# Patient Record
Sex: FEMALE | Race: BLACK OR AFRICAN AMERICAN | Employment: UNEMPLOYED | URBAN - METROPOLITAN AREA
[De-identification: names, ages, dates, MRNs, and addresses within clinical notes are randomized per-mention and may not be internally consistent; named-entity substitution may affect disease eponyms.]

---

## 2017-02-13 ENCOUNTER — HOSPITAL ENCOUNTER (EMERGENCY)
Age: 52
Discharge: HOME | End: 2017-02-13
Attending: EMERGENCY MEDICINE

## 2017-02-13 ENCOUNTER — APPOINTMENT (OUTPATIENT)
Dept: GENERAL RADIOLOGY | Age: 52
End: 2017-02-13

## 2017-02-13 VITALS
WEIGHT: 233 LBS | DIASTOLIC BLOOD PRESSURE: 97 MMHG | HEIGHT: 67 IN | SYSTOLIC BLOOD PRESSURE: 138 MMHG | HEART RATE: 79 BPM | BODY MASS INDEX: 36.57 KG/M2 | RESPIRATION RATE: 16 BRPM | TEMPERATURE: 98.5 F | OXYGEN SATURATION: 99 %

## 2017-02-13 DIAGNOSIS — S92.501A FRACTURE OF FOURTH TOE, RIGHT, CLOSED, INITIAL ENCOUNTER: Primary | ICD-10-CM

## 2017-02-13 PROCEDURE — 73630 X-RAY EXAM OF FOOT: CPT

## 2017-02-13 PROCEDURE — 6370000000 HC RX 637 (ALT 250 FOR IP): Performed by: PHYSICIAN ASSISTANT

## 2017-02-13 PROCEDURE — 73630 X-RAY EXAM OF FOOT: CPT | Performed by: RADIOLOGY

## 2017-02-13 PROCEDURE — 99283 EMERGENCY DEPT VISIT LOW MDM: CPT

## 2017-02-13 RX ORDER — IBUPROFEN 600 MG/1
600 TABLET ORAL ONCE
Status: COMPLETED | OUTPATIENT
Start: 2017-02-13 | End: 2017-02-13

## 2017-02-13 RX ORDER — IBUPROFEN 600 MG/1
600 TABLET ORAL EVERY 6 HOURS PRN
Qty: 30 TABLET | Refills: 0 | Status: SHIPPED | OUTPATIENT
Start: 2017-02-13 | End: 2017-11-01 | Stop reason: ALTCHOICE

## 2017-02-13 RX ADMIN — IBUPROFEN 600 MG: 600 TABLET, FILM COATED ORAL at 15:03

## 2017-02-13 ASSESSMENT — PAIN SCALES - GENERAL
PAINLEVEL_OUTOF10: 8
PAINLEVEL_OUTOF10: 8

## 2017-02-13 ASSESSMENT — ENCOUNTER SYMPTOMS
COUGH: 0
WHEEZING: 0
COLOR CHANGE: 0

## 2017-03-14 ENCOUNTER — HOSPITAL ENCOUNTER (EMERGENCY)
Age: 52
Discharge: HOME | End: 2017-03-14
Attending: EMERGENCY MEDICINE

## 2017-03-14 ENCOUNTER — APPOINTMENT (OUTPATIENT)
Dept: GENERAL RADIOLOGY | Age: 52
End: 2017-03-14

## 2017-03-14 VITALS
RESPIRATION RATE: 15 BRPM | OXYGEN SATURATION: 98 % | TEMPERATURE: 98.2 F | DIASTOLIC BLOOD PRESSURE: 82 MMHG | WEIGHT: 228 LBS | SYSTOLIC BLOOD PRESSURE: 158 MMHG | BODY MASS INDEX: 35.79 KG/M2 | HEART RATE: 86 BPM | HEIGHT: 67 IN

## 2017-03-14 DIAGNOSIS — M79.671 RIGHT FOOT PAIN: Primary | ICD-10-CM

## 2017-03-14 PROCEDURE — 99283 EMERGENCY DEPT VISIT LOW MDM: CPT

## 2017-03-14 PROCEDURE — 73630 X-RAY EXAM OF FOOT: CPT

## 2017-03-14 ASSESSMENT — ENCOUNTER SYMPTOMS
BACK PAIN: 0
COUGH: 0
WHEEZING: 0

## 2017-03-14 ASSESSMENT — PAIN DESCRIPTION - LOCATION: LOCATION: FOOT

## 2017-03-14 ASSESSMENT — PAIN DESCRIPTION - PAIN TYPE: TYPE: ACUTE PAIN

## 2017-03-14 ASSESSMENT — PAIN SCALES - GENERAL: PAINLEVEL_OUTOF10: 8

## 2017-03-22 ENCOUNTER — OFFICE VISIT (OUTPATIENT)
Dept: ORTHOPEDIC SURGERY | Age: 52
End: 2017-03-22

## 2017-03-22 DIAGNOSIS — S92.514A CLOSED NONDISPLACED FRACTURE OF PROXIMAL PHALANX OF LESSER TOE OF RIGHT FOOT, INITIAL ENCOUNTER: Primary | ICD-10-CM

## 2017-03-22 PROCEDURE — 99203 OFFICE O/P NEW LOW 30 MIN: CPT | Performed by: STUDENT IN AN ORGANIZED HEALTH CARE EDUCATION/TRAINING PROGRAM

## 2017-04-19 ENCOUNTER — OFFICE VISIT (OUTPATIENT)
Dept: ORTHOPEDIC SURGERY | Age: 52
End: 2017-04-19

## 2017-04-19 VITALS — BODY MASS INDEX: 35.64 KG/M2 | HEIGHT: 67 IN | WEIGHT: 227.07 LBS

## 2017-04-19 DIAGNOSIS — M77.41 METATARSALGIA OF RIGHT FOOT: ICD-10-CM

## 2017-04-19 DIAGNOSIS — S92.514A CLOSED NONDISPLACED FRACTURE OF PROXIMAL PHALANX OF LESSER TOE OF RIGHT FOOT, INITIAL ENCOUNTER: Primary | ICD-10-CM

## 2017-04-19 PROCEDURE — 99213 OFFICE O/P EST LOW 20 MIN: CPT | Performed by: STUDENT IN AN ORGANIZED HEALTH CARE EDUCATION/TRAINING PROGRAM

## 2017-04-19 RX ORDER — TRAMADOL HYDROCHLORIDE 50 MG/1
50 TABLET ORAL EVERY 8 HOURS PRN
COMMUNITY
End: 2017-11-01

## 2017-05-03 ENCOUNTER — HOSPITAL ENCOUNTER (EMERGENCY)
Age: 52
Discharge: HOME | End: 2017-05-03
Attending: EMERGENCY MEDICINE

## 2017-05-03 ENCOUNTER — APPOINTMENT (OUTPATIENT)
Dept: GENERAL RADIOLOGY | Age: 52
End: 2017-05-03

## 2017-05-03 ENCOUNTER — APPOINTMENT (OUTPATIENT)
Dept: CT IMAGING | Age: 52
End: 2017-05-03

## 2017-05-03 VITALS
HEIGHT: 67 IN | DIASTOLIC BLOOD PRESSURE: 85 MMHG | WEIGHT: 230 LBS | SYSTOLIC BLOOD PRESSURE: 158 MMHG | BODY MASS INDEX: 36.1 KG/M2 | HEART RATE: 82 BPM | OXYGEN SATURATION: 99 % | TEMPERATURE: 98.3 F | RESPIRATION RATE: 21 BRPM

## 2017-05-03 DIAGNOSIS — K80.20 CALCULUS OF GALLBLADDER WITHOUT CHOLECYSTITIS WITHOUT OBSTRUCTION: Primary | ICD-10-CM

## 2017-05-03 DIAGNOSIS — R10.13 ABDOMINAL PAIN, EPIGASTRIC: ICD-10-CM

## 2017-05-03 LAB
-: ABNORMAL
ABSOLUTE EOS #: 0.2 K/UL (ref 0–0.4)
ABSOLUTE LYMPH #: 2.5 K/UL (ref 1–4.8)
ABSOLUTE MONO #: 0.5 K/UL (ref 0.1–1.3)
ALBUMIN SERPL-MCNC: 4 G/DL (ref 3.5–5.2)
ALBUMIN/GLOBULIN RATIO: ABNORMAL (ref 1–2.5)
ALP BLD-CCNC: 170 U/L (ref 35–104)
ALT SERPL-CCNC: 14 U/L (ref 5–33)
AMORPHOUS: ABNORMAL
ANION GAP SERPL CALCULATED.3IONS-SCNC: 13 MMOL/L (ref 9–17)
AST SERPL-CCNC: 13 U/L
BACTERIA: ABNORMAL
BASOPHILS # BLD: 1 %
BASOPHILS ABSOLUTE: 0.1 K/UL (ref 0–0.2)
BILIRUB SERPL-MCNC: 0.16 MG/DL (ref 0.3–1.2)
BILIRUBIN URINE: NEGATIVE
BNP INTERPRETATION: NORMAL
BUN BLDV-MCNC: 3 MG/DL (ref 6–20)
BUN/CREAT BLD: ABNORMAL (ref 9–20)
CALCIUM SERPL-MCNC: 9.1 MG/DL (ref 8.6–10.4)
CASTS UA: ABNORMAL /LPF
CHLORIDE BLD-SCNC: 101 MMOL/L (ref 98–107)
CO2: 26 MMOL/L (ref 20–31)
COLOR: YELLOW
COMMENT UA: ABNORMAL
CREAT SERPL-MCNC: 0.49 MG/DL (ref 0.5–0.9)
CRYSTALS, UA: ABNORMAL /HPF
DIFFERENTIAL TYPE: ABNORMAL
EOSINOPHILS RELATIVE PERCENT: 3 %
EPITHELIAL CELLS UA: ABNORMAL /HPF
GFR AFRICAN AMERICAN: >60 ML/MIN
GFR NON-AFRICAN AMERICAN: >60 ML/MIN
GFR SERPL CREATININE-BSD FRML MDRD: ABNORMAL ML/MIN/{1.73_M2}
GFR SERPL CREATININE-BSD FRML MDRD: ABNORMAL ML/MIN/{1.73_M2}
GLUCOSE BLD-MCNC: 198 MG/DL (ref 70–99)
GLUCOSE URINE: ABNORMAL
HCT VFR BLD CALC: 42.8 % (ref 36–46)
HEMOGLOBIN: 14.4 G/DL (ref 12–16)
KETONES, URINE: NEGATIVE
LEUKOCYTE ESTERASE, URINE: ABNORMAL
LIPASE: 26 U/L (ref 13–60)
LYMPHOCYTES # BLD: 44 %
MCH RBC QN AUTO: 27.1 PG (ref 26–34)
MCHC RBC AUTO-ENTMCNC: 33.7 G/DL (ref 31–37)
MCV RBC AUTO: 80.3 FL (ref 80–100)
MONOCYTES # BLD: 10 %
MUCUS: ABNORMAL
NITRITE, URINE: NEGATIVE
OTHER OBSERVATIONS UA: ABNORMAL
PDW BLD-RTO: 13.5 % (ref 11.5–14.9)
PH UA: 5.5 (ref 5–8)
PLATELET # BLD: 192 K/UL (ref 150–450)
PLATELET ESTIMATE: ABNORMAL
PMV BLD AUTO: 9.2 FL (ref 6–12)
POTASSIUM SERPL-SCNC: 3.7 MMOL/L (ref 3.7–5.3)
PRO-BNP: 22 PG/ML
PROTEIN UA: NEGATIVE
RBC # BLD: 5.33 M/UL (ref 4–5.2)
RBC # BLD: ABNORMAL 10*6/UL
RBC UA: ABNORMAL /HPF
RENAL EPITHELIAL, UA: ABNORMAL /HPF
SEG NEUTROPHILS: 42 %
SEGMENTED NEUTROPHILS ABSOLUTE COUNT: 2.3 K/UL (ref 1.3–9.1)
SODIUM BLD-SCNC: 140 MMOL/L (ref 135–144)
SPECIFIC GRAVITY UA: 1.01 (ref 1–1.03)
TOTAL PROTEIN: 6.9 G/DL (ref 6.4–8.3)
TRICHOMONAS: ABNORMAL
TROPONIN INTERP: NORMAL
TROPONIN INTERP: NORMAL
TROPONIN T: <0.03 NG/ML
TROPONIN T: <0.03 NG/ML
TURBIDITY: ABNORMAL
URINE HGB: NEGATIVE
UROBILINOGEN, URINE: NORMAL
WBC # BLD: 5.6 K/UL (ref 3.5–11)
WBC # BLD: ABNORMAL 10*3/UL
WBC UA: ABNORMAL /HPF
YEAST: ABNORMAL

## 2017-05-03 PROCEDURE — 96375 TX/PRO/DX INJ NEW DRUG ADDON: CPT

## 2017-05-03 PROCEDURE — 84484 ASSAY OF TROPONIN QUANT: CPT

## 2017-05-03 PROCEDURE — 6360000002 HC RX W HCPCS: Performed by: EMERGENCY MEDICINE

## 2017-05-03 PROCEDURE — 6370000000 HC RX 637 (ALT 250 FOR IP): Performed by: EMERGENCY MEDICINE

## 2017-05-03 PROCEDURE — 80053 COMPREHEN METABOLIC PANEL: CPT

## 2017-05-03 PROCEDURE — 85025 COMPLETE CBC W/AUTO DIFF WBC: CPT

## 2017-05-03 PROCEDURE — 99284 EMERGENCY DEPT VISIT MOD MDM: CPT

## 2017-05-03 PROCEDURE — 36415 COLL VENOUS BLD VENIPUNCTURE: CPT

## 2017-05-03 PROCEDURE — 93005 ELECTROCARDIOGRAM TRACING: CPT

## 2017-05-03 PROCEDURE — 2580000003 HC RX 258: Performed by: EMERGENCY MEDICINE

## 2017-05-03 PROCEDURE — 87086 URINE CULTURE/COLONY COUNT: CPT

## 2017-05-03 PROCEDURE — 83880 ASSAY OF NATRIURETIC PEPTIDE: CPT

## 2017-05-03 PROCEDURE — 74177 CT ABD & PELVIS W/CONTRAST: CPT

## 2017-05-03 PROCEDURE — 81001 URINALYSIS AUTO W/SCOPE: CPT

## 2017-05-03 PROCEDURE — 71020 XR CHEST STANDARD TWO VW: CPT

## 2017-05-03 PROCEDURE — 6360000004 HC RX CONTRAST MEDICATION: Performed by: EMERGENCY MEDICINE

## 2017-05-03 PROCEDURE — 96374 THER/PROPH/DIAG INJ IV PUSH: CPT

## 2017-05-03 PROCEDURE — 83690 ASSAY OF LIPASE: CPT

## 2017-05-03 RX ORDER — SODIUM CHLORIDE 0.9 % (FLUSH) 0.9 %
10 SYRINGE (ML) INJECTION PRN
Status: DISCONTINUED | OUTPATIENT
Start: 2017-05-03 | End: 2017-05-04 | Stop reason: HOSPADM

## 2017-05-03 RX ORDER — ASPIRIN 81 MG/1
324 TABLET, CHEWABLE ORAL ONCE
Status: DISCONTINUED | OUTPATIENT
Start: 2017-05-03 | End: 2017-05-04 | Stop reason: HOSPADM

## 2017-05-03 RX ORDER — CALCIUM CARBONATE 200(500)MG
1 TABLET,CHEWABLE ORAL DAILY
Qty: 30 TABLET | Refills: 0 | Status: SHIPPED | OUTPATIENT
Start: 2017-05-03 | End: 2017-06-02

## 2017-05-03 RX ORDER — ONDANSETRON 2 MG/ML
4 INJECTION INTRAMUSCULAR; INTRAVENOUS ONCE
Status: COMPLETED | OUTPATIENT
Start: 2017-05-03 | End: 2017-05-03

## 2017-05-03 RX ORDER — FENTANYL CITRATE 50 UG/ML
25 INJECTION, SOLUTION INTRAMUSCULAR; INTRAVENOUS ONCE
Status: COMPLETED | OUTPATIENT
Start: 2017-05-03 | End: 2017-05-03

## 2017-05-03 RX ORDER — MAGNESIUM HYDROXIDE/ALUMINUM HYDROXICE/SIMETHICONE 120; 1200; 1200 MG/30ML; MG/30ML; MG/30ML
30 SUSPENSION ORAL
Status: COMPLETED | OUTPATIENT
Start: 2017-05-03 | End: 2017-05-03

## 2017-05-03 RX ORDER — 0.9 % SODIUM CHLORIDE 0.9 %
100 INTRAVENOUS SOLUTION INTRAVENOUS ONCE
Status: COMPLETED | OUTPATIENT
Start: 2017-05-03 | End: 2017-05-03

## 2017-05-03 RX ADMIN — IOVERSOL 130 ML: 741 INJECTION INTRA-ARTERIAL; INTRAVENOUS at 21:19

## 2017-05-03 RX ADMIN — SODIUM CHLORIDE 100 ML: 9 INJECTION, SOLUTION INTRAVENOUS at 21:21

## 2017-05-03 RX ADMIN — ONDANSETRON 4 MG: 2 INJECTION INTRAMUSCULAR; INTRAVENOUS at 20:30

## 2017-05-03 RX ADMIN — FENTANYL CITRATE 25 MCG: 50 INJECTION INTRAMUSCULAR; INTRAVENOUS at 22:39

## 2017-05-03 RX ADMIN — Medication 10 ML: at 21:20

## 2017-05-03 RX ADMIN — ALUMINUM HYDROXIDE, MAGNESIUM HYDROXIDE, AND SIMETHICONE 30 ML: 200; 200; 20 SUSPENSION ORAL at 22:39

## 2017-05-03 ASSESSMENT — ENCOUNTER SYMPTOMS
SHORTNESS OF BREATH: 1
NAUSEA: 1
BACK PAIN: 0
COUGH: 1
VOMITING: 0
BLOOD IN STOOL: 0
DIARRHEA: 0
CONSTIPATION: 0
ABDOMINAL PAIN: 1
RHINORRHEA: 0

## 2017-05-03 ASSESSMENT — PAIN DESCRIPTION - PAIN TYPE: TYPE: ACUTE PAIN

## 2017-05-03 ASSESSMENT — PAIN DESCRIPTION - LOCATION: LOCATION: ABDOMEN

## 2017-05-03 ASSESSMENT — PAIN SCALES - GENERAL
PAINLEVEL_OUTOF10: 6
PAINLEVEL_OUTOF10: 7

## 2017-05-03 ASSESSMENT — PAIN DESCRIPTION - FREQUENCY: FREQUENCY: CONTINUOUS

## 2017-05-03 ASSESSMENT — PAIN DESCRIPTION - DESCRIPTORS: DESCRIPTORS: SQUEEZING

## 2017-05-04 LAB
CULTURE: NORMAL
CULTURE: NORMAL
EKG ATRIAL RATE: 84 BPM
EKG P AXIS: 34 DEGREES
EKG P-R INTERVAL: 156 MS
EKG Q-T INTERVAL: 388 MS
EKG QRS DURATION: 90 MS
EKG QTC CALCULATION (BAZETT): 458 MS
EKG R AXIS: -31 DEGREES
EKG T AXIS: 56 DEGREES
EKG VENTRICULAR RATE: 84 BPM
Lab: NORMAL
SPECIMEN DESCRIPTION: NORMAL
SPECIMEN DESCRIPTION: NORMAL
STATUS: NORMAL

## 2017-09-25 ENCOUNTER — HOSPITAL ENCOUNTER (INPATIENT)
Age: 52
LOS: 4 days | Discharge: HOME | DRG: 416 | End: 2017-10-01
Attending: EMERGENCY MEDICINE | Admitting: EMERGENCY MEDICINE

## 2017-09-25 ENCOUNTER — APPOINTMENT (OUTPATIENT)
Dept: CT IMAGING | Age: 52
DRG: 416 | End: 2017-09-25

## 2017-09-25 DIAGNOSIS — R10.11 RIGHT UPPER QUADRANT ABDOMINAL PAIN: Primary | ICD-10-CM

## 2017-09-25 LAB
-: ABNORMAL
ABSOLUTE EOS #: 0.12 K/UL (ref 0–0.4)
ABSOLUTE LYMPH #: 2.6 K/UL (ref 1–4.8)
ABSOLUTE MONO #: 0.56 K/UL (ref 0.1–0.8)
ALBUMIN SERPL-MCNC: 3.8 G/DL (ref 3.5–5.2)
ALBUMIN/GLOBULIN RATIO: 1.2 (ref 1–2.5)
ALP BLD-CCNC: 157 U/L (ref 35–104)
ALT SERPL-CCNC: 11 U/L (ref 5–33)
AMORPHOUS: ABNORMAL
ANION GAP SERPL CALCULATED.3IONS-SCNC: 13 MMOL/L (ref 9–17)
AST SERPL-CCNC: 13 U/L
BACTERIA: ABNORMAL
BASOPHILS # BLD: 0 %
BASOPHILS ABSOLUTE: 0 K/UL (ref 0–0.2)
BILIRUB SERPL-MCNC: 0.25 MG/DL (ref 0.3–1.2)
BILIRUBIN DIRECT: 0.1 MG/DL
BILIRUBIN URINE: NEGATIVE
BILIRUBIN, INDIRECT: 0.15 MG/DL (ref 0–1)
BUN BLDV-MCNC: 3 MG/DL (ref 6–20)
BUN/CREAT BLD: ABNORMAL (ref 9–20)
CALCIUM SERPL-MCNC: 9.1 MG/DL (ref 8.6–10.4)
CASTS UA: ABNORMAL /LPF (ref 0–8)
CHLORIDE BLD-SCNC: 100 MMOL/L (ref 98–107)
CO2: 27 MMOL/L (ref 20–31)
COLOR: YELLOW
CREAT SERPL-MCNC: 0.48 MG/DL (ref 0.5–0.9)
CRYSTALS, UA: ABNORMAL /HPF
DIFFERENTIAL TYPE: ABNORMAL
DIRECT EXAM: NORMAL
EOSINOPHILS RELATIVE PERCENT: 2 %
EPITHELIAL CELLS UA: ABNORMAL /HPF (ref 0–5)
GFR AFRICAN AMERICAN: >60 ML/MIN
GFR NON-AFRICAN AMERICAN: >60 ML/MIN
GFR SERPL CREATININE-BSD FRML MDRD: ABNORMAL ML/MIN/{1.73_M2}
GFR SERPL CREATININE-BSD FRML MDRD: ABNORMAL ML/MIN/{1.73_M2}
GLOBULIN: ABNORMAL G/DL (ref 1.5–3.8)
GLUCOSE BLD-MCNC: 244 MG/DL (ref 70–99)
GLUCOSE URINE: ABNORMAL
HCT VFR BLD CALC: 44.1 % (ref 36–46)
HEMOGLOBIN: 14.7 G/DL (ref 12–16)
KETONES, URINE: NEGATIVE
LEUKOCYTE ESTERASE, URINE: NEGATIVE
LIPASE: 19 U/L (ref 13–60)
LYMPHOCYTES # BLD: 42 %
Lab: NORMAL
MCH RBC QN AUTO: 26.6 PG (ref 26–34)
MCHC RBC AUTO-ENTMCNC: 33.3 G/DL (ref 31–37)
MCV RBC AUTO: 79.8 FL (ref 80–100)
MONOCYTES # BLD: 9 %
MORPHOLOGY: ABNORMAL
MUCUS: ABNORMAL
NITRITE, URINE: NEGATIVE
OTHER OBSERVATIONS UA: ABNORMAL
PDW BLD-RTO: 13.1 % (ref 12.5–15.4)
PH UA: 6 (ref 5–8)
PLATELET # BLD: 200 K/UL (ref 140–450)
PLATELET ESTIMATE: ABNORMAL
PMV BLD AUTO: 8.9 FL (ref 6–12)
POTASSIUM SERPL-SCNC: 3.8 MMOL/L (ref 3.7–5.3)
PROTEIN UA: NEGATIVE
RBC # BLD: 5.52 M/UL (ref 4–5.2)
RBC # BLD: ABNORMAL 10*6/UL
RBC UA: ABNORMAL /HPF (ref 0–4)
RENAL EPITHELIAL, UA: ABNORMAL /HPF
SEG NEUTROPHILS: 47 %
SEGMENTED NEUTROPHILS ABSOLUTE COUNT: 2.92 K/UL (ref 1.8–7.7)
SODIUM BLD-SCNC: 140 MMOL/L (ref 135–144)
SPECIFIC GRAVITY UA: 1.02 (ref 1–1.03)
SPECIMEN DESCRIPTION: NORMAL
STATUS: NORMAL
TOTAL PROTEIN: 7 G/DL (ref 6.4–8.3)
TRICHOMONAS: ABNORMAL
TURBIDITY: CLEAR
URINE HGB: NEGATIVE
UROBILINOGEN, URINE: NORMAL
WBC # BLD: 6.2 K/UL (ref 3.5–11)
WBC # BLD: ABNORMAL 10*3/UL
WBC UA: ABNORMAL /HPF (ref 0–5)
YEAST: ABNORMAL

## 2017-09-25 PROCEDURE — 87660 TRICHOMONAS VAGIN DIR PROBE: CPT

## 2017-09-25 PROCEDURE — 74176 CT ABD & PELVIS W/O CONTRAST: CPT

## 2017-09-25 PROCEDURE — 80076 HEPATIC FUNCTION PANEL: CPT

## 2017-09-25 PROCEDURE — 87480 CANDIDA DNA DIR PROBE: CPT

## 2017-09-25 PROCEDURE — 87491 CHLMYD TRACH DNA AMP PROBE: CPT

## 2017-09-25 PROCEDURE — 87591 N.GONORRHOEAE DNA AMP PROB: CPT

## 2017-09-25 PROCEDURE — 81001 URINALYSIS AUTO W/SCOPE: CPT

## 2017-09-25 PROCEDURE — 87510 GARDNER VAG DNA DIR PROBE: CPT

## 2017-09-25 PROCEDURE — 83690 ASSAY OF LIPASE: CPT

## 2017-09-25 PROCEDURE — 85025 COMPLETE CBC W/AUTO DIFF WBC: CPT

## 2017-09-25 PROCEDURE — 80048 BASIC METABOLIC PNL TOTAL CA: CPT

## 2017-09-25 PROCEDURE — 99285 EMERGENCY DEPT VISIT HI MDM: CPT

## 2017-09-25 ASSESSMENT — PAIN DESCRIPTION - FREQUENCY: FREQUENCY: CONTINUOUS

## 2017-09-25 ASSESSMENT — ENCOUNTER SYMPTOMS
CONSTIPATION: 1
VOMITING: 0
ABDOMINAL PAIN: 1
CHEST TIGHTNESS: 0
EYE REDNESS: 0
SORE THROAT: 0
RHINORRHEA: 0
EYE DISCHARGE: 0
DIARRHEA: 1
BLOOD IN STOOL: 0
NAUSEA: 1
COUGH: 0
SHORTNESS OF BREATH: 0

## 2017-09-25 ASSESSMENT — PAIN DESCRIPTION - PAIN TYPE: TYPE: ACUTE PAIN

## 2017-09-25 ASSESSMENT — PAIN SCALES - GENERAL: PAINLEVEL_OUTOF10: 8

## 2017-09-25 ASSESSMENT — PAIN DESCRIPTION - LOCATION: LOCATION: ABDOMEN

## 2017-09-25 ASSESSMENT — PAIN DESCRIPTION - DESCRIPTORS: DESCRIPTORS: PRESSURE

## 2017-09-25 NOTE — IP AVS SNAPSHOT
Patient Information     Patient Name NELA Ruelas 1965         This is your updated medication list to keep with you all times      TAKE these medications     Blood Glucose Meter Kit   Dx: DM-2. Use 2-3 times daily       BLOOD GLUCOSE TEST STRIPS Strp   Dx: DM-2. Use 2-3 times daily       cetirizine 10 MG tablet   Commonly known as:  ZYRTEC   Take 1 tablet by mouth daily Stop Allergra       docusate sodium 100 MG capsule   Commonly known as:  COLACE   Take 1 capsule by mouth 2 times daily as needed for Constipation       fluticasone 50 MCG/ACT nasal spray   Commonly known as:  FLONASE   1 spray by Nasal route daily       glipiZIDE 10 MG tablet   Commonly known as:  GLUCOTROL   Take 2 tablets by mouth 2 times daily (before meals)       ibuprofen 600 MG tablet   Commonly known as:  ADVIL;MOTRIN   Take 1 tablet by mouth every 6 hours as needed for Pain       Lancets Misc   Dx: DM-2. Use 2-3 times daily       metFORMIN 500 MG tablet   Commonly known as:  GLUCOPHAGE   Take 1 tablet by mouth 2 times daily (with meals)       omeprazole 40 MG delayed release capsule   Commonly known as:  PRILOSEC   Take 1 capsule by mouth daily       oxyCODONE-acetaminophen 5-325 MG per tablet   Commonly known as:  PERCOCET   Take 1 tablet by mouth every 6 hours as needed for Pain .        pregabalin 50 MG capsule   Commonly known as:  LYRICA   Take 1 capsule by mouth 3 times daily       traMADol 50 MG tablet   Commonly known as:  ULTRAM       vitamin B-12 500 MCG tablet   Commonly known as:  CYANOCOBALAMIN       vitamin D 1000 UNIT Tabs tablet   Commonly known as:  CHOLECALCIFEROL       VITAMIN K PO       Walking Boot Misc   by Does not apply route

## 2017-09-25 NOTE — IP AVS SNAPSHOT
cetirizine 10 MG tablet   Commonly known as:  ZYRTEC   Take 1 tablet by mouth daily Stop Allergra                10 mg on 10/1/2017  8:37 AM                            glipiZIDE 10 MG tablet   Commonly known as:  GLUCOTROL   Take 2 tablets by mouth 2 times daily (before meals)                                         ibuprofen 600 MG tablet   Commonly known as:  ADVIL;MOTRIN   Take 1 tablet by mouth every 6 hours as needed for Pain                                         Lancets Misc   Dx: DM-2.  Use 2-3 times daily                                         metFORMIN 500 MG tablet   Commonly known as:  GLUCOPHAGE   Take 1 tablet by mouth 2 times daily (with meals)                500 mg on 9/26/2017  7:25 PM                            omeprazole 40 MG delayed release capsule   Commonly known as:  PRILOSEC   Take 1 capsule by mouth daily                40 mg on 10/1/2017  8:37 AM                            traMADol 50 MG tablet   Commonly known as:  ULTRAM   Take 50 mg by mouth every 8 hours as needed for Pain                50 mg on 9/26/2017  4:09 AM                            vitamin B-12 500 MCG tablet   Commonly known as:  CYANOCOBALAMIN   Take 500 mcg by mouth daily                                         vitamin D 1000 UNIT Tabs tablet   Commonly known as:  CHOLECALCIFEROL   Take 1,000 Units by mouth daily                                         VITAMIN K PO   Take by mouth                                         Walking Boot Misc   by Does not apply route                                              Where to Get Your Medications      You can get these medications from any pharmacy     Bring a paper prescription for each of these medications     docusate sodium 100 MG capsule    oxyCODONE-acetaminophen 5-325 MG per tablet               Allergies as of 10/1/2017        Reactions    Demerol Hcl [Meperidine]     Morphine     Pcn [Penicillins]       Immunizations as of 10/1/2017 has been given to me, the patient and/or responsible adult.            Patient Signature/Responsible Adult:____________________    Clinician Signature:_____________________    Date:_____________________    Time:_____________________

## 2017-09-26 ENCOUNTER — APPOINTMENT (OUTPATIENT)
Dept: ULTRASOUND IMAGING | Age: 52
DRG: 416 | End: 2017-09-26

## 2017-09-26 ENCOUNTER — APPOINTMENT (OUTPATIENT)
Dept: NUCLEAR MEDICINE | Age: 52
DRG: 416 | End: 2017-09-26

## 2017-09-26 LAB
C TRACH DNA GENITAL QL NAA+PROBE: NEGATIVE
ESTIMATED AVERAGE GLUCOSE: 286 MG/DL
GLUCOSE BLD-MCNC: 246 MG/DL (ref 65–105)
GLUCOSE BLD-MCNC: 276 MG/DL (ref 65–105)
HBA1C MFR BLD: 11.6 % (ref 4–6)
HEPATITIS B SURFACE ANTIGEN: NONREACTIVE
HEPATITIS C ANTIBODY: NONREACTIVE
N. GONORRHOEAE DNA: NEGATIVE
TSH SERPL DL<=0.05 MIU/L-ACNC: 0.73 MIU/L (ref 0.3–5)

## 2017-09-26 PROCEDURE — 83516 IMMUNOASSAY NONANTIBODY: CPT

## 2017-09-26 PROCEDURE — 6360000002 HC RX W HCPCS: Performed by: EMERGENCY MEDICINE

## 2017-09-26 PROCEDURE — G0378 HOSPITAL OBSERVATION PER HR: HCPCS

## 2017-09-26 PROCEDURE — 99254 IP/OBS CNSLTJ NEW/EST MOD 60: CPT | Performed by: INTERNAL MEDICINE

## 2017-09-26 PROCEDURE — 83036 HEMOGLOBIN GLYCOSYLATED A1C: CPT

## 2017-09-26 PROCEDURE — 86038 ANTINUCLEAR ANTIBODIES: CPT

## 2017-09-26 PROCEDURE — G0108 DIAB MANAGE TRN  PER INDIV: HCPCS

## 2017-09-26 PROCEDURE — 82947 ASSAY GLUCOSE BLOOD QUANT: CPT

## 2017-09-26 PROCEDURE — 6370000000 HC RX 637 (ALT 250 FOR IP): Performed by: STUDENT IN AN ORGANIZED HEALTH CARE EDUCATION/TRAINING PROGRAM

## 2017-09-26 PROCEDURE — 86803 HEPATITIS C AB TEST: CPT

## 2017-09-26 PROCEDURE — 87340 HEPATITIS B SURFACE AG IA: CPT

## 2017-09-26 PROCEDURE — 2580000003 HC RX 258: Performed by: EMERGENCY MEDICINE

## 2017-09-26 PROCEDURE — 36415 COLL VENOUS BLD VENIPUNCTURE: CPT

## 2017-09-26 PROCEDURE — 76705 ECHO EXAM OF ABDOMEN: CPT

## 2017-09-26 PROCEDURE — 6370000000 HC RX 637 (ALT 250 FOR IP): Performed by: EMERGENCY MEDICINE

## 2017-09-26 PROCEDURE — 84443 ASSAY THYROID STIM HORMONE: CPT

## 2017-09-26 RX ORDER — CETIRIZINE HYDROCHLORIDE 10 MG/1
10 TABLET ORAL DAILY
Status: DISCONTINUED | OUTPATIENT
Start: 2017-09-26 | End: 2017-10-01 | Stop reason: HOSPADM

## 2017-09-26 RX ORDER — MAGNESIUM HYDROXIDE/ALUMINUM HYDROXICE/SIMETHICONE 120; 1200; 1200 MG/30ML; MG/30ML; MG/30ML
30 SUSPENSION ORAL EVERY 6 HOURS PRN
Status: DISCONTINUED | OUTPATIENT
Start: 2017-09-26 | End: 2017-10-01 | Stop reason: HOSPADM

## 2017-09-26 RX ORDER — SODIUM CHLORIDE 0.9 % (FLUSH) 0.9 %
10 SYRINGE (ML) INJECTION PRN
Status: DISCONTINUED | OUTPATIENT
Start: 2017-09-26 | End: 2017-10-01 | Stop reason: HOSPADM

## 2017-09-26 RX ORDER — HYDROCODONE BITARTRATE AND ACETAMINOPHEN 5; 325 MG/1; MG/1
1 TABLET ORAL EVERY 4 HOURS PRN
Status: DISCONTINUED | OUTPATIENT
Start: 2017-09-26 | End: 2017-10-01 | Stop reason: HOSPADM

## 2017-09-26 RX ORDER — SODIUM CHLORIDE 0.9 % (FLUSH) 0.9 %
10 SYRINGE (ML) INJECTION EVERY 12 HOURS SCHEDULED
Status: DISCONTINUED | OUTPATIENT
Start: 2017-09-26 | End: 2017-10-01 | Stop reason: HOSPADM

## 2017-09-26 RX ORDER — PREGABALIN 50 MG/1
50 CAPSULE ORAL 3 TIMES DAILY PRN
Status: DISCONTINUED | OUTPATIENT
Start: 2017-09-26 | End: 2017-10-01 | Stop reason: HOSPADM

## 2017-09-26 RX ORDER — OXYCODONE HYDROCHLORIDE AND ACETAMINOPHEN 5; 325 MG/1; MG/1
1 TABLET ORAL EVERY 4 HOURS PRN
Status: DISCONTINUED | OUTPATIENT
Start: 2017-09-26 | End: 2017-09-26

## 2017-09-26 RX ORDER — ACETAMINOPHEN 325 MG/1
650 TABLET ORAL EVERY 4 HOURS PRN
Status: DISCONTINUED | OUTPATIENT
Start: 2017-09-26 | End: 2017-10-01 | Stop reason: HOSPADM

## 2017-09-26 RX ORDER — ONDANSETRON 2 MG/ML
4 INJECTION INTRAMUSCULAR; INTRAVENOUS EVERY 8 HOURS PRN
Status: DISCONTINUED | OUTPATIENT
Start: 2017-09-26 | End: 2017-10-01 | Stop reason: HOSPADM

## 2017-09-26 RX ORDER — OMEPRAZOLE 20 MG/1
40 CAPSULE, DELAYED RELEASE ORAL DAILY
Status: DISCONTINUED | OUTPATIENT
Start: 2017-09-26 | End: 2017-10-01 | Stop reason: HOSPADM

## 2017-09-26 RX ORDER — TRAMADOL HYDROCHLORIDE 50 MG/1
50 TABLET ORAL EVERY 8 HOURS PRN
Status: DISCONTINUED | OUTPATIENT
Start: 2017-09-26 | End: 2017-09-26

## 2017-09-26 RX ADMIN — Medication 10 ML: at 09:48

## 2017-09-26 RX ADMIN — HYDROCODONE BITARTRATE AND ACETAMINOPHEN 1 TABLET: 5; 325 TABLET ORAL at 19:25

## 2017-09-26 RX ADMIN — HYDROCODONE BITARTRATE AND ACETAMINOPHEN 1 TABLET: 5; 325 TABLET ORAL at 23:35

## 2017-09-26 RX ADMIN — TRAMADOL HYDROCHLORIDE 50 MG: 50 TABLET, FILM COATED ORAL at 04:09

## 2017-09-26 RX ADMIN — METFORMIN HYDROCHLORIDE 500 MG: 500 TABLET ORAL at 04:09

## 2017-09-26 RX ADMIN — ACETAMINOPHEN 650 MG: 325 TABLET ORAL at 03:06

## 2017-09-26 RX ADMIN — METFORMIN HYDROCHLORIDE 500 MG: 500 TABLET ORAL at 19:25

## 2017-09-26 RX ADMIN — PREGABALIN 50 MG: 50 CAPSULE ORAL at 04:09

## 2017-09-26 RX ADMIN — HYDROCODONE BITARTRATE AND ACETAMINOPHEN 1 TABLET: 5; 325 TABLET ORAL at 09:47

## 2017-09-26 RX ADMIN — Medication 10 ML: at 21:51

## 2017-09-26 RX ADMIN — ALUMINUM HYDROXIDE, MAGNESIUM HYDROXIDE, AND SIMETHICONE 30 ML: 200; 200; 20 SUSPENSION ORAL at 21:47

## 2017-09-26 RX ADMIN — ONDANSETRON 4 MG: 2 INJECTION, SOLUTION INTRAMUSCULAR; INTRAVENOUS at 08:47

## 2017-09-26 RX ADMIN — OMEPRAZOLE 40 MG: 20 CAPSULE, DELAYED RELEASE ORAL at 09:46

## 2017-09-26 RX ADMIN — CETIRIZINE HYDROCHLORIDE 10 MG: 10 TABLET ORAL at 09:47

## 2017-09-26 ASSESSMENT — PAIN DESCRIPTION - LOCATION
LOCATION: ABDOMEN

## 2017-09-26 ASSESSMENT — PAIN SCALES - GENERAL
PAINLEVEL_OUTOF10: 6
PAINLEVEL_OUTOF10: 7
PAINLEVEL_OUTOF10: 5

## 2017-09-26 ASSESSMENT — PAIN DESCRIPTION - ONSET
ONSET: ON-GOING
ONSET: ON-GOING

## 2017-09-26 ASSESSMENT — PAIN DESCRIPTION - PAIN TYPE
TYPE: ACUTE PAIN

## 2017-09-26 ASSESSMENT — PAIN DESCRIPTION - FREQUENCY
FREQUENCY: CONTINUOUS
FREQUENCY: CONTINUOUS

## 2017-09-26 ASSESSMENT — PAIN DESCRIPTION - ORIENTATION
ORIENTATION: UPPER;RIGHT;MID
ORIENTATION: RIGHT;UPPER
ORIENTATION: UPPER;RIGHT;MID

## 2017-09-26 ASSESSMENT — PAIN DESCRIPTION - DESCRIPTORS
DESCRIPTORS: PRESSURE
DESCRIPTORS: PRESSURE

## 2017-09-26 NOTE — H&P
[facility]  CDU / OBSERVATION eNCOUnter  Resident Note     Pt Name: Hung Munroe  MRN: [id number]  [place] 1965  Date of evaluation: 9/26/17  Patient's PCP is :  MD Zenon Goss       Chief Complaint   Patient presents with    Abdominal Pain     c/o upper abdominal pain that started last night along with nausea and diarrhea AND constipation    Urinary Frequency     started last night         HISTORY OF PRESENT ILLNESS    Hung Munroe is a 50 y.o. female who presents With one-day history of acute onset sharp nonradiating waxing and waning right upper quadrant pain that is worse with eating, without alleviating factors, rated 8 out of 10 most likely due to cholelithiasis. In addition to her abdominal pain she complains of acute onset polyuria over the last day of unknown etiology. She has a history of diabetes and is used to being frequently however she has been going hourly. This morning she says that she is urinating once every 4 hours, is no longer worried about it. She still having abdominal pain, along with nausea. She has not vomited this morning. She says her nausea is controlled with current medications, but requests pain medications. Location/Symptom: Right upper quadrant  Timing/Onset: 1 day ago  Provocation: None  Quality: Sharp  Radiation: None  Severity: 8 out of 10  Timing/Duration: Waxing and waning  Modifying Factors:  Worse with eating no alleviating factors    REVIEW OF SYSTEMS       General ROS - No fevers, No malaise   Ophthalmic ROS - No discharge, No changes in vision  ENT ROS -  No sore throat, No rhinorrhea,   Respiratory ROS - no shortness of breath, no cough, no  wheezing  Cardiovascular ROS - No chest pain, no dyspnea on exertion  Gastrointestinal ROS - abdominal pain, nausea, no vomiting, no change in bowel habits, no black or bloody stools  Genito-Urinary ROS - No dysuria, trouble voiding, or hematuria, polyuria  Musculoskeletal ROS - No myalgias, No arthalgias  Neurological ROS - No headache, no dizziness/lightheadedness, No focal weakness, no loss of sensation  Dermatological ROS - No lesions, No rash     (PQRS) Advance directives on face sheet per hospital policy. No change unless specifically mentioned in chart    PAST MEDICAL HISTORY    has a past medical history of Allergic rhinitis; Diabetes mellitus ([facility] [address] Diabetic neuropathy ([facility] [address] Fibromyalgia; GERD (gastroesophageal reflux disease); Obesity; Pure hypercholesterolemia; Tobacco abuse; Type II or unspecified type diabetes mellitus without mention of complication, not stated as uncontrolled; and Unspecified sleep apnea. I have reviewed the past medical history with the patient and it is pertinent to this complaint. SURGICAL HISTORY      has a past surgical history that includes Gastric bypass surgery; ovarian cyst removal (Right); and Carpal tunnel release (Right). I have reviewed and agree with Surgical History entered and it is pertinent to this complaint. CURRENT MEDICATIONS       cetirizine (ZYRTEC) tablet 10 mg Daily   omeprazole (PRILOSEC) delayed release capsule 40 mg Daily   pregabalin (LYRICA) capsule 50 mg TID PRN   sodium chloride flush 0.9 % injection 10 mL 2 times per day   sodium chloride flush 0.9 % injection 10 mL PRN   acetaminophen (TYLENOL) tablet 650 mg Q4H PRN   enoxaparin (LOVENOX) injection 40 mg Daily   ondansetron (ZOFRAN) injection 4 mg Q8H PRN   [START ON 2017] influenza quadrivalent-split vaccine (FLULAVAL/FLUZONE) injection 0.5 mL Once   metFORMIN (GLUCOPHAGE) tablet 500 mg BID WC   HYDROcodone-acetaminophen (NORCO) 5-325 MG per tablet 1 tablet Q4H PRN       All medication charted and reviewed. ALLERGIES     is allergic to demerol hcl [meperidine]; morphine; and pcn [penicillins]. FAMILY HISTORY     indicated that her mother is alive. She indicated that her father is .  She indicated that her sister is alive. family history includes Diabetes in her mother and sister; Heart Disease in her father. The patient denies any pertinent family history. I have reviewed and agree with the family history entered. I have reviewed the Family History and it is not significant to the case    SOCIAL HISTORY      reports that she has been smoking Cigarettes. She has been smoking about 1.50 packs per day. She has never used smokeless tobacco. She reports that she drinks alcohol. She reports that she does not use illicit drugs. I have reviewed and agree with all Social.  There are no concerns for substance abuse/use. PHYSICAL EXAM     INITIAL VITALS:  height is 5' 6\" (1.676 m) and weight is 229 lb 8 oz (104.1 kg). Her oral temperature is 98.4 °F (36.9 °C). Her blood pressure is 137/73 and her pulse is 76. Her respiration is 18 and oxygen saturation is 99%.       CONSTITUTIONAL: AOx4, no apparent distress, appears stated age    HEAD: normocephalic, atraumatic   EYES: PERRLA, EOMI    ENT: moist mucous membranes, uvula midline   NECK: supple, symmetric   BACK: symmetric   LUNGS: clear to auscultation bilaterally   CARDIOVASCULAR: regular rate and rhythm, no murmurs, rubs or gallops   ABDOMEN: soft, non-tender, non-distended with normal active bowel sounds   NEUROLOGIC:  MAEx4, no focal sensory or motor deficits   MUSCULOSKELETAL: no clubbing, cyanosis or edema   SKIN: no rash or wounds       DIFFERENTIAL DIAGNOSIS/MDM:     Abdominal Pain:  DDX: GERD, PUD, pancreatitis, cholecystitis, GB colic, cholangitis, Vyyn-Ovne-Btlqsn, ACS/ MI, pneumonia, SBO, DKA, AAA, mesenteric ischemia, perforated viscous, acute gastroenteritis, NSAP, pyelonephritis, kidney stone, appendicitis, hernia, D-TICS, ectopic, ovarian torsion, ovarian cyst, PID, Mittelschmerz, period/ fibroid, UTI, constipation    DIAGNOSTIC RESULTS     EKG: All EKG's are interpreted by the Observation Physician who either signs or Co-signs this chart in the absence of a cardiologist.    EKG Interpretation    None    RADIOLOGY:   I directly visualized the following  images and reviewed the radiologist interpretations:    Ct Abdomen Pelvis Wo Iv Contrast Additional Contrast? None    Result Date: 9/26/2017  EXAMINATION: CT OF THE ABDOMEN AND PELVIS WITHOUT CONTRAST, 9/25/2017 11:38 pm TECHNIQUE: CT of the abdomen and pelvis was performed without the administration of intravenous contrast. Multiplanar reformatted images are provided for review. Dose modulation, iterative reconstruction, and/or weight based adjustment of the mA/kV was utilized to reduce the radiation dose to as low as reasonably achievable. COMPARISON: May 3, 2017 HISTORY: ORDERING SYSTEM PROVIDED HISTORY: dysuria, RLQ abdominal pain TECHNOLOGIST PROVIDED HISTORY: Additional Contrast?->None FINDINGS: Lower Chest: Clear Organs: The abdominal wall appears normal. The liver, spleen, pancreas, and right adrenal appear normal.  The gallbladder is distended. No radiodense gallstones are noted. The left adrenal mass appears unchanged in size and configuration measuring 23 x 36 mm. It is well circumscribed but slightly heterogeneous. Kidneys appear normal. The bladder appears normal. GI/Bowel: Postsurgical changes are noted consistent with gastric bypass. The stomach and small bowel are otherwise normal.  The colon and appendix appear normal.  There is a stable heterogeneous mass in the left upper quadrant of the abdomen containing a curvy linear radiodensity and coarse calcifications. It appears essentially unchanged in size and configuration. It measures 54 x 57 mm in AP and transverse dimensions. Two intraluminal radiodensities are noted in the cecum which probably represent radiodense pills. Pelvis: The uterus appears normal.  Stable phlebolith. Peritoneum/Retroperitoneum: The abdominal aorta and iliac arteries are normal in caliber. There is no pathologic adenopathy.  Bones/Soft Tissues: Normal     Distended Cholelithiasis without evidence for acute cholecystitis. Otherwise, unremarkable right upper quadrant ultrasound       LABS:  I have reviewed and interpreted all available lab results. Labs Reviewed   URINALYSIS WITH MICROSCOPIC - Abnormal; Notable for the following:        Result Value    Glucose, Ur 3+ (*)     All other components within normal limits   CBC WITH AUTO DIFFERENTIAL - Abnormal; Notable for the following:     RBC 5.52 (*)     MCV 79.8 (*)     All other components within normal limits   BASIC METABOLIC PANEL - Abnormal; Notable for the following:     Glucose 244 (*)     BUN 3 (*)     CREATININE 0.48 (*)     All other components within normal limits   HEPATIC FUNCTION PANEL - Abnormal; Notable for the following:     Alkaline Phosphatase 157 (*)     Total Bilirubin 0.25 (*)     All other components within normal limits   HEMOGLOBIN A1C - Abnormal; Notable for the following:     Hemoglobin A1C 11.6 (*)     All other components within normal limits   POC GLUCOSE FINGERSTICK - Abnormal; Notable for the following:     POC Glucose 276 (*)     All other components within normal limits   POC GLUCOSE FINGERSTICK - Abnormal; Notable for the following:     POC Glucose 246 (*)     All other components within normal limits   VAGINITIS DNA PROBE   C.TRACHOMATIS N.GONORRHOEAE DNA   LIPASE   TSH WITH REFLEX   STEFANIE   MITOCHONDRIAL ANTIBODIES, M2, IGG   SMOOTH MUSCLE ANTIBODY QUANT   HEPATITIS C ANTIBODY   HEPATITIS B SURFACE ANTIGEN       SCREENING TOOLS:      CDU BAO / Mahin Meuse is a 50 y.o. female who presents With one-day history of acute onset sharp nonradiating waxing and waning right upper quadrant pain that is worse with eating, without alleviating factors, rated 8 out of 10 most likely due to cholelithiasis. Also, acute onset polyuria over the last day of unknown etiology. · She is urinating once every 4 hours at this time, no need for urology consult.   She'll follow up with PCP as needed. · She's been seen by GI who recommended right upper quadrant ultrasound. Ultrasound has returned as showing cholelithiasis without stone obstruction. Waiting on recommendation for possible outpatient management. · Continue home medications and pain control  · Monitor vitals, labs, and imaging  · DISPO: pending consults and clinical improvement    CONSULTS:    IP CONSULT TO GI  IP CONSULT TO DIABETES EDUCATOR    PROCEDURES:  Not indicated      PATIENT REFERRED TO:    Thuy Mckenna MD  [address] [place] [address]  Thony Schmidt MD   Emergency Medicine Resident     This dictation was generated by voice recognition computer software. Although all attempts are made to edit the dictation for accuracy, there may be errors in the transcription that are not intended.

## 2017-09-26 NOTE — DISCHARGE SUMMARY
CDU Discharge Summary      Patient:  Indira Castillo  YOB: 1965    MRN: [id number]   Acct: [de-identified]    Primary Care Physician: Monica Mosher MD    Admit date:  9/25/2017  9:39 PM  Discharge date: 10/1/2017 11:50 AM     Discharge Diagnoses:     1.) Acute right upper quadrant pain likely due to Cholecystitis improved with cholecystectomy on 9/29/2017 will follow up with general surgery outpatient. 2.)  Acute polyuria of unknown etiology self resolved, will be followed up with PCP.    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 Follow-up:  Call today/tomorrow for a follow up appointment with Thuy Soler MD , or return to the Emergency Room with worsening symptoms    Stressed to patient the importance of following up with primary care doctor for further workup/management of symptoms. Pt verbalizes understanding and agrees with plan. Discharge Medication Changes:       Medication List      START taking these medications          docusate sodium 100 MG capsule   Commonly known as:  COLACE   Take 1 capsule by mouth 2 times daily as needed for Constipation       oxyCODONE-acetaminophen 5-325 MG per tablet   Commonly known as:  PERCOCET   Take 1 tablet by mouth every 6 hours as needed for Pain . CHANGE how you take these medications          fluticasone 50 MCG/ACT nasal spray   Commonly known as:  FLONASE   1 spray by Nasal route daily   What changed:    - when to take this  - reasons to take this       pregabalin 50 MG capsule   Commonly known as:  LYRICA   Take 1 capsule by mouth 3 times daily   What changed:    - when to take this  - reasons to take this         CONTINUE taking these medications          Blood Glucose Meter Kit   Dx: DM-2.  Use 2-3 times daily       BLOOD GLUCOSE TEST STRIPS Strp Dx: DM-2. Use 2-3 times daily       cetirizine 10 MG tablet   Commonly known as:  ZYRTEC   Take 1 tablet by mouth daily Stop Allergra       glipiZIDE 10 MG tablet   Commonly known as:  GLUCOTROL   Take 2 tablets by mouth 2 times daily (before meals)       ibuprofen 600 MG tablet   Commonly known as:  ADVIL;MOTRIN   Take 1 tablet by mouth every 6 hours as needed for Pain       Lancets Misc   Dx: DM-2. Use 2-3 times daily       metFORMIN 500 MG tablet   Commonly known as:  GLUCOPHAGE   Take 1 tablet by mouth 2 times daily (with meals)       omeprazole 40 MG delayed release capsule   Commonly known as:  PRILOSEC   Take 1 capsule by mouth daily       traMADol 50 MG tablet   Commonly known as:  ULTRAM       vitamin B-12 500 MCG tablet   Commonly known as:  CYANOCOBALAMIN       vitamin D 1000 UNIT Tabs tablet   Commonly known as:  CHOLECALCIFEROL       VITAMIN K PO       Walking Boot Misc   by Does not apply route            Where to Get Your Medications      You can get these medications from any pharmacy     Bring a paper prescription for each of these medications     docusate sodium 100 MG capsule    oxyCODONE-acetaminophen 5-325 MG per tablet             Diet:   advance as tolerated     Activity:  As tolerated    Consultants: IP CONSULT TO GI  IP CONSULT TO DIABETES EDUCATOR  IP CONSULT TO PULMONOLOGY  IP CONSULT TO OTOLARYNGOLOGY  IP CONSULT TO GENERAL SURGERY  IP CONSULT TO IV TEAM    Procedures:  Not indicated     Diagnostic Test:   Results for orders placed or performed during the hospital encounter of 09/25/17   VAGINITIS DNA PROBE   Result Value Ref Range    Specimen Description . VAGINA     Special Requests NOT REPORTED     Direct Exam NEGATIVE for Trichomonas vaginalis     Direct Exam NEGATIVE for Candida sp.      Direct Exam NEGATIVE for Gardnerella vaginalis     Direct Exam       Method of testing is a DNA probe intended for detection and identification of    Direct Exam        Candida species, Gardnerella vaginalis, and Trichomonas vaginalis nucleic acid    Direct Exam        in vaginal fluid specimens from patients with symptoms of vaginitis/vaginosis.     Direct Exam       [facility] [address] [facility], [address] [place] [address] [phone]    Status FINAL 09/25/2017    C.trachomatis N.gonorrhoeae DNA   Result Value Ref Range    C. trachomatis DNA NEGATIVE NEG    N. gonorrhoeae DNA NEGATIVE NEG   Urinalysis with Microscopic   Result Value Ref Range    Color, UA YELLOW YEL    Turbidity UA CLEAR CLEAR    Glucose, Ur 3+ (A) NEG    Bilirubin Urine NEGATIVE NEG    Ketones, Urine NEGATIVE NEG    Specific Gravity, UA 1.020 1.005 - 1.030    Urine Hgb NEGATIVE NEG    pH, UA 6.0 5.0 - 8.0    Protein, UA NEGATIVE NEG    Urobilinogen, Urine Normal NORM    Nitrite, Urine NEGATIVE NEG    Leukocyte Esterase, Urine NEGATIVE NEG    -          WBC, UA 2 TO 5 0 - 5 /HPF    RBC, UA 0 TO 2 0 - 4 /HPF    Casts UA NOT REPORTED 0 - 8 /LPF    Crystals UA NOT REPORTED NONE /HPF    Epithelial Cells UA 0 TO 2 0 - 5 /HPF    Renal Epithelial, Urine NOT REPORTED 0 /HPF    Bacteria, UA NOT REPORTED NONE    Mucus, UA NOT REPORTED NONE    Trichomonas, UA NOT REPORTED NONE    Amorphous, UA NOT REPORTED NONE    Other Observations UA NOT REPORTED NREQ    Yeast, UA NOT REPORTED NONE   CBC Auto Differential   Result Value Ref Range    WBC 6.2 3.5 - 11.0 k/uL    RBC 5.52 (H) 4.0 - 5.2 m/uL    Hemoglobin 14.7 12.0 - 16.0 g/dL    Hematocrit 44.1 36 - 46 %    MCV 79.8 (L) 80 - 100 fL    MCH 26.6 26 - 34 pg    MCHC 33.3 31 - 37 g/dL    RDW 13.1 12.5 - 15.4 %    Platelets 606 041 - 393 k/uL    MPV 8.9 6.0 - 12.0 fL    Differential Type NOT REPORTED     WBC Morphology NOT REPORTED     RBC Morphology NOT REPORTED     Platelet Estimate NOT REPORTED     Seg Neutrophils 47 %    Lymphocytes 42 %    Monocytes 9 %    Eosinophils % 2 %    Basophils 0 %    Segs Absolute 2.92 1.8 - 7.7 k/uL    Absolute Lymph # 2.60 1.0 - 4.8 k/uL    Absolute Mono # 0.56 0.1 - 0.8 k/uL Absolute Eos # 0.12 0.0 - 0.4 k/uL    Basophils # 0.00 0.0 - 0.2 k/uL    Morphology MICROCYTOSIS PRESENT    BASIC METABOLIC PANEL   Result Value Ref Range    Glucose 244 (H) 70 - 99 mg/dL    BUN 3 (L) 6 - 20 mg/dL    CREATININE 0.48 (L) 0.50 - 0.90 mg/dL    Bun/Cre Ratio NOT REPORTED 9 - 20    Calcium 9.1 8.6 - 10.4 mg/dL    Sodium 140 135 - 144 mmol/L    Potassium 3.8 3.7 - 5.3 mmol/L    Chloride 100 98 - 107 mmol/L    CO2 27 20 - 31 mmol/L    Anion Gap 13 9 - 17 mmol/L    GFR Non-African American >60 >60 mL/min    GFR African American >60 >60 mL/min    GFR Comment          GFR Staging NOT REPORTED    HEPATIC FUNCTION PANEL   Result Value Ref Range    Alb 3.8 3.5 - 5.2 g/dL    Alkaline Phosphatase 157 (H) 35 - 104 U/L    ALT 11 5 - 33 U/L    AST 13 <32 U/L    Total Bilirubin 0.25 (L) 0.3 - 1.2 mg/dL    Bilirubin, Direct 0.10 <0.31 mg/dL    Bilirubin, Indirect 0.15 0.00 - 1.00 mg/dL    Total Protein 7.0 6.4 - 8.3 g/dL    Globulin NOT REPORTED 1.5 - 3.8 g/dL    Albumin/Globulin Ratio 1.2 1.0 - 2.5   LIPASE   Result Value Ref Range    Lipase 19 13 - 60 U/L   HEMOGLOBIN A1C   Result Value Ref Range    Hemoglobin A1C 11.6 (H) 4.0 - 6.0 %    Estimated Avg Glucose 286 mg/dL   TSH with Reflex   Result Value Ref Range    TSH 0.73 0.30 - 5.00 mIU/L   STEFANIE   Result Value Ref Range    STEFANIE NEGATIVE NEG   MITOCHONDRIAL ANTIBODIES, M2, IGG   Result Value Ref Range    Mitochondrial Ab 5.4 0.0 - 20.0 Units   Smooth Muscle Antibody Quant   Result Value Ref Range    Smooth Muscle Ab 4 0 - 19 Units   Hepatitis C Antibody   Result Value Ref Range    Hepatitis C Ab NONREACTIVE NR   Hepatitis B Surface Antigen   Result Value Ref Range    Hepatitis B Surface Ag NONREACTIVE NR   Surgical Pathology   Result Value Ref Range    Surgical Pathology Report       (NOTE)  [id number]  [facility]  ANATOMIC PATHOLOGY  [address] [place] [address], [place] [address].   [place], [address]  [phone]  Fax: [phone]    [id number]  Path Number: [id number]  Collected: 9/27/2017  Received: 9/27/2017  Reported: 9/28/2017 16:43    -- Diagnosis --  STOMACH, BIOPSIES:  - SEVERE ACTIVE CHRONIC HELICOBACTER PYLORI ASSOCIATED GASTRITIS. Ana Burnette. John Wright,  **Electronically Signed Out**         sls/9/28/2017      Clinical Information  Pre-op Diagnosis:  UPPER GI BLEED   Operative Findings:  GASTRIC BX'S  Operation Performed:  EGD BIOPSY     Source of Specimen  1: GASTRIC BX'S    Gross Description  \"AZEB LORENZ, GASTRIC BX'S\" Nine pink-tan tissue fragments  from 0.2 to 0.5 cm and are 1.0 x 0.7 x 0.2 cm in aggregate. Entirely  1cs. yr tm      Microscopic Description  The biopsies demonstrate severe chronic and focal active gastritis  ( active chronic gastritis). Helicobacter pylori immunostain (control  appropriate) is positive for infectious bacteria. There is no  evidence of intestinal metaplasia, dysplasia or malignancy. Cytology, Non-Gyn   Result Value Ref Range    Specimen Description NOT REPORTED     Case Number: [id number]    Surgical Pathology   Result Value Ref Range    Surgical Pathology Report       (NOTE)  [id number]  [facility]  ANATOMIC PATHOLOGY  [address] [place] [address]. [place], [address]  [phone]  Fax: [phone]    [address] [place] [address] Drive    [facility] [id number]  Path Number: [id number]  Collected: 9/27/2017  Received: 9/27/2017  Reported: 9/28/2017 11:49    -- Diagnosis --  1. VOCAL CORD, BRUSHING:         ATYPICAL CELLS PRESENT. SEE COMMENT. 2.  BRONCHIAL WASHINGS:         NEGATIVE FOR MALIGNANCY.               -- Diagnosis Comment --  PER THE BRONCHOSCOPY PROCEDURE NOTE, THE PATIENT WAS FOUND TO HAVE A  LARGE, POLYPOID, ULCERATED FLESHY MASS ARISING FROM THE BODY OF THE  RIGHT VOCAL CORD Glucose Fingerstick   Result Value Ref Range    POC Glucose 249 (H) 65 - 105 mg/dL   POC Glucose Fingerstick   Result Value Ref Range    POC Glucose 316 (H) 65 - 105 mg/dL   POC Glucose Fingerstick   Result Value Ref Range    POC Glucose 263 (H) 65 - 105 mg/dL   POC Glucose Fingerstick   Result Value Ref Range    POC Glucose 323 (H) 65 - 105 mg/dL   POC Glucose Fingerstick   Result Value Ref Range    POC Glucose 222 (H) 65 - 105 mg/dL   POC Glucose Fingerstick   Result Value Ref Range    POC Glucose 224 (H) 65 - 105 mg/dL   POC Glucose Fingerstick   Result Value Ref Range    POC Glucose 263 (H) 65 - 105 mg/dL   POC Glucose Fingerstick   Result Value Ref Range    POC Glucose 366 (H) 65 - 105 mg/dL   POC Glucose Fingerstick   Result Value Ref Range    POC Glucose 318 (H) 65 - 105 mg/dL   POCT urine pregnancy   Result Value Ref Range    HCG, Pregnancy Urine (POC) NEGATIVE NEG   POC Glucose Fingerstick   Result Value Ref Range    POC Glucose 170 (H) 65 - 105 mg/dL   POC Glucose Fingerstick   Result Value Ref Range    POC Glucose 190 (H) 65 - 105 mg/dL   POC Glucose Fingerstick   Result Value Ref Range    POC Glucose 253 (H) 65 - 105 mg/dL   POC Glucose Fingerstick   Result Value Ref Range    POC Glucose 245 (H) 65 - 105 mg/dL   POC Glucose Fingerstick   Result Value Ref Range    POC Glucose 199 (H) 65 - 105 mg/dL   POC Glucose Fingerstick   Result Value Ref Range    POC Glucose 272 (H) 65 - 105 mg/dL   POC Glucose Fingerstick   Result Value Ref Range    POC Glucose 265 (H) 65 - 105 mg/dL     Ct Abdomen Pelvis Wo Iv Contrast Additional Contrast? None    Result Date: 9/26/2017  EXAMINATION: CT OF THE ABDOMEN AND PELVIS WITHOUT CONTRAST, 9/25/2017 11:38 pm TECHNIQUE: CT of the abdomen and pelvis was performed without the administration of intravenous contrast. Multiplanar reformatted images are provided for review.  Dose modulation, iterative reconstruction, and/or weight based adjustment of the mA/kV was utilized to reduce of acute onset sharp nonradiating waxing and waning right upper quadrant pain that is worse with eating, without alleviating factors, rated 8 out of 10 most likely due to cholelithiasis. At that time it was determined that She required further observation and evaluation by gastroenterology attending. She received a right upper quadrant ultrasound that showed stones in the gallbladder without obstruction, but HIDA scan was positive. She received cholecystectomy on 9/29/2017. In addition during an EGD she was found to have a laryngeal mass, suspicious for vocal cord polyp. She received a bronchoscopy by pulmonology, polyp was removed, and subsequently otolaryngology was consulted for management, which will be done in the outpatient setting. Labs and imaging were followed daily. Imaging results as above. She is medically stable to be discharged. Disposition: Home    Patient stated that they will not drive themselves home from the hospital if they have gotten pain killers/ narcotics earlier that day and that they will arrange for transportation on their own or work with the  for a ride. Patient counseled NOT to drive while under the influence of narcotics/ pain killers. Condition: Good    Patient stable and ready for discharge home. I have discussed plan of care with patient and they are in understanding. They were instructed to read discharge paperwork. All of their questions and concerns were addressed. Time Spent: 4 day    --  Neeta Culver DO  Emergency Medicine Resident Physician    This dictation was generated by voice recognition computer software. Although all attempts are made to edit the dictation for accuracy, there may be errors in the transcription that are not intended.

## 2017-09-26 NOTE — PROGRESS NOTES
CDU Daily Progress Note  Attending Physician       Pt Name: Olvin Arredondo  MRN: [id number]  [place] 1965  Date of evaluation: 09/26/17    I performed a history and physical examination of the patient and discussed management with the resident. I reviewed the residents note and agree with the documented findings and plan of care. Any areas of disagreement are noted on the chart. I was personally present for the key portions of any procedures. I have documented in the chart those procedures where I was not present during the key portions. I have reviewed the emergency nurses triage note. I agree with the chief complaint, past medical history, past surgical history, allergies, medications, social and family history as documented unless otherwise noted below. Documentation of the HPI, Physical Exam and Medical Decision Making performed by medical students or scribes is based on my personal performance of the HPI, PE and MDM. For Physician Assistant/ Nurse Practitioner cases/documentation I have personally evaluated this patient and have completed at least one if not all key elements of the E/M (history, physical exam, and MDM). Additional findings are as noted. The Family History, Social History and Review of Systems are unchanged from the previous day. No significant events overnight. GI consult, gb ultrasound  Patient smokes 30 cigarettes per day for 30 years. Smoking cessation counseling for 3 minutes. Discussed the effects of smoking in regards to cad. I explained how this negatively effects their condition, will contribute to increased recovery time, and possible lead to further health problems in the future.         Nickie Del Cid,  DO  Attending Physician  Critical Decision Unit

## 2017-09-26 NOTE — PROGRESS NOTES
Mukund Yoder,  Seen for evaluation and education on Type 2 uncontrolled    Lab Results   Component Value Date    LABA1C 9.5 02/23/2016   Discussed with Mukund Yoder, their current diabetes self-care routines prior to this admission. medication compliance: noncompliant much of the time, stated only taking metformin because she could get some from other, had not been taking her own DM meds due to lack of health insurance. Diabetic diet compliance: noncompliant much of the time, drinks regular pop and sweet tea daily. Stated due to GB issues avoid veggies and fruits. Home glucose monitoring: is performed sporadically, know how to use a device, but is without a working meter and can not afford strips. Following Survival Skills education topics were covered as appropriate to patient plan of for care:  _x__ Type 2 Diabetes diagnosis as chronic and progressive       _x__ Healthy eating - CHO food groups and need for CHO controlled diet. Elimination of sugary beverages, avoid skipping meal   Patient stated thinking about giving up pop - and add less sugar to sweet tea. Also stated in past had gastic bypass surgery. _x__ Role of physical activity - bouts of walking, swimming or low impact aerobics as recommended by care providers- aim for 30 minutes per day      __x_ Blood Glucose Self-Monitoring ( BGSM)  /how to use a BG meter - Provided and demonstrated use True Metrix BG meter for education and practice of BGSM skill     __x_ Blood sugar targets Pre meal 80 - 130 and 2 hours post meal < 180    _x__ Hyperglycemia  ( BG over 250) signs and symptoms and treatment   __x_ Hypoglycemia( BG < than 70) signs and symptoms and treatment \"Rule of 15\"    _x__ Keep BG log and take log and meter to follow up HCP appointments    ___ Medications -   Orals - encourged to take all diabetes medications as directed. pateint has not used insulin in past. Current A1c is pending.     Following Support materials were

## 2017-09-26 NOTE — CONSULTS
focal neurological deficits    LABS and IMAGING:     CBC  Recent Labs      09/25/17 2310   WBC  6.2   HGB  14.7   MCV  79.8*   RDW  13.1   PLT  200       ANEMIA STUDIES  No results for input(s): LABIRON, TIBC, FERRITIN, [id number], FOLATE, OCCULTBLD in the last 72 hours. BMP  Recent Labs      09/25/17 2310   NA  140   K  3.8   CL  100   CO2  27   BUN  3*   CREATININE  0.48*   GLUCOSE  244*   CALCIUM  9.1       LFTS  Recent Labs      09/25/17   2310   ALKPHOS  157*   ALT  11   AST  13   BILITOT  0.25*   BILIDIR  0.10   LABALBU  3.8       AMYLASE/LIPASE/AMMONIA  Recent Labs      09/25/17   2310   LIPASE  19       PT/INR  No results for input(s): PROTIME, INR in the last 72 hours.         Electronically signed by:  Liane Busch M.D.  9/26/2017    9:45 AM

## 2017-09-26 NOTE — ED TRIAGE NOTES
Pt presents to ED with c/o upper abdominal pain that started yesterday along with diarrhea and constipation. Also, c/o urinary frequency.

## 2017-09-26 NOTE — ED PROVIDER NOTES
atraumatic. Eyes: Conjunctivae and EOM are normal. Pupils are equal, round, and reactive to light. Neck: Normal range of motion. Neck supple. No JVD present. No tracheal deviation present. Cardiovascular: Normal rate, regular rhythm, normal heart sounds and intact distal pulses. Pulmonary/Chest: Effort normal and breath sounds normal. No stridor. No respiratory distress. She has no wheezes. She has no rales. She exhibits no tenderness. Abdominal:   Abdomen soft, obese, nondistended, no peritoneal, no rebound tenderness or guarding, no tenderness to palpation in bilateral lower quadrants, normal bowel sounds   Genitourinary:   Genitourinary Comments: No adnexal tenderness bilaterally, no cervical motion tenderness, small amount of white discharge, no blood, negative stool guaiac   Musculoskeletal: Normal range of motion. She exhibits no edema. Neurological: She is alert and oriented to person, place, and time. No cranial nerve deficit. Skin: Skin is warm and dry. No rash noted. Psychiatric: She has a normal mood and affect.        DIFFERENTIAL  DIAGNOSIS     PLAN (LABS / IMAGING / EKG):  Orders Placed This Encounter   Procedures    VAGINITIS DNA PROBE    C.trachomatis N.gonorrhoeae DNA    CT ABDOMEN PELVIS WO IV CONTRAST Additional Contrast? None    Urinalysis with Microscopic    CBC Auto Differential    BASIC METABOLIC PANEL    HEPATIC FUNCTION PANEL    LIPASE    Diet NPO Effective Now    Vaginal exam    Vital signs per unit routine    Notify physician    Notify physician    Place intermittent pneumatic compression device    Full Code    Inpatient consult to GI    PATIENT STATUS (FROM ED OR OR/PROCEDURAL) Observation       MEDICATIONS ORDERED:  Orders Placed This Encounter   Medications    cetirizine (ZYRTEC) tablet 10 mg    DISCONTD: metFORMIN (GLUCOPHAGE) tablet 500 mg    omeprazole (PRILOSEC) delayed release capsule 40 mg    pregabalin (LYRICA) capsule 50 mg    sodium chloride flush 0.9 % injection 10 mL    sodium chloride flush 0.9 % injection 10 mL    acetaminophen (TYLENOL) tablet 650 mg    enoxaparin (LOVENOX) injection 40 mg    ondansetron (ZOFRAN) injection 4 mg    influenza quadrivalent-split vaccine (FLULAVAL/FLUZONE) injection 0.5 mL    traMADol (ULTRAM) tablet 50 mg    metFORMIN (GLUCOPHAGE) tablet 500 mg       DDX: PID, UTI, STD/STI, reticulocyte/diverticulitis, chronic constipation, inflammatory bowel, irritable bowel, dietary    DIAGNOSTIC RESULTS / EMERGENCY DEPARTMENT COURSE / MDM     LABS:  Results for orders placed or performed during the hospital encounter of 09/25/17   VAGINITIS DNA PROBE   Result Value Ref Range    Specimen Description . VAGINA     Special Requests NOT REPORTED     Direct Exam NEGATIVE for Trichomonas vaginalis     Direct Exam NEGATIVE for Candida sp. Direct Exam NEGATIVE for Gardnerella vaginalis     Direct Exam       Method of testing is a DNA probe intended for detection and identification of    Direct Exam        Candida species, Gardnerella vaginalis, and Trichomonas vaginalis nucleic acid    Direct Exam        in vaginal fluid specimens from patients with symptoms of vaginitis/vaginosis.     Direct Exam       [employer] [address] [address] [phone]    Status FINAL 09/25/2017    Urinalysis with Microscopic   Result Value Ref Range    Color, UA YELLOW YEL    Turbidity UA CLEAR CLEAR    Glucose, Ur 3+ (A) NEG    Bilirubin Urine NEGATIVE NEG    Ketones, Urine NEGATIVE NEG    Specific Gravity, UA 1.020 1.005 - 1.030    Urine Hgb NEGATIVE NEG    pH, UA 6.0 5.0 - 8.0    Protein, UA NEGATIVE NEG    Urobilinogen, Urine Normal NORM    Nitrite, Urine NEGATIVE NEG    Leukocyte Esterase, Urine NEGATIVE NEG    -          WBC, UA 2 TO 5 0 - 5 /HPF    RBC, UA 0 TO 2 0 - 4 /HPF    Casts UA NOT REPORTED 0 - 8 /LPF    Crystals UA NOT REPORTED NONE /HPF    Epithelial Cells UA 0 TO 2 0 - 5 /HPF    Renal Epithelial, Urine NOT REPORTED 0 /HPF    Bacteria, UA NOT REPORTED NONE    Mucus, UA NOT REPORTED NONE    Trichomonas, UA NOT REPORTED NONE    Amorphous, UA NOT REPORTED NONE    Other Observations UA NOT REPORTED NREQ    Yeast, UA NOT REPORTED NONE   CBC Auto Differential   Result Value Ref Range    WBC 6.2 3.5 - 11.0 k/uL    RBC 5.52 (H) 4.0 - 5.2 m/uL    Hemoglobin 14.7 12.0 - 16.0 g/dL    Hematocrit 44.1 36 - 46 %    MCV 79.8 (L) 80 - 100 fL    MCH 26.6 26 - 34 pg    MCHC 33.3 31 - 37 g/dL    RDW 13.1 12.5 - 15.4 %    Platelets 003 902 - 233 k/uL    MPV 8.9 6.0 - 12.0 fL    Differential Type NOT REPORTED     WBC Morphology NOT REPORTED     RBC Morphology NOT REPORTED     Platelet Estimate NOT REPORTED     Seg Neutrophils 47 %    Lymphocytes 42 %    Monocytes 9 %    Eosinophils % 2 %    Basophils 0 %    Segs Absolute 2.92 1.8 - 7.7 k/uL    Absolute Lymph # 2.60 1.0 - 4.8 k/uL    Absolute Mono # 0.56 0.1 - 0.8 k/uL    Absolute Eos # 0.12 0.0 - 0.4 k/uL    Basophils # 0.00 0.0 - 0.2 k/uL    Morphology MICROCYTOSIS PRESENT    BASIC METABOLIC PANEL   Result Value Ref Range    Glucose 244 (H) 70 - 99 mg/dL    BUN 3 (L) 6 - 20 mg/dL    CREATININE 0.48 (L) 0.50 - 0.90 mg/dL    Bun/Cre Ratio NOT REPORTED 9 - 20    Calcium 9.1 8.6 - 10.4 mg/dL    Sodium 140 135 - 144 mmol/L    Potassium 3.8 3.7 - 5.3 mmol/L    Chloride 100 98 - 107 mmol/L    CO2 27 20 - 31 mmol/L    Anion Gap 13 9 - 17 mmol/L    GFR Non-African American >60 >60 mL/min    GFR African American >60 >60 mL/min    GFR Comment          GFR Staging NOT REPORTED    HEPATIC FUNCTION PANEL   Result Value Ref Range    Alb 3.8 3.5 - 5.2 g/dL    Alkaline Phosphatase 157 (H) 35 - 104 U/L    ALT 11 5 - 33 U/L    AST 13 <32 U/L    Total Bilirubin 0.25 (L) 0.3 - 1.2 mg/dL    Bilirubin, Direct 0.10 <0.31 mg/dL    Bilirubin, Indirect 0.15 0.00 - 1.00 mg/dL    Total Protein 7.0 6.4 - 8.3 g/dL    Globulin NOT REPORTED 1.5 - 3.8 g/dL    Albumin/Globulin Ratio 1.2 1.0 - 2.5   LIPASE   Result PROCEDURES:  None    CONSULTS:  IP CONSULT TO GI    CRITICAL CARE:  None    FINAL IMPRESSION      1.  Right upper quadrant abdominal pain          DISPOSITION / PLAN     DISPOSITION Admitted    PATIENT REFERRED TO:  Thuy Eaton MD  [address] [id number]            DISCHARGE MEDICATIONS:  Current Discharge Medication List          Nikole Marlow DO  Emergency Medicine Resident    (Please note that portions of this note were completed with a voice recognition program.  Efforts were made to edit the dictations but occasionally words are mis-transcribed.)     Nikole Marlow DO  09/26/17 [id number]

## 2017-09-27 ENCOUNTER — ANESTHESIA EVENT (OUTPATIENT)
Dept: ENDOSCOPY | Age: 52
DRG: 416 | End: 2017-09-27

## 2017-09-27 ENCOUNTER — ANESTHESIA (OUTPATIENT)
Dept: ENDOSCOPY | Age: 52
DRG: 416 | End: 2017-09-27

## 2017-09-27 ENCOUNTER — APPOINTMENT (OUTPATIENT)
Dept: NUCLEAR MEDICINE | Age: 52
DRG: 416 | End: 2017-09-27

## 2017-09-27 VITALS
RESPIRATION RATE: 20 BRPM | SYSTOLIC BLOOD PRESSURE: 144 MMHG | OXYGEN SATURATION: 98 % | DIASTOLIC BLOOD PRESSURE: 90 MMHG

## 2017-09-27 VITALS
OXYGEN SATURATION: 94 % | RESPIRATION RATE: 28 BRPM | SYSTOLIC BLOOD PRESSURE: 124 MMHG | DIASTOLIC BLOOD PRESSURE: 72 MMHG | TEMPERATURE: 96.8 F

## 2017-09-27 LAB
ANTI-NUCLEAR ANTIBODY (ANA): NEGATIVE
CASE NUMBER:: NORMAL
GLUCOSE BLD-MCNC: 249 MG/DL (ref 65–105)
GLUCOSE BLD-MCNC: 281 MG/DL (ref 65–105)
GLUCOSE BLD-MCNC: 316 MG/DL (ref 65–105)
SPECIMEN DESCRIPTION: NORMAL

## 2017-09-27 PROCEDURE — 90688 IIV4 VACCINE SPLT 0.5 ML IM: CPT | Performed by: EMERGENCY MEDICINE

## 2017-09-27 PROCEDURE — 78226 HEPATOBILIARY SYSTEM IMAGING: CPT

## 2017-09-27 PROCEDURE — G0008 ADMIN INFLUENZA VIRUS VAC: HCPCS | Performed by: EMERGENCY MEDICINE

## 2017-09-27 PROCEDURE — 82947 ASSAY GLUCOSE BLOOD QUANT: CPT

## 2017-09-27 PROCEDURE — 2580000003 HC RX 258: Performed by: STUDENT IN AN ORGANIZED HEALTH CARE EDUCATION/TRAINING PROGRAM

## 2017-09-27 PROCEDURE — 43239 EGD BIOPSY SINGLE/MULTIPLE: CPT | Performed by: INTERNAL MEDICINE

## 2017-09-27 PROCEDURE — 31622 DX BRONCHOSCOPE/WASH: CPT | Performed by: INTERNAL MEDICINE

## 2017-09-27 PROCEDURE — 88305 TISSUE EXAM BY PATHOLOGIST: CPT

## 2017-09-27 PROCEDURE — 6370000000 HC RX 637 (ALT 250 FOR IP): Performed by: EMERGENCY MEDICINE

## 2017-09-27 PROCEDURE — 0DB68ZX EXCISION OF STOMACH, VIA NATURAL OR ARTIFICIAL OPENING ENDOSCOPIC, DIAGNOSTIC: ICD-10-PCS | Performed by: INTERNAL MEDICINE

## 2017-09-27 PROCEDURE — 7100000010 HC PHASE II RECOVERY - FIRST 15 MIN: Performed by: INTERNAL MEDICINE

## 2017-09-27 PROCEDURE — 88342 IMHCHEM/IMCYTCHM 1ST ANTB: CPT

## 2017-09-27 PROCEDURE — 7100000011 HC PHASE II RECOVERY - ADDTL 15 MIN: Performed by: INTERNAL MEDICINE

## 2017-09-27 PROCEDURE — 3430000000 HC RX DIAGNOSTIC RADIOPHARMACEUTICAL: Performed by: EMERGENCY MEDICINE

## 2017-09-27 PROCEDURE — 3700000000 HC ANESTHESIA ATTENDED CARE: Performed by: INTERNAL MEDICINE

## 2017-09-27 PROCEDURE — A9537 TC99M MEBROFENIN: HCPCS | Performed by: EMERGENCY MEDICINE

## 2017-09-27 PROCEDURE — 2500000003 HC RX 250 WO HCPCS: Performed by: SPECIALIST

## 2017-09-27 PROCEDURE — 3700000001 HC ADD 15 MINUTES (ANESTHESIA): Performed by: INTERNAL MEDICINE

## 2017-09-27 PROCEDURE — 1200000000 HC SEMI PRIVATE

## 2017-09-27 PROCEDURE — 6360000002 HC RX W HCPCS: Performed by: SPECIALIST

## 2017-09-27 PROCEDURE — 2580000003 HC RX 258: Performed by: EMERGENCY MEDICINE

## 2017-09-27 PROCEDURE — 0BJ08ZZ INSPECTION OF TRACHEOBRONCHIAL TREE, VIA NATURAL OR ARTIFICIAL OPENING ENDOSCOPIC: ICD-10-PCS | Performed by: INTERNAL MEDICINE

## 2017-09-27 PROCEDURE — 3609012400 HC EGD TRANSORAL BIOPSY SINGLE/MULTIPLE: Performed by: INTERNAL MEDICINE

## 2017-09-27 PROCEDURE — 88112 CYTOPATH CELL ENHANCE TECH: CPT

## 2017-09-27 PROCEDURE — 6360000002 HC RX W HCPCS: Performed by: EMERGENCY MEDICINE

## 2017-09-27 PROCEDURE — 2500000003 HC RX 250 WO HCPCS: Performed by: INTERNAL MEDICINE

## 2017-09-27 PROCEDURE — 6370000000 HC RX 637 (ALT 250 FOR IP): Performed by: STUDENT IN AN ORGANIZED HEALTH CARE EDUCATION/TRAINING PROGRAM

## 2017-09-27 PROCEDURE — 0CBT8ZX EXCISION OF RIGHT VOCAL CORD, VIA NATURAL OR ARTIFICIAL OPENING ENDOSCOPIC, DIAGNOSTIC: ICD-10-PCS | Performed by: INTERNAL MEDICINE

## 2017-09-27 PROCEDURE — 3609011100 HC BRONCHOSCOPY BRUSHINGS: Performed by: INTERNAL MEDICINE

## 2017-09-27 RX ORDER — DEXTROSE MONOHYDRATE 50 MG/ML
100 INJECTION, SOLUTION INTRAVENOUS PRN
Status: DISCONTINUED | OUTPATIENT
Start: 2017-09-27 | End: 2017-10-01 | Stop reason: HOSPADM

## 2017-09-27 RX ORDER — PROPOFOL 10 MG/ML
INJECTION, EMULSION INTRAVENOUS PRN
Status: DISCONTINUED | OUTPATIENT
Start: 2017-09-27 | End: 2017-09-27 | Stop reason: SDUPTHER

## 2017-09-27 RX ORDER — CIPROFLOXACIN 2 MG/ML
400 INJECTION, SOLUTION INTRAVENOUS EVERY 12 HOURS
Status: DISCONTINUED | OUTPATIENT
Start: 2017-09-27 | End: 2017-10-01 | Stop reason: HOSPADM

## 2017-09-27 RX ORDER — NICOTINE POLACRILEX 4 MG
15 LOZENGE BUCCAL PRN
Status: DISCONTINUED | OUTPATIENT
Start: 2017-09-27 | End: 2017-10-01 | Stop reason: HOSPADM

## 2017-09-27 RX ORDER — MIDAZOLAM HYDROCHLORIDE 1 MG/ML
INJECTION INTRAMUSCULAR; INTRAVENOUS PRN
Status: DISCONTINUED | OUTPATIENT
Start: 2017-09-27 | End: 2017-09-27 | Stop reason: SDUPTHER

## 2017-09-27 RX ORDER — SODIUM CHLORIDE 0.9 % (FLUSH) 0.9 %
10 SYRINGE (ML) INJECTION PRN
Status: DISCONTINUED | OUTPATIENT
Start: 2017-09-27 | End: 2017-10-01 | Stop reason: HOSPADM

## 2017-09-27 RX ORDER — SODIUM CHLORIDE 9 MG/ML
INJECTION, SOLUTION INTRAVENOUS CONTINUOUS
Status: DISCONTINUED | OUTPATIENT
Start: 2017-09-27 | End: 2017-09-30

## 2017-09-27 RX ORDER — LIDOCAINE HYDROCHLORIDE 20 MG/ML
8 INJECTION, SOLUTION EPIDURAL; INFILTRATION; INTRACAUDAL; PERINEURAL ONCE
Status: COMPLETED | OUTPATIENT
Start: 2017-09-27 | End: 2017-09-27

## 2017-09-27 RX ORDER — LIDOCAINE HYDROCHLORIDE 10 MG/ML
INJECTION, SOLUTION INFILTRATION; PERINEURAL PRN
Status: DISCONTINUED | OUTPATIENT
Start: 2017-09-27 | End: 2017-09-27 | Stop reason: SDUPTHER

## 2017-09-27 RX ORDER — DEXTROSE MONOHYDRATE 25 G/50ML
12.5 INJECTION, SOLUTION INTRAVENOUS PRN
Status: DISCONTINUED | OUTPATIENT
Start: 2017-09-27 | End: 2017-10-01 | Stop reason: HOSPADM

## 2017-09-27 RX ADMIN — LIDOCAINE HYDROCHLORIDE 30 MG: 10 INJECTION, SOLUTION INFILTRATION; PERINEURAL at 14:17

## 2017-09-27 RX ADMIN — OMEPRAZOLE 40 MG: 20 CAPSULE, DELAYED RELEASE ORAL at 19:55

## 2017-09-27 RX ADMIN — MIDAZOLAM HYDROCHLORIDE 2 MG: 1 INJECTION, SOLUTION INTRAMUSCULAR; INTRAVENOUS at 09:41

## 2017-09-27 RX ADMIN — SODIUM CHLORIDE, PRESERVATIVE FREE 10 ML: 5 INJECTION INTRAVENOUS at 11:30

## 2017-09-27 RX ADMIN — MIDAZOLAM HYDROCHLORIDE 2 MG: 1 INJECTION, SOLUTION INTRAMUSCULAR; INTRAVENOUS at 14:17

## 2017-09-27 RX ADMIN — HYDROCODONE BITARTRATE AND ACETAMINOPHEN 1 TABLET: 5; 325 TABLET ORAL at 19:57

## 2017-09-27 RX ADMIN — LIDOCAINE HYDROCHLORIDE 8 ML: 20 INJECTION, SOLUTION INFILTRATION; PERINEURAL at 13:35

## 2017-09-27 RX ADMIN — SODIUM CHLORIDE: 9 INJECTION, SOLUTION INTRAVENOUS at 14:00

## 2017-09-27 RX ADMIN — Medication 3 MILLICURIE: at 11:30

## 2017-09-27 RX ADMIN — SODIUM CHLORIDE: 9 INJECTION, SOLUTION INTRAVENOUS at 23:28

## 2017-09-27 RX ADMIN — SODIUM CHLORIDE: 9 INJECTION, SOLUTION INTRAVENOUS at 09:40

## 2017-09-27 RX ADMIN — INSULIN LISPRO 6 UNITS: 100 INJECTION, SOLUTION INTRAVENOUS; SUBCUTANEOUS at 21:45

## 2017-09-27 RX ADMIN — PROPOFOL 50 MG: 10 INJECTION, EMULSION INTRAVENOUS at 09:42

## 2017-09-27 RX ADMIN — INSULIN LISPRO 4 UNITS: 100 INJECTION, SOLUTION INTRAVENOUS; SUBCUTANEOUS at 19:57

## 2017-09-27 RX ADMIN — PROPOFOL 35 MG: 10 INJECTION, EMULSION INTRAVENOUS at 14:20

## 2017-09-27 RX ADMIN — CIPROFLOXACIN 400 MG: 2 INJECTION, SOLUTION INTRAVENOUS at 23:28

## 2017-09-27 RX ADMIN — PROPOFOL 50 MG: 10 INJECTION, EMULSION INTRAVENOUS at 09:45

## 2017-09-27 RX ADMIN — Medication 10 ML: at 08:41

## 2017-09-27 RX ADMIN — PROPOFOL 35 MG: 10 INJECTION, EMULSION INTRAVENOUS at 14:17

## 2017-09-27 RX ADMIN — PROPOFOL 70 MG: 10 INJECTION, EMULSION INTRAVENOUS at 09:50

## 2017-09-27 RX ADMIN — Medication 0.5 ML: at 21:44

## 2017-09-27 ASSESSMENT — PAIN SCALES - GENERAL
PAINLEVEL_OUTOF10: 2
PAINLEVEL_OUTOF10: 0
PAINLEVEL_OUTOF10: 5
PAINLEVEL_OUTOF10: 0

## 2017-09-27 ASSESSMENT — PAIN DESCRIPTION - LOCATION: LOCATION: SHOULDER

## 2017-09-27 ASSESSMENT — PAIN SCALES - WONG BAKER: WONGBAKER_NUMERICALRESPONSE: 0

## 2017-09-27 ASSESSMENT — ENCOUNTER SYMPTOMS
SHORTNESS OF BREATH: 0
STRIDOR: 0
SHORTNESS OF BREATH: 0
STRIDOR: 0

## 2017-09-27 ASSESSMENT — PAIN DESCRIPTION - ORIENTATION: ORIENTATION: LEFT

## 2017-09-27 ASSESSMENT — PAIN - FUNCTIONAL ASSESSMENT: PAIN_FUNCTIONAL_ASSESSMENT: 0-10

## 2017-09-27 ASSESSMENT — PAIN DESCRIPTION - PAIN TYPE: TYPE: ACUTE PAIN

## 2017-09-27 NOTE — PROCEDURES
Bronchoscopy Outpatient Procedure Note    Date of Procedure: 9/27/2017    Pre-op Diagnosis: tracheal mass    Post-op Diagnosis: Right vocal cord, fleshy polypoid mass with ulceration--ball valve with good mobility of both vocal cords and approx 50% obstruction of vocal cord orifice when cords open. Bronchoscopist[de-identified] Stan Michelle    Anesthesia: MAC pre CRNA  Procedure: Flexible fiberoptic bronchoscopy    Estimated Blood Loss: Minimal    Complications: None    Indications and History  The patient is a 50 y.o. female with a history of dyspepsia underwent EGD this am. Endoscopist reported \"tracheal mass\". Smoker. Reports 8 mos history of hoarseness. No stridor on exam and no resp symptoms.      ASA Grade: ASA 3 - Patient with moderate systemic disease with functional limitations    PMH:  Past Medical History:   Diagnosis Date    Allergic rhinitis 10/21/2014    [facility] [address] [facility]     Diabetic neuropathy ([facility] [address] 10/21/2014    Fibromyalgia     GERD (gastroesophageal reflux disease) 10/21/2014    Obesity 10/21/2014    Pure hypercholesterolemia 2/23/2016    Tobacco abuse     Type II or unspecified type diabetes mellitus without mention of complication, not stated as uncontrolled     Unspecified sleep apnea      SURGICAL HISTORY:  Past Surgical History:   Procedure Laterality Date    CARPAL TUNNEL RELEASE Right     GASTRIC BYPASS SURGERY      with revision x1    OVARIAN CYST REMOVAL Right      SOCIAL HISTORY:  Social History   Substance Use Topics    Smoking status: Current Every Day Smoker     Packs/day: 1.50     Types: Cigarettes    Smokeless tobacco: Never Used    Alcohol use 0.0 oz/week     0 Standard drinks or equivalent per week      Comment: rare     ALLERGIES:  Allergies   Allergen Reactions    Demerol Hcl [Meperidine]     Morphine     Pcn [Penicillins]      FAMILY HISTORY:  Family History   Problem Relation Age of Onset    Diabetes Mother     Heart Disease Father     Diabetes Sister or organomegaly  Neurologic - CN II-XII are grossly intact. There are no focal motor or sensory deficits  Skin - No bruising or bleeding  Extremities - No cyanosis, clubbing or edema    Consent to Procedure  The risks, benefits, complications, treatment options and expected outcomes were discussed with the patient. The possibilities of reaction to medication, pulmonary aspiration, perforation of a viscus, bleeding, failure to diagnose a condition and creating a complication requiring transfusion or operation were discussed with the patient who freely signed the consent. Description of Procedure  The patient was brought to the endoscopy suite, identified as Deja Gallagher and the procedure verified as Flexible Fiberoptic Bronchoscopy. A Time Out was held and the above information confirmed. The patient was monitored with non-invasive blood pressure monitoring, pulse oximetry, and continuoius ECG. After induction of topical nasopharyngeal anesthesia using a 2% Lidocaine solution gargle and Afrin nasal spray, the patient was placed in appropriate position. When the patient was properly sedated using conscious sedation with fractionated doses of the medicications indicated above, the bronchoscope was passed through the  Right nares after lubrication with 1% lidocaine gel. The larynx and glottis were visualized and anesthetized using 2% Lidocaine solution topically placed onto the vocal cords. The bronchoscope was then passed into the trachea. Lidocaine 2% solution 2 ml at a time was applied topically to the wandy. After careful inspection of the tracheal, the bronchoscope was sequentially passed into all segments of the left and right endobronchial trees to the second and/or third divisions. Endobronchial findings    Vocal Cords moved symmetrically, but large, polypoid fleshy mass with ulceration arising from body of right vocal cord with approx 50% obstruction in abduction.  Mucosa bland, no

## 2017-09-27 NOTE — PROGRESS NOTES
Contacted by observation unit regarding concern for cholecystitis on HIDA scan. General surgery consult. Patient has allergy to penicillins and even though Zosyn was recommended spoke with inpatient pharmacy who recommended Cipro and Flagyl.     Electronically signed by Carl Guerrero MD on 9/27/2017 at 7:40 PM

## 2017-09-27 NOTE — PROGRESS NOTES
presents with  one-day history of acute onset sharp nonradiating waxing and waning right upper quadrant pain that is worse with eating, without alleviating factors, rated 8 out of 10 most likely due to cholelithiasis without cholecystitis      · Pulmonology and Gastroenterology consult  · Pulmonology-bronchoscopy to be performed today for vocal cord lesion identified on EGD incidentally  · Gastroenterology-HIDA scan to be performed  · Pending negative HIDA scan, the patient may be discharged home if she has 24-hour monitoring following anesthesia  · We'll consider Gen. surgery consult if HIDA is positive  · Continue current pain medication regimen  · Continue normal saline infusion  · Continue home medications and pain control  · Monitor vitals, labs, and imaging  · DISPO: pending consults and clinical improvement    --  Edward Hart  Emergency Medicine Resident Physician     This dictation was generated by voice recognition computer software. Although all attempts are made to edit the dictation for accuracy, there may be errors in the transcription that are not intended.

## 2017-09-27 NOTE — PLAN OF CARE
Problem: Pain:  Goal: Pain level will decrease  Pain level will decrease   Outcome: Ongoing  Goal: Control of acute pain  Control of acute pain   Outcome: Ongoing  Goal: Control of chronic pain  Control of chronic pain   Outcome: Ongoing    Problem: Falls - Risk of  Goal: Absence of falls  Outcome: Ongoing

## 2017-09-27 NOTE — OP NOTE
PROCEDURE NOTE    DATE OF PROCEDURE: 9/27/2017     SURGEON: Brianna Truong MD  Facility: [facility]",  ASSISTANT: None  Anesthesia: MAC  PREOPERATIVE DIAGNOSIS:   abd pain     Diagnosis:      Polypoidal lesion seen in the trachea beyond the vocal cords   S/P gastric bypass ( Raux en y type most likely with a high gastric hock up )  Gastritis, bxs taken         POSTOPERATIVE DIAGNOSIS: As described below    OPERATION: Upper GI endoscopy with Biopsy    ANESTHESIA: Moderate Sedation     ESTIMATED BLOOD LOSS: Less than 50 ml    COMPLICATIONS: None. SPECIMENS:  Was Obtained:     Gastritis, bxs taken     HISTORY: The patient is a 50y.o. year old female with history of above preop diagnosis. I recommended esophagogastroduodenoscopy with possible biopsy and I explained the risk, benefits, expected outcome, and alternatives to the procedure. Risks included but are not limited to bleeding, infection, respiratory distress, hypotension, and perforation of the esophagus, stomach, or duodenum. Patient understands and is in agreement. PROCEDURE: The patient was given IV conscious sedation. The patient's SPO2 remained above 90% throughout the procedure. The gastroscope was inserted orally and advanced under direct vision through the esophagus, through the stomach, through the pylorus, and into the descending duodenum. Findings:    Retropharyngeal area was abnormal with :  Polypoidal lesion seen in the trachea beyond the vocal cords   Esophagus: normal    Stomach:  S/P gastric bypass (  B2 type most likely with a high gastric hock up, no gastrectomy as the rest of the stomach and the duodenum, and the pyloric office all still looking normal  )  Gastritis, bxs taken       Duodenum:     Descending: normal    Bulb: normal    The scope was removed and the patient tolerated the procedure well. Recommendations/Plan:   1. F/U Biopsies  2. Pulmonary / ENT consult   3. Discussed with the family  4.  HIDA scan Electronically signed by Giovanni Olvera MD  on 9/27/2017 at 10:03 AM

## 2017-09-27 NOTE — PROGRESS NOTES
CDU Daily Progress Note  Attending Physician       Pt Name: Volodymyr Paulino  MRN: [id number]  [place] 1965  Date of evaluation: 09/27/17    I performed a history and physical examination of the patient and discussed management with the resident. I reviewed the residents note and agree with the documented findings and plan of care. Any areas of disagreement are noted on the chart. I was personally present for the key portions of any procedures. I have documented in the chart those procedures where I was not present during the key portions. I have reviewed the emergency nurses triage note. I agree with the chief complaint, past medical history, past surgical history, allergies, medications, social and family history as documented unless otherwise noted below. Documentation of the HPI, Physical Exam and Medical Decision Making performed by medical students or scribes is based on my personal performance of the HPI, PE and MDM. For Physician Assistant/ Nurse Practitioner cases/documentation I have personally evaluated this patient and have completed at least one if not all key elements of the E/M (history, physical exam, and MDM). Additional findings are as noted. The Family History, Social History and Review of Systems are unchanged from the previous day. No significant events overnight.     pulm and GI consult    Samuel Smart DO  Attending Physician  Critical Decision Unit

## 2017-09-28 ENCOUNTER — ANESTHESIA EVENT (OUTPATIENT)
Dept: OPERATING ROOM | Age: 52
DRG: 416 | End: 2017-09-28

## 2017-09-28 PROBLEM — J38.1 VOCAL CORD POLYP: Status: ACTIVE | Noted: 2017-09-28

## 2017-09-28 PROBLEM — K81.1 CHRONIC CHOLECYSTITIS: Status: ACTIVE | Noted: 2017-09-28

## 2017-09-28 PROBLEM — J38.3 VOCAL CORD MASS: Status: ACTIVE | Noted: 2017-09-28

## 2017-09-28 PROBLEM — R49.0 HOARSENESS OF VOICE: Status: ACTIVE | Noted: 2017-09-28

## 2017-09-28 LAB
GLUCOSE BLD-MCNC: 222 MG/DL (ref 65–105)
GLUCOSE BLD-MCNC: 224 MG/DL (ref 65–105)
GLUCOSE BLD-MCNC: 263 MG/DL (ref 65–105)
GLUCOSE BLD-MCNC: 323 MG/DL (ref 65–105)
MITOCHONDRIAL ANTIBODY: 5.4 UNITS (ref 0–20)
SMOOTH MUSCLE ANTIBODY: 4 UNITS (ref 0–19)
SURGICAL PATHOLOGY REPORT: NORMAL
SURGICAL PATHOLOGY REPORT: NORMAL

## 2017-09-28 PROCEDURE — 2500000003 HC RX 250 WO HCPCS: Performed by: EMERGENCY MEDICINE

## 2017-09-28 PROCEDURE — 6370000000 HC RX 637 (ALT 250 FOR IP): Performed by: STUDENT IN AN ORGANIZED HEALTH CARE EDUCATION/TRAINING PROGRAM

## 2017-09-28 PROCEDURE — 6360000002 HC RX W HCPCS: Performed by: EMERGENCY MEDICINE

## 2017-09-28 PROCEDURE — 82947 ASSAY GLUCOSE BLOOD QUANT: CPT

## 2017-09-28 PROCEDURE — 6370000000 HC RX 637 (ALT 250 FOR IP): Performed by: EMERGENCY MEDICINE

## 2017-09-28 PROCEDURE — 1200000000 HC SEMI PRIVATE

## 2017-09-28 PROCEDURE — 99232 SBSQ HOSP IP/OBS MODERATE 35: CPT | Performed by: INTERNAL MEDICINE

## 2017-09-28 PROCEDURE — 99255 IP/OBS CONSLTJ NEW/EST HI 80: CPT | Performed by: INTERNAL MEDICINE

## 2017-09-28 RX ADMIN — METRONIDAZOLE 500 MG: 500 INJECTION, SOLUTION INTRAVENOUS at 09:48

## 2017-09-28 RX ADMIN — METRONIDAZOLE 500 MG: 500 INJECTION, SOLUTION INTRAVENOUS at 02:11

## 2017-09-28 RX ADMIN — INSULIN LISPRO 4 UNITS: 100 INJECTION, SOLUTION INTRAVENOUS; SUBCUTANEOUS at 17:01

## 2017-09-28 RX ADMIN — HYDROCODONE BITARTRATE AND ACETAMINOPHEN 1 TABLET: 5; 325 TABLET ORAL at 23:37

## 2017-09-28 RX ADMIN — OMEPRAZOLE 40 MG: 20 CAPSULE, DELAYED RELEASE ORAL at 09:48

## 2017-09-28 RX ADMIN — CIPROFLOXACIN 400 MG: 2 INJECTION, SOLUTION INTRAVENOUS at 23:31

## 2017-09-28 RX ADMIN — CIPROFLOXACIN 400 MG: 2 INJECTION, SOLUTION INTRAVENOUS at 13:31

## 2017-09-28 RX ADMIN — INSULIN LISPRO 6 UNITS: 100 INJECTION, SOLUTION INTRAVENOUS; SUBCUTANEOUS at 09:51

## 2017-09-28 RX ADMIN — INSULIN LISPRO 8 UNITS: 100 INJECTION, SOLUTION INTRAVENOUS; SUBCUTANEOUS at 13:34

## 2017-09-28 RX ADMIN — METRONIDAZOLE 500 MG: 500 INJECTION, SOLUTION INTRAVENOUS at 16:59

## 2017-09-28 RX ADMIN — CETIRIZINE HYDROCHLORIDE 10 MG: 10 TABLET ORAL at 09:49

## 2017-09-28 RX ADMIN — PREGABALIN 50 MG: 50 CAPSULE ORAL at 09:48

## 2017-09-28 RX ADMIN — INSULIN LISPRO 4 UNITS: 100 INJECTION, SOLUTION INTRAVENOUS; SUBCUTANEOUS at 21:58

## 2017-09-28 RX ADMIN — HYDROCODONE BITARTRATE AND ACETAMINOPHEN 1 TABLET: 5; 325 TABLET ORAL at 16:59

## 2017-09-28 ASSESSMENT — PAIN SCALES - GENERAL
PAINLEVEL_OUTOF10: 4
PAINLEVEL_OUTOF10: 6

## 2017-09-28 NOTE — PROGRESS NOTES
OBS/CDU   RESIDENT NOTE      Patients PCP is:  Thuy Eaton MD        SUBJECTIVE      Patient positive HIDA scan yesterday afternoon, scheduled for cholecystectomy tomorrow morning. Patient received bronchoscopy yesterday afternoon by pulmonology, found polyp on the vocal cord, will be followed up with ENT today. No acute events overnight. PHYSICAL EXAM      General: NAD, AO X 3  Heent: EMOI, PERRL  Neck: SUPPLE, NO JVD  Cardiovascular: RRR, S1S2  Pulmonary: CTAB, NO SOB  Abdomen: SOFT, Right upper quadrant tenderness to palpation, ND, +BS  Extremities: +2/4 PULSES DISTAL, NO SWELLING  Neuro / Psych: NO NUMBNESS OR TINGLING, MENTATION AT BASELINE    PERTINENT TEST /EXAMS      I have reviewed all available laboratory results. MEDICATIONS CURRENT     0.9 % sodium chloride infusion Continuous   sodium chloride flush 0.9 % injection 10 mL PRN   insulin lispro (HUMALOG) injection vial 0-12 Units 4x Daily AC & HS   glucose (GLUTOSE) 40 % oral gel 15 g PRN   dextrose 50 % solution 12.5 g PRN   glucagon (rDNA) injection 1 mg PRN   dextrose 5 % solution PRN   metronidazole (FLAGYL) 500 mg in NaCl 100 mL IVPB premix Q8H   ciprofloxacin (CIPRO) IVPB 400 mg Q12H   cetirizine (ZYRTEC) tablet 10 mg Daily   omeprazole (PRILOSEC) delayed release capsule 40 mg Daily   pregabalin (LYRICA) capsule 50 mg TID PRN   sodium chloride flush 0.9 % injection 10 mL 2 times per day   sodium chloride flush 0.9 % injection 10 mL PRN   acetaminophen (TYLENOL) tablet 650 mg Q4H PRN   enoxaparin (LOVENOX) injection 40 mg Daily   ondansetron (ZOFRAN) injection 4 mg Q8H PRN   HYDROcodone-acetaminophen (NORCO) 5-325 MG per tablet 1 tablet Q4H PRN   aluminum & magnesium hydroxide-simethicone (MAALOX) 200-200-20 MG/5ML suspension 30 mL Q6H PRN       All medication charted and reviewed.     CONSULTS      IP CONSULT TO GI  IP CONSULT TO DIABETES EDUCATOR  IP CONSULT TO PULMONOLOGY  IP CONSULT TO OTOLARYNGOLOGY  IP CONSULT TO GENERAL

## 2017-09-28 NOTE — CONSULTS
Pulmonary/Critical Care consultation    Patient's name:  Jethro Tom  Medical Record Number: [id number]  Patient's account/billing number: [de-identified]  Patient's YOB: 1965  Age: 50 y.o. Date of Admission: 9/25/2017  9:39 PM  Date of Consult: 9/28/2017      Primary Care Physician: Thuy Chen MD      Code Status: Full Code    Reason for consult: sub-laryngeal mass seen on EGD      HISTORY OF PRESENT ILLNESS:   History was obtained from {source of history:087098}. Mr./Ms. Jethro Tom is a 50 y.o.  ***  Hx of gastric bypass   Who presents with a chief complaint of RUQ abdominal pain. During EGD, a mass was observed below the vocal cords  Complains of 8 months of hoarseness of voice. Pt is a current smoker with *** pack-year history      Past Medical History:        Diagnosis Date    Allergic rhinitis 10/21/2014    Diabetes mellitus ([address]     Diabetic neuropathy ([facility] [address] 10/21/2014    Fibromyalgia     GERD (gastroesophageal reflux disease) 10/21/2014    Obesity 10/21/2014    Pure hypercholesterolemia 2/23/2016    Tobacco abuse     Type II or unspecified type diabetes mellitus without mention of complication, not stated as uncontrolled     Unspecified sleep apnea        Past Surgical History:        Procedure Laterality Date    BRONCHOSCOPY  9/27/2017    BRONCHOSCOPY BRUSHINGS performed by Lissa Howe MD at [facility] Right     GASTRIC BYPASS SURGERY      with revision x1    OVARIAN CYST REMOVAL Right     [place] EGD TRANSORAL BIOPSY SINGLE/MULTIPLE N/A 9/27/2017    EGD BIOPSY performed by Holli Huitron MD at [facility]       Allergies: Allergies   Allergen Reactions    Demerol Hcl [Meperidine]     Morphine     Pcn [Penicillins]          Home Meds:   Prior to Admission medications    Medication Sig Start Date End Date Taking?  Authorizing Provider   traMADol (ULTRAM) 50 MG tablet day. She has never used smokeless tobacco.  ETOH:   reports that she drinks alcohol. DRUGS:  reports that she does not use illicit drugs. OCCUPATION:   There  {IS:19891} history of TB or TB exposure. There {HF:22130} asbestos or silica dust exposure. The patient reports {HAS/DOES NOT HAVE:20504} coal, foundry, quarry or [employer] exposure. Travel history reveals nothing of significance. There {IS:19891}  history of recreational or IV drug use. There {IS:19891} hot tube exposure. The patient {DOES:19736} have pets, dogs, cats turtles or exotic birds.            REVIEW OF SYSTEMS:    Review of Systems -   General ROS: Completed and except as mentioned above were negative   Psychological ROS:  Completed and except as mentioned above were negative  Ophthalmic ROS:  Completed and except as mentioned above were negative  ENT ROS:  Completed and except as mentioned above were negative  Allergy and Immunology ROS:  Completed and except as mentioned above were negative  Hematological and Lymphatic ROS:  Completed and except as mentioned above were negative  Endocrine ROS: Completed and except as mentioned above were negative  Breast ROS:  Completed and except as mentioned above were negative  Respiratory ROS:  Completed and except as mentioned above were negative  Cardiovascular ROS:  Completed and except as mentioned above were negative  Gastrointestinal ROS: Completed and except as mentioned above were negative  Genito-Urinary ROS:  Completed and except as mentioned above were negative  Musculoskeletal ROS:  Completed and except as mentioned above were negative  Neurological ROS:  Completed and except as mentioned above were negative  Dermatological ROS:  Completed and except as mentioned above were negative          Physical Exam:    Vitals: BP (!) 148/88  Pulse 79  Temp 97.3 °F (36.3 °C) (Oral)   Resp 18  Ht 5' 6\" (1.676 m)  Wt 229 lb 8 oz (104.1 kg)  LMP 09/27/1997  SpO2 92%  BMI 37.04 kg/m2    Last Body weight:   Wt Readings from Last 3 Encounters:   09/26/17 229 lb 8 oz (104.1 kg)   05/03/17 230 lb (104.3 kg)   04/19/17 227 lb 1.2 oz (103 kg)       Body Mass Index : Body mass index is 37.04 kg/(m^2). Intake and Output summary:   Intake/Output Summary (Last 24 hours) at 09/28/17 1041  Last data filed at 09/28/17 0630   Gross per 24 hour   Intake             1678 ml   Output                0 ml   Net             1678 ml       Physical Examination:   PHYSICAL EXAMINATION:  Vitals:    09/27/17 1517 09/27/17 2041 09/27/17 2232 09/28/17 0750   BP: 132/75 (!) 161/93 (!) 152/90 (!) 148/88   Pulse: 81 81 81 79   Resp: 23 20 16 18   Temp:  97.7 °F (36.5 °C) 98 °F (36.7 °C) 97.3 °F (36.3 °C)   TempSrc:  Oral Oral Oral   SpO2: 93% 95% 98% 92%   Weight:       Height:         Constitutional: This is a well developed, well nourished, {BMI Classification:4145456004} 50y.o. year old female who is alert, oriented, cooperative and in no apparent distress. Head:normocephalic and atraumatic. EENT: EOMI. ISMAEL. No conjunctival injections. External canals are patent and no discharge was appreciated. Septum was midline, mucosa was without erythema, exudates or cobblestoning. No thrush was noted. {Mallampati:95613}  Neck: Supple without thyromegaly. No elevated JVP. Trachea was midline. No carotid bruits were auscultated. Respiratory: Chest was symmetrical without dullness to percussion. Breath sounds bilaterally were clear to auscultation. There were no wheezes, rhonchi or rales. There is no intercostal retraction or use of accessory muscles. No egophony noted. Cardiovascular: Regular without murmur, clicks, gallops or rubs. There is no left or right ventricular heave. Pulses:  Carotid, radial and femoral pulses were equally bilaterally. Abdomen: Slightly rounded and soft without organomegaly. No rebound, rigidity or guarding was appreciated. Lymphatic: No lymphadenopathy.   Musculoskeletal: Normal No results found for: PH, PCO2, PO2, HCO3, O2SAT  Thyroid functions:   Lab Results   Component Value Date    TSH 0.73 09/26/2017            Microbiology:    Cultures during this admission:     Blood cultures:      [] None drawn      [] Negative             []  Positive (Details:  )  Urine Culture:        [] None drawn      [] Negative             []  Positive (Details:  )  Sputum Culture:   [] None drawn       [] Negative             []  Positive (Details:  )         -----------------------------------------------------------------  Pulmonary Functions Testing Results:    No results found for: FEV1, FVC, CAL6XMI, TLC, DLCO  Radiological reports:  CXR  {test chest xray findings:450779::\"normal chest X-ray\"}    CT Scans  {X CHEST CT SCAN FINDINGS ED:20177}  (See actual reports for details)    EKG: {ekg findings:532126::\"normal EKG, normal sinus rhythm\",\"unchanged from previous tracings\"}. ECHO: {obtained/reviewed:81470}. Ejection fraction: {NUMBERS BY 5:60719}%  Stress Test: {obtained/reviewed:31460}. Cardiac Angiography: {obtained/reviewed:69386}. Assessment and Plan     Active Problems:    Right upper quadrant abdominal pain    Abdominal pain, right upper quadrant          Additional assessment:    1. Plan:    1. Thank you for having us involved in the care of your patient. Please call us if you have any questions or concerns.       Joel Gill M.D.            9/28/2017, 10:41 AM

## 2017-09-28 NOTE — PROGRESS NOTES
Patient had an I V in her left AC on arrival to the floor. The IV fluids would not infuse due to the IV being so positional. The IV was discontinued and unable to restart IV x 3 attempts. The patients Cipro was infusing at this time. The Cipro was stopped at 2345 and was restarted after an IV was placed into the left forearm. This is to explain why the Cipro took so long to infuse and why the Metronidazole was so late in being given.

## 2017-09-28 NOTE — PROGRESS NOTES
Patient is back on the floor and will allow care at this time. Physical assessment done. IV fluids started and IV antibiotics started. Telemetry applied. Patient is resting quietly in the bed. Respirations are even and non labored at this time. Refusing EPC cuffs at th is time.

## 2017-09-28 NOTE — CONSULTS
1000 UNIT TABS tablet, Take 1,000 Units by mouth daily  VITAMIN K PO, Take by mouth  metFORMIN (GLUCOPHAGE) 500 MG tablet, Take 1 tablet by mouth 2 times daily (with meals)  cetirizine (ZYRTEC) 10 MG tablet, Take 1 tablet by mouth daily Stop Allergra  omeprazole (PRILOSEC) 40 MG capsule, Take 1 capsule by mouth daily  pregabalin (LYRICA) 50 MG capsule, Take 1 capsule by mouth 3 times daily (Patient taking differently: Take 50 mg by mouth 3 times daily as needed )  Walking Boot MISC, by Does not apply route  ibuprofen (ADVIL;MOTRIN) 600 MG tablet, Take 1 tablet by mouth every 6 hours as needed for Pain  glipiZIDE (GLUCOTROL) 10 MG tablet, Take 2 tablets by mouth 2 times daily (before meals)  fluticasone (FLONASE) 50 MCG/ACT nasal spray, 1 spray by Nasal route daily (Patient taking differently: 1 spray by Nasal route daily as needed )  Blood Glucose Monitoring Suppl (BLOOD GLUCOSE METER) KIT, Dx: DM-2. Use 2-3 times daily  Lancets MISC, Dx: DM-2. Use 2-3 times daily  Glucose Blood (BLOOD GLUCOSE TEST STRIPS) STRP, Dx: DM-2. Use 2-3 times daily  vitamin B-12 (CYANOCOBALAMIN) 500 MCG tablet, Take 500 mcg by mouth daily    Allergies:  Demerol hcl [meperidine]; Morphine; and Pcn [penicillins]    Social History:   Social History     Social History    Marital status: Single     Spouse name: N/A    Number of children: N/A    Years of education: N/A     Occupational History    Not on file.      Social History Main Topics    Smoking status: Current Every Day Smoker     Packs/day: 1.50     Types: Cigarettes    Smokeless tobacco: Never Used    Alcohol use 0.0 oz/week     0 Standard drinks or equivalent per week      Comment: rare    Drug use: No    Sexual activity: Not on file     Other Topics Concern    Not on file     Social History Narrative       Family History:       Problem Relation Age of Onset    Diabetes Mother     Heart Disease Father     Diabetes Sister        REVIEW OF SYSTEMS:    General: Energy level normal for pt. HEENT: Denies sore throat  Cardiovascular: Denies chest pain  Pulmonary: Denies cough, shortness of breath  GI:  per HPI, with hx of RUQ pain for 3 years intermittently, denies N/V, reports BM yesterday normal  : Denies dysuria  Endocrine: Reports diabetes   Hem/Onc: Denies anemia, h/o bleeding or clotting problems.    Neurological: Denies h/o CVA, TIA  Skin: Denies rashes, ulcers  Musculoskeletal: Denies muscle, joint, back pain      PHYSICAL EXAM:    VITALS:  BP (!) 161/93  Pulse 81  Temp 97.7 °F (36.5 °C) (Oral)   Resp 20  Ht 5' 6\" (1.676 m)  Wt 229 lb 8 oz (104.1 kg)  LMP 09/27/1997  SpO2 95%  BMI 37.04 kg/m2  INTAKE/OUTPUT:     Intake/Output Summary (Last 24 hours) at 09/27/17 2149  Last data filed at 09/27/17 1450   Gross per 24 hour   Intake              800 ml   Output                0 ml   Net              800 ml     CONSTITUTIONAL: awake, alert, cooperative, no apparent distress  HEENT: sclera clear, moist mucous membranes  NECK: Supple'  LUNGS: No increased work of breathing  CARDIOVASCULAR: Regular rate and rhythm  ABDOMEN: soft, positive murphys sign, tenderness in the epigastric region, non-distended, obese   MUSCULOSKELETAL: full range of motion noted  NEUROLOGIC: Awake, alert, oriented to name, place and time  Extremities - peripheral pulses normal  SKIN: normal coloration and turgor      CBC with Differential:    Lab Results   Component Value Date    WBC 6.2 09/25/2017    RBC 5.52 09/25/2017    HGB 14.7 09/25/2017    HCT 44.1 09/25/2017     09/25/2017    MCV 79.8 09/25/2017    MCH 26.6 09/25/2017    MCHC 33.3 09/25/2017    RDW 13.1 09/25/2017    LYMPHOPCT 42 09/25/2017    LYMPHOPCT 37.2 03/19/2015    MONOPCT 9 09/25/2017    MONOPCT 9.8 03/19/2015    EOSPCT 2.3 03/19/2015    BASOPCT 0 09/25/2017    BASOPCT 0.6 03/19/2015    MONOSABS 0.56 09/25/2017    MONOSABS 0.5 03/19/2015    LYMPHSABS 2.60 09/25/2017    LYMPHSABS 2.0 03/19/2015    EOSABS 0.12 09/25/2017    EOSABS 0.1 03/19/2015    BASOSABS 0.00 09/25/2017    DIFFTYPE NOT REPORTED 09/25/2017     BMP:    Lab Results   Component Value Date     09/25/2017    K 3.8 09/25/2017     09/25/2017    CO2 27 09/25/2017    BUN 3 09/25/2017    LABALBU 3.8 09/25/2017    CREATININE 0.48 09/25/2017    CALCIUM 9.1 09/25/2017    GFRAA >60 09/25/2017    LABGLOM >60 09/25/2017    GLUCOSE 244 09/25/2017     Hepatic Function Panel:    Lab Results   Component Value Date    ALKPHOS 157 09/25/2017    ALT 11 09/25/2017    AST 13 09/25/2017    PROT 7.0 09/25/2017    BILITOT 0.25 09/25/2017    BILIDIR 0.10 09/25/2017    IBILI 0.15 09/25/2017    LABALBU 3.8 09/25/2017       Pertinent Radiology:     U/S reviewed: cholelithiasis without thickening or pericholecystic fluid  HIDA: no filling of gallbladder after 4 hours consistent with acute cholecystitis       ASSESSMENT   Active Problems:    Right upper quadrant abdominal pain    Abdominal pain, right upper quadrant    1. RUQ pain secondary to likely chronic cholecystitis   2. Hx of GERD   3. Gastritis   4. Hx of open weight loss surgery - appears to have gastrojejunostomy on EGD per GI note      PLAN    · Okay for low fat diet at this time  · NPO at midnight 9/29 for surgery Friday morning   · IV antibiotics Cipro/Flagyl  · IVF resuscitation   · ENT consulted for right vocal cord polyp  · Will plan for surgery on Friday morning. Patient to be scheduled for open cholecystectomy given hx of open weight loss surgery. Recommend admission to medicine service for continued medical management.          Electronically signed by Kristine Horn DO  on 9/28/2017 at 12:45 AM

## 2017-09-28 NOTE — CONSULTS
MD at [facility] Right     GASTRIC BYPASS SURGERY      with revision x1    OVARIAN CYST REMOVAL Right     [place] EGD TRANSORAL BIOPSY SINGLE/MULTIPLE N/A 9/27/2017    EGD BIOPSY performed by Holli Huitron MD at [facility]       Allergies: Allergies   Allergen Reactions    Demerol Hcl [Meperidine]     Morphine     Pcn [Penicillins]          Home Meds:   Prior to Admission medications    Medication Sig Start Date End Date Taking? Authorizing Provider   traMADol (ULTRAM) 50 MG tablet Take 50 mg by mouth every 8 hours as needed for Pain   Yes Historical Provider, MD   vitamin D (CHOLECALCIFEROL) 1000 UNIT TABS tablet Take 1,000 Units by mouth daily   Yes Historical Provider, MD   VITAMIN K PO Take by mouth   Yes Historical Provider, MD   metFORMIN (GLUCOPHAGE) 500 MG tablet Take 1 tablet by mouth 2 times daily (with meals) 12/23/16  Yes Antoinette Krause MD   cetirizine (ZYRTEC) 10 MG tablet Take 1 tablet by mouth daily Stop Allergra 2/23/16  Yes Thuy Chen MD   omeprazole (PRILOSEC) 40 MG capsule Take 1 capsule by mouth daily 2/23/16  Yes Thuy Chen MD   pregabalin (LYRICA) 50 MG capsule Take 1 capsule by mouth 3 times daily  Patient taking differently: Take 50 mg by mouth 3 times daily as needed  2/23/16  Yes Thuy Chen MD   Walking Boot [facility] by Does not apply route 3/14/17   Manolo Martínez PA-C   ibuprofen (ADVIL;MOTRIN) 600 MG tablet Take 1 tablet by mouth every 6 hours as needed for Pain 2/13/17   Manolo Martínez PA-C   glipiZIDE (GLUCOTROL) 10 MG tablet Take 2 tablets by mouth 2 times daily (before meals) 12/23/16 3/14/17  Antoinette Krause MD   fluticasone (FLONASE) 50 MCG/ACT nasal spray 1 spray by Nasal route daily  Patient taking differently: 1 spray by Nasal route daily as needed  3/14/16   Tyler Sullivan MD   Blood Glucose Monitoring Suppl (BLOOD GLUCOSE METER) KIT Dx: DM-2.  Use 2-3 times daily 10/6/15   Thuy Chen MD   Lancets MISC Dx: DM-2. Use 2-3 times daily 10/6/15   Thuy Eaton MD   Glucose Blood (BLOOD GLUCOSE TEST STRIPS) STRP Dx: DM-2. Use 2-3 times daily 10/6/15   Thuy Eaton MD   vitamin B-12 (CYANOCOBALAMIN) 500 MCG tablet Take 500 mcg by mouth daily    Historical Provider, MD       Family History:       Problem Relation Age of Onset    Diabetes Mother     Heart Disease Father     Diabetes Sister          Social History:   TOBACCO:   reports that she has been smoking Cigarettes. She has been smoking about 1.50 packs per day. She has never used smokeless tobacco.  ETOH:   reports that she drinks alcohol. DRUGS:  reports that she does not use illicit drugs.       REVIEW OF SYSTEMS:    Review of Systems -   General ROS: Completed and except as mentioned above were negative   Psychological ROS:  Completed and except as mentioned above were negative  Ophthalmic ROS:  Completed and except as mentioned above were negative  ENT ROS:  Completed and except as mentioned above were negative  Allergy and Immunology ROS:  Completed and except as mentioned above were negative  Hematological and Lymphatic ROS:  Completed and except as mentioned above were negative  Endocrine ROS: Completed and except as mentioned above were negative  Breast ROS:  Completed and except as mentioned above were negative  Respiratory ROS:  Completed and except as mentioned above were negative  Cardiovascular ROS:  Completed and except as mentioned above were negative  Gastrointestinal ROS: Completed and except as mentioned above were negative  Genito-Urinary ROS:  Completed and except as mentioned above were negative  Musculoskeletal ROS:  Completed and except as mentioned above were negative  Neurological ROS:  Completed and except as mentioned above were negative  Dermatological ROS:  Completed and except as mentioned above were negative          Physical Exam:    Vitals: BP (!) 148/88  Pulse 79  Temp 97.3 °F (36.3 °C) (Oral)   Resp 18  Ht 5' 6\" (1.676

## 2017-09-28 NOTE — PLAN OF CARE
Problem: Pain:  Goal: Pain level will decrease  Pain level will decrease   Outcome: Ongoing  Goal: Control of acute pain  Control of acute pain   Outcome: Ongoing  Goal: Control of chronic pain  Control of chronic pain   Outcome: Ongoing    Problem: Falls - Risk of  Goal: Absence of falls  Outcome: Ongoing  Patient remains free of falls this shift. Fall precautions remain in place.

## 2017-09-29 ENCOUNTER — ANESTHESIA (OUTPATIENT)
Dept: OPERATING ROOM | Age: 52
DRG: 416 | End: 2017-09-29

## 2017-09-29 VITALS — SYSTOLIC BLOOD PRESSURE: 145 MMHG | OXYGEN SATURATION: 91 % | DIASTOLIC BLOOD PRESSURE: 133 MMHG | TEMPERATURE: 96 F

## 2017-09-29 LAB
GLUCOSE BLD-MCNC: 170 MG/DL (ref 65–105)
GLUCOSE BLD-MCNC: 190 MG/DL (ref 65–105)
GLUCOSE BLD-MCNC: 263 MG/DL (ref 65–105)
GLUCOSE BLD-MCNC: 318 MG/DL (ref 65–105)
GLUCOSE BLD-MCNC: 366 MG/DL (ref 65–105)
HCG, PREGNANCY URINE (POC): NEGATIVE

## 2017-09-29 PROCEDURE — 6360000002 HC RX W HCPCS: Performed by: ANESTHESIOLOGY

## 2017-09-29 PROCEDURE — 2580000003 HC RX 258: Performed by: NURSE ANESTHETIST, CERTIFIED REGISTERED

## 2017-09-29 PROCEDURE — 0CBT8ZX EXCISION OF RIGHT VOCAL CORD, VIA NATURAL OR ARTIFICIAL OPENING ENDOSCOPIC, DIAGNOSTIC: ICD-10-PCS | Performed by: OTOLARYNGOLOGY

## 2017-09-29 PROCEDURE — 6370000000 HC RX 637 (ALT 250 FOR IP): Performed by: SURGERY

## 2017-09-29 PROCEDURE — 6360000002 HC RX W HCPCS: Performed by: STUDENT IN AN ORGANIZED HEALTH CARE EDUCATION/TRAINING PROGRAM

## 2017-09-29 PROCEDURE — 6360000002 HC RX W HCPCS: Performed by: SURGERY

## 2017-09-29 PROCEDURE — 7100000001 HC PACU RECOVERY - ADDTL 15 MIN: Performed by: SURGERY

## 2017-09-29 PROCEDURE — 82947 ASSAY GLUCOSE BLOOD QUANT: CPT

## 2017-09-29 PROCEDURE — 7100000000 HC PACU RECOVERY - FIRST 15 MIN: Performed by: SURGERY

## 2017-09-29 PROCEDURE — 99233 SBSQ HOSP IP/OBS HIGH 50: CPT | Performed by: INTERNAL MEDICINE

## 2017-09-29 PROCEDURE — 2500000003 HC RX 250 WO HCPCS: Performed by: EMERGENCY MEDICINE

## 2017-09-29 PROCEDURE — 84703 CHORIONIC GONADOTROPIN ASSAY: CPT

## 2017-09-29 PROCEDURE — 6370000000 HC RX 637 (ALT 250 FOR IP): Performed by: NURSE ANESTHETIST, CERTIFIED REGISTERED

## 2017-09-29 PROCEDURE — 1200000000 HC SEMI PRIVATE

## 2017-09-29 PROCEDURE — 88305 TISSUE EXAM BY PATHOLOGIST: CPT

## 2017-09-29 PROCEDURE — 0FT40ZZ RESECTION OF GALLBLADDER, OPEN APPROACH: ICD-10-PCS | Performed by: SURGERY

## 2017-09-29 PROCEDURE — 88304 TISSUE EXAM BY PATHOLOGIST: CPT

## 2017-09-29 PROCEDURE — 2580000003 HC RX 258

## 2017-09-29 PROCEDURE — 6370000000 HC RX 637 (ALT 250 FOR IP): Performed by: STUDENT IN AN ORGANIZED HEALTH CARE EDUCATION/TRAINING PROGRAM

## 2017-09-29 PROCEDURE — 6360000002 HC RX W HCPCS: Performed by: EMERGENCY MEDICINE

## 2017-09-29 PROCEDURE — 3700000000 HC ANESTHESIA ATTENDED CARE: Performed by: SURGERY

## 2017-09-29 PROCEDURE — 88331 PATH CONSLTJ SURG 1 BLK 1SPC: CPT

## 2017-09-29 PROCEDURE — 2500000003 HC RX 250 WO HCPCS: Performed by: NURSE ANESTHETIST, CERTIFIED REGISTERED

## 2017-09-29 PROCEDURE — 3700000001 HC ADD 15 MINUTES (ANESTHESIA): Performed by: SURGERY

## 2017-09-29 PROCEDURE — 6370000000 HC RX 637 (ALT 250 FOR IP): Performed by: ANESTHESIOLOGY

## 2017-09-29 PROCEDURE — 3600000004 HC SURGERY LEVEL 4 BASE: Performed by: SURGERY

## 2017-09-29 PROCEDURE — 3600000014 HC SURGERY LEVEL 4 ADDTL 15MIN: Performed by: SURGERY

## 2017-09-29 PROCEDURE — 6360000002 HC RX W HCPCS: Performed by: NURSE ANESTHETIST, CERTIFIED REGISTERED

## 2017-09-29 PROCEDURE — 2580000003 HC RX 258: Performed by: STUDENT IN AN ORGANIZED HEALTH CARE EDUCATION/TRAINING PROGRAM

## 2017-09-29 RX ORDER — ONDANSETRON 2 MG/ML
INJECTION INTRAMUSCULAR; INTRAVENOUS PRN
Status: DISCONTINUED | OUTPATIENT
Start: 2017-09-29 | End: 2017-09-29 | Stop reason: SDUPTHER

## 2017-09-29 RX ORDER — MIDAZOLAM HYDROCHLORIDE 1 MG/ML
1 INJECTION INTRAMUSCULAR; INTRAVENOUS ONCE
Status: COMPLETED | OUTPATIENT
Start: 2017-09-29 | End: 2017-09-29

## 2017-09-29 RX ORDER — FENTANYL CITRATE 50 UG/ML
50 INJECTION, SOLUTION INTRAMUSCULAR; INTRAVENOUS EVERY 30 MIN PRN
Status: DISCONTINUED | OUTPATIENT
Start: 2017-09-29 | End: 2017-09-29

## 2017-09-29 RX ORDER — HYDRALAZINE HYDROCHLORIDE 20 MG/ML
5 INJECTION INTRAMUSCULAR; INTRAVENOUS EVERY 10 MIN PRN
Status: DISCONTINUED | OUTPATIENT
Start: 2017-09-29 | End: 2017-09-29 | Stop reason: HOSPADM

## 2017-09-29 RX ORDER — FENTANYL CITRATE 50 UG/ML
25 INJECTION, SOLUTION INTRAMUSCULAR; INTRAVENOUS
Status: DISCONTINUED | OUTPATIENT
Start: 2017-09-29 | End: 2017-09-29

## 2017-09-29 RX ORDER — KETOROLAC TROMETHAMINE 30 MG/ML
30 INJECTION, SOLUTION INTRAMUSCULAR; INTRAVENOUS EVERY 6 HOURS
Status: DISCONTINUED | OUTPATIENT
Start: 2017-09-29 | End: 2017-10-01 | Stop reason: HOSPADM

## 2017-09-29 RX ORDER — DIPHENHYDRAMINE HYDROCHLORIDE 50 MG/ML
12.5 INJECTION INTRAMUSCULAR; INTRAVENOUS
Status: DISCONTINUED | OUTPATIENT
Start: 2017-09-29 | End: 2017-09-29 | Stop reason: HOSPADM

## 2017-09-29 RX ORDER — ROCURONIUM BROMIDE 10 MG/ML
INJECTION, SOLUTION INTRAVENOUS PRN
Status: DISCONTINUED | OUTPATIENT
Start: 2017-09-29 | End: 2017-09-29 | Stop reason: SDUPTHER

## 2017-09-29 RX ORDER — PROPOFOL 10 MG/ML
INJECTION, EMULSION INTRAVENOUS PRN
Status: DISCONTINUED | OUTPATIENT
Start: 2017-09-29 | End: 2017-09-29 | Stop reason: SDUPTHER

## 2017-09-29 RX ORDER — FENTANYL CITRATE 50 UG/ML
INJECTION, SOLUTION INTRAMUSCULAR; INTRAVENOUS PRN
Status: DISCONTINUED | OUTPATIENT
Start: 2017-09-29 | End: 2017-09-29 | Stop reason: SDUPTHER

## 2017-09-29 RX ORDER — HYDROCODONE BITARTRATE AND ACETAMINOPHEN 5; 325 MG/1; MG/1
2 TABLET ORAL EVERY 4 HOURS PRN
Status: DISCONTINUED | OUTPATIENT
Start: 2017-09-29 | End: 2017-10-01 | Stop reason: HOSPADM

## 2017-09-29 RX ORDER — GLYCOPYRROLATE 0.2 MG/ML
INJECTION INTRAMUSCULAR; INTRAVENOUS PRN
Status: DISCONTINUED | OUTPATIENT
Start: 2017-09-29 | End: 2017-09-29 | Stop reason: SDUPTHER

## 2017-09-29 RX ORDER — ONDANSETRON 2 MG/ML
4 INJECTION INTRAMUSCULAR; INTRAVENOUS
Status: DISCONTINUED | OUTPATIENT
Start: 2017-09-29 | End: 2017-09-29 | Stop reason: HOSPADM

## 2017-09-29 RX ORDER — MAGNESIUM HYDROXIDE 1200 MG/15ML
LIQUID ORAL
Status: COMPLETED
Start: 2017-09-29 | End: 2017-09-29

## 2017-09-29 RX ORDER — OXYCODONE HYDROCHLORIDE AND ACETAMINOPHEN 5; 325 MG/1; MG/1
2 TABLET ORAL EVERY 4 HOURS PRN
Status: DISCONTINUED | OUTPATIENT
Start: 2017-09-29 | End: 2017-10-01 | Stop reason: HOSPADM

## 2017-09-29 RX ORDER — FENTANYL CITRATE 50 UG/ML
75 INJECTION, SOLUTION INTRAMUSCULAR; INTRAVENOUS
Status: DISCONTINUED | OUTPATIENT
Start: 2017-09-29 | End: 2017-10-01 | Stop reason: HOSPADM

## 2017-09-29 RX ORDER — FENTANYL CITRATE 50 UG/ML
25 INJECTION, SOLUTION INTRAMUSCULAR; INTRAVENOUS ONCE
Status: COMPLETED | OUTPATIENT
Start: 2017-09-29 | End: 2017-09-29

## 2017-09-29 RX ORDER — LABETALOL HYDROCHLORIDE 5 MG/ML
INJECTION, SOLUTION INTRAVENOUS PRN
Status: DISCONTINUED | OUTPATIENT
Start: 2017-09-29 | End: 2017-09-29 | Stop reason: SDUPTHER

## 2017-09-29 RX ORDER — LABETALOL HYDROCHLORIDE 5 MG/ML
5 INJECTION, SOLUTION INTRAVENOUS EVERY 10 MIN PRN
Status: DISCONTINUED | OUTPATIENT
Start: 2017-09-29 | End: 2017-09-29 | Stop reason: HOSPADM

## 2017-09-29 RX ORDER — FENTANYL CITRATE 50 UG/ML
50 INJECTION, SOLUTION INTRAMUSCULAR; INTRAVENOUS EVERY 5 MIN PRN
Status: DISCONTINUED | OUTPATIENT
Start: 2017-09-29 | End: 2017-09-29 | Stop reason: HOSPADM

## 2017-09-29 RX ORDER — ALBUTEROL SULFATE 90 UG/1
AEROSOL, METERED RESPIRATORY (INHALATION) PRN
Status: DISCONTINUED | OUTPATIENT
Start: 2017-09-29 | End: 2017-09-29 | Stop reason: SDUPTHER

## 2017-09-29 RX ORDER — HYDROCODONE BITARTRATE AND ACETAMINOPHEN 5; 325 MG/1; MG/1
1 TABLET ORAL EVERY 4 HOURS PRN
Status: DISCONTINUED | OUTPATIENT
Start: 2017-09-29 | End: 2017-10-01 | Stop reason: HOSPADM

## 2017-09-29 RX ORDER — LIDOCAINE HYDROCHLORIDE 10 MG/ML
INJECTION, SOLUTION EPIDURAL; INFILTRATION; INTRACAUDAL; PERINEURAL PRN
Status: DISCONTINUED | OUTPATIENT
Start: 2017-09-29 | End: 2017-09-29 | Stop reason: SDUPTHER

## 2017-09-29 RX ORDER — SODIUM CHLORIDE, SODIUM LACTATE, POTASSIUM CHLORIDE, CALCIUM CHLORIDE 600; 310; 30; 20 MG/100ML; MG/100ML; MG/100ML; MG/100ML
INJECTION, SOLUTION INTRAVENOUS CONTINUOUS PRN
Status: DISCONTINUED | OUTPATIENT
Start: 2017-09-29 | End: 2017-09-29 | Stop reason: SDUPTHER

## 2017-09-29 RX ORDER — FENTANYL CITRATE 50 UG/ML
25 INJECTION, SOLUTION INTRAMUSCULAR; INTRAVENOUS EVERY 5 MIN PRN
Status: DISCONTINUED | OUTPATIENT
Start: 2017-09-29 | End: 2017-09-29 | Stop reason: HOSPADM

## 2017-09-29 RX ORDER — OXYCODONE HYDROCHLORIDE AND ACETAMINOPHEN 5; 325 MG/1; MG/1
1 TABLET ORAL EVERY 4 HOURS PRN
Status: DISCONTINUED | OUTPATIENT
Start: 2017-09-29 | End: 2017-10-01 | Stop reason: HOSPADM

## 2017-09-29 RX ADMIN — GLYCOPYRROLATE 0.2 MG: 0.2 INJECTION INTRAMUSCULAR; INTRAVENOUS at 09:50

## 2017-09-29 RX ADMIN — INSULIN LISPRO 2 UNITS: 100 INJECTION, SOLUTION INTRAVENOUS; SUBCUTANEOUS at 18:49

## 2017-09-29 RX ADMIN — METRONIDAZOLE 500 MG: 500 INJECTION, SOLUTION INTRAVENOUS at 01:47

## 2017-09-29 RX ADMIN — LABETALOL HYDROCHLORIDE 5 MG: 5 INJECTION, SOLUTION INTRAVENOUS at 08:47

## 2017-09-29 RX ADMIN — ROCURONIUM BROMIDE 20 MG: 10 INJECTION, SOLUTION INTRAVENOUS at 08:40

## 2017-09-29 RX ADMIN — INSULIN LISPRO 8 UNITS: 100 INJECTION, SOLUTION INTRAVENOUS; SUBCUTANEOUS at 12:33

## 2017-09-29 RX ADMIN — METRONIDAZOLE 500 MG: 500 INJECTION, SOLUTION INTRAVENOUS at 18:48

## 2017-09-29 RX ADMIN — FENTANYL CITRATE 50 MCG: 50 INJECTION, SOLUTION INTRAMUSCULAR; INTRAVENOUS at 11:00

## 2017-09-29 RX ADMIN — LIDOCAINE HYDROCHLORIDE 50 MG: 10 INJECTION, SOLUTION EPIDURAL; INFILTRATION; INTRACAUDAL; PERINEURAL at 08:07

## 2017-09-29 RX ADMIN — Medication 2 G: at 08:13

## 2017-09-29 RX ADMIN — HYDROMORPHONE HYDROCHLORIDE 1 MG: 1 INJECTION, SOLUTION INTRAMUSCULAR; INTRAVENOUS; SUBCUTANEOUS at 18:45

## 2017-09-29 RX ADMIN — ROCURONIUM BROMIDE 50 MG: 10 INJECTION, SOLUTION INTRAVENOUS at 08:07

## 2017-09-29 RX ADMIN — FENTANYL CITRATE 50 MCG: 50 INJECTION, SOLUTION INTRAMUSCULAR; INTRAVENOUS at 11:05

## 2017-09-29 RX ADMIN — SODIUM CHLORIDE: 9 INJECTION, SOLUTION INTRAVENOUS at 11:43

## 2017-09-29 RX ADMIN — SODIUM CHLORIDE 1000 ML: 900 IRRIGANT IRRIGATION at 12:32

## 2017-09-29 RX ADMIN — SODIUM CHLORIDE, POTASSIUM CHLORIDE, SODIUM LACTATE AND CALCIUM CHLORIDE: 600; 310; 30; 20 INJECTION, SOLUTION INTRAVENOUS at 07:24

## 2017-09-29 RX ADMIN — EDROPHONIUM CHLORIDE 30 MG: 10 INJECTION, SOLUTION INTRAMUSCULAR; INTRAVENOUS at 09:52

## 2017-09-29 RX ADMIN — LABETALOL HYDROCHLORIDE 10 MG: 5 INJECTION, SOLUTION INTRAVENOUS at 08:23

## 2017-09-29 RX ADMIN — FENTANYL CITRATE 25 MCG: 50 INJECTION, SOLUTION INTRAMUSCULAR; INTRAVENOUS at 14:45

## 2017-09-29 RX ADMIN — INSULIN LISPRO 10 UNITS: 100 INJECTION, SOLUTION INTRAVENOUS; SUBCUTANEOUS at 10:00

## 2017-09-29 RX ADMIN — INSULIN LISPRO 2 UNITS: 100 INJECTION, SOLUTION INTRAVENOUS; SUBCUTANEOUS at 20:55

## 2017-09-29 RX ADMIN — KETOROLAC TROMETHAMINE 30 MG: 30 INJECTION, SOLUTION INTRAMUSCULAR at 19:41

## 2017-09-29 RX ADMIN — MIDAZOLAM HYDROCHLORIDE 1 MG: 1 INJECTION, SOLUTION INTRAMUSCULAR; INTRAVENOUS at 07:38

## 2017-09-29 RX ADMIN — FENTANYL CITRATE 50 MCG: 50 INJECTION, SOLUTION INTRAMUSCULAR; INTRAVENOUS at 08:03

## 2017-09-29 RX ADMIN — PROPOFOL 200 MG: 10 INJECTION, EMULSION INTRAVENOUS at 08:07

## 2017-09-29 RX ADMIN — HYDROCODONE BITARTRATE AND ACETAMINOPHEN 2 TABLET: 5; 325 TABLET ORAL at 18:06

## 2017-09-29 RX ADMIN — HYDROMORPHONE HYDROCHLORIDE 1 MG: 1 INJECTION, SOLUTION INTRAMUSCULAR; INTRAVENOUS; SUBCUTANEOUS at 21:57

## 2017-09-29 RX ADMIN — HYDROCODONE BITARTRATE AND ACETAMINOPHEN 2 TABLET: 5; 325 TABLET ORAL at 13:32

## 2017-09-29 RX ADMIN — ONDANSETRON 4 MG: 2 INJECTION INTRAMUSCULAR; INTRAVENOUS at 08:27

## 2017-09-29 RX ADMIN — FENTANYL CITRATE 25 MCG: 50 INJECTION, SOLUTION INTRAMUSCULAR; INTRAVENOUS at 15:54

## 2017-09-29 RX ADMIN — ALBUTEROL SULFATE 5 PUFF: 90 AEROSOL, METERED RESPIRATORY (INHALATION) at 10:00

## 2017-09-29 RX ADMIN — FENTANYL CITRATE 50 MCG: 50 INJECTION, SOLUTION INTRAMUSCULAR; INTRAVENOUS at 08:07

## 2017-09-29 RX ADMIN — CIPROFLOXACIN 400 MG: 2 INJECTION, SOLUTION INTRAVENOUS at 12:32

## 2017-09-29 RX ADMIN — FENTANYL CITRATE 25 MCG: 50 INJECTION, SOLUTION INTRAMUSCULAR; INTRAVENOUS at 12:20

## 2017-09-29 RX ADMIN — FENTANYL CITRATE 75 MCG: 50 INJECTION, SOLUTION INTRAMUSCULAR; INTRAVENOUS at 17:35

## 2017-09-29 RX ADMIN — FENTANYL CITRATE 100 MCG: 50 INJECTION, SOLUTION INTRAMUSCULAR; INTRAVENOUS at 08:47

## 2017-09-29 ASSESSMENT — PAIN SCALES - GENERAL
PAINLEVEL_OUTOF10: 10
PAINLEVEL_OUTOF10: 5
PAINLEVEL_OUTOF10: 10
PAINLEVEL_OUTOF10: 7
PAINLEVEL_OUTOF10: 10
PAINLEVEL_OUTOF10: 10
PAINLEVEL_OUTOF10: 2
PAINLEVEL_OUTOF10: 10
PAINLEVEL_OUTOF10: 7
PAINLEVEL_OUTOF10: 7
PAINLEVEL_OUTOF10: 10

## 2017-09-29 ASSESSMENT — PAIN - FUNCTIONAL ASSESSMENT: PAIN_FUNCTIONAL_ASSESSMENT: 0-10

## 2017-09-29 ASSESSMENT — PAIN DESCRIPTION - PAIN TYPE: TYPE: SURGICAL PAIN

## 2017-09-29 ASSESSMENT — PAIN DESCRIPTION - LOCATION: LOCATION: ABDOMEN

## 2017-09-29 NOTE — PROGRESS NOTES
Surgery Progress Note            PATIENT NAME: Teofilo Abad     TODAY'S DATE: 9/29/2017, 6:14 AM    SUBJECTIVE:    Pt seen and examined. Prepared for surgery this morning. NPO since midnight. With some nausea this morning but no emesis. No pain this morning.       OBJECTIVE:   VITALS:  BP (!) 167/91  Pulse 73  Temp 98.2 °F (36.8 °C) (Oral)   Resp 14  Ht 5' 6\" (1.676 m)  Wt 229 lb 8 oz (104.1 kg)  LMP 09/27/1997  SpO2 98%  BMI 37.04 kg/m2     INTAKE/OUTPUT:      Intake/Output Summary (Last 24 hours) at 09/29/17 0614  Last data filed at 09/28/17 0630   Gross per 24 hour   Intake             1278 ml   Output                0 ml   Net             1278 ml       PHYSICAL EXAM  GEN: alert, awake, oriented, NAD  HEENT: moist mucous membranes  CV: RRR  LUNG: CTA, b.l  ABDOMEN: soft, non-tender, non-distended, no guarding     Data:  CBC with Differential:    Lab Results   Component Value Date    WBC 6.2 09/25/2017    RBC 5.52 09/25/2017    HGB 14.7 09/25/2017    HCT 44.1 09/25/2017     09/25/2017    MCV 79.8 09/25/2017    MCH 26.6 09/25/2017    MCHC 33.3 09/25/2017    RDW 13.1 09/25/2017    LYMPHOPCT 42 09/25/2017    LYMPHOPCT 37.2 03/19/2015    MONOPCT 9 09/25/2017    MONOPCT 9.8 03/19/2015    EOSPCT 2.3 03/19/2015    BASOPCT 0 09/25/2017    BASOPCT 0.6 03/19/2015    MONOSABS 0.56 09/25/2017    MONOSABS 0.5 03/19/2015    LYMPHSABS 2.60 09/25/2017    LYMPHSABS 2.0 03/19/2015    EOSABS 0.12 09/25/2017    EOSABS 0.1 03/19/2015    BASOSABS 0.00 09/25/2017    DIFFTYPE NOT REPORTED 09/25/2017     BMP:    Lab Results   Component Value Date     09/25/2017    K 3.8 09/25/2017     09/25/2017    CO2 27 09/25/2017    BUN 3 09/25/2017    LABALBU 3.8 09/25/2017    CREATININE 0.48 09/25/2017    CALCIUM 9.1 09/25/2017    GFRAA >60 09/25/2017    LABGLOM >60 09/25/2017    GLUCOSE 244 09/25/2017       Radiology Review:     U/S with cholelithiasis  HIDA: no filling of the gallbladder consistent with cholecystitis     ASSESSMENT   1. Chronic cholecystitis   2. Obesity  3. Hx of weight loss surgery open  4. Tobacco use    Plan  1. NPO  2. IVF maintenance  3. Nausea control with zofran  4. Plan for OR this morning for open cholecystectomy given hx of multiple open weight loss surgeries. Post operative orders to follow.     Electronically signed by Hung Lainez DO  on 9/29/2017 at 6:14 AM

## 2017-09-29 NOTE — PLAN OF CARE
Problem: Pain:  Goal: Pain level will decrease  Pain level will decrease   Outcome: Met This Shift  Medicated for pain as needed

## 2017-09-29 NOTE — PROGRESS NOTES
OBS/CDU   RESIDENT NOTE      Patients PCP is:  Thuy Salmon MD        SUBJECTIVE      No acute events overnight. Patient has been NPO for her scheduled cholecystectomy this morning. Patient is able to ambulate without assistance. Says pain is controlled. Denies fevers, chills, vomiting, difficulty with urination. PHYSICAL EXAM      General: NAD, AO X 3  Heent: EMOI, PERRL  Neck: SUPPLE, NO JVD  Cardiovascular: RRR, S1S2  Pulmonary: CTAB, NO SOB  Abdomen: SOFT, mild RUQ tenderness to palpation, ND, +BS  Extremities: +2/4 PULSES DISTAL, NO SWELLING  Neuro / Psych: NO NUMBNESS OR TINGLING, MENTATION AT BASELINE    PERTINENT TEST /EXAMS      I have reviewed all available laboratory results. MEDICATIONS CURRENT     0.9 % sodium chloride infusion Continuous   sodium chloride flush 0.9 % injection 10 mL PRN   insulin lispro (HUMALOG) injection vial 0-12 Units 4x Daily AC & HS   glucose (GLUTOSE) 40 % oral gel 15 g PRN   dextrose 50 % solution 12.5 g PRN   glucagon (rDNA) injection 1 mg PRN   dextrose 5 % solution PRN   metronidazole (FLAGYL) 500 mg in NaCl 100 mL IVPB premix Q8H   ciprofloxacin (CIPRO) IVPB 400 mg Q12H   cetirizine (ZYRTEC) tablet 10 mg Daily   omeprazole (PRILOSEC) delayed release capsule 40 mg Daily   pregabalin (LYRICA) capsule 50 mg TID PRN   sodium chloride flush 0.9 % injection 10 mL 2 times per day   sodium chloride flush 0.9 % injection 10 mL PRN   acetaminophen (TYLENOL) tablet 650 mg Q4H PRN   enoxaparin (LOVENOX) injection 40 mg Daily   ondansetron (ZOFRAN) injection 4 mg Q8H PRN   HYDROcodone-acetaminophen (NORCO) 5-325 MG per tablet 1 tablet Q4H PRN   aluminum & magnesium hydroxide-simethicone (MAALOX) 200-200-20 MG/5ML suspension 30 mL Q6H PRN       All medication charted and reviewed.     CONSULTS      IP CONSULT TO GI  IP CONSULT TO DIABETES EDUCATOR  IP CONSULT TO PULMONOLOGY  IP CONSULT TO OTOLARYNGOLOGY  IP CONSULT TO GENERAL SURGERY    ASSESSMENT/PLAN       Luisa Chirinos

## 2017-09-29 NOTE — PROGRESS NOTES
CDU Daily Progress Note  Attending Physician       Pt Name: Lexy Oshea  MRN: [id number]  Adrianagfjohnathon 1965  Date of evaluation: 9/29/17    I performed a history and physical examination of the patient and discussed management with the resident. I reviewed the residents note and agree with the documented findings and plan of care. Any areas of disagreement are noted on the chart. I was personally present for the key portions of any procedures. I have documented in the chart those procedures where I was not present during the key portions. I have reviewed the emergency nurses triage note. I agree with the chief complaint, past medical history, past surgical history, allergies, medications, social and family history as documented unless otherwise noted below. Documentation of the HPI, Physical Exam and Medical Decision Making performed by medical students or scribes is based on my personal performance of the HPI, PE and MDM. For Physician Assistant/ Nurse Practitioner cases/documentation I have personally evaluated this patient and have completed at least one if not all key elements of the E/M (history, physical exam, and MDM). Additional findings are as noted. The Family History, Social History and Review of Systems are unchanged from the previous day. No significant events overnight.     To OR for reginaldo Santana MD  Attending Physician  Critical Decision Unit

## 2017-09-29 NOTE — PROGRESS NOTES
Pt was given the 75 mcg of fentanyl at 5:35 Pm and 2 norco given at 6:06 pm. pt called own rapid response at 6:15pm.  Surgery at bedside. Pt tearful and angry. New orders received and pt medicated as ordered. Pt on continuous pulse ox to monitor Oxygen saturations.     Clemente Abbott RN

## 2017-09-30 LAB
ANION GAP SERPL CALCULATED.3IONS-SCNC: 15 MMOL/L (ref 9–17)
BUN BLDV-MCNC: 4 MG/DL (ref 6–20)
BUN/CREAT BLD: ABNORMAL (ref 9–20)
CALCIUM SERPL-MCNC: 8.5 MG/DL (ref 8.6–10.4)
CHLORIDE BLD-SCNC: 101 MMOL/L (ref 98–107)
CO2: 22 MMOL/L (ref 20–31)
CREAT SERPL-MCNC: 0.52 MG/DL (ref 0.5–0.9)
GFR AFRICAN AMERICAN: >60 ML/MIN
GFR NON-AFRICAN AMERICAN: >60 ML/MIN
GFR SERPL CREATININE-BSD FRML MDRD: ABNORMAL ML/MIN/{1.73_M2}
GFR SERPL CREATININE-BSD FRML MDRD: ABNORMAL ML/MIN/{1.73_M2}
GLUCOSE BLD-MCNC: 199 MG/DL (ref 65–105)
GLUCOSE BLD-MCNC: 245 MG/DL (ref 65–105)
GLUCOSE BLD-MCNC: 253 MG/DL (ref 65–105)
GLUCOSE BLD-MCNC: 270 MG/DL (ref 70–99)
GLUCOSE BLD-MCNC: 272 MG/DL (ref 65–105)
POTASSIUM SERPL-SCNC: 3.8 MMOL/L (ref 3.7–5.3)
SODIUM BLD-SCNC: 138 MMOL/L (ref 135–144)

## 2017-09-30 PROCEDURE — 2500000003 HC RX 250 WO HCPCS: Performed by: EMERGENCY MEDICINE

## 2017-09-30 PROCEDURE — 6360000002 HC RX W HCPCS: Performed by: EMERGENCY MEDICINE

## 2017-09-30 PROCEDURE — 6370000000 HC RX 637 (ALT 250 FOR IP): Performed by: STUDENT IN AN ORGANIZED HEALTH CARE EDUCATION/TRAINING PROGRAM

## 2017-09-30 PROCEDURE — 6370000000 HC RX 637 (ALT 250 FOR IP): Performed by: SURGERY

## 2017-09-30 PROCEDURE — 1200000000 HC SEMI PRIVATE

## 2017-09-30 PROCEDURE — 80048 BASIC METABOLIC PNL TOTAL CA: CPT

## 2017-09-30 PROCEDURE — 82947 ASSAY GLUCOSE BLOOD QUANT: CPT

## 2017-09-30 PROCEDURE — 6360000002 HC RX W HCPCS: Performed by: STUDENT IN AN ORGANIZED HEALTH CARE EDUCATION/TRAINING PROGRAM

## 2017-09-30 PROCEDURE — 2580000003 HC RX 258: Performed by: EMERGENCY MEDICINE

## 2017-09-30 PROCEDURE — 36415 COLL VENOUS BLD VENIPUNCTURE: CPT

## 2017-09-30 PROCEDURE — 6370000000 HC RX 637 (ALT 250 FOR IP): Performed by: EMERGENCY MEDICINE

## 2017-09-30 RX ADMIN — KETOROLAC TROMETHAMINE 30 MG: 30 INJECTION, SOLUTION INTRAMUSCULAR at 06:28

## 2017-09-30 RX ADMIN — METRONIDAZOLE 500 MG: 500 INJECTION, SOLUTION INTRAVENOUS at 09:43

## 2017-09-30 RX ADMIN — INSULIN LISPRO 6 UNITS: 100 INJECTION, SOLUTION INTRAVENOUS; SUBCUTANEOUS at 09:44

## 2017-09-30 RX ADMIN — KETOROLAC TROMETHAMINE 30 MG: 30 INJECTION, SOLUTION INTRAMUSCULAR at 18:50

## 2017-09-30 RX ADMIN — CIPROFLOXACIN 400 MG: 2 INJECTION, SOLUTION INTRAVENOUS at 01:24

## 2017-09-30 RX ADMIN — KETOROLAC TROMETHAMINE 30 MG: 30 INJECTION, SOLUTION INTRAMUSCULAR at 12:20

## 2017-09-30 RX ADMIN — METRONIDAZOLE 500 MG: 500 INJECTION, SOLUTION INTRAVENOUS at 01:30

## 2017-09-30 RX ADMIN — INSULIN LISPRO 4 UNITS: 100 INJECTION, SOLUTION INTRAVENOUS; SUBCUTANEOUS at 12:20

## 2017-09-30 RX ADMIN — KETOROLAC TROMETHAMINE 30 MG: 30 INJECTION, SOLUTION INTRAMUSCULAR at 01:25

## 2017-09-30 RX ADMIN — OXYCODONE HYDROCHLORIDE AND ACETAMINOPHEN 2 TABLET: 5; 325 TABLET ORAL at 14:56

## 2017-09-30 RX ADMIN — ENOXAPARIN SODIUM 40 MG: 40 INJECTION SUBCUTANEOUS at 09:43

## 2017-09-30 RX ADMIN — OMEPRAZOLE 40 MG: 20 CAPSULE, DELAYED RELEASE ORAL at 09:43

## 2017-09-30 RX ADMIN — METRONIDAZOLE 500 MG: 500 INJECTION, SOLUTION INTRAVENOUS at 18:19

## 2017-09-30 RX ADMIN — CIPROFLOXACIN 400 MG: 2 INJECTION, SOLUTION INTRAVENOUS at 12:02

## 2017-09-30 RX ADMIN — OXYCODONE HYDROCHLORIDE AND ACETAMINOPHEN 2 TABLET: 5; 325 TABLET ORAL at 10:08

## 2017-09-30 RX ADMIN — CETIRIZINE HYDROCHLORIDE 10 MG: 10 TABLET ORAL at 09:43

## 2017-09-30 RX ADMIN — Medication 10 ML: at 22:13

## 2017-09-30 RX ADMIN — HYDROCODONE BITARTRATE AND ACETAMINOPHEN 2 TABLET: 5; 325 TABLET ORAL at 23:17

## 2017-09-30 RX ADMIN — OXYCODONE HYDROCHLORIDE AND ACETAMINOPHEN 2 TABLET: 5; 325 TABLET ORAL at 18:50

## 2017-09-30 RX ADMIN — INSULIN LISPRO 6 UNITS: 100 INJECTION, SOLUTION INTRAVENOUS; SUBCUTANEOUS at 22:15

## 2017-09-30 RX ADMIN — INSULIN LISPRO 2 UNITS: 100 INJECTION, SOLUTION INTRAVENOUS; SUBCUTANEOUS at 18:19

## 2017-09-30 ASSESSMENT — PAIN DESCRIPTION - LOCATION: LOCATION: ABDOMEN

## 2017-09-30 ASSESSMENT — PAIN DESCRIPTION - FREQUENCY: FREQUENCY: INTERMITTENT

## 2017-09-30 ASSESSMENT — PAIN SCALES - GENERAL
PAINLEVEL_OUTOF10: 7
PAINLEVEL_OUTOF10: 6
PAINLEVEL_OUTOF10: 0
PAINLEVEL_OUTOF10: 6
PAINLEVEL_OUTOF10: 8
PAINLEVEL_OUTOF10: 6
PAINLEVEL_OUTOF10: 7
PAINLEVEL_OUTOF10: 6
PAINLEVEL_OUTOF10: 6
PAINLEVEL_OUTOF10: 5
PAINLEVEL_OUTOF10: 7

## 2017-09-30 ASSESSMENT — PAIN DESCRIPTION - DESCRIPTORS: DESCRIPTORS: SHARP

## 2017-09-30 ASSESSMENT — PAIN DESCRIPTION - PROGRESSION: CLINICAL_PROGRESSION: NOT CHANGED

## 2017-09-30 ASSESSMENT — PAIN DESCRIPTION - ONSET: ONSET: ON-GOING

## 2017-09-30 ASSESSMENT — PAIN DESCRIPTION - PAIN TYPE: TYPE: SURGICAL PAIN

## 2017-09-30 NOTE — FLOWSHEET NOTE
[address] [facility] [address]     Emergency/Trauma Note    Shift date: 09/29/2017  Shift day: Friday   Shift # 2    Room # 8516/0495-83   Name: Mariana Hunter            Age: 50 y.o. Gender: female          [employer]: [address] [employer] [place]:     Trauma/Incident type: Rapid Response  Admit Date & Time: 9/25/2017  9:39 PM  TRAUMA NAME:     PATIENT/EVENT DESCRIPTION:  Mariana Hunter is a 50 y.o. female who per nurse was in pain and called a rapid response on herself. Pt is in 0440/0440-01. SPIRITUAL ASSESSMENT/INTERVENTION:   responded to the rapid response. When  arrived the door was closed, so  checked in with pt's nurse and doctor. Per nurse, \"pt wasn't happy with how we were not medicating her for pain, so she called a rapid response on herself. \" Sandrine Santos listened as pt's mother and daughter expressed feeling frustrated and angry with the medical team for not giving the pt the pain medication she is requesting.  listened as pt talked about not being able move she hurt so badly. Pt talked about even feeling sick to her stomach. Pt was appreciative of the support. PATIENT BELONGINGS:   did not handle belongings    SPIRITUAL CARE FOLLOW-UP PLAN:  Chaplains will remain available to offer spiritual and emotional support as needed.       Electronically signed by Chaplain Liborio, on 9/29/2017 at 8:21 PM.

## 2017-09-30 NOTE — OP NOTE
[address]                               OPERATIVE REPORT    PATIENT NAME: Agueda Stewart              :             1965  MED REC NO:   [id number]                              ROOM:            8839  ACCOUNT NO:   [de-identified]                            ADMISSION DATE:  2017  PROVIDER:     Suki Nunez. Keny    DATE OF PROCEDURE:  2017    PREOPERATIVE DIAGNOSES:  Cholelithiasis, cholecystitis. POSTOPERATIVE DIAGNOSES:  Cholelithiasis, cholecystitis. OPERATION PERFORMED:  Open cholecystectomy. SURGEON:  Suki Nunez. Rita Chinchilla MD    ASSISTANTS:  Dr. Nahomy Taylor and Dr. Sandhya Saha. COMPLICATIONS:  None. SPECIMENS:  One of gallbladder and contents. DRAINS:  None. INDICATIONS:  The patient is a 51-year-old, obese, black female,  status post gastric bypass surgery with 4-year complaints of right  upper abdominal pain with gallbladder ultrasound and HIDA scan  demonstrating cholelithiasis and acute and chronic cholecystitis. DESCRIPTION OF PROCEDURE:  After the abdomen was prepped in the usual  sterile fashion, a 14-cm Kocher incision was made in the right upper  quadrant, and skin and subcutaneous tissue were sharply divided down  to the level of the fascia, and hemostasis was accomplished using  electrocautery. The fascia was then divided down to the level of the  rectus muscle, which was then divided using electrocautery. The  posterior sheath was then elevated between clamps, incised, and opened  for the length of the incision; and the gallbladder was then packed  into the wound and found to be acutely dilated and was decompressed of  white bile.   The gallbladder was then taken down in retrograde fashion  with retraction at the fundus and at the infundibulum, and the cystic  duct and artery were then dissected out clearly after dissecting the  gallbladder away from the liver bed down to the level of the triangle  of Calot, where once the cystic duct and artery were dissected out  clearly and simultaneously, each was then doubly clipped proximally  and transected, and then the gallbladder and contents were handed off  for pathologic examination. Hemostasis was then accomplished in the  bed of the dissection and found to be adequate, and the wound was then  irrigated with saline. The posterior sheath was then closed using  running suture of 0 Vicryl, anterior sheath was closed with running  suture of #1 Prolene, skin was closed using staples, and sterile  dressing applied. The patient tolerated the procedure well; was taken  to the recovery room in satisfactory condition after Dr. Van Terrell  completed the portion of his procedure.         Calton Rubinstein    D: 09/29/2017 9:43:12       T: 09/29/2017 12:10:49     /[email or web address]  Job#: [id number]     Doc#: [id number]

## 2017-09-30 NOTE — PLAN OF CARE
Problem: Pain:  Goal: Pain level will decrease  Pain level will decrease   Outcome: Ongoing  Goal: Control of acute pain  Control of acute pain   Outcome: Ongoing  Goal: Control of chronic pain  Control of chronic pain   Outcome: Ongoing

## 2017-09-30 NOTE — OP NOTE
[address] [address]                               OPERATIVE REPORT    PATIENT NAME: Froylan Simon              :             1965  MED REC NO:   [id number]                              ROOM:            0745  ACCOUNT NO:   [de-identified]                            ADMISSION DATE:  2017  PROVIDER:     Lewis Bagley    DATE OF PROCEDURE:  2017    PREOPERATIVE DIAGNOSES:  1. Right vocal cord lesion. 2.  Right vocal cord leukoplakia. 3.  Tobacco abuse. 4.  Gastroesophageal reflux disease. 5.  Chronic cholecystitis. POSTOPERATIVE DIAGNOSES:  1. Right vocal cord lesion. 2.  Right vocal cord leukoplakia. 3.  Tobacco abuse. 4.  Gastroesophageal reflux disease. 5.  Chronic cholecystitis. OPERATION PROCEDURES:  Microscopic direct laryngoscopy with excision  of right vocal cord lesion. SURGEON:  Lewis Bagley MD.    ANESTHESIA:  General endotracheal.    ESTIMATED BLOOD LOSS:  Minimal.    OPERATIVE FINDINGS:  She had a large lesion involving the entire right  vocal cord superomedial surface that was occluding 50% of the glottic  airway. The lesion appeared polypoid in appearance and had a central  area of leukoplakia that was sent for frozen section which revealed a  benign laryngeal nodule. INDICATION OF PROCEDURE:  The patient is a 77-year-old female with  hoarseness present for a year, who had a bronchoscopy couple of days  back revealing a right vocal cord lesion, hence ENT was consulted to  assess and evaluate. She was scheduled to have a cholecystectomy and  after discussion with the anesthesiologist and the surgeon, Dr. Brooks Hendrickson, and the patient, they all agreed to have me come in and take  out the lesion at the end of the cholecystectomy. Risks, benefits,  and alternatives of the surgery expressed in detail with the patient. Informed consent was then obtained.     OPERATIVE PROCEDURE:  In the operating room, after the cholecystectomy  was complete, the bed was turned 90 degrees and the patient was  prepped and draped in standard surgical fashion for laryngoscopy. Dedo laryngoscope was then used to evaluate the oral cavity,  oropharynx, hypopharynx, and larynx. The above noted lesion was seen  on the right vocal cord. The laryngoscope was then placed in  suspension and then the operative microscope was then brought into  view. Then under direct visualization, the right vocal cord lesion  was grasped and its base was amputated using scissors. It was cut out  in two portions, the portion involving the leukoplakia was sent for  frozen section which revealed the tissue to be benign. The rest of  the fragments of it were carefully cut off leaving a straight vocal  cord edge without compromising the anterior commissure, a small piece  of redundant tissue was left there. Hemostasis was achieved using  cotton pledgets soaked in adrenaline. Once the hemostasis was  achieved, postoperative photo was taken and the procedure was then  terminated. The laryngoscope was removed and the patient was then  aroused from anesthesia, taken to recovery room, awake, alert in  stable condition. Both written and verbal instructions were given  with regard to postop care and followup.         Donovan Corrales    D: 09/29/2017 10:53:37       T: 09/29/2017 14:11:39     [id number]  Job#: [id number]     Doc#: [id number]

## 2017-09-30 NOTE — PROGRESS NOTES
glucose (GLUTOSE) 40 % oral gel 15 g PRN   dextrose 50 % solution 12.5 g PRN   glucagon (rDNA) injection 1 mg PRN   dextrose 5 % solution PRN   metronidazole (FLAGYL) 500 mg in NaCl 100 mL IVPB premix Q8H   ciprofloxacin (CIPRO) IVPB 400 mg Q12H   cetirizine (ZYRTEC) tablet 10 mg Daily   omeprazole (PRILOSEC) delayed release capsule 40 mg Daily   pregabalin (LYRICA) capsule 50 mg TID PRN   sodium chloride flush 0.9 % injection 10 mL 2 times per day   sodium chloride flush 0.9 % injection 10 mL PRN   acetaminophen (TYLENOL) tablet 650 mg Q4H PRN   enoxaparin (LOVENOX) injection 40 mg Daily   ondansetron (ZOFRAN) injection 4 mg Q8H PRN   [MAR Hold] HYDROcodone-acetaminophen (NORCO) 5-325 MG per tablet 1 tablet Q4H PRN   aluminum & magnesium hydroxide-simethicone (MAALOX) 200-200-20 MG/5ML suspension 30 mL Q6H PRN     All medication charted and reviewed. CONSULTS      IP CONSULT TO GI  IP CONSULT TO DIABETES EDUCATOR  IP CONSULT TO PULMONOLOGY  IP CONSULT TO OTOLARYNGOLOGY  IP CONSULT TO GENERAL SURGERY  IP CONSULT TO IV TEAM    ASSESSMENT/PLAN        Severiano Wolf is a 50 y.o. female who presents with      1. acute onset sharp nonradiating waxing and waning right upper quadrant pain that is worse with eating, without alleviating factors, rated 8 out of 10 secondary to cholecystitis. -patient is POD1 s/p cholecystectomy   -GI has signed off    -Gen surg following      2.  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 Shamir  Emergency Medicine Resident Physician     This dictation was generated by voice recognition computer software. Although all attempts are made to edit the dictation for accuracy, there may be errors in the transcription that are not intended.

## 2017-09-30 NOTE — PROGRESS NOTES
Surgery Progress Note            PATIENT NAME: Gennaro Jorge     TODAY'S DATE: 9/30/2017, 4:08 AM    SURGERY: POD#1 open cholecystectomy with direct micro laryngoscopy with excision of vocal cord lesion    SUBJECTIVE:    Pt seen and examined. Called rapid response in the evening for pain control. Was started on toradol and PRN dilaudid at that time. Pain currently controlled/improved. Patient states she had bowel movement. Passing flatus. Tolerating clears. Denies N/V. Currently resting her voice as much as possible.      OBJECTIVE:   VITALS:     BP (!) 155/96  Pulse 81  Temp 97.3 °F (36.3 °C) (Axillary)   Resp 18  Ht 5' 6\" (1.676 m)  Wt 229 lb (103.9 kg) Comment: FROM FLOOR  LMP 09/27/1997  SpO2 98%  BMI 36.96 kg/m2     Gen:  A&Ox3, NAD  HEENT: moist mucous membranes  CV: RRR  Resp: CTA, b/l   Abd:  Soft, diffusely sore and tender to palpation, non-distended, obese, previous midline scar noted, surgical dressing intact without any significant drainage  Ext:  Warm, no edema     INTAKE/OUTPUT:      Intake/Output Summary (Last 24 hours) at 09/30/17 0124  Last data filed at 09/29/17 2014   Gross per 24 hour   Intake             1800 ml   Output             1330 ml   Net              470 ml     Data:  CBC with Differential:    Lab Results   Component Value Date    WBC 6.2 09/25/2017    RBC 5.52 09/25/2017    HGB 14.7 09/25/2017    HCT 44.1 09/25/2017     09/25/2017    MCV 79.8 09/25/2017    MCH 26.6 09/25/2017    MCHC 33.3 09/25/2017    RDW 13.1 09/25/2017    LYMPHOPCT 42 09/25/2017    LYMPHOPCT 37.2 03/19/2015    MONOPCT 9 09/25/2017    MONOPCT 9.8 03/19/2015    EOSPCT 2.3 03/19/2015    BASOPCT 0 09/25/2017    BASOPCT 0.6 03/19/2015    MONOSABS 0.56 09/25/2017    MONOSABS 0.5 03/19/2015    LYMPHSABS 2.60 09/25/2017    LYMPHSABS 2.0 03/19/2015    EOSABS 0.12 09/25/2017    EOSABS 0.1 03/19/2015    BASOSABS 0.00 09/25/2017    DIFFTYPE NOT REPORTED 09/25/2017     BMP:    Lab Results   Component Value Date     09/25/2017    K 3.8 09/25/2017     09/25/2017    CO2 27 09/25/2017    BUN 3 09/25/2017    LABALBU 3.8 09/25/2017    CREATININE 0.48 09/25/2017    CALCIUM 9.1 09/25/2017    GFRAA >60 09/25/2017    LABGLOM >60 09/25/2017    GLUCOSE 244 09/25/2017       Radiology Review:      U/S with cholelithiasis  HIDA without filling of gallbladder and concerning for cholecystitis     ASSESSMENT   1. POD#1 open cholecystectomy with direct micro laryngoscopy with excision of vocal cord lesion  2. Chronic cholecystitis - resolved     Plan  · Continue current care  · Pain control PRN: keep on scheduled Toradol, dilaudid PRN - will de-escalate on POD#2   · If tolerates fulls for breakfast will advance to low fat for lunch  · Encourage ambulation in hallway   · Encourage IS, deep breathing and coughing   · Will plan to remove dressing on POD#2  · Keep on voice rest for 2 more days per ENT post operative orders. · Saline gargle 4x daily. · Will continue to follow along at this time.        Electronically signed by Yusra Wiggins DO on 9/30/2017 at 4:12 AM

## 2017-09-30 NOTE — PROGRESS NOTES
CDU Daily Progress Note  Attending Physician       Pt Name: Hung Munroe  MRN: [id number]  [place] 1965  Date of evaluation: 09/30/17    I performed a history and physical examination of the patient and discussed management with the resident. I reviewed the residents note and agree with the documented findings and plan of care. Any areas of disagreement are noted on the chart. I was personally present for the key portions of any procedures. I have documented in the chart those procedures where I was not present during the key portions. I have reviewed the emergency nurses triage note. I agree with the chief complaint, past medical history, past surgical history, allergies, medications, social and family history as documented unless otherwise noted below. Documentation of the HPI, Physical Exam and Medical Decision Making performed by medical students or scribes is based on my personal performance of the HPI, PE and MDM. For Physician Assistant/ Nurse Practitioner cases/documentation I have personally evaluated this patient and have completed at least one if not all key elements of the E/M (history, physical exam, and MDM). Additional findings are as noted. The Family History, Social History and Review of Systems are unchanged from the previous day. No significant events overnight.     Patient doing well, anticipate d/c today    Clemetine Dense,  DO  Attending Physician  Critical Decision Unit

## 2017-10-01 VITALS
HEIGHT: 66 IN | TEMPERATURE: 97.4 F | DIASTOLIC BLOOD PRESSURE: 66 MMHG | HEART RATE: 72 BPM | RESPIRATION RATE: 16 BRPM | WEIGHT: 229 LBS | SYSTOLIC BLOOD PRESSURE: 149 MMHG | OXYGEN SATURATION: 95 % | BODY MASS INDEX: 36.8 KG/M2

## 2017-10-01 LAB
ANION GAP SERPL CALCULATED.3IONS-SCNC: 13 MMOL/L (ref 9–17)
BUN BLDV-MCNC: 3 MG/DL (ref 6–20)
BUN/CREAT BLD: ABNORMAL (ref 9–20)
CALCIUM SERPL-MCNC: 9.1 MG/DL (ref 8.6–10.4)
CHLORIDE BLD-SCNC: 102 MMOL/L (ref 98–107)
CO2: 24 MMOL/L (ref 20–31)
CREAT SERPL-MCNC: 0.56 MG/DL (ref 0.5–0.9)
GFR AFRICAN AMERICAN: >60 ML/MIN
GFR NON-AFRICAN AMERICAN: >60 ML/MIN
GFR SERPL CREATININE-BSD FRML MDRD: ABNORMAL ML/MIN/{1.73_M2}
GFR SERPL CREATININE-BSD FRML MDRD: ABNORMAL ML/MIN/{1.73_M2}
GLUCOSE BLD-MCNC: 235 MG/DL (ref 70–99)
GLUCOSE BLD-MCNC: 265 MG/DL (ref 65–105)
POTASSIUM SERPL-SCNC: 3.9 MMOL/L (ref 3.7–5.3)
SODIUM BLD-SCNC: 139 MMOL/L (ref 135–144)

## 2017-10-01 PROCEDURE — 6370000000 HC RX 637 (ALT 250 FOR IP): Performed by: EMERGENCY MEDICINE

## 2017-10-01 PROCEDURE — 6370000000 HC RX 637 (ALT 250 FOR IP): Performed by: STUDENT IN AN ORGANIZED HEALTH CARE EDUCATION/TRAINING PROGRAM

## 2017-10-01 PROCEDURE — 90686 IIV4 VACC NO PRSV 0.5 ML IM: CPT | Performed by: EMERGENCY MEDICINE

## 2017-10-01 PROCEDURE — 6360000002 HC RX W HCPCS: Performed by: EMERGENCY MEDICINE

## 2017-10-01 PROCEDURE — 36415 COLL VENOUS BLD VENIPUNCTURE: CPT

## 2017-10-01 PROCEDURE — 80048 BASIC METABOLIC PNL TOTAL CA: CPT

## 2017-10-01 PROCEDURE — 82947 ASSAY GLUCOSE BLOOD QUANT: CPT

## 2017-10-01 PROCEDURE — 2500000003 HC RX 250 WO HCPCS: Performed by: EMERGENCY MEDICINE

## 2017-10-01 PROCEDURE — G0008 ADMIN INFLUENZA VIRUS VAC: HCPCS | Performed by: EMERGENCY MEDICINE

## 2017-10-01 PROCEDURE — 6360000002 HC RX W HCPCS: Performed by: STUDENT IN AN ORGANIZED HEALTH CARE EDUCATION/TRAINING PROGRAM

## 2017-10-01 RX ORDER — DOCUSATE SODIUM 100 MG/1
100 CAPSULE, LIQUID FILLED ORAL 2 TIMES DAILY PRN
Qty: 30 CAPSULE | Refills: 0 | Status: SHIPPED | OUTPATIENT
Start: 2017-10-01 | End: 2017-11-10

## 2017-10-01 RX ORDER — OXYCODONE HYDROCHLORIDE AND ACETAMINOPHEN 5; 325 MG/1; MG/1
1 TABLET ORAL EVERY 6 HOURS PRN
Qty: 28 TABLET | Refills: 0 | Status: SHIPPED | OUTPATIENT
Start: 2017-10-01 | End: 2017-10-08

## 2017-10-01 RX ADMIN — INFLUENZA VIRUS VACCINE 0.5 ML: 15; 15; 15; 15 SUSPENSION INTRAMUSCULAR at 11:15

## 2017-10-01 RX ADMIN — KETOROLAC TROMETHAMINE 30 MG: 30 INJECTION, SOLUTION INTRAMUSCULAR at 00:34

## 2017-10-01 RX ADMIN — METRONIDAZOLE 500 MG: 500 INJECTION, SOLUTION INTRAVENOUS at 02:32

## 2017-10-01 RX ADMIN — CIPROFLOXACIN 400 MG: 2 INJECTION, SOLUTION INTRAVENOUS at 00:29

## 2017-10-01 RX ADMIN — OMEPRAZOLE 40 MG: 20 CAPSULE, DELAYED RELEASE ORAL at 08:37

## 2017-10-01 RX ADMIN — OXYCODONE HYDROCHLORIDE AND ACETAMINOPHEN 2 TABLET: 5; 325 TABLET ORAL at 10:03

## 2017-10-01 RX ADMIN — OXYCODONE HYDROCHLORIDE AND ACETAMINOPHEN 2 TABLET: 5; 325 TABLET ORAL at 05:09

## 2017-10-01 RX ADMIN — ENOXAPARIN SODIUM 40 MG: 40 INJECTION SUBCUTANEOUS at 08:37

## 2017-10-01 RX ADMIN — CETIRIZINE HYDROCHLORIDE 10 MG: 10 TABLET ORAL at 08:37

## 2017-10-01 RX ADMIN — KETOROLAC TROMETHAMINE 30 MG: 30 INJECTION, SOLUTION INTRAMUSCULAR at 07:02

## 2017-10-01 RX ADMIN — INSULIN LISPRO 6 UNITS: 100 INJECTION, SOLUTION INTRAVENOUS; SUBCUTANEOUS at 08:37

## 2017-10-01 ASSESSMENT — PAIN DESCRIPTION - FREQUENCY
FREQUENCY: INTERMITTENT

## 2017-10-01 ASSESSMENT — PAIN DESCRIPTION - DESCRIPTORS
DESCRIPTORS: SORE
DESCRIPTORS: SHARP
DESCRIPTORS: SHARP
DESCRIPTORS: SORE

## 2017-10-01 ASSESSMENT — PAIN DESCRIPTION - PROGRESSION
CLINICAL_PROGRESSION: NOT CHANGED

## 2017-10-01 ASSESSMENT — PAIN DESCRIPTION - PAIN TYPE
TYPE: SURGICAL PAIN

## 2017-10-01 ASSESSMENT — PAIN DESCRIPTION - LOCATION
LOCATION: ABDOMEN

## 2017-10-01 ASSESSMENT — PAIN SCALES - GENERAL
PAINLEVEL_OUTOF10: 5
PAINLEVEL_OUTOF10: 6
PAINLEVEL_OUTOF10: 7
PAINLEVEL_OUTOF10: 5
PAINLEVEL_OUTOF10: 7
PAINLEVEL_OUTOF10: 2
PAINLEVEL_OUTOF10: 6

## 2017-10-01 ASSESSMENT — PAIN DESCRIPTION - ONSET
ONSET: ON-GOING

## 2017-10-01 NOTE — PROGRESS NOTES
CDU Daily Progress Note  Attending Physician       Pt Name: Hung Munroe  MRN: [id number]  [place] 1965  Date of evaluation: 10/1/17    I performed a history and physical examination of the patient and discussed management with the resident. I reviewed the residents note and agree with the documented findings and plan of care. Any areas of disagreement are noted on the chart. I was personally present for the key portions of any procedures. I have documented in the chart those procedures where I was not present during the key portions. I have reviewed the emergency nurses triage note. I agree with the chief complaint, past medical history, past surgical history, allergies, medications, social and family history as documented unless otherwise noted below. Documentation of the HPI, Physical Exam and Medical Decision Making performed by medical students or scribes is based on my personal performance of the HPI, PE and MDM. For Physician Assistant/ Nurse Practitioner cases/documentation I have personally evaluated this patient and have completed at least one if not all key elements of the E/M (history, physical exam, and MDM). Additional findings are as noted. The Family History, Social History and Review of Systems are unchanged from the previous day. No significant events overnight. Patient smokes 2 packs of cigarettes per day for 34 years. Smoking cessation counseling for 3 minutes. Discussed the effects of smoking in regards to cad. I explained how this negatively effects their condition, will contribute to increased recovery time, and possible lead to further health problems in the future. Patient relates she is actually feeling better and hasn't had a cigarette for three days. She relates to me that she is feeling like she could go home.     Ranjana Jasmine MD  Attending Physician  Critical Decision Unit

## 2017-10-01 NOTE — PROGRESS NOTES
OBS/CDU   RESIDENT NOTE      Patients PCP is:  Thuy Rene MD    SUBJECTIVE      Patient now POD2 s/p open cholecystectomy. Mild abdominal pain, much improved. Requesting to go home today. No acute events overnight. Has been able to tolerate diet without nausea or vomiting. The patient is urinating on her own and is passing flatus. Denies fever, chills, nausea, vomiting, chest pain, shortness of breath, focal weakness, numbness, tingling, urinary/bowel symptoms, vision changes, visual hallucinations, or headache. PHYSICAL EXAM      General: NAD, AO X 3  Heent: EMOI, PERRL  Neck: SUPPLE, NO JVD  Cardiovascular: RRR, S1S2  Pulmonary: CTAB, NO SOB  Abdomen: SOFT, NTTP, ND, +BS  Extremities: +2/4 PULSES DISTAL, NO SWELLING  Neuro / Psych: NO NUMBNESS OR TINGLING, MENTATION AT BASELINE    PERTINENT TEST /EXAMS      I have reviewed all available laboratory results. MEDICATIONS CURRENT     medroxyPROGESTERone (DEPO-PROVERA) injection 150 mg Once     All medication charted and reviewed. CONSULTS      IP CONSULT TO GI  IP CONSULT TO DIABETES EDUCATOR  IP CONSULT TO PULMONOLOGY  IP CONSULT TO OTOLARYNGOLOGY  IP CONSULT TO GENERAL SURGERY  IP CONSULT TO IV TEAM    ASSESSMENT/PLAN        Jaspreet Beltrán is a 50 y.o. female who presents with     1. acute onset sharp nonradiating waxing and waning right upper quadrant pain that is worse with eating, without alleviating factors, rated 8 out of 10 secondary to cholecystitis. -patient is POD2 s/p cholecystectomy                        -Gen surg has cleared patient for discharge, will follow up outpatient     2.  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 seen incidentally on EGD, most likely etiology vocal cord polyp                         -patient s/p direct micro layngoscopy with excision of vocal cord lesion             -follow up with ENT for biopsy results and further management    · IP CONSULT TO GI  · IP CONSULT TO DIABETES EDUCATOR  · IP CONSULT TO PULMONOLOGY  · IP CONSULT TO OTOLARYNGOLOGY  · IP CONSULT TO GENERAL SURGERY  · IP CONSULT TO IV TEAM  · Further workup and evaluation   · Follow up recommendations     · Continue home meds, pain control   · Monitor vitals, labs, imaging     DISPO: discharge today    --  Aracelis Barksdale  Emergency Medicine Resident Physician     This dictation was generated by voice recognition computer software. Although all attempts are made to edit the dictation for accuracy, there may be errors in the transcription that are not intended.

## 2017-10-02 ENCOUNTER — TELEPHONE (OUTPATIENT)
Dept: FAMILY MEDICINE CLINIC | Age: 52
End: 2017-10-02

## 2017-10-02 LAB — SURGICAL PATHOLOGY REPORT: NORMAL

## 2017-10-02 NOTE — TELEPHONE ENCOUNTER
Called patient to set up hospital follow up appt. Patient has not seen Dr. Devonte Ewing in a while due to not having insurance. Patient informed on self pay copays as well as our financial assistance.

## 2017-10-13 ENCOUNTER — APPOINTMENT (OUTPATIENT)
Dept: GENERAL RADIOLOGY | Age: 52
End: 2017-10-13

## 2017-10-13 ENCOUNTER — HOSPITAL ENCOUNTER (EMERGENCY)
Age: 52
Discharge: HOME | End: 2017-10-13
Attending: EMERGENCY MEDICINE

## 2017-10-13 VITALS
TEMPERATURE: 97.2 F | RESPIRATION RATE: 13 BRPM | DIASTOLIC BLOOD PRESSURE: 95 MMHG | HEART RATE: 79 BPM | SYSTOLIC BLOOD PRESSURE: 181 MMHG | OXYGEN SATURATION: 98 %

## 2017-10-13 DIAGNOSIS — G89.18 POST-OP PAIN: Primary | ICD-10-CM

## 2017-10-13 LAB
ABSOLUTE EOS #: 0.12 K/UL (ref 0–0.4)
ABSOLUTE LYMPH #: 2.07 K/UL (ref 1–4.8)
ABSOLUTE MONO #: 0.43 K/UL (ref 0.1–0.8)
ALBUMIN SERPL-MCNC: 3.8 G/DL (ref 3.5–5.2)
ALBUMIN/GLOBULIN RATIO: 1.1 (ref 1–2.5)
ALP BLD-CCNC: 133 U/L (ref 35–104)
ALT SERPL-CCNC: 16 U/L (ref 5–33)
AMYLASE: 35 U/L (ref 28–100)
ANION GAP SERPL CALCULATED.3IONS-SCNC: 13 MMOL/L (ref 9–17)
AST SERPL-CCNC: 30 U/L
BASOPHILS # BLD: 0 %
BASOPHILS ABSOLUTE: 0 K/UL (ref 0–0.2)
BILIRUB SERPL-MCNC: 0.15 MG/DL (ref 0.3–1.2)
BILIRUBIN DIRECT: <0.08 MG/DL
BILIRUBIN URINE: NEGATIVE
BILIRUBIN, INDIRECT: ABNORMAL MG/DL (ref 0–1)
BUN BLDV-MCNC: 3 MG/DL (ref 6–20)
BUN/CREAT BLD: ABNORMAL (ref 9–20)
CALCIUM SERPL-MCNC: 9.1 MG/DL (ref 8.6–10.4)
CHLORIDE BLD-SCNC: 100 MMOL/L (ref 98–107)
CO2: 27 MMOL/L (ref 20–31)
COLOR: YELLOW
COMMENT UA: NORMAL
CREAT SERPL-MCNC: 0.41 MG/DL (ref 0.5–0.9)
DIFFERENTIAL TYPE: ABNORMAL
EOSINOPHILS RELATIVE PERCENT: 2 %
GFR AFRICAN AMERICAN: >60 ML/MIN
GFR NON-AFRICAN AMERICAN: >60 ML/MIN
GFR SERPL CREATININE-BSD FRML MDRD: ABNORMAL ML/MIN/{1.73_M2}
GFR SERPL CREATININE-BSD FRML MDRD: ABNORMAL ML/MIN/{1.73_M2}
GLOBULIN: ABNORMAL G/DL (ref 1.5–3.8)
GLUCOSE BLD-MCNC: 185 MG/DL (ref 70–99)
GLUCOSE URINE: NEGATIVE
HCT VFR BLD CALC: 40.4 % (ref 36–46)
HEMOGLOBIN: 13.5 G/DL (ref 12–16)
KETONES, URINE: NEGATIVE
LEUKOCYTE ESTERASE, URINE: NEGATIVE
LIPASE: 16 U/L (ref 13–60)
LYMPHOCYTES # BLD: 34 %
MCH RBC QN AUTO: 26.4 PG (ref 26–34)
MCHC RBC AUTO-ENTMCNC: 33.4 G/DL (ref 31–37)
MCV RBC AUTO: 78.9 FL (ref 80–100)
MONOCYTES # BLD: 7 %
MORPHOLOGY: ABNORMAL
NITRITE, URINE: NEGATIVE
PDW BLD-RTO: 13.2 % (ref 12.5–15.4)
PH UA: 6.5 (ref 5–8)
PLATELET # BLD: 261 K/UL (ref 140–450)
PLATELET ESTIMATE: ABNORMAL
PMV BLD AUTO: 7.8 FL (ref 6–12)
POTASSIUM SERPL-SCNC: 3.8 MMOL/L (ref 3.7–5.3)
PROTEIN UA: NEGATIVE
RBC # BLD: 5.13 M/UL (ref 4–5.2)
RBC # BLD: ABNORMAL 10*6/UL
SEG NEUTROPHILS: 57 %
SEGMENTED NEUTROPHILS ABSOLUTE COUNT: 3.48 K/UL (ref 1.8–7.7)
SODIUM BLD-SCNC: 140 MMOL/L (ref 135–144)
SPECIFIC GRAVITY UA: 1 (ref 1–1.03)
TOTAL PROTEIN: 7.2 G/DL (ref 6.4–8.3)
TURBIDITY: CLEAR
URINE HGB: NEGATIVE
UROBILINOGEN, URINE: NORMAL
WBC # BLD: 6.1 K/UL (ref 3.5–11)
WBC # BLD: ABNORMAL 10*3/UL

## 2017-10-13 PROCEDURE — 81003 URINALYSIS AUTO W/O SCOPE: CPT

## 2017-10-13 PROCEDURE — 6360000002 HC RX W HCPCS: Performed by: EMERGENCY MEDICINE

## 2017-10-13 PROCEDURE — 96374 THER/PROPH/DIAG INJ IV PUSH: CPT

## 2017-10-13 PROCEDURE — 83690 ASSAY OF LIPASE: CPT

## 2017-10-13 PROCEDURE — 80048 BASIC METABOLIC PNL TOTAL CA: CPT

## 2017-10-13 PROCEDURE — 82150 ASSAY OF AMYLASE: CPT

## 2017-10-13 PROCEDURE — 74000 XR ABDOMEN LIMITED (KUB): CPT

## 2017-10-13 PROCEDURE — 99284 EMERGENCY DEPT VISIT MOD MDM: CPT

## 2017-10-13 PROCEDURE — 80076 HEPATIC FUNCTION PANEL: CPT

## 2017-10-13 PROCEDURE — 85025 COMPLETE CBC W/AUTO DIFF WBC: CPT

## 2017-10-13 RX ORDER — OXYCODONE HYDROCHLORIDE AND ACETAMINOPHEN 5; 325 MG/1; MG/1
1 TABLET ORAL EVERY 4 HOURS PRN
Qty: 10 TABLET | Refills: 0 | Status: SHIPPED | OUTPATIENT
Start: 2017-10-13 | End: 2017-11-01

## 2017-10-13 RX ORDER — CLARITHROMYCIN 500 MG/1
500 TABLET, COATED ORAL 2 TIMES DAILY
Qty: 28 TABLET | Refills: 0 | OUTPATIENT
Start: 2017-10-13 | End: 2017-10-27

## 2017-10-13 RX ORDER — OMEPRAZOLE 20 MG/1
20 CAPSULE, DELAYED RELEASE ORAL 2 TIMES DAILY
Qty: 28 CAPSULE | Refills: 0 | OUTPATIENT
Start: 2017-10-13 | End: 2017-11-07 | Stop reason: SDUPTHER

## 2017-10-13 RX ORDER — DIPHENHYDRAMINE HYDROCHLORIDE 50 MG/ML
25 INJECTION INTRAMUSCULAR; INTRAVENOUS ONCE
Status: COMPLETED | OUTPATIENT
Start: 2017-10-13 | End: 2017-10-13

## 2017-10-13 RX ORDER — METRONIDAZOLE 500 MG/1
500 TABLET ORAL 3 TIMES DAILY
Qty: 42 TABLET | Refills: 0 | OUTPATIENT
Start: 2017-10-13 | End: 2017-10-27

## 2017-10-13 RX ADMIN — DIPHENHYDRAMINE HYDROCHLORIDE 25 MG: 50 INJECTION, SOLUTION INTRAMUSCULAR; INTRAVENOUS at 20:25

## 2017-10-13 ASSESSMENT — ENCOUNTER SYMPTOMS
BACK PAIN: 0
SORE THROAT: 0
SHORTNESS OF BREATH: 0
COUGH: 0
ABDOMINAL DISTENTION: 1
TROUBLE SWALLOWING: 0
CONSTIPATION: 1
ABDOMINAL PAIN: 1
PHOTOPHOBIA: 0
VOMITING: 0
CHEST TIGHTNESS: 0
COLOR CHANGE: 0
DIARRHEA: 0
NAUSEA: 0

## 2017-10-13 ASSESSMENT — PAIN DESCRIPTION - LOCATION: LOCATION: ABDOMEN

## 2017-10-13 ASSESSMENT — PAIN DESCRIPTION - PAIN TYPE: TYPE: ACUTE PAIN;CHRONIC PAIN

## 2017-10-13 ASSESSMENT — PAIN SCALES - GENERAL: PAINLEVEL_OUTOF10: 8

## 2017-10-13 ASSESSMENT — PAIN DESCRIPTION - ORIENTATION: ORIENTATION: RIGHT;MID

## 2017-10-13 ASSESSMENT — PAIN DESCRIPTION - FREQUENCY: FREQUENCY: CONTINUOUS

## 2017-10-13 ASSESSMENT — PAIN DESCRIPTION - DESCRIPTORS: DESCRIPTORS: ACHING

## 2017-10-13 NOTE — ED PROVIDER NOTES
[address] [facility]     Emergency Department     Faculty Attestation    I performed a history and physical examination of the patient and discussed management with the resident. I reviewed the residents note and agree with the documented findings and plan of care. Any areas of disagreement are noted on the chart. I was personally present for the key portions of any procedures. I have documented in the chart those procedures where I was not present during the key portions. I have reviewed the emergency nurses triage note. I agree with the chief complaint, past medical history, past surgical history, allergies, medications, social and family history as documented unless otherwise noted below. For Physician Assistant/ Nurse Practitioner cases/documentation I have personally evaluated this patient and have completed at least one if not all key elements of the E/M (history, physical exam, and MDM). Additional findings are as noted. I have personally seen and evaluated the patient. I find the patient's history and physical exam are consistent with the NP/PA documentation. I agree with the care provided, treatment rendered, disposition and follow-up plan. Reporting abdominal pain right upper quadrants status post cholecystectomy 2 weeks post surgical abdomen soft or peritoneal signs. Critical Care     Kiersten Osuna M.D.   Attending Emergency  Physician              Desmond Dunlap MD  10/13/17 [id number]

## 2017-10-13 NOTE — ED PROVIDER NOTES
[facility]  ED  Emergency Department Encounter  Emergency Medicine Resident     Pt Name: Javier Alva  MRN: [id number]  [place] 1965  Date of evaluation: 10/13/17  PCP:  Thuy Juarez MD    [address] [place] [address]       Chief Complaint   Patient presents with    Abdominal Pain     post surgical pain       HISTORY OF PRESENT ILLNESS  (Location/Symptom, Timing/Onset, Context/Setting, Quality, Duration, Modifying Factors, Severity.)      Javier Alva is a 50 y.o. female who presents with Ongoing abdominal pain status post open cholecystectomy on 9/29/17. Pt reports ongoing pulling sensation around the incision site along with right inguinal pulling. She reports pain worsens after meals and improves with rest.  Pt has used Percocet for pain management and ran out of this prescription 4 days ago. She has used Tylenol and Motrin since this time without improvement. She notes constipation since surgery and states she has a bowel movement every 4 days, decreased from every 2 days prior to surgery. Pt reports she has not followed up with PCP as she is currently uninsured. She is scheduled to follow up with General Surgery in 3 days for staple removal.  Pt denies fever, chills, rash, diarrhea, nausea, vomiting, headaches, dizziness. PAST MEDICAL / SURGICAL / SOCIAL / FAMILY HISTORY      has a past medical history of Allergic rhinitis; Diabetes mellitus ([address] Diabetic neuropathy ([address] Fibromyalgia; GERD (gastroesophageal reflux disease); Obesity; Pure hypercholesterolemia; Tobacco abuse; Type II or unspecified type diabetes mellitus without mention of complication, not stated as uncontrolled; and Unspecified sleep apnea. has a past surgical history that includes Gastric bypass surgery; ovarian cyst removal (Right); Carpal tunnel release (Right); bronchoscopy (9/27/2017); egd transoral biopsy single/multiple (N/A, 9/27/2017);  Cholecystectomy (09/29/2017); other surgical history (09/29/2017); Cholecystectomy (N/A, 9/29/2017); and laryngoscopy (N/A, 9/29/2017). Social History     Social History    Marital status: Single     Spouse name: N/A    Number of children: N/A    Years of education: N/A     Occupational History    Not on file. Social History Main Topics    Smoking status: Current Every Day Smoker     Packs/day: 1.50     Types: Cigarettes    Smokeless tobacco: Never Used    Alcohol use 0.0 oz/week      Comment: rare    Drug use: No    Sexual activity: Not on file     Other Topics Concern    Not on file     Social History Narrative    No narrative on file       Family History   Problem Relation Age of Onset    Diabetes Mother     Heart Disease Father     Diabetes Sister        Allergies:  Demerol hcl [meperidine]; Morphine; and Pcn [penicillins]    Home Medications:  Prior to Admission medications    Medication Sig Start Date End Date Taking?  Authorizing Provider   omeprazole (PRILOSEC) 20 MG delayed release capsule Take 1 capsule by mouth 2 times daily for 14 days 10/13/17 10/27/17  Eliezer Serra MD   clarithromycin (BIAXIN) 500 MG tablet Take 1 tablet by mouth 2 times daily for 14 days 10/13/17 10/27/17  Eliezer Serra MD   metroNIDAZOLE (FLAGYL) 500 MG tablet Take 1 tablet by mouth 3 times daily for 14 days 10/13/17 10/27/17  Eliezer Serra MD   docusate sodium (COLACE) 100 MG capsule Take 1 capsule by mouth 2 times daily as needed for Constipation 10/1/17   Xander Caballero DO   traMADol (ULTRAM) 50 MG tablet Take 50 mg by mouth every 8 hours as needed for Pain    Historical Provider, MD   vitamin D (CHOLECALCIFEROL) 1000 UNIT TABS tablet Take 1,000 Units by mouth daily    Historical Provider, MD   VITAMIN K PO Take by mouth    Historical Provider, MD   [facility] [employer] [address] [facility] by Does not apply route 3/14/17   Renuka Martínez PA-C   ibuprofen (ADVIL;MOTRIN) 600 MG tablet Take 1 tablet by mouth every 6 hours as needed for Pain 2/13/17   Phani Szymanski PA-C and wound. Neurological: Positive for numbness (Around surgical site). Negative for dizziness, weakness and headaches. PHYSICAL EXAM   (up to 7 for level 4, 8 or more for level 5)      INITIAL VITALS:   BP (S) (!) 181/95   Pulse (S) 79   Temp (S) 97.2 °F (36.2 °C) (Oral)   Resp (S) 13   LMP 09/27/1997   SpO2 (S) 98%     Physical Exam   Constitutional: She is oriented to person, place, and time. She appears well-developed and well-nourished. No distress. Obese     HENT:   Head: Normocephalic and atraumatic. Nose: Nose normal.   Eyes: Conjunctivae and EOM are normal.   Neck: Normal range of motion. Neck supple. No JVD present. Cardiovascular: Normal rate, regular rhythm and normal heart sounds. Exam reveals no friction rub. No murmur heard. Pulmonary/Chest: Effort normal and breath sounds normal. No stridor. No respiratory distress. She has no wheezes. Abdominal: Soft. Bowel sounds are normal. She exhibits no distension. There is tenderness. Well healing incision site underneath right breast without significant surrounding erythema or edema. No discharge from wound. Musculoskeletal: Normal range of motion. She exhibits no edema, tenderness or deformity. Neurological: She is alert and oriented to person, place, and time. Skin: Skin is warm and dry. She is not diaphoretic. DIFFERENTIAL  DIAGNOSIS     PLAN (LABS / IMAGING / EKG):  Orders Placed This Encounter   Procedures    XR ABDOMEN (KUB) (SINGLE AP VIEW)    Amylase    Lipase    Basic Metabolic Panel    CBC Auto Differential    UA W/REFLEX CULTURE    HEPATIC FUNCTION PANEL    Inpatient consult to General Surgery       MEDICATIONS ORDERED:  Orders Placed This Encounter   Medications    diphenhydrAMINE (BENADRYL) injection 25 mg    oxyCODONE-acetaminophen (PERCOCET) 5-325 MG per tablet     Sig: Take 1 tablet by mouth every 4 hours as needed for Pain .      Dispense:  10 tablet     Refill:  0       DDX: Post-operative pain, incision site pain, constipation, possible GERD, possible PUD, doubt intraabdominal etiology, doubt infection, doubt abscess.           DIAGNOSTIC RESULTS / EMERGENCY DEPARTMENT COURSE / MDM     LABS:  Results for orders placed or performed during the hospital encounter of 10/13/17   Amylase   Result Value Ref Range    Amylase 35 28 - 100 U/L   Lipase   Result Value Ref Range    Lipase 16 13 - 60 U/L   Basic Metabolic Panel   Result Value Ref Range    Glucose 185 (H) 70 - 99 mg/dL    BUN 3 (L) 6 - 20 mg/dL    CREATININE 0.41 (L) 0.50 - 0.90 mg/dL    Bun/Cre Ratio NOT REPORTED 9 - 20    Calcium 9.1 8.6 - 10.4 mg/dL    Sodium 140 135 - 144 mmol/L    Potassium 3.8 3.7 - 5.3 mmol/L    Chloride 100 98 - 107 mmol/L    CO2 27 20 - 31 mmol/L    Anion Gap 13 9 - 17 mmol/L    GFR Non-African American >60 >60 mL/min    GFR African American >60 >60 mL/min    GFR Comment          GFR Staging NOT REPORTED    CBC Auto Differential   Result Value Ref Range    WBC 6.1 3.5 - 11.0 k/uL    RBC 5.13 4.0 - 5.2 m/uL    Hemoglobin 13.5 12.0 - 16.0 g/dL    Hematocrit 40.4 36 - 46 %    MCV 78.9 (L) 80 - 100 fL    MCH 26.4 26 - 34 pg    MCHC 33.4 31 - 37 g/dL    RDW 13.2 12.5 - 15.4 %    Platelets 922 895 - 577 k/uL    MPV 7.8 6.0 - 12.0 fL    Differential Type NOT REPORTED     WBC Morphology NOT REPORTED     RBC Morphology NOT REPORTED     Platelet Estimate NOT REPORTED     Seg Neutrophils 57 %    Lymphocytes 34 %    Monocytes 7 %    Eosinophils % 2 %    Basophils 0 %    Segs Absolute 3.48 1.8 - 7.7 k/uL    Absolute Lymph # 2.07 1.0 - 4.8 k/uL    Absolute Mono # 0.43 0.1 - 0.8 k/uL    Absolute Eos # 0.12 0.0 - 0.4 k/uL    Basophils # 0.00 0.0 - 0.2 k/uL    Morphology MICROCYTOSIS PRESENT    UA W/REFLEX CULTURE   Result Value Ref Range    Color, UA YELLOW YEL    Turbidity UA CLEAR CLEAR    Glucose, Ur NEGATIVE NEG    Bilirubin Urine NEGATIVE NEG    Ketones, Urine NEGATIVE NEG    Specific Carrie, UA 1.005 1.005 - 1.030    Urine Hgb NEGATIVE NEG    pH, UA 6.5 5.0 - 8.0    Protein, UA NEGATIVE NEG    Urobilinogen, Urine Normal NORM    Nitrite, Urine NEGATIVE NEG    Leukocyte Esterase, Urine NEGATIVE NEG    Urinalysis Comments       Microscopic exam not performed based on chemical results unless requested in   HEPATIC FUNCTION PANEL   Result Value Ref Range    Alb 3.8 3.5 - 5.2 g/dL    Alkaline Phosphatase 133 (H) 35 - 104 U/L    ALT 16 5 - 33 U/L    AST 30 <32 U/L    Total Bilirubin 0.15 (L) 0.3 - 1.2 mg/dL    Bilirubin, Direct <0.08 <0.31 mg/dL    Bilirubin, Indirect CANNOT BE CALCULATED 0.00 - 1.00 mg/dL    Total Protein 7.2 6.4 - 8.3 g/dL    Globulin NOT REPORTED 1.5 - 3.8 g/dL    Albumin/Globulin Ratio 1.1 1.0 - 2.5       IMPRESSION: No evidence of UTI or abnormal urine on UA. CBC shows mild microcytosis w/u evidence of anemia, otherwise WNL. BUN remarkable for elevated glucose consistent with diabetes. BUN creatinine decreased with no other concerning abnormalities. Amylase/lipase along w/ LFTs show no acute abnormalities. Doubt bile duct stricture or liver pathology causing current pain. RADIOLOGY:    Xr Abdomen (kub) (single Ap View)    Result Date: 10/13/2017  EXAMINATION: SUPINE VIEW(S) OF THE ABDOMEN, 10/13/2017 7:17 pm COMPARISON: None HISTORY: ORDERING SYSTEM PROVIDED HISTORY: Post-op pain TECHNOLOGIST PROVIDED HISTORY: Reason for exam:->Post-op pain FINDINGS: Surgical clips are seen superiorly in the right upper quadrant. Gas is seen in the stomach. A suture line is seen in the left side of the abdomen and left upper quadrant. The bowel loops are not significantly dilated. Foreign body in the left upper quadrant was present in 2014. 1. Foreign body in the left upper quadrant was present in 2014. 2. Bowel gas pattern is unremarkable.            EMERGENCY DEPARTMENT COURSE:    This is a 50year old female who presents to ED with acute worsening of post-operative pain after undergoing open cholecystectomy approximately 2 weeks ago. She has unable to follow up with PCP due to lack of insurance and is scheduled to follow up with Surgeons in 3 days for staple removal.  Physical exam shows mild tenderness to palpation around surgical site and right inguinal area. Consult to General Surgery done who requested labs and imaging, and will come down to the department to evaluate the patient. General Surgery resident evaluated patient at bedside as well as removed staples from incision site. Discussed imaging and lab results with ED attending along with Surgery resident, who consulted Surgery Attending. No concerning results noted. Plan to discharge pt with outpatient follow up with Surgery as schedule. Outpatient prescription for Percocet # 10 x 0 given for pain management. Instructions to continue OTC laxatives and monitor symptoms. Pt expressed understanding and agreed with plan. She remained stable throughout entirety of ED visit while under my care and was discharged in no acute distress. She will continue medication plan and follow up with all providers as directed. PROCEDURES:  None    CONSULTS:  IP CONSULT TO GENERAL SURGERY    CRITICAL CARE:  None    FINAL IMPRESSION      1.  Post-op pain          DISPOSITION / PLAN     DISPOSITION      Discharged    PATIENT REFERRED TO:  Thuy Schneider MD  [address] [place], [place] [phone]    Schedule an appointment as soon as possible for a visit       [facility] ED  [address] [address]  [phone]  Go to   If symptoms worsen, As needed      DISCHARGE MEDICATIONS:  Discharge Medication List as of 10/13/2017 10:09 PM      START taking these medications    Details   oxyCODONE-acetaminophen (PERCOCET) 5-325 MG per tablet Take 1 tablet by mouth every 4 hours as needed for Pain ., Disp-10 tablet, R-0Print             Enrique Bui DO    Emergency Medicine Resident    (Please note that portions of this note were completed with a voice recognition program.  Efforts were made to edit the dictations but occasionally words are mis-transcribed.)       Corbin Floyd MD  10/14/17 [id number]

## 2017-10-13 NOTE — CONSULTS
General Surgery   Consultation        PATIENT NAME: Ashleigh Rivera OF BIRTH: 1965    ADMISSION DATE: 10/13/2017  5:58 PM       Consulted Physician: Moe Martines DATE: 10/13/2017    Consult Regarding:   S/p open cholecystectomy    HISTORY OF PRESENT ILLNESS:  The patient is a 50 y.o. female  Who is s/p open cholecystectomy in late September. Patient only complaint post operatively is that she continues to have pain at her skin site and having intermittent bowel movements. She states she is tolerating diet well and ambulating well. She is to see Dr. Breana Garcia later this week for post operative visit. Past Medical History:        Diagnosis Date    Allergic rhinitis 10/21/2014    Diabetes mellitus [address]     Diabetic neuropathy ([facility] 10/21/2014    Fibromyalgia     GERD (gastroesophageal reflux disease) 10/21/2014    Obesity 10/21/2014    Pure hypercholesterolemia 2/23/2016    Tobacco abuse     Type II or unspecified type diabetes mellitus without mention of complication, not stated as uncontrolled     Unspecified sleep apnea        Past Surgical History:        Procedure Laterality Date    BRONCHOSCOPY  9/27/2017    BRONCHOSCOPY BRUSHINGS performed by Talita Ruiz MD at [facility] Right     CHOLECYSTECTOMY  09/29/2017    CHOLECYSTECTOMY N/A 9/29/2017    CHOLECYSTECTOMY OPEN performed by Ehsan Rivera DO at [facility]      with revision x1    LARYNGOSCOPY N/A 9/29/2017    DIRECT MICRO LARYNGOSCOPY WITH EXCISION VOCAL CORD LESION  performed by Ward Small MD at [address] [place] [address]  09/29/2017    vocal cord biopsy    OVARIAN CYST REMOVAL Right     GA EGD TRANSORAL BIOPSY SINGLE/MULTIPLE N/A 9/27/2017    EGD BIOPSY performed by Penelope Crenshaw MD at [facility]       Medications Prior to Admission:   Not in a hospital admission. Allergies:  Demerol hcl [meperidine];  Morphine; and Pcn [penicillins]    Social History:   Social History     Social History    Marital status: Single     Spouse name: N/A    Number of children: N/A    Years of education: N/A     Occupational History    Not on file. Social History Main Topics    Smoking status: Current Every Day Smoker     Packs/day: 1.50     Types: Cigarettes    Smokeless tobacco: Never Used    Alcohol use 0.0 oz/week      Comment: rare    Drug use: No    Sexual activity: Not on file     Other Topics Concern    Not on file     Social History Narrative    No narrative on file       Family History:       Problem Relation Age of Onset    Diabetes Mother     Heart Disease Father     Diabetes Sister        REVIEW OF SYSTEMS:    CONSTITUTIONAL:  Negative for fevers/ negative for night sweats  HEENT:  negative  CARDIOVASCULAR:  Negative for chest pain /palpitations   GASTROINTESTINAL:  See HPI  RESP: negative for shortness of breath, difficulty breathing  GENITOURINARY:  Negative for dysuria  HEMATOLOGIC/LYMPHATIC:  No easy bruising. No history of cancer  ENDOCRINE: negative  Review of systems negative unless above. PHYSICAL EXAM:    VITALS:  BP (S) (!) 181/95   Pulse (S) 79   Temp (S) 97.2 °F (36.2 °C) (Oral)   Resp (S) 13   LMP 09/27/1997   SpO2 (S) 98%   INTAKE/OUTPUT: .No intake or output data in the 24 hours ending 10/13/17 1949    CONSTITUTIONAL:  awake, alert, not distressed   ENT:  NC/AT  NECK:  supple, symmetrical, trachea midline   LUNGS:  Equal chest rise bilaterally  CARDIOVASCULAR:  s1s2   Abdomen:  Soft, ND, staples intact, incision no signs cellulitis    NEUROLOGIC:  Mental Status Exam:  Level of Alertness:   alert  Orientation:   oriented to person, place, and time        Pertinent Radiology:       ASSESSMENT   S/p open cholecystectomy  PLAN    1. S/p open cholecystectomy: will check LFT's if abnormal will get US  2. Will remove staples in ED  3.  Follow up with Dr. Cassi Kuhn this week          Electronically signed by

## 2017-10-14 NOTE — ED NOTES
Pt with allergic reation at iv site, pt with hives forming around site. Pt states similar event after surgery.   Resident asks for 25 mg benadryl     Trish Thomas RN  10/13/17 2022

## 2017-10-16 ENCOUNTER — OFFICE VISIT (OUTPATIENT)
Dept: GASTROENTEROLOGY | Age: 52
End: 2017-10-16

## 2017-10-16 VITALS
WEIGHT: 231 LBS | BODY MASS INDEX: 37.12 KG/M2 | TEMPERATURE: 97.3 F | SYSTOLIC BLOOD PRESSURE: 164 MMHG | HEART RATE: 92 BPM | HEIGHT: 66 IN | OXYGEN SATURATION: 98 % | DIASTOLIC BLOOD PRESSURE: 90 MMHG | RESPIRATION RATE: 14 BRPM

## 2017-10-16 DIAGNOSIS — K81.1 CHRONIC CHOLECYSTITIS: ICD-10-CM

## 2017-10-16 DIAGNOSIS — Z12.11 COLON CANCER SCREENING: ICD-10-CM

## 2017-10-16 DIAGNOSIS — K21.00 GASTROESOPHAGEAL REFLUX DISEASE WITH ESOPHAGITIS: Primary | ICD-10-CM

## 2017-10-16 DIAGNOSIS — A04.8 H. PYLORI INFECTION: ICD-10-CM

## 2017-10-16 PROCEDURE — 99214 OFFICE O/P EST MOD 30 MIN: CPT | Performed by: INTERNAL MEDICINE

## 2017-10-16 RX ORDER — FLUCONAZOLE 100 MG/1
100 TABLET ORAL DAILY
Qty: 7 TABLET | Refills: 0 | Status: SHIPPED | OUTPATIENT
Start: 2017-10-16 | End: 2017-10-23

## 2017-10-16 ASSESSMENT — ENCOUNTER SYMPTOMS
CONSTIPATION: 1
NAUSEA: 0
BLOOD IN STOOL: 0
VOMITING: 0
ALLERGIC/IMMUNOLOGIC NEGATIVE: 1
APNEA: 1
ABDOMINAL DISTENTION: 0
ABDOMINAL PAIN: 1
DIARRHEA: 0
RECTAL PAIN: 0
ANAL BLEEDING: 0

## 2017-10-16 NOTE — PROGRESS NOTES
Subjective:      Patient ID: Severiano Wolf is a 50 y.o. female. HPI     Dr. Rosalynn Riedel, MD our mutual patient Severiano Wolf was seen  for   1. Gastroesophageal reflux disease with esophagitis    2. Chronic cholecystitis    3. H. pylori infection     . Patient is here for follow up, Gastroesophageal Reflux (currently on prilosec for treatment but is also being treated at this time for h pylori ) and GI Problem (h pylori, pt is on treatment )  Patient was seen when she was admitted in [place], she had abdominal pain EGD was done showed a lesion in the vocal cord which was removed 10 to be a benign lesion, showed gastritis biopsy showed H. pylori she was treated with antibiotics, and her studies showed issues with her gallbladder for which she had cholecystectomy when she was in the hospital, with above she said her pain has gone completely she is doing a lot better she is eating and drinking well has no issues at this time. She did not have any colonoscopy screening of her she does not have any significant lower GI symptoms    Past Medical, Family, and Social History reviewed and does contribute to the patient presenting condition.     patient\"s PMH/PSH,SH,PSYCH hx, MEDs, ALLERGIES, and ROS was all reviewed and updated ion the appropriate sections    PROCEDURE NOTE     DATE OF PROCEDURE: 9/27/2017      SURGEON: Van Alcantar MD  Fac[facility]  ASSISTANT: None  Anesthesia: MAC  PREOPERATIVE DIAGNOSIS:   abd pain      Diagnosis:        Polypoidal lesion seen in the trachea beyond the vocal cords   S/P gastric bypass ( Raux en y type most likely with a high gastric hock up )  Gastritis, bxs taken            POSTOPERATIVE DIAGNOSIS: As described below     OPERATION: Upper GI endoscopy with Biopsy     ANESTHESIA: Moderate Sedation      ESTIMATED BLOOD LOSS: Less than 50 ml     COMPLICATIONS: None.      SPECIMENS:  Was Obtained:      Gastritis, bxs taken      HISTORY: The patient is a 50y.o. year old female with history of above preop diagnosis. I recommended esophagogastroduodenoscopy with possible biopsy and I explained the risk, benefits, expected outcome, and alternatives to the procedure. Risks included but are not limited to bleeding, infection, respiratory distress, hypotension, and perforation of the esophagus, stomach, or duodenum. Patient understands and is in agreement.     PROCEDURE: The patient was given IV conscious sedation. The patient's SPO2 remained above 90% throughout the procedure. The gastroscope was inserted orally and advanced under direct vision through the esophagus, through the stomach, through the pylorus, and into the descending duodenum.       Findings:     Retropharyngeal area was abnormal with :  Polypoidal lesion seen in the trachea beyond the vocal cords   Esophagus: normal     Stomach:  S/P gastric bypass (  B2 type most likely with a high gastric hock up, no gastrectomy as the rest of the stomach and the duodenum, and the pyloric office all still looking normal  )  Gastritis, bxs taken         Duodenum:     Descending: normal    Bulb: normal     The scope was removed and the patient tolerated the procedure well.      Recommendations/Plan:   1. F/U Biopsies  2. Pulmonary / ENT consult   3. Discussed with the family  4. HIDA scan      Electronically signed by Phuong Gardner MD  on 9/27/2017 at 10:03 AM    -- Diagnosis --   STOMACH, BIOPSIES:   - SEVERE ACTIVE CHRONIC HELICOBACTER PYLORI ASSOCIATED GASTRITIS. Maeve Lopez. Santi Whalen,   **Electronically Signed Out**         sls/9/28/2017      Review of Systems   Constitutional: Positive for fatigue. HENT: Negative. Eyes: Positive for visual disturbance (wears glasses). Respiratory: Positive for apnea. Cardiovascular: Negative. Gastrointestinal: Positive for abdominal pain (mild post surgical pain) and constipation.  Negative for abdominal distention, anal bleeding, blood in stool, diarrhea, nausea, rectal pain and vomiting. Endocrine: Negative. Genitourinary: Negative. Musculoskeletal: Negative. Skin: Negative. Allergic/Immunologic: Negative. Neurological: Negative. Hematological: Bruises/bleeds easily. Psychiatric/Behavioral: Positive for sleep disturbance. Objective:   Physical Exam   Constitutional: She is oriented to person, place, and time. She appears well-developed and well-nourished. No distress. HENT:   Head: Normocephalic. Mouth/Throat: No oropharyngeal exudate. Eyes: Pupils are equal, round, and reactive to light. No scleral icterus. Neck: Normal range of motion. Neck supple. No JVD present. No tracheal deviation present. No thyromegaly present. Cardiovascular: Normal rate, regular rhythm, normal heart sounds and intact distal pulses. No murmur heard. Pulmonary/Chest: Effort normal and breath sounds normal. No respiratory distress. She has no wheezes. Abdominal: Soft. Bowel sounds are normal. She exhibits no distension. There is no tenderness. There is no rebound and no guarding. No ascites   Musculoskeletal: Normal range of motion. She exhibits no edema. Neurological: She is alert and oriented to person, place, and time. She has normal reflexes. Skin: Skin is warm. No rash noted. She is not diaphoretic. No erythema. No pallor. She is not diaphoretic   Psychiatric: She has a normal mood and affect. Her behavior is normal. Judgment and thought content normal.   Nursing note and vitals reviewed. Assessment:      1. Gastroesophageal reflux disease with esophagitis     2. Chronic cholecystitis     3. H. pylori infection     4.  Colon cancer screening  COLONOSCOPY W/ OR W/O BIOPSY           Plan:      We'll proceed with a screening colonoscopy, her pain is gone now we'll keep watching she is to report that back to us if she starts having any pain

## 2017-10-17 RX ORDER — POLYETHYLENE GLYCOL 3350 17 G/17G
POWDER, FOR SOLUTION ORAL
Qty: 255 G | Refills: 0 | Status: SHIPPED | OUTPATIENT
Start: 2017-10-17 | End: 2017-11-10

## 2017-10-25 ENCOUNTER — OFFICE VISIT (OUTPATIENT)
Dept: ORTHOPEDIC SURGERY | Age: 52
End: 2017-10-25

## 2017-10-25 VITALS — WEIGHT: 231 LBS | HEIGHT: 66 IN | BODY MASS INDEX: 37.12 KG/M2

## 2017-10-25 DIAGNOSIS — M75.102 ROTATOR CUFF SYNDROME OF LEFT SHOULDER: Primary | ICD-10-CM

## 2017-10-25 DIAGNOSIS — M25.512 CHRONIC LEFT SHOULDER PAIN: ICD-10-CM

## 2017-10-25 DIAGNOSIS — G89.29 CHRONIC LEFT SHOULDER PAIN: ICD-10-CM

## 2017-10-25 PROCEDURE — 20610 DRAIN/INJ JOINT/BURSA W/O US: CPT | Performed by: STUDENT IN AN ORGANIZED HEALTH CARE EDUCATION/TRAINING PROGRAM

## 2017-10-25 PROCEDURE — 99213 OFFICE O/P EST LOW 20 MIN: CPT | Performed by: STUDENT IN AN ORGANIZED HEALTH CARE EDUCATION/TRAINING PROGRAM

## 2017-10-25 RX ORDER — MELOXICAM 7.5 MG/1
7.5 TABLET ORAL DAILY
Qty: 30 TABLET | Refills: 2 | Status: SHIPPED | OUTPATIENT
Start: 2017-10-25 | End: 2017-11-01 | Stop reason: ALTCHOICE

## 2017-10-25 RX ORDER — BUPIVACAINE HYDROCHLORIDE 2.5 MG/ML
2 INJECTION, SOLUTION INFILTRATION; PERINEURAL ONCE
Status: COMPLETED | OUTPATIENT
Start: 2017-10-25 | End: 2017-10-26

## 2017-10-25 RX ORDER — METHYLPREDNISOLONE ACETATE 80 MG/ML
80 INJECTION, SUSPENSION INTRA-ARTICULAR; INTRALESIONAL; INTRAMUSCULAR; SOFT TISSUE ONCE
Status: COMPLETED | OUTPATIENT
Start: 2017-10-25 | End: 2017-10-26

## 2017-10-25 NOTE — PROGRESS NOTES
[address] [address] [place] [address]  Dept: [phone]  Dept Fax: [phone]        Ambulatory Follow Up      Subjective:   Kathy Clemente is a 50y.o. year old female who presents to our office today for routine followup regarding her   1. Rotator cuff syndrome of left shoulder    2. Chronic left shoulder pain    . Chief Complaint   Patient presents with    Shoulder Pain     left        HPI   The patient presents to clinic today for evaluation of her left shoulder. We last saw her on 4/19/17 for issues with the broken toe which has fully resolved. She states that last month she was admitted to the hospital where she had a gallbladder issue and subsequent surgical removal.  During her stay in the hospital she started to develop left shoulder pain. She denies any trauma or injury to the shoulder. She denies any prior trauma or surgeries. She denies any numbness and tingling in the hand. She denies weakness but she states she is taking ibuprofen for the pain which is not helping much. The pain bothers her at night especially. Review of Systems  Constitutional: Denies fever or chills   MSK: Denies numbness or tingling         Objective :   General: Kathy Clemente is a 50 y.o. female who is alert and oriented and sitting comfortably in our office. Ortho Exam  L Shoulder:   Size symmetric to contralateral without appreciable muscle atrophy    No scapular retraction or winging appreciated    No warmth/deformity  TTP diffusely along anterior and posterior shoulder, slightly TTP over AC joint  AROM abd 85, Flex 85, ER 40, IR to T10; all motions without pain. PROM abd 130, Flex 150  Strength 4/5 in abd, IR, ER, and belly press due to pain  Spurling's -  Hawkin's -  Neer's +  Amandeep's +  Speed's +  Cross arm -  O'curt's -    Neuro: alert.  oriented  Eyes: Extra-ocular muscles intact  Mouth: Oral mucosa moist. No perioral lesions  Pulm:

## 2017-10-26 RX ADMIN — METHYLPREDNISOLONE ACETATE 80 MG: 80 INJECTION, SUSPENSION INTRA-ARTICULAR; INTRALESIONAL; INTRAMUSCULAR; SOFT TISSUE at 18:10

## 2017-10-26 RX ADMIN — BUPIVACAINE HYDROCHLORIDE 5 MG: 2.5 INJECTION, SOLUTION INFILTRATION; PERINEURAL at 18:07

## 2017-10-26 NOTE — PROGRESS NOTES
I performed a history and physical examination of the patient and discussed management with the resident. I reviewed the residents note and agree with the documented findings and plan of care. Any areas of disagreement are noted on the chart. I have personally evaluated this patient and have completed at least one if not all key elements of the E/M (history, physical exam, and MDM). Additional findings are as noted. I agree with the chief complaint, past medical history, past surgical history, allergies, medications, social and family history as documented unless otherwise noted below.      Electronically signed by Rodolfo Holm DO on 10/26/2017 at 6:41 AM

## 2017-10-30 ENCOUNTER — TELEPHONE (OUTPATIENT)
Dept: ORTHOPEDIC SURGERY | Age: 52
End: 2017-10-30

## 2017-10-31 ENCOUNTER — HOSPITAL ENCOUNTER (OUTPATIENT)
Dept: PHYSICAL THERAPY | Age: 52
Setting detail: THERAPIES SERIES
Discharge: HOME | End: 2017-10-31

## 2017-10-31 PROCEDURE — 97161 PT EVAL LOW COMPLEX 20 MIN: CPT

## 2017-10-31 PROCEDURE — G0283 ELEC STIM OTHER THAN WOUND: HCPCS

## 2017-10-31 NOTE — CONSULTS
[x] Harry Rojas        Outpatient Physical                Therapy       [address]       Phone: [phone]       Fax: [phone] [] [facility] at [address]       Phone: [phone]       Fax: [phone] [] Christal. [address] [place] [address]     [facility]      Phone: [phone]      Fax:  [phone]     Physical Therapy Upper Extremity Evaluation    Date:  10/31/2017  Patient: Emy Manning  : 1965  MRN: [id number]  Physician: Tez Jasso DO  Insurance: Self pay  Medical Diagnosis: M75.102 (ICD-10-CM) - Unspecified rotator cuff tear or rupture of left shoulder, not specified as traumatic    Rehab Codes: M25.512, M25.612, M62.81  Onset Date: 17   Next 's appt: 17    Subjective:   CC: Left shoulder pain that is constant in nature, + sleep disturbances, numbness and tingling primarily in the left hand but occasionally in the right; difficulty reaching overhead, difficulty reaching behind her back  HPI: (onset date): States that she was in the hospital on the  having pre-op testing done when she began having left shoulder pain. States that she has a history of shoulder problems but that it has always been her right shoulder that has bothered her the most. States that the left shoulder pain became much worse after getting an injection. States that she is not getting any relief from pain medicine.        PMHx: [] Unremarkable [x] Diabetes [] HTN  [] Pacemaker   [] MI/Heart Problems [] Cancer [] Arthritis [x] Other: Recent gall bladder surgery              [x] Refer to full medical chart  In EPIC     Tests: [x] X-Ray: moderate AC joint arthritis   [] MRI:  [] Other:    Medications: [x] Refer to full medical record [] None [] Other:  Allergies:      [x] Refer to full medical record [] None [] Other:    Function:  Hand Dominance  [x] Right  [] Left  Working:  [] Normal Duty  [] Light Duty  [] Off D/T Condition  [] Retired    [] Not Employed    []  Disability  [x] Other:  Currently off due to gallbladder surgery           Return to work: 2 weeks (?)   Job/ADL Description: [employer]--    Pain:  [x] Yes  [] No Location: Left shoulder Pain Rating: (0-10 scale) 6/10  Pain altered Tx:  [] Yes  [x] No  Action:  Symptoms:  [] Improving [x] Worsening [] Same  Better:  [] AM    [] PM    [] Sit    [] Rise/Sit    []Stand    [] Walk    [] Lying    [x] Other: heat  Worse: [] AM    [] PM    [] Sit    [] Rise/Sit    []Stand    [] Walk    [x] Lying    [] Bend                             [] Valsalva    [x] Other: reaching  Sleep: [] OK    [x] Disturbed    Objective:     ROM  °A/P END FEEL STRENGTH TESTS (+/-) Left Right Not Tested    Left Right  Left Right Drop Arm   []   Sit Shld Flex 70   3-  Sulcus Sign   []   Sit Shld Abd 70   3-  Apprehension   []   Sit Shld IR      Yergasons   []   Shoulder Flex (passive) 95     Speeds   []   Ext      Neer   []   ABD (passive) 95     Mcintosh    []   ER @ 0 45 90 (passive) 45   3+ pain  Painful Arc   []   IR (passive) 60   4  Tinel   []   Supraspinatus            Infraspinatus            Serratus Ant            Pectoralis            Lats            Mid Trap            Lower Trap            Elbow Flex. Elbow Ext. Pronation            Supination            Wrist Flex. Wrist Ext. Rad. Dev. Ulnar Dev.                         *Left shoulder Flex and Abd MMT not performed due to high pain with active movement  **All passive measurements taken with the patient supine, unable to attain true end feel with flexion due to muscle guarding  No special tests this date due to pt's high pain level and decreased tolerance to active movement   OBSERVATION No Deficit Deficit Not Tested Comments   Forward Head [] [x] []    Rounded Shoulders [] [x] []    Kyphosis [] [x] []    Scap

## 2017-11-01 ENCOUNTER — OFFICE VISIT (OUTPATIENT)
Dept: ORTHOPEDIC SURGERY | Age: 52
End: 2017-11-01

## 2017-11-01 VITALS — HEIGHT: 66 IN | WEIGHT: 231.04 LBS | BODY MASS INDEX: 37.13 KG/M2

## 2017-11-01 DIAGNOSIS — M75.102 ROTATOR CUFF SYNDROME OF LEFT SHOULDER: Primary | ICD-10-CM

## 2017-11-01 PROCEDURE — 99213 OFFICE O/P EST LOW 20 MIN: CPT | Performed by: STUDENT IN AN ORGANIZED HEALTH CARE EDUCATION/TRAINING PROGRAM

## 2017-11-01 ASSESSMENT — ENCOUNTER SYMPTOMS
SHORTNESS OF BREATH: 0
ABDOMINAL DISTENTION: 0
WHEEZING: 0

## 2017-11-01 NOTE — PROGRESS NOTES
[address] [place] [address]  Dept: [phone]  Dept Fax: [phone]        Ambulatory Follow Up      Subjective:   Jethro Tom is a 50y.o. year old female who presents to our office today for routine followup regarding her   1. Rotator cuff syndrome of left shoulder    . Chief Complaint   Patient presents with    Shoulder Pain     left     The patient is here for follow-up regarding her left shoulder. Patient was last seen in our clinic on October 25 of 2017. Patient received a subacromial steroid injection of her left shoulder at that time. She states after that she had an increase in her pain over the weekend. She states the pain improved but it is now worse than before the injection. She has been taking Mobic 7.5 mg daily for pain. She states that it is not relieving her pain. She states she started physical therapy last week. She has been using a TENS unit which does help with the pain. Her next physical therapy appointment is tomorrow. Denies any new injuries. Denies numbness or tingling. Review of Systems   Constitutional: Negative for chills and fever. Respiratory: Negative for shortness of breath and wheezing. Cardiovascular: Negative for chest pain and palpitations. Gastrointestinal: Negative for abdominal distention. Musculoskeletal: Positive for arthralgias. I have reviewed the CC, HPI, ROS, PMH, FHX, Social History. I agree with the documentation provided by other staff, residents, and/or medical students and have reviewed their documentation prior to providing my signature indicating agreement. Objective :   General: Jethro Tom is a 50 y.o. female who is alert and oriented and sitting comfortably in our office.   Ortho Exam  MS:  LUE: Shoulder: Skin intact, muscle tone normal            AROM 85 ABD, 85 FF , 40 ER @0 deg, L1 IR            PROM 100 ABD, 120 FF , 50 ER @0 deg, 50 IR            Strength: 3/5 ABD (Jobes) , 4/5 ER @0 deg, 4/5 IR            Belly press and lift off negative            Neer's and Mcintosh positive            Speed's and Yergason negative            Casper's negative            TTP about the lateral and anterior border of the acromion            Cross body adduction without pain            Apprehension, relocation negative  Neuro: alert. oriented  Eyes: Extra-ocular muscles intact  Mouth: Oral mucosa moist. No perioral lesions  Pulm: Respirations unlabored and regular. Skin: warm, well perfused  Psych:   Patient has good fund of knowledge and displays understanging of exam, diagnosis, and plan. Assessment:      1. Rotator cuff syndrome of left shoulder       Plan: We had a lengthy discussion with the patient regarding treatment going forward. It appears that she had acute inflammation of her rotator cuff. We like her to continue physical therapy. She does not require another steroid injection at this time since she just had one last week. We will discontinue her Mobic and start her on Voltaren 50 mg 3 times a day for pain control as needed. Also ice as needed. We predict that her pain will improve with physical therapy. If it does not we will go forward with an MRI of her left shoulder. We will see the patient back after finishing physical therapy. Follow up:Return after finishing PT. Orders Placed This Encounter   Medications    diclofenac (VOLTAREN) 50 MG EC tablet     Sig: Take 1 tablet by mouth 3 times daily (with meals)     Dispense:  60 tablet     Refill:  3          No orders of the defined types were placed in this encounter.       Electronically signed by Render Lombard, DO, on 11/1/2017 at 4:25 PM

## 2017-11-02 ENCOUNTER — HOSPITAL ENCOUNTER (OUTPATIENT)
Dept: PHYSICAL THERAPY | Age: 52
Setting detail: THERAPIES SERIES
Discharge: HOME | End: 2017-11-02

## 2017-11-02 PROCEDURE — 97110 THERAPEUTIC EXERCISES: CPT

## 2017-11-02 PROCEDURE — G0283 ELEC STIM OTHER THAN WOUND: HCPCS

## 2017-11-02 NOTE — FLOWSHEET NOTE
[x] Kandi Jane       Outpatient Physical        Therapy       [address]       Phone: [phone]       Fax: [phone] [] [facility] at [address]       Phone: [phone]       Fax: [phone] [] Christal. [facility] [address]   Phone: [phone]   Fax:  [phone]     Physical Therapy Daily Treatment Note    Date:  2017  Patient Name:  Tez Gallegos    :  1965  MRN: [id number]  Physician: Rm Recio DO                                        Insurance: Self pay  Medical Diagnosis: M75.102 (ICD-10-CM) - Unspecified rotator cuff tear or rupture of left shoulder, not specified as traumatic                                          Rehab Codes: M25.512, M25.612, M62.81  Onset Date: 17                                   Next 's appt: 17  Visit# / total visits: 2/12  Cancels/No Shows: 0/0    Subjective:    Pain:  [x] Yes  [] No Location: left shoulder  Pain Rating: (0-10 scale) 5/10  Pain altered Tx:  [x] No  [] Yes  Action:  Comments: States pain is good for her today. Notes that she was sore after initial eval for the rest of the day. Patient would like to be scheduled three times per week until going back to work. Objective:  Modalities: UES (opiate) and cervical HP to left shoulder, pt sitting x20 minutes after exercises  Precautions:  Exercises:  Exercise Reps/ Time Weight/ Level Comments   Pulleys 20x  Flexion and abduction         Seated       Shrugs 15x     Shoulder rolls bwd 15x     Shoulder retraction 15x     Cervical ROM 5x 5 sec    Table slides flexion 15x     Table slides CW/CCW 15x     Table horizontal abduction 15x           Supine cane      Chest press 15x     Flexion 15x     Protraction 15x           Other:    Specific Instructions for next treatment: try PROM?     Treatment Charges: Mins Units   [x]  Modalities-HP/IFC  0/1   [x]  Ther Exercise 49 3   []  Manual Therapy     []  Ther Activities     []  Aquatics     []  Vasocompression     []  Other     Total Treatment time 69 mins        Assessment: [x] Progressing toward goals. [] No change. [x] Other: Initiated exercises per log to increase shoulder ROM. Cues and demonstration for all exercises as they are new to patient. Patient notes some pain and soreness with exercises but able to complete. Fair tolerance overall. No change in pain rating after exercises and after treatment. STG: (to be met in 8 treatments)  1. ? Pain: Decrease pain 2 levels   2. ? ROM: Increase left shoulder AROM and PROM 8° at least two motions  3. ? Strength: As AROM improves  4. ? Function: Improve DASH by 10%  5. Independent with Home Exercise Programs  LTG: (to be met in 12 treatments)  1. Pt will be able to lay down with little difficulty  2. Pt will be able to reach overhead with little difficulty  3. Pt will report an improvement in sleep at night to at least 4 hours                     Patient goals: Stop pain. Pt. Education:  [x] Yes  [] No  [x] Reviewed Prior HEP/Ed  Method of Education: [x] Verbal  [x] Demo  [] Written  Comprehension of Education:  [x] Verbalizes understanding. [x] Demonstrates understanding. [x] Needs review. [] Demonstrates/verbalizes HEP/Ed previously given. Plan: [x] Continue per plan of care.    [] Other:      Time In: 11:00 am            Time Out: 12:12 pm    Electronically signed by:  Chichi Dobbs PTA

## 2017-11-06 RX ORDER — OMEPRAZOLE 20 MG/1
20 CAPSULE, DELAYED RELEASE ORAL 2 TIMES DAILY
Qty: 60 CAPSULE | Refills: 2 | Status: CANCELLED | OUTPATIENT
Start: 2017-11-06 | End: 2017-12-06

## 2017-11-07 ENCOUNTER — HOSPITAL ENCOUNTER (OUTPATIENT)
Dept: PHYSICAL THERAPY | Age: 52
Setting detail: THERAPIES SERIES
Discharge: HOME | End: 2017-11-07

## 2017-11-07 PROCEDURE — 97110 THERAPEUTIC EXERCISES: CPT

## 2017-11-07 PROCEDURE — G0283 ELEC STIM OTHER THAN WOUND: HCPCS

## 2017-11-07 NOTE — FLOWSHEET NOTE
[x] Wali Calix       Outpatient Physical        Therapy       [address]       Phone: [phone]       Fax: [phone] [] [facility] at [address] [facility]       Phone: [phone]       Fax: [phone] [] [facility]  [address] [facility]   Phone: [phone]   Fax:  [phone]     Physical Therapy Daily Treatment Note    Date:  2017  Patient Name:  Melodie Alexandra    :  1965  MRN: [id number]  Physician: Victor Manuel Gutierrez DO                                        Insurance: Self pay  Medical Diagnosis: M75.102 (ICD-10-CM) - Unspecified rotator cuff tear or rupture of left shoulder, not specified as traumatic                                          Rehab Codes: M25.512, M25.612, M62.81  Onset Date: 17                                   Next 's appt: 17  Visit# / total visits: 3/12  Cancels/No Shows: 0/0    Subjective:    Pain:  [x] Yes  [] No Location: left shoulder  Pain Rating: (0-10 scale) 4/10  Pain altered Tx:  [x] No  [] Yes  Action:  Comments: States that her shoulder is actually feeling pretty good. No c/o increase in pain after the last session. Objective:  Modalities: UES (opiate) and cervical HP to left shoulder, pt sitting x20 minutes after exercises  Precautions:  Exercises:  Exercise Reps/ Time Weight/ Level Comments   Pulleys 20x  Flexion and abduction         Seated       Shrugs 15x     Shoulder rolls bwd 15x     Shoulder retraction 15x     Cervical ROM 5x 5 sec    Table slides flexion 15x     Table slides CW/CCW 15x     Table horizontal abduction 15x           Supine cane      Chest press 15x     Flexion 15x     Protraction 15x     ER 15x     Other:    Specific Instructions for next treatment: try PROM?     Treatment Charges: Mins Units   [x]  Modalities-HP/IFC  0/   [x]  Ther Exercise 45 3   []  Manual Therapy     []  Ther

## 2017-11-08 ENCOUNTER — TELEPHONE (OUTPATIENT)
Dept: FAMILY MEDICINE CLINIC | Age: 52
End: 2017-11-08

## 2017-11-08 RX ORDER — OMEPRAZOLE 20 MG/1
20 CAPSULE, DELAYED RELEASE ORAL 2 TIMES DAILY
Qty: 60 CAPSULE | Refills: 1 | Status: SHIPPED | OUTPATIENT
Start: 2017-11-08 | End: 2018-03-26

## 2017-11-08 NOTE — TELEPHONE ENCOUNTER
Patient would like to come to [facility] location as it would be easier for her to get back forth transportation wise. This would only need to be scheduled as a nurse visit.

## 2017-11-09 ENCOUNTER — HOSPITAL ENCOUNTER (OUTPATIENT)
Dept: PHYSICAL THERAPY | Age: 52
Setting detail: THERAPIES SERIES
Discharge: HOME | End: 2017-11-09

## 2017-11-09 PROCEDURE — 97110 THERAPEUTIC EXERCISES: CPT

## 2017-11-10 ENCOUNTER — OFFICE VISIT (OUTPATIENT)
Dept: INTERNAL MEDICINE | Age: 52
End: 2017-11-10

## 2017-11-10 ENCOUNTER — HOSPITAL ENCOUNTER (OUTPATIENT)
Dept: PHYSICAL THERAPY | Age: 52
Setting detail: THERAPIES SERIES
Discharge: HOME | End: 2017-11-10

## 2017-11-10 VITALS
HEIGHT: 66 IN | BODY MASS INDEX: 36.55 KG/M2 | DIASTOLIC BLOOD PRESSURE: 80 MMHG | SYSTOLIC BLOOD PRESSURE: 134 MMHG | HEART RATE: 86 BPM | WEIGHT: 227.4 LBS

## 2017-11-10 DIAGNOSIS — E11.42 DIABETIC POLYNEUROPATHY ASSOCIATED WITH TYPE 2 DIABETES MELLITUS (HCC): ICD-10-CM

## 2017-11-10 DIAGNOSIS — E11.8 TYPE 2 DIABETES MELLITUS WITH COMPLICATION, UNSPECIFIED LONG TERM INSULIN USE STATUS: Primary | ICD-10-CM

## 2017-11-10 DIAGNOSIS — Z12.31 ENCOUNTER FOR SCREENING MAMMOGRAM FOR BREAST CANCER: ICD-10-CM

## 2017-11-10 DIAGNOSIS — Z11.4 SCREENING FOR HIV WITHOUT PRESENCE OF RISK FACTORS: ICD-10-CM

## 2017-11-10 DIAGNOSIS — Z13.220 SCREENING FOR HYPERLIPIDEMIA: ICD-10-CM

## 2017-11-10 PROBLEM — R49.0 HOARSENESS OF VOICE: Status: RESOLVED | Noted: 2017-09-28 | Resolved: 2017-11-10

## 2017-11-10 PROBLEM — A04.8 H. PYLORI INFECTION: Status: RESOLVED | Noted: 2017-10-16 | Resolved: 2017-11-10

## 2017-11-10 PROBLEM — J38.3 VOCAL CORD MASS: Status: RESOLVED | Noted: 2017-09-28 | Resolved: 2017-11-10

## 2017-11-10 PROBLEM — K81.1 CHRONIC CHOLECYSTITIS: Status: RESOLVED | Noted: 2017-09-28 | Resolved: 2017-11-10

## 2017-11-10 LAB — HBA1C MFR BLD: 10.2 %

## 2017-11-10 PROCEDURE — 97110 THERAPEUTIC EXERCISES: CPT

## 2017-11-10 PROCEDURE — 99214 OFFICE O/P EST MOD 30 MIN: CPT

## 2017-11-10 PROCEDURE — 83036 HEMOGLOBIN GLYCOSYLATED A1C: CPT | Performed by: INTERNAL MEDICINE

## 2017-11-10 PROCEDURE — G0283 ELEC STIM OTHER THAN WOUND: HCPCS

## 2017-11-10 PROCEDURE — 99214 OFFICE O/P EST MOD 30 MIN: CPT | Performed by: INTERNAL MEDICINE

## 2017-11-10 RX ORDER — GABAPENTIN 300 MG/1
300 CAPSULE ORAL 3 TIMES DAILY
Qty: 90 CAPSULE | Refills: 2 | Status: SHIPPED | OUTPATIENT
Start: 2017-11-10 | End: 2018-03-26 | Stop reason: SDUPTHER

## 2017-11-10 RX ORDER — GLIPIZIDE 10 MG/1
20 TABLET ORAL
Qty: 120 TABLET | Refills: 2 | Status: SHIPPED | OUTPATIENT
Start: 2017-11-10 | End: 2018-03-26 | Stop reason: SDUPTHER

## 2017-11-10 RX ORDER — GABAPENTIN 300 MG/1
300 CAPSULE ORAL 3 TIMES DAILY
COMMUNITY
End: 2017-11-10 | Stop reason: SDUPTHER

## 2017-11-10 ASSESSMENT — PATIENT HEALTH QUESTIONNAIRE - PHQ9
1. LITTLE INTEREST OR PLEASURE IN DOING THINGS: 2
9. THOUGHTS THAT YOU WOULD BE BETTER OFF DEAD, OR OF HURTING YOURSELF: 0
3. TROUBLE FALLING OR STAYING ASLEEP: 3
4. FEELING TIRED OR HAVING LITTLE ENERGY: 3
SUM OF ALL RESPONSES TO PHQ QUESTIONS 1-9: 18
SUM OF ALL RESPONSES TO PHQ9 QUESTIONS 1 & 2: 4
7. TROUBLE CONCENTRATING ON THINGS, SUCH AS READING THE NEWSPAPER OR WATCHING TELEVISION: 3
6. FEELING BAD ABOUT YOURSELF - OR THAT YOU ARE A FAILURE OR HAVE LET YOURSELF OR YOUR FAMILY DOWN: 1
10. IF YOU CHECKED OFF ANY PROBLEMS, HOW DIFFICULT HAVE THESE PROBLEMS MADE IT FOR YOU TO DO YOUR WORK, TAKE CARE OF THINGS AT HOME, OR GET ALONG WITH OTHER PEOPLE: 1
5. POOR APPETITE OR OVEREATING: 3
2. FEELING DOWN, DEPRESSED OR HOPELESS: 2
8. MOVING OR SPEAKING SO SLOWLY THAT OTHER PEOPLE COULD HAVE NOTICED. OR THE OPPOSITE, BEING SO FIGETY OR RESTLESS THAT YOU HAVE BEEN MOVING AROUND A LOT MORE THAN USUAL: 1

## 2017-11-10 ASSESSMENT — ENCOUNTER SYMPTOMS
HEARTBURN: 0
COUGH: 0
DOUBLE VISION: 0
NAUSEA: 0
ABDOMINAL PAIN: 0
HEMOPTYSIS: 0
BLURRED VISION: 0

## 2017-11-10 NOTE — PROGRESS NOTES
[facility]  [facility] [address] [facility]  [address] [facility] [address] [facility]  Dept: [phone]  Dept Fax: [phone]    Office Progress/Follow Up Note  Date of patient's visit: 11/10/2017  Patient's Name:  Ramesh Becerra YOB: 1965            Patient Care Team:  Thuy Vidal MD as PCP - General (Internal Medicine)  ================================================================    REASON FOR VISIT/CHIEF COMPLAINT:  Diabetes (needs a1c ) and Health Maintenance (all labs due--pended )    HISTORY OF PRESENTING ILLNESS:  History was obtained from: patient. Ramesh Becerra is a 50 y.o. is here for a follow-up visit. Patient was contacted by me due to uncontrolled diabetes with A1c of 11.2 months ago. Last visit was more than a year ago. Patient has not been compliant with her medication because of lack of insurance. She has been taking her mother's metformin at thousand milligrams daily. A1c today is 10.2. She had her gallbladder removed due to chronic right upper quadrant pain. Her pain has resolved since then. She has no new complaints today. She is due for screening colon cancer which is scheduled at the end of the month. She is due for breast cancer screening. She is due for blood work.   Her problem list, medications and blood work reviewed      Patient Active Problem List   Diagnosis    DM2 (diabetes mellitus, type 2) ([facility]    Allergic rhinitis    Diabetic neuropathy ([facility]    Fibromyalgia    Pure hypercholesterolemia    Gastroesophageal reflux disease with esophagitis       Health Maintenance Due   Topic Date Due    HIV screen  01/30/1982    DTaP/Tdap/Td vaccine (1 - Tdap) 01/30/1986    Diabetic microalbuminuria test  03/19/2016    Lipid screen  03/19/2016    Diabetic foot exam  10/06/2016    Cervical cancer screen  10/11/2016    Diabetic retinal exam  11/10/2016    Colon cancer screen colonoscopy  01/30/2017    Breast cancer screen  10/06/2017       Allergies   Allergen Reactions    Demerol Hcl [Meperidine]     Morphine     Pcn [Penicillins]          Current Outpatient Prescriptions   Medication Sig Dispense Refill    gabapentin (NEURONTIN) 300 MG capsule Take 1 capsule by mouth 3 times daily 90 capsule 2    glipiZIDE (GLUCOTROL) 10 MG tablet Take 2 tablets by mouth 2 times daily (before meals) 120 tablet 2    metFORMIN (GLUCOPHAGE) 1000 MG tablet Take 1 tablet by mouth 2 times daily (with meals) 60 tablet 2    omeprazole (PRILOSEC) 20 MG delayed release capsule Take 1 capsule by mouth 2 times daily 60 capsule 1    diclofenac (VOLTAREN) 50 MG EC tablet Take 1 tablet by mouth 3 times daily (with meals) 60 tablet 3    vitamin D (CHOLECALCIFEROL) 1000 UNIT TABS tablet Take 1,000 Units by mouth daily      VITAMIN K PO Take by mouth      fluticasone (FLONASE) 50 MCG/ACT nasal spray 1 spray by Nasal route daily (Patient taking differently: 1 spray by Nasal route daily as needed ) 1 Bottle 3    cetirizine (ZYRTEC) 10 MG tablet Take 1 tablet by mouth daily Stop Allergra 30 tablet 3    Blood Glucose Monitoring Suppl (BLOOD GLUCOSE METER) KIT Dx: DM-2. Use 2-3 times daily 1 kit 0    Lancets MISC Dx: DM-2. Use 2-3 times daily 100 each 3    Glucose Blood (BLOOD GLUCOSE TEST STRIPS) STRP Dx: DM-2.  Use 2-3 times daily 100 strip 3     Current Facility-Administered Medications   Medication Dose Route Frequency Provider Last Rate Last Dose    medroxyPROGESTERone (DEPO-PROVERA) injection 150 mg  150 mg Intramuscular Once Aysha Galvin MD           Social History   Substance Use Topics    Smoking status: Former Smoker     Packs/day: 1.50     Types: Cigarettes    Smokeless tobacco: Never Used      Comment: pt has quit for 6 weeks     Alcohol use 0.0 oz/week      Comment: rare       Family History   Problem Relation Age of Onset    Diabetes Mother     Heart Disease Father     Diabetes Sister         REVIEW OF HGB 13.5 10/13/2017     10/13/2017       BMP:    Lab Results   Component Value Date     10/13/2017    K 3.8 10/13/2017     10/13/2017    CO2 27 10/13/2017    BUN 3 10/13/2017    CREATININE 0.41 10/13/2017    GLUCOSE 185 10/13/2017       HEMOGLOBIN A1C:   Lab Results   Component Value Date    LABA1C 10.2 11/10/2017       FASTING LIPID PANEL:  Lab Results   Component Value Date    CHOL 181 03/19/2015    HDL 50 03/19/2015    TRIG 107 03/19/2015       ASSESSMENT AND PLAN:  Jaime Royal was seen today for diabetes and health maintenance. Diagnoses and all orders for this visit:    Type 2 diabetes mellitus with complication, unspecified long term insulin use status (HCC)  -      DIABETES FOOT EXAM  -     Microalbumin, Ur; Future  -     POCT glycosylated hemoglobin (Hb A1C)  -     glipiZIDE (GLUCOTROL) 10 MG tablet; Take 2 tablets by mouth 2 times daily (before meals)  -     metFORMIN (GLUCOPHAGE) 1000 MG tablet; Take 1 tablet by mouth 2 times daily (with meals)    Encounter for screening mammogram for breast cancer  -     [facility] Digital Screen Bilateral [[id number]]; Future    Screening for HIV without presence of risk factors  -     HIV Screen; Future    Screening for hyperlipidemia  -     Lipid Panel; Future    Diabetic polyneuropathy associated with type 2 diabetes mellitus (HCC)  -     gabapentin (NEURONTIN) 300 MG capsule; Take 1 capsule by mouth 3 times daily      FOLLOW UP AND INSTRUCTIONS:  · Return in about 3 months (around 2/10/2018) for DM-2. · Jaime Royal received counseling on the following healthy behaviors: nutrition and exercise    · Discussed use, benefit, and side effects of prescribed medications. Barriers to medication compliance addressed. All patient questions answered. Pt voiced understanding.      · Patient given educational materials - see patient instructions    Thuy Gillespie MD, [place], [address] [place] [address] Internal Medicine

## 2017-11-10 NOTE — PATIENT INSTRUCTIONS
You have been given an order and instructions for a mammogram.  The order was faxed to the scheduling department and they will contact you with an appointment. Please bring order with you to that appointment. The scheduling number is [phone] if you have scheduling problems. Your doctor has ordered fasting blood work. It can be done at [facility] or at the hospital. Jaz Núñez will need to fast for 12 hours and bring order with you to registration department. Return appointment card and Summary of Care was reviewed and copy was given to the patient.   MARIELY

## 2017-11-13 ENCOUNTER — HOSPITAL ENCOUNTER (OUTPATIENT)
Dept: PHYSICAL THERAPY | Age: 52
Setting detail: THERAPIES SERIES
Discharge: HOME | End: 2017-11-13

## 2017-11-13 PROCEDURE — 97110 THERAPEUTIC EXERCISES: CPT

## 2017-11-13 NOTE — FLOWSHEET NOTE
Table slides CW/CCW 15x     Table horizontal abduction 15x           Supine cane      PROM 5 mins      Chest press 15x 1#    Flexion 15x 1#    Protraction 15x 1#    abduction 10x 1# To tolerance    ER 15x 1#          Side lying      ER 15x  A Increased reps 11/13   horiz abd 10x A    abd 11x A Reported increased pain at reps 10-11. Other:     Specific Instructions for next treatment: PROM, progress AROM/strength and abolish L shldr pain. Check compliance with cervical  HEP ROM/stretching. Add wt as able in side lying exercises, add prone exercises as able. Treatment Charges: Mins Units   [x]  Modalities-HP/IFC      [x]  Ther Exercise 40 3   []  Manual Therapy     []  Ther Activities     []  Aquatics     []  Vasocompression     []  Other     Total Treatment time  40 mins 3       Assessment: [x] Progressing toward goals: Completed exercises per log, pt demonstrated increased fatigue/increased discomfort  with side lying abduction at end range. Verbal cues for technique. Added resistance to scapular retraction. [] No change. [] Other:      STG: (to be met in 8 treatments)  1. ? Pain: Decrease pain 2 levels   2. ? ROM: Increase left shoulder AROM and PROM 8° at least two motions  3. ? Strength: As AROM improves  4. ? Function: Improve DASH by 10%  5. Independent with Home Exercise Programs  LTG: (to be met in 12 treatments)  1. Pt will be able to lay down with little difficulty  2. Pt will be able to reach overhead with little difficulty  3. Pt will report an improvement in sleep at night to at least 4 hours                     Patient goals: Stop pain. Pt. Education:  [x] Yes  [] No  [x] Reviewed Prior HEP/Ed  Method of Education: [x] Verbal  [x] Demo  [x] Written  Comprehension of Education:  [x] Verbalizes understanding. [x] Demonstrates understanding. [x] Needs review. Wand exercise in supine and side lying AROM. [x] Demonstrates/verbalizes HEP/Ed previously given.   11/9/17: shldr circles, shldr shrugs, scapular retraction. Cervical SB, Rotation, levator. Alexsander CW, CCW  11/13/17: alexsander Flex/ext, abd/adduction. Plan: [x] Continue per plan of care.    [] Other:      Time In: 257    pm            Time Out: 345  pm    Electronically signed by:  Mary Barron PTA

## 2017-11-15 ENCOUNTER — HOSPITAL ENCOUNTER (OUTPATIENT)
Dept: PHYSICAL THERAPY | Age: 52
Setting detail: THERAPIES SERIES
Discharge: HOME | End: 2017-11-15

## 2017-11-15 NOTE — FLOWSHEET NOTE
[x] [facility]        Outpatient Physical                Therapy       [address]       Phone: [phone]       Fax: [phone] [] [facility] at [address] [place] [address]       Phone: [phone]       Fax: [phone] [] [facility]     [address] [facility]      Phone: [phone]      Fax:  [phone]     Physical Therapy Cancel/No Show note    Date: 11/15/2017  Patient: Lexy Oshea  : 1965  MRN: [id number]    Cancels/No Shows to date:     For today's appointment patient:  []  Cancelled  []  Rescheduled appointment  [x]  No-show     Reason given by patient:  []  Patient ill  []  Conflicting appointment  []  No transportation    []  Conflict with work  []  No reason given  []  Weather related  []  Other:      Comments:      []  Next appointment was confirmed    Electronically signed by: Mary Barron PTA

## 2017-11-17 ENCOUNTER — HOSPITAL ENCOUNTER (OUTPATIENT)
Dept: PHYSICAL THERAPY | Age: 52
Setting detail: THERAPIES SERIES
Discharge: HOME | End: 2017-11-17

## 2017-11-17 PROCEDURE — 97110 THERAPEUTIC EXERCISES: CPT

## 2017-11-17 NOTE — FLOWSHEET NOTE
[x] Shaun Rich       Outpatient Physical        Therapy       [address]       Phone: [phone]       Fax: [phone] [] [facility] at [address] [facility]       Phone: [phone]       Fax: [phone] [] [facility]  [address]   Phone: [phone]   Fax:  [phone]     Physical Therapy Daily Treatment Note    Date:  2017  Patient Name:  Emely Eid    :  1965  MRN: [id number]  Physician: Vernon Rivas DO                                        Insurance: Self pay  Medical Diagnosis: M75.102 (ICD-10-CM) - Unspecified rotator cuff tear or rupture of left shoulder, not specified as traumatic                                          Rehab Codes: M25.512, M25.612, M62.81  Onset Date: 17                                   Next 's appt: 17  Visit# / total visits:   Cancels/No Shows: 0/0    Subjective:    Pain:  [x] Yes  [] No Location: left shoulder  Pain Rating: (0-10 scale) 1/10  Pain altered Tx:  [x] No  [] Yes  Action:  Comments:   Reports shoulder is just sore with arrival to clinic today. Sleeping somewhat  better (hard to keep pillows where she wants them for support)  States MD may release her back to work on 17 from gall bladder surgery. Addressing HEP on regular basis.      Objective:  Modalities: UES (opiate) and cervical HP to left shoulder, pt sitting x20 minutes after exercises - held 17 due to low pain level 17  Precautions:  Exercises:         Exercise Reps/ Time Weight/ Level  Comments   Pulleys 1 mins each  x Flexion and abduction   Codman  HEP     F/ex/ext, abd/add, CW/CCW    USE 2 fwd/2bwd L2 x Added    Seated        Shrugs 15x      Shoulder rolls bwd 15x      Shoulder retraction 15x      Cervical ROM 5x 10 sec  Rotation, SB, levator    Table slides flexion 15x      Table slides CW/CCW 15x      Table AROM.  [x] Demonstrates/verbalizes HEP/Ed previously given. 11/9/17: shldr circles, shldr shrugs, scapular retraction. Cervical SB, Rotation, levator. Alexsander CW, CCW  11/13/17: alexsander Flex/ext, abd/adduction. Plan: [x] Continue per plan of care. [x] Other:2x per week for 12 sessions.      Time In:  130   pm            Time Out: 206  pm    Electronically signed by:  Delta Bridges PTA

## 2017-11-20 ENCOUNTER — HOSPITAL ENCOUNTER (OUTPATIENT)
Dept: PHYSICAL THERAPY | Age: 52
Setting detail: THERAPIES SERIES
Discharge: HOME | End: 2017-11-20

## 2017-11-20 PROCEDURE — 97110 THERAPEUTIC EXERCISES: CPT

## 2017-11-20 NOTE — FLOWSHEET NOTE
[x] Mark Bean       Outpatient Physical        Therapy       [address]       Phone: [phone]       Fax: [phone] [] [facility] at [address]       Phone: [phone]       Fax: [phone] [] Christal. [address] [place] [address] Health Promotion  [address] [place] [address]   Phone: [phone]   Fax:  [phone]     Physical Therapy Daily Treatment Note    Date:  2017  Patient Name:  Shahram Bland    :  1965  MRN: [id number]  Physician: Leslee Middleton DO                                        Insurance: Self pay  Medical Diagnosis: M75.102 (ICD-10-CM) - Unspecified rotator cuff tear or rupture of left shoulder, not specified as traumatic                                          Rehab Codes: M25.512, M25.612, M62.81  Onset Date: 17                                   Next 's appt: TBD  Visit# / total visits:    Cancels/No Shows: 0/0    Subjective:    Pain:  [x] Yes  [] No Location: left shoulder  Pain Rating: (0-10 scale) 3/10  Pain altered Tx:  [x] No  [] Yes  Action:  Comments:    Reports lateral L shoulder pain and contributes the increase pain to the weather. Reports surgeon for Gallbladder surgery (surgery )  released her to return to work 17. Reports she cannot finish PT due to her work hours (>8 hr shift).   Reports she would like to finish her  PT  Sessions, (Ortho MD did not restrict her from work surgeon did)      Objective:  Modalities: UES (opiate) and cervical HP to left shoulder, pt sitting x20 minutes after exercises -held  due to low pain level/pt declinded- 17   Precautions:  Exercises:         Exercise Reps/ Time Weight/ Level  Comments   Pulleys       Flexion and abduction, not available 17,held   Codman  HEP     F/ex/ext, abd/add, CW/CCW    UBE 2 fwd/2bwd L2 x     Seated        Shrugs 15x      Shoulder rolls bwd 15x      Shoulder retraction 15x Cervical ROM 5x 10 sec  Rotation, SB, levator    Table slides flexion 15x      Table slides CW/CCW 15x      Table horizontal abduction 15x             Supine cane        PROM 5 mins  x     Chest press 15x 2# x    Flexion 15x 2# x    Protraction 15x 2# x    abduction 10x 2# x To tolerance    ER 15x 2#      ER stretch 3x15\"  x    shldr multidirectional stab. ex 3x15\"  x                   Side lying        ER 15x 2 lbs x  fatiguing. horiz abd 15x 1 lbs x    abd 15x  A x           tband     pt progresseherself  to green    shdlr ext 10x grn x    rows 10x grn x    IR/ER 10x ea grn x           Prone    Add next session           Other: completed all exercises marked with \"x\" Held several exercises due to time needed for reassessment. Specific Instructions for next treatment: PROM, progress AROM/strength and abolish L shldr pain. Treatment Charges: Mins Units   [x]  Modalities-HP/IFC      [x]  Ther Exercise  35 2   []  Manual Therapy     []  Ther Activities     []  Aquatics     []  Vasocompression     []  Other     Total Treatment time  35 2       Assessment: [x] Progressing toward goals: Completed exercises per log. Progressed Side lying ER weight but fatiguing this date. [] No change. [x] Other:  Pt requires verbal cues for tband exercises techniques and pt demonstrates fatigue with side lying strengthening/ROM exercises. Pt demonstrates grim ance with shdlr abduction/ER. shldr demonstrates decreased stability with shdlr flexion > 90 degrees. At [facility] joint.      11/20/17:  L shldr flexion sitting 165 AROM, shldr abd 115 degree then  135 degrees( after distraction stretch)  R slhdr flexion sitting 166 degrees, shldr 164 degrees  L shldr flexion 4-/5, abd 4-/5  L biceps/triceps 4+/5  L shldr IR/ER with NO shldr abduction  4+/5, pain with ER  Dash raw 27, 36% disability      STG: (to be met in 8 treatments)  1. ? Pain: Decrease pain 2 levels 11/20/17 Progressing  2. ? ROM: Increase left shoulder AROM

## 2017-11-20 NOTE — PROGRESS NOTES
[x] [facility]       Outpatient Physical                Therapy         [address]       Phone: [phone]       Fax: [phone] [] [facility]       Promotion at [address]       Phone: [phone]       Fax: [phone] [] Jemannybrigidsimeon. [address] [place] [address] Promotion     [address] [facility]     Phone: [phone]     Fax:  [phone]     Physical Therapy Progress Note    Date: 2017      Patient: Olvin Arredondo  : 1965  MRN: [id number]    Physician: IMANI FalkU: Self pay  Medical Diagnosis: M75.102 (ICD-10-CM) - Unspecified rotator cuff tear or rupture of left shoulder, not specified as traumatic                                          Rehab Codes: M25.512, M25.612, M62.81  Onset Date: 17                                   Next 's appt: TBD  Visit# / total visits:                     Cancels/No Shows: 0/0  Date of initial visit: 10/31/17                Date of final visit:     Subjective:    Pain:  [x] Yes  [] No          Location: left shoulder            Pain Rating: (0-10 scale) 3/10  Pain altered Tx:  [x] No  [] Yes  Action:  Comments:    Reports lateral L shoulder pain and contributes the increase pain to the weather. Reports surgeon for Gallbladder surgery (surgery )  released her to return to work 17. Reports she cannot finish PT due to her work hours (>8 hr shift).   Reports she would like to finish her  PT  Sessions, (Ortho MD did not restrict her from work surgeon did)      Objective:  Test Measurements:  L shldr flexion sitting 165 AROM, shldr abd 115 degree then  135 degrees (after distraction stretch)  R slhdr flexion sitting 166 degrees, shldr 164 degrees  L shldr flexion 4-/5, abd 4-/5  L biceps/triceps 4+/5  L shldr IR/ER with NO shldr abduction  4+/5, pain with ER  Function:  Dash raw 27, 36% hesitate to call. Thank you for your referral.    Physician Signature:________________________________Date:__________________  By signing above or cosigning this note, I have reviewed this plan of care and certify a need for medically necessary rehabilitation services.      *PLEASE SIGN ABOVE AND FAX BACK ALL PAGES*

## 2017-11-21 ENCOUNTER — TELEPHONE (OUTPATIENT)
Dept: ORTHOPEDIC SURGERY | Age: 52
End: 2017-11-21

## 2017-11-21 ENCOUNTER — TELEPHONE (OUTPATIENT)
Dept: INTERNAL MEDICINE | Age: 52
End: 2017-11-21

## 2017-11-21 NOTE — TELEPHONE ENCOUNTER
Patient called asking if she can be off work until December 4th so she can make her physical therapy appointments. Are you okay with this?

## 2017-11-21 NOTE — TELEPHONE ENCOUNTER
PC from pt requesting letter to return to work as 12/04/17. She states that Dr Butch Sutton released her to go back to work as of 11/27 but pt needs that week off to finish PT for shoulder as well as do bowel prep for colonoscopy scheduled on 11/30. Pt would like to be contacted when letter is ready to  at office. Best number to reach pt is : [phone].     Please review and advise

## 2017-11-27 ENCOUNTER — HOSPITAL ENCOUNTER (OUTPATIENT)
Dept: PHYSICAL THERAPY | Age: 52
Setting detail: THERAPIES SERIES
Discharge: HOME | End: 2017-11-27

## 2017-11-27 ENCOUNTER — TELEPHONE (OUTPATIENT)
Dept: GASTROENTEROLOGY | Age: 52
End: 2017-11-27

## 2017-11-27 PROCEDURE — 97110 THERAPEUTIC EXERCISES: CPT

## 2017-12-01 ENCOUNTER — HOSPITAL ENCOUNTER (OUTPATIENT)
Dept: PHYSICAL THERAPY | Age: 52
Setting detail: THERAPIES SERIES
Discharge: HOME | End: 2017-12-01

## 2017-12-06 ENCOUNTER — TELEPHONE (OUTPATIENT)
Dept: INTERNAL MEDICINE | Age: 52
End: 2017-12-06

## 2017-12-06 NOTE — TELEPHONE ENCOUNTER
PC to patient to obtain blood sugar log. Per patient, blood sugars have been ranging from 264-364. States she just got back to work this week so she hasn't been able to afford the glipizide yet. States as soon as she gets a paycheck she will pick it up. Patient also found out today that she was denied for medicaid so she has to go through [employer] and [employer]. Patient states she had tried this a couple years ago and it was going to be 250/mos which she could not afford. It was cheaper to take the tax penalty. Encouraged patient to look into insurance so that we can get blood sugar under control.

## 2017-12-08 NOTE — TELEPHONE ENCOUNTER
No samples of Trulicity 1.5 mg in office. Per Dr. Vada Cooks, [facility] for patient to use Trulicity 9.54 x 1 mos and then increase to 1.5 when samples available.

## 2017-12-20 ENCOUNTER — TELEPHONE (OUTPATIENT)
Dept: INTERNAL MEDICINE | Age: 52
End: 2017-12-20

## 2017-12-20 NOTE — TELEPHONE ENCOUNTER
PC to patient to let her know we have samples of Trulicity. 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 Please sign pended order for Trulicity. Patient instructed to bring pay stubs and denial letter from [facility] when she receives it so that we can see if she is eligible for patient assistance.

## 2017-12-20 NOTE — TELEPHONE ENCOUNTER
Onur Peña inquired about denial status on client reached out to [address] to inquire about denial. It is possible that since she was connected to the help line that an application was never formally filed. If that is the case then the pt. will need to complete a Medicaid application to receive a formal declination. Routing to FLORES Sow to see if she can F/U with patient and identify whether or not pt. Filled out a formal application for Medicaid benefits.

## 2017-12-26 ENCOUNTER — TELEPHONE (OUTPATIENT)
Dept: INTERNAL MEDICINE | Age: 52
End: 2017-12-26

## 2017-12-26 NOTE — TELEPHONE ENCOUNTER
PC to patient to verify she was able to give self her injection of Trulicity. Patient denies any problems. Also discussed whether or not patient has formally applied for medicaid. Patient states it was done over the phone while she was at work. Explained to patient that we will need a formal application and either denial letter for PAP or acceptance in order to proceed from here. Instructed patient that Lorenzo Navarrete (community health worker) should be getting in touch with her to help her apply. Patient will return call to writer if she does not hear back from Lorenzo Navarrete after the first of the year.

## 2018-01-11 ENCOUNTER — TELEPHONE (OUTPATIENT)
Dept: FAMILY MEDICINE CLINIC | Age: 53
End: 2018-01-11

## 2018-01-19 PROBLEM — Z12.11 COLON CANCER SCREENING: Status: ACTIVE | Noted: 2018-01-19

## 2018-01-22 ENCOUNTER — TELEPHONE (OUTPATIENT)
Dept: INTERNAL MEDICINE | Age: 53
End: 2018-01-22

## 2018-01-22 ENCOUNTER — OFFICE VISIT (OUTPATIENT)
Dept: GASTROENTEROLOGY | Age: 53
End: 2018-01-22

## 2018-01-22 VITALS
WEIGHT: 248 LBS | HEIGHT: 66 IN | DIASTOLIC BLOOD PRESSURE: 90 MMHG | HEART RATE: 86 BPM | OXYGEN SATURATION: 96 % | BODY MASS INDEX: 39.86 KG/M2 | SYSTOLIC BLOOD PRESSURE: 150 MMHG

## 2018-01-22 DIAGNOSIS — Z12.11 COLON CANCER SCREENING: ICD-10-CM

## 2018-01-22 DIAGNOSIS — K21.00 GASTROESOPHAGEAL REFLUX DISEASE WITH ESOPHAGITIS: Primary | ICD-10-CM

## 2018-01-22 DIAGNOSIS — A04.8 H. PYLORI INFECTION: ICD-10-CM

## 2018-01-22 DIAGNOSIS — Z90.49 S/P CHOLE: ICD-10-CM

## 2018-01-22 PROCEDURE — 99214 OFFICE O/P EST MOD 30 MIN: CPT | Performed by: INTERNAL MEDICINE

## 2018-01-22 ASSESSMENT — ENCOUNTER SYMPTOMS
ANAL BLEEDING: 0
ALLERGIC/IMMUNOLOGIC NEGATIVE: 1
NAUSEA: 0
RECTAL PAIN: 0
DIARRHEA: 0
CONSTIPATION: 1
ABDOMINAL PAIN: 1
VOMITING: 0
APNEA: 1
BLOOD IN STOOL: 0
ABDOMINAL DISTENTION: 0

## 2018-01-22 NOTE — TELEPHONE ENCOUNTER
Pt came to window requesting Trulicity 2.4LH sample, signed out 1 sample box of Trulicity 8.0DF , reminded pt to talk with Lucio [address], re: completing the medicaid application and receiving a confirmation or denial to send in for PAP.

## 2018-02-12 ENCOUNTER — TELEPHONE (OUTPATIENT)
Dept: FAMILY MEDICINE CLINIC | Age: 53
End: 2018-02-12

## 2018-02-13 ENCOUNTER — TELEPHONE (OUTPATIENT)
Dept: INTERNAL MEDICINE | Age: 53
End: 2018-02-13

## 2018-02-19 ENCOUNTER — TELEPHONE (OUTPATIENT)
Dept: INTERNAL MEDICINE | Age: 53
End: 2018-02-19

## 2018-02-19 NOTE — LETTER
MAGALY [address]er [address]  [address] [facility] [address] [facility]  Phone: [phone]  Fax: [phone]    Thuy Amaya MD        February 19, 2018    [place]  [address] [address]      Dear Desi Espinoza: We are sending this letter because your PCP ordered [facility] for you to have done at your last visit here and they have not yet been completed. If you do not have the order, please stop by the office to get a print out of the order. If you do not have a follow-up appointment scheduled, please contact our office to set up an appointment, or if you stop to get a printout of the order you can make an appointment at that time. If you have any questions or concerns, please don't hesitate to call.     Sincerely,        Thuy Amaya MD

## 2018-02-20 ENCOUNTER — TELEPHONE (OUTPATIENT)
Dept: INTERNAL MEDICINE | Age: 53
End: 2018-02-20

## 2018-02-21 ENCOUNTER — HOSPITAL ENCOUNTER (OUTPATIENT)
Age: 53
Setting detail: OUTPATIENT SURGERY
Discharge: HOME | End: 2018-02-21
Attending: INTERNAL MEDICINE | Admitting: INTERNAL MEDICINE

## 2018-02-21 VITALS
TEMPERATURE: 97.5 F | WEIGHT: 248 LBS | OXYGEN SATURATION: 98 % | BODY MASS INDEX: 39.86 KG/M2 | DIASTOLIC BLOOD PRESSURE: 87 MMHG | HEART RATE: 92 BPM | RESPIRATION RATE: 18 BRPM | HEIGHT: 66 IN | SYSTOLIC BLOOD PRESSURE: 145 MMHG

## 2018-02-21 LAB — GLUCOSE BLD-MCNC: 186 MG/DL (ref 65–105)

## 2018-02-21 PROCEDURE — 7100000011 HC PHASE II RECOVERY - ADDTL 15 MIN: Performed by: INTERNAL MEDICINE

## 2018-02-21 PROCEDURE — 6360000002 HC RX W HCPCS: Performed by: INTERNAL MEDICINE

## 2018-02-21 PROCEDURE — 2580000003 HC RX 258: Performed by: INTERNAL MEDICINE

## 2018-02-21 PROCEDURE — 82947 ASSAY GLUCOSE BLOOD QUANT: CPT

## 2018-02-21 PROCEDURE — 99153 MOD SED SAME PHYS/QHP EA: CPT | Performed by: INTERNAL MEDICINE

## 2018-02-21 PROCEDURE — 99152 MOD SED SAME PHYS/QHP 5/>YRS: CPT | Performed by: INTERNAL MEDICINE

## 2018-02-21 PROCEDURE — 88305 TISSUE EXAM BY PATHOLOGIST: CPT

## 2018-02-21 PROCEDURE — 45385 COLONOSCOPY W/LESION REMOVAL: CPT | Performed by: INTERNAL MEDICINE

## 2018-02-21 PROCEDURE — C1773 RET DEV, INSERTABLE: HCPCS | Performed by: INTERNAL MEDICINE

## 2018-02-21 PROCEDURE — 7100000010 HC PHASE II RECOVERY - FIRST 15 MIN: Performed by: INTERNAL MEDICINE

## 2018-02-21 PROCEDURE — 3609010600 HC COLONOSCOPY POLYPECTOMY SNARE/COLD BIOPSY: Performed by: INTERNAL MEDICINE

## 2018-02-21 RX ORDER — SODIUM CHLORIDE, SODIUM LACTATE, POTASSIUM CHLORIDE, CALCIUM CHLORIDE 600; 310; 30; 20 MG/100ML; MG/100ML; MG/100ML; MG/100ML
INJECTION, SOLUTION INTRAVENOUS CONTINUOUS
Status: DISCONTINUED | OUTPATIENT
Start: 2018-02-21 | End: 2018-02-21 | Stop reason: HOSPADM

## 2018-02-21 RX ORDER — MIDAZOLAM HYDROCHLORIDE 1 MG/ML
INJECTION INTRAMUSCULAR; INTRAVENOUS PRN
Status: DISCONTINUED | OUTPATIENT
Start: 2018-02-21 | End: 2018-02-21 | Stop reason: HOSPADM

## 2018-02-21 RX ORDER — FENTANYL CITRATE 50 UG/ML
INJECTION, SOLUTION INTRAMUSCULAR; INTRAVENOUS PRN
Status: DISCONTINUED | OUTPATIENT
Start: 2018-02-21 | End: 2018-02-21 | Stop reason: HOSPADM

## 2018-02-21 RX ADMIN — SODIUM CHLORIDE, POTASSIUM CHLORIDE, SODIUM LACTATE AND CALCIUM CHLORIDE: 600; 310; 30; 20 INJECTION, SOLUTION INTRAVENOUS at 09:31

## 2018-02-21 ASSESSMENT — PAIN SCALES - GENERAL
PAINLEVEL_OUTOF10: 0
PAINLEVEL_OUTOF10: 0

## 2018-02-21 ASSESSMENT — PAIN - FUNCTIONAL ASSESSMENT: PAIN_FUNCTIONAL_ASSESSMENT: 0-10

## 2018-02-21 NOTE — H&P
Used      Comment: pt has quit for 6 weeks     Alcohol use 0.0 oz/week      Comment: rare    Drug use: No    Sexual activity: Not Asked     Other Topics Concern    None     Social History Narrative    None           REVIEW OF SYSTEMS      Allergies   Allergen Reactions    Demerol Hcl [Meperidine]     Morphine     Pcn [Penicillins]        No current facility-administered medications on file prior to encounter. Current Outpatient Prescriptions on File Prior to Encounter   Medication Sig Dispense Refill    omeprazole (PRILOSEC) 20 MG delayed release capsule Take 1 capsule by mouth 2 times daily 60 capsule 1    Dulaglutide 1.5 MG/0.5ML SOPN Inject 1.5 mg into the skin once a week 4 pen 3    gabapentin (NEURONTIN) 300 MG capsule Take 1 capsule by mouth 3 times daily 90 capsule 2    glipiZIDE (GLUCOTROL) 10 MG tablet Take 2 tablets by mouth 2 times daily (before meals) 120 tablet 2    metFORMIN (GLUCOPHAGE) 1000 MG tablet Take 1 tablet by mouth 2 times daily (with meals) 60 tablet 2    diclofenac (VOLTAREN) 50 MG EC tablet Take 1 tablet by mouth 3 times daily (with meals) 60 tablet 3    vitamin D (CHOLECALCIFEROL) 1000 UNIT TABS tablet Take 1,000 Units by mouth daily      VITAMIN K PO Take by mouth      fluticasone (FLONASE) 50 MCG/ACT nasal spray 1 spray by Nasal route daily (Patient taking differently: 1 spray by Nasal route daily as needed ) 1 Bottle 3    cetirizine (ZYRTEC) 10 MG tablet Take 1 tablet by mouth daily Stop Allergra 30 tablet 3    Blood Glucose Monitoring Suppl (BLOOD GLUCOSE METER) KIT Dx: DM-2. Use 2-3 times daily 1 kit 0    Lancets MISC Dx: DM-2. Use 2-3 times daily 100 each 3    Glucose Blood (BLOOD GLUCOSE TEST STRIPS) STRP Dx: DM-2. Use 2-3 times daily 100 strip 3       Negative except for what is mentioned in the HPI.      GENERAL PHYSICAL EXAM     Vitals: BP (!) 158/98   Pulse 98   Temp 97.5 °F (36.4 °C) (Oral)   Resp 16   Ht 5' 6\" (1.676 m)   Wt 248 lb (112.5 kg) SULEIMAN DODD on 2/21/2018 at 9:54 AM

## 2018-02-21 NOTE — PRE SEDATION
Pre Sedation Note    1. Patient is a 48 y.o. female with above specified procedure planned. Expected Sedation/Anesthesia Type: MAC    2. ASA ([address] Anesthesiology) Anesthesia Status: Class 1 - A normal healthy patient    3. Mallampati: I (soft palate, uvula, fauces, tonsillar pillars visible)  4. Procedure options, risks and benefits reviewed with Patient. Patient expresses understanding. 5.  Consent has been signed:  Yes    HISTORY OF PRESENT ILLNESS:       The patient is a 48 y.o. female who presents for the above procedure.         Past Medical History:    Past Medical History:   Diagnosis Date    Allergic rhinitis 10/21/2014    Diabetes mellitus [facility]     Diabetic neuropathy ([facility] 10/21/2014    Fibromyalgia     GERD (gastroesophageal reflux disease) 10/21/2014    Obesity 10/21/2014    Pure hypercholesterolemia 2/23/2016    Tobacco abuse     Type II or unspecified type diabetes mellitus without mention of complication, not stated as uncontrolled     Unspecified sleep apnea        Past Surgical History:    Past Surgical History:   Procedure Laterality Date    BRONCHOSCOPY  9/27/2017    BRONCHOSCOPY BRUSHINGS performed by Alma Lovett MD at [facility] Right     CHOLECYSTECTOMY  09/29/2017    CHOLECYSTECTOMY N/A 9/29/2017    CHOLECYSTECTOMY OPEN performed by Sarah Mackey DO at [facility]      with revision x1    LARYNGOSCOPY N/A 9/29/2017    DIRECT MICRO LARYNGOSCOPY WITH EXCISION VOCAL CORD LESION  performed by Kelly Davis MD at [address] [place] [address]  09/29/2017    vocal cord biopsy    OVARIAN CYST REMOVAL Right     ND EGD TRANSORAL BIOPSY SINGLE/MULTIPLE N/A 9/27/2017    EGD BIOPSY performed by Elmer Rowley MD at [facility]       Medications:  Current Facility-Administered Medications   Medication Dose Route Frequency Provider Last Rate Last Dose    lactated ringers infusion   Intravenous Continuous Eliezer Serra  mL/hr at 02/21/18 0931         Allergies: Allergies   Allergen Reactions    Demerol Hcl [Meperidine]     Morphine     Pcn [Penicillins]                Problems with Sedation/Anesthesia in the past? no    REVIEW OF SYSTEMS:  12 point review of systems negative other than mentioned above.       PHYSICAL EXAM:    Vitals:  BP (!) 158/98   Pulse 98   Temp 97.5 °F (36.4 °C) (Oral)   Resp 16   Ht 5' 6\" (1.676 m)   Wt 248 lb (112.5 kg)   LMP 09/27/1997   SpO2 98%   BMI 40.03 kg/m²     Focused Exam related to procedure:    General appearance: NAD, conversant   Eyes: anicteric sclerae, moist conjunctivae; no lid-lag; PERRLA   Lungs: CTA, with normal respiratory effort and no intercostal retractions   CV: RRR, no MRGs   Abdomen: Soft, non-tender; no masses or HSM   Skin: Normal temperature, turgor and texture; no rash, ulcers or subcutaneous nodules     DATA:  CBC:   Lab Results   Component Value Date    WBC 6.1 10/13/2017    HGB 13.5 10/13/2017    HCT 40.4 10/13/2017    MCV 78.9 (L) 10/13/2017     10/13/2017     BUN/Cr:   Lab Results   Component Value Date    BUN 3 (L) 10/13/2017   ,   Lab Results   Component Value Date    CREATININE 0.41 (L) 10/13/2017     Potassium:   Lab Results   Component Value Date    K 3.8 10/13/2017     PT/INR: No results found for: INR, PROTIME        Eliezer Serra

## 2018-02-21 NOTE — OP NOTE
PROCEDURE NOTE    DATE OF PROCEDURE: 2/21/2018    SURGEON: Kirill Contreras MD  Facility : [facility]  ASSISTANT: None  Anesthesia: Versed 4 mg IV, fentanyl 100 mcg IV  PREOPERATIVE DIAGNOSIS:   Screening     POSTOPERATIVE DIAGNOSIS: as described below    OPERATION: Total colonoscopy     ANESTHESIA: Moderate Sedation    ESTIMATED BLOOD LOSS: less than 50     COMPLICATIONS: None. SPECIMENS:  Was Obtained:   Two Rt colon polyps, less then 8 mm, snared, burned in total   Multiple recto-sigmoid polyps, the largest is12 mm, snared miost of them       HISTORY: The patient is a 48y.o. year old female with history of above preop diagnosis. I recommended colonoscopy with possible biopsy or polypectomy and I explained the risk, benefits, expected outcome, and alternatives to the procedure. Risks included but are not limited to bleeding, infection, respiratory distress, hypotension, and perforation of the colon and possibility of missing a lesion. The patient understands and is in agreement. PROCEDURE: The patient was given IV conscious sedation. The patient's SPO2 remained above 90% throughout the procedure. The colonoscope was inserted per rectum and advanced under direct vision to the cecum without difficulty. Post sedation note : The patient's SPO2 remained above 90% throughout the procedure. the vital signs remained stable , and no immediate complication form the procedure noted, patient will be ready for d/c when criteria is met . The prep was fair.       Findings:  Terminal ileum: normal    Cecum/Ascending colon: abnormal: Two Rt colon polyps, less then 8 mm, snared, burned in total       Transverse colon: abnormal: diverticulosis     Descending/Sigmoid colon: abnormal: diverticulosis     Rectum/Anus: examined in normal and retroflexed positions and was abnormal: Multiple recto-sigmoid polyps, the largest is12 mm, snared miost of them     Withdrawal Time was (minutes): 10    The colon

## 2018-02-22 LAB — SURGICAL PATHOLOGY REPORT: NORMAL

## 2018-02-23 ENCOUNTER — HOSPITAL ENCOUNTER (EMERGENCY)
Age: 53
Discharge: HOME | End: 2018-02-23
Attending: EMERGENCY MEDICINE

## 2018-02-23 ENCOUNTER — APPOINTMENT (OUTPATIENT)
Dept: GENERAL RADIOLOGY | Age: 53
End: 2018-02-23

## 2018-02-23 ENCOUNTER — APPOINTMENT (OUTPATIENT)
Dept: CT IMAGING | Age: 53
End: 2018-02-23

## 2018-02-23 ENCOUNTER — TELEPHONE (OUTPATIENT)
Dept: GASTROENTEROLOGY | Age: 53
End: 2018-02-23

## 2018-02-23 VITALS
HEIGHT: 66 IN | OXYGEN SATURATION: 99 % | TEMPERATURE: 98.1 F | BODY MASS INDEX: 37.45 KG/M2 | HEART RATE: 94 BPM | RESPIRATION RATE: 16 BRPM | SYSTOLIC BLOOD PRESSURE: 148 MMHG | DIASTOLIC BLOOD PRESSURE: 81 MMHG | WEIGHT: 233 LBS

## 2018-02-23 DIAGNOSIS — Z18.9 RETAINED FOREIGN BODY: ICD-10-CM

## 2018-02-23 DIAGNOSIS — R10.13 ABDOMINAL PAIN, EPIGASTRIC: Primary | ICD-10-CM

## 2018-02-23 DIAGNOSIS — R11.0 NAUSEA: ICD-10-CM

## 2018-02-23 LAB
ABSOLUTE EOS #: 0.11 K/UL (ref 0–0.4)
ABSOLUTE IMMATURE GRANULOCYTE: ABNORMAL K/UL (ref 0–0.3)
ABSOLUTE LYMPH #: 2.17 K/UL (ref 1–4.8)
ABSOLUTE MONO #: 0.53 K/UL (ref 0.1–1.3)
ALBUMIN SERPL-MCNC: 4.1 G/DL (ref 3.5–5.2)
ALBUMIN/GLOBULIN RATIO: ABNORMAL (ref 1–2.5)
ALP BLD-CCNC: 136 U/L (ref 35–104)
ALT SERPL-CCNC: 27 U/L (ref 5–33)
ANION GAP SERPL CALCULATED.3IONS-SCNC: 13 MMOL/L (ref 9–17)
AST SERPL-CCNC: 21 U/L
BASOPHILS # BLD: 0 % (ref 0–2)
BASOPHILS ABSOLUTE: 0 K/UL (ref 0–0.2)
BILIRUB SERPL-MCNC: 0.21 MG/DL (ref 0.3–1.2)
BILIRUBIN DIRECT: 0.09 MG/DL
BILIRUBIN, INDIRECT: 0.12 MG/DL (ref 0–1)
BUN BLDV-MCNC: 3 MG/DL (ref 6–20)
BUN/CREAT BLD: ABNORMAL (ref 9–20)
CALCIUM SERPL-MCNC: 9.7 MG/DL (ref 8.6–10.4)
CHLORIDE BLD-SCNC: 100 MMOL/L (ref 98–107)
CO2: 28 MMOL/L (ref 20–31)
CREAT SERPL-MCNC: 0.54 MG/DL (ref 0.5–0.9)
DIFFERENTIAL TYPE: ABNORMAL
EKG ATRIAL RATE: 84 BPM
EKG P AXIS: 49 DEGREES
EKG P-R INTERVAL: 150 MS
EKG Q-T INTERVAL: 388 MS
EKG QRS DURATION: 90 MS
EKG QTC CALCULATION (BAZETT): 458 MS
EKG R AXIS: -28 DEGREES
EKG T AXIS: 69 DEGREES
EKG VENTRICULAR RATE: 84 BPM
EOSINOPHILS RELATIVE PERCENT: 2 % (ref 0–4)
GFR AFRICAN AMERICAN: >60 ML/MIN
GFR NON-AFRICAN AMERICAN: >60 ML/MIN
GFR SERPL CREATININE-BSD FRML MDRD: ABNORMAL ML/MIN/{1.73_M2}
GFR SERPL CREATININE-BSD FRML MDRD: ABNORMAL ML/MIN/{1.73_M2}
GLOBULIN: ABNORMAL G/DL (ref 1.5–3.8)
GLUCOSE BLD-MCNC: 111 MG/DL (ref 70–99)
HCT VFR BLD CALC: 44.8 % (ref 36–46)
HEMOGLOBIN: 14.9 G/DL (ref 12–16)
IMMATURE GRANULOCYTES: ABNORMAL %
LIPASE: 17 U/L (ref 13–60)
LYMPHOCYTES # BLD: 41 % (ref 24–44)
MCH RBC QN AUTO: 27 PG (ref 26–34)
MCHC RBC AUTO-ENTMCNC: 33.1 G/DL (ref 31–37)
MCV RBC AUTO: 81.4 FL (ref 80–100)
MONOCYTES # BLD: 10 % (ref 1–7)
MORPHOLOGY: NORMAL
NRBC AUTOMATED: ABNORMAL PER 100 WBC
PDW BLD-RTO: 13 % (ref 11.5–14.9)
PLATELET # BLD: 218 K/UL (ref 150–450)
PLATELET ESTIMATE: ABNORMAL
PMV BLD AUTO: 8.4 FL (ref 6–12)
POTASSIUM SERPL-SCNC: 3.7 MMOL/L (ref 3.7–5.3)
RBC # BLD: 5.51 M/UL (ref 4–5.2)
RBC # BLD: ABNORMAL 10*6/UL
SEG NEUTROPHILS: 47 % (ref 36–66)
SEGMENTED NEUTROPHILS ABSOLUTE COUNT: 2.49 K/UL (ref 1.3–9.1)
SODIUM BLD-SCNC: 141 MMOL/L (ref 135–144)
TOTAL PROTEIN: 7.4 G/DL (ref 6.4–8.3)
TROPONIN INTERP: NORMAL
TROPONIN T: <0.03 NG/ML
WBC # BLD: 5.3 K/UL (ref 3.5–11)
WBC # BLD: ABNORMAL 10*3/UL

## 2018-02-23 PROCEDURE — 96372 THER/PROPH/DIAG INJ SC/IM: CPT

## 2018-02-23 PROCEDURE — 85025 COMPLETE CBC W/AUTO DIFF WBC: CPT

## 2018-02-23 PROCEDURE — 6360000002 HC RX W HCPCS: Performed by: EMERGENCY MEDICINE

## 2018-02-23 PROCEDURE — 83690 ASSAY OF LIPASE: CPT

## 2018-02-23 PROCEDURE — 2580000003 HC RX 258: Performed by: EMERGENCY MEDICINE

## 2018-02-23 PROCEDURE — 74022 RADEX COMPL AQT ABD SERIES: CPT

## 2018-02-23 PROCEDURE — 99284 EMERGENCY DEPT VISIT MOD MDM: CPT

## 2018-02-23 PROCEDURE — 84484 ASSAY OF TROPONIN QUANT: CPT

## 2018-02-23 PROCEDURE — 80048 BASIC METABOLIC PNL TOTAL CA: CPT

## 2018-02-23 PROCEDURE — 6360000004 HC RX CONTRAST MEDICATION: Performed by: EMERGENCY MEDICINE

## 2018-02-23 PROCEDURE — 74177 CT ABD & PELVIS W/CONTRAST: CPT

## 2018-02-23 PROCEDURE — 93005 ELECTROCARDIOGRAM TRACING: CPT

## 2018-02-23 PROCEDURE — 80076 HEPATIC FUNCTION PANEL: CPT

## 2018-02-23 RX ORDER — HALOPERIDOL 5 MG/ML
2 INJECTION INTRAMUSCULAR ONCE
Status: COMPLETED | OUTPATIENT
Start: 2018-02-23 | End: 2018-02-23

## 2018-02-23 RX ORDER — 0.9 % SODIUM CHLORIDE 0.9 %
1000 INTRAVENOUS SOLUTION INTRAVENOUS ONCE
Status: DISCONTINUED | OUTPATIENT
Start: 2018-02-23 | End: 2018-02-23 | Stop reason: HOSPADM

## 2018-02-23 RX ORDER — 0.9 % SODIUM CHLORIDE 0.9 %
100 INTRAVENOUS SOLUTION INTRAVENOUS ONCE
Status: COMPLETED | OUTPATIENT
Start: 2018-02-23 | End: 2018-02-23

## 2018-02-23 RX ORDER — SODIUM CHLORIDE 0.9 % (FLUSH) 0.9 %
10 SYRINGE (ML) INJECTION PRN
Status: DISCONTINUED | OUTPATIENT
Start: 2018-02-23 | End: 2018-02-23 | Stop reason: HOSPADM

## 2018-02-23 RX ORDER — ONDANSETRON HYDROCHLORIDE 8 MG/1
8 TABLET, FILM COATED ORAL EVERY 8 HOURS PRN
Qty: 20 TABLET | Refills: 0 | Status: SHIPPED | OUTPATIENT
Start: 2018-02-23 | End: 2018-03-26

## 2018-02-23 RX ADMIN — HALOPERIDOL LACTATE 2 MG: 5 INJECTION, SOLUTION INTRAMUSCULAR at 16:19

## 2018-02-23 RX ADMIN — Medication 10 ML: at 16:00

## 2018-02-23 RX ADMIN — SODIUM CHLORIDE 100 ML: 9 INJECTION, SOLUTION INTRAVENOUS at 16:00

## 2018-02-23 RX ADMIN — IOPAMIDOL 100 ML: 755 INJECTION, SOLUTION INTRAVENOUS at 16:00

## 2018-02-23 ASSESSMENT — PAIN DESCRIPTION - ORIENTATION: ORIENTATION: UPPER

## 2018-02-23 ASSESSMENT — ENCOUNTER SYMPTOMS
VOMITING: 0
SHORTNESS OF BREATH: 0
ABDOMINAL PAIN: 1
COUGH: 0
NAUSEA: 1
DIARRHEA: 0

## 2018-02-23 ASSESSMENT — PAIN DESCRIPTION - LOCATION: LOCATION: ABDOMEN

## 2018-02-23 ASSESSMENT — PAIN DESCRIPTION - FREQUENCY: FREQUENCY: CONTINUOUS

## 2018-02-23 ASSESSMENT — PAIN DESCRIPTION - DESCRIPTORS: DESCRIPTORS: ACHING

## 2018-02-23 ASSESSMENT — PAIN SCALES - GENERAL: PAINLEVEL_OUTOF10: 6

## 2018-02-23 NOTE — ED PROVIDER NOTES
complication, not stated as uncontrolled     Unspecified sleep apnea        SURGICAL HISTORY       Past Surgical History:   Procedure Laterality Date    BRONCHOSCOPY  9/27/2017    BRONCHOSCOPY BRUSHINGS performed by Laura Carvalho MD at [facility] Right     CHOLECYSTECTOMY  09/29/2017    CHOLECYSTECTOMY N/A 9/29/2017    CHOLECYSTECTOMY OPEN performed by Ariadna Iglesias DO at [address] COLONOSCOPY N/A 2/21/2018    COLONOSCOPY POLYPECTOMY SNARE/COLD BIOPSY performed by Estrada Curtis MD at [facility]      with revision x1    LARYNGOSCOPY N/A 9/29/2017    DIRECT MICRO LARYNGOSCOPY WITH EXCISION VOCAL CORD LESION  performed by Tavo Candelario MD at [address] [facility]  09/29/2017    vocal cord biopsy    OVARIAN CYST REMOVAL Right     DC EGD TRANSORAL BIOPSY SINGLE/MULTIPLE N/A 9/27/2017    EGD BIOPSY performed by Estrada Curtis MD at [facility]       Previous Medications    BLOOD GLUCOSE MONITORING SUPPL (BLOOD GLUCOSE METER) KIT    Dx: DM-2. Use 2-3 times daily    CETIRIZINE (ZYRTEC) 10 MG TABLET    Take 1 tablet by mouth daily Stop Allergra    DICLOFENAC (VOLTAREN) 50 MG EC TABLET    Take 1 tablet by mouth 3 times daily (with meals)    DULAGLUTIDE 1.5 MG/0.5ML SOPN    Inject 1.5 mg into the skin once a week    FLUTICASONE (FLONASE) 50 MCG/ACT NASAL SPRAY    1 spray by Nasal route daily    GABAPENTIN (NEURONTIN) 300 MG CAPSULE    Take 1 capsule by mouth 3 times daily    GLIPIZIDE (GLUCOTROL) 10 MG TABLET    Take 2 tablets by mouth 2 times daily (before meals)    GLUCOSE BLOOD (BLOOD GLUCOSE TEST STRIPS) STRP    Dx: DM-2. Use 2-3 times daily    LANCETS MISC    Dx: DM-2.  Use 2-3 times daily    METFORMIN (GLUCOPHAGE) 1000 MG TABLET    Take 1 tablet by mouth 2 times daily (with meals)    OMEPRAZOLE (PRILOSEC) 20 MG DELAYED RELEASE CAPSULE    Take 1 capsule by mouth 2 times daily    VITAMIN D (CHOLECALCIFEROL)

## 2018-02-27 ENCOUNTER — TELEPHONE (OUTPATIENT)
Dept: INTERNAL MEDICINE | Age: 53
End: 2018-02-27

## 2018-02-27 NOTE — TELEPHONE ENCOUNTER
PC to patient to obtain blood sugar log. Patient states she is at work right now and does not have her readings but they have ranged from 110-210. She is monitoring it once daily. Requested patient call office when she has her blood sugar log so that we can report this to Dr. Clifford Ordoñez. Patient will also call later this week and schedule her follow up when she knows her hours at work.

## 2018-03-07 ENCOUNTER — TELEPHONE (OUTPATIENT)
Dept: INTERNAL MEDICINE | Age: 53
End: 2018-03-07

## 2018-03-13 ENCOUNTER — TELEPHONE (OUTPATIENT)
Dept: INTERNAL MEDICINE | Age: 53
End: 2018-03-13

## 2018-03-13 NOTE — LETTER
MAGALY [address]er [address]  [address] [facility] 29 [facility]  Phone: [phone]  Fax: [phone]    Thuy Espinoza MD        March 13, 2018    [address] [place] [address]      Dear Domi Juarez: We are sending this letter because your PCP ordered Mammogram for you to have done at your last visit here and they have not yet been completed. If you do not have the order, please stop by the office to get a print out of the order. If you do not have a follow-up appointment scheduled, please contact our office to set up an appointment, or if you stop to get a printout of the order you can make an appointment at that time. If you have any questions or concerns, please don't hesitate to call.     Sincerely,        Thuy Espinoza MD

## 2018-03-21 ENCOUNTER — OFFICE VISIT (OUTPATIENT)
Dept: ORTHOPEDIC SURGERY | Age: 53
End: 2018-03-21

## 2018-03-21 VITALS — BODY MASS INDEX: 39.21 KG/M2 | WEIGHT: 244 LBS | HEIGHT: 66 IN

## 2018-03-21 DIAGNOSIS — M75.82 TENDINITIS OF LEFT ROTATOR CUFF: Primary | ICD-10-CM

## 2018-03-21 PROCEDURE — 99214 OFFICE O/P EST MOD 30 MIN: CPT | Performed by: ORTHOPAEDIC SURGERY

## 2018-03-21 PROCEDURE — 20610 DRAIN/INJ JOINT/BURSA W/O US: CPT | Performed by: ORTHOPAEDIC SURGERY

## 2018-03-21 RX ORDER — BUPIVACAINE HYDROCHLORIDE 2.5 MG/ML
2 INJECTION, SOLUTION INFILTRATION; PERINEURAL ONCE
Status: COMPLETED | OUTPATIENT
Start: 2018-03-21 | End: 2018-03-22

## 2018-03-21 RX ORDER — BETAMETHASONE SODIUM PHOSPHATE AND BETAMETHASONE ACETATE 3; 3 MG/ML; MG/ML
12 INJECTION, SUSPENSION INTRA-ARTICULAR; INTRALESIONAL; INTRAMUSCULAR; SOFT TISSUE ONCE
Status: COMPLETED | OUTPATIENT
Start: 2018-03-21 | End: 2018-03-22

## 2018-03-22 RX ADMIN — BUPIVACAINE HYDROCHLORIDE 5 MG: 2.5 INJECTION, SOLUTION INFILTRATION; PERINEURAL at 11:09

## 2018-03-22 RX ADMIN — BETAMETHASONE SODIUM PHOSPHATE AND BETAMETHASONE ACETATE 12 MG: 3; 3 INJECTION, SUSPENSION INTRA-ARTICULAR; INTRALESIONAL; INTRAMUSCULAR; SOFT TISSUE at 11:09

## 2018-03-23 ENCOUNTER — HOSPITAL ENCOUNTER (EMERGENCY)
Age: 53
Discharge: HOME | End: 2018-03-23
Attending: EMERGENCY MEDICINE

## 2018-03-23 VITALS
HEART RATE: 87 BPM | BODY MASS INDEX: 39.21 KG/M2 | SYSTOLIC BLOOD PRESSURE: 149 MMHG | WEIGHT: 244 LBS | DIASTOLIC BLOOD PRESSURE: 85 MMHG | HEIGHT: 66 IN | TEMPERATURE: 98.2 F | OXYGEN SATURATION: 99 % | RESPIRATION RATE: 18 BRPM

## 2018-03-23 DIAGNOSIS — E11.8 TYPE 2 DIABETES MELLITUS WITH COMPLICATION, UNSPECIFIED LONG TERM INSULIN USE STATUS: ICD-10-CM

## 2018-03-23 DIAGNOSIS — R73.9 HYPERGLYCEMIA: Primary | ICD-10-CM

## 2018-03-23 LAB
ABSOLUTE EOS #: 0.1 K/UL (ref 0–0.4)
ABSOLUTE IMMATURE GRANULOCYTE: ABNORMAL K/UL (ref 0–0.3)
ABSOLUTE LYMPH #: 1.9 K/UL (ref 1–4.8)
ABSOLUTE MONO #: 0.6 K/UL (ref 0.1–1.3)
ANION GAP SERPL CALCULATED.3IONS-SCNC: 17 MMOL/L (ref 9–17)
BASOPHILS # BLD: 1 % (ref 0–2)
BASOPHILS ABSOLUTE: 0 K/UL (ref 0–0.2)
BETA-HYDROXYBUTYRATE: 0.19 MMOL/L (ref 0.02–0.27)
BILIRUBIN URINE: NEGATIVE
BUN BLDV-MCNC: 12 MG/DL (ref 6–20)
BUN/CREAT BLD: ABNORMAL (ref 9–20)
CALCIUM SERPL-MCNC: 9.6 MG/DL (ref 8.6–10.4)
CHLORIDE BLD-SCNC: 93 MMOL/L (ref 98–107)
CHP ED QC CHECK: NORMAL
CO2: 25 MMOL/L (ref 20–31)
COLOR: YELLOW
COMMENT UA: ABNORMAL
CREAT SERPL-MCNC: 0.81 MG/DL (ref 0.5–0.9)
DIFFERENTIAL TYPE: ABNORMAL
EKG ATRIAL RATE: 90 BPM
EKG P AXIS: 52 DEGREES
EKG P-R INTERVAL: 148 MS
EKG Q-T INTERVAL: 354 MS
EKG QRS DURATION: 92 MS
EKG QTC CALCULATION (BAZETT): 433 MS
EKG R AXIS: -25 DEGREES
EKG T AXIS: 84 DEGREES
EKG VENTRICULAR RATE: 90 BPM
EOSINOPHILS RELATIVE PERCENT: 2 % (ref 0–4)
GFR AFRICAN AMERICAN: >60 ML/MIN
GFR NON-AFRICAN AMERICAN: >60 ML/MIN
GFR SERPL CREATININE-BSD FRML MDRD: ABNORMAL ML/MIN/{1.73_M2}
GFR SERPL CREATININE-BSD FRML MDRD: ABNORMAL ML/MIN/{1.73_M2}
GLUCOSE BLD-MCNC: 356 MG/DL
GLUCOSE BLD-MCNC: 482 MG/DL (ref 70–99)
GLUCOSE URINE: ABNORMAL
HCT VFR BLD CALC: 44.1 % (ref 36–46)
HEMOGLOBIN: 14.9 G/DL (ref 12–16)
IMMATURE GRANULOCYTES: ABNORMAL %
KETONES, URINE: NEGATIVE
LEUKOCYTE ESTERASE, URINE: NEGATIVE
LYMPHOCYTES # BLD: 29 % (ref 24–44)
MAGNESIUM: 2 MG/DL (ref 1.6–2.6)
MCH RBC QN AUTO: 27.6 PG (ref 26–34)
MCHC RBC AUTO-ENTMCNC: 33.8 G/DL (ref 31–37)
MCV RBC AUTO: 81.6 FL (ref 80–100)
MONOCYTES # BLD: 9 % (ref 1–7)
NITRITE, URINE: NEGATIVE
NRBC AUTOMATED: ABNORMAL PER 100 WBC
PDW BLD-RTO: 12.7 % (ref 11.5–14.9)
PH UA: 5.5 (ref 5–8)
PLATELET # BLD: 208 K/UL (ref 150–450)
PLATELET ESTIMATE: ABNORMAL
PMV BLD AUTO: 9.3 FL (ref 6–12)
POTASSIUM SERPL-SCNC: 4.2 MMOL/L (ref 3.7–5.3)
PROTEIN UA: NEGATIVE
RBC # BLD: 5.41 M/UL (ref 4–5.2)
RBC # BLD: ABNORMAL 10*6/UL
SEG NEUTROPHILS: 59 % (ref 36–66)
SEGMENTED NEUTROPHILS ABSOLUTE COUNT: 4 K/UL (ref 1.3–9.1)
SERUM OSMOLALITY: 310 MOSM/KG (ref 275–295)
SODIUM BLD-SCNC: 135 MMOL/L (ref 135–144)
SPECIFIC GRAVITY UA: 1.03 (ref 1–1.03)
TURBIDITY: CLEAR
URINE HGB: NEGATIVE
UROBILINOGEN, URINE: NORMAL
WBC # BLD: 6.7 K/UL (ref 3.5–11)
WBC # BLD: ABNORMAL 10*3/UL

## 2018-03-23 PROCEDURE — 6370000000 HC RX 637 (ALT 250 FOR IP): Performed by: EMERGENCY MEDICINE

## 2018-03-23 PROCEDURE — 82010 KETONE BODYS QUAN: CPT

## 2018-03-23 PROCEDURE — 83930 ASSAY OF BLOOD OSMOLALITY: CPT

## 2018-03-23 PROCEDURE — 93005 ELECTROCARDIOGRAM TRACING: CPT

## 2018-03-23 PROCEDURE — 85025 COMPLETE CBC W/AUTO DIFF WBC: CPT

## 2018-03-23 PROCEDURE — 83735 ASSAY OF MAGNESIUM: CPT

## 2018-03-23 PROCEDURE — 82947 ASSAY GLUCOSE BLOOD QUANT: CPT

## 2018-03-23 PROCEDURE — 99285 EMERGENCY DEPT VISIT HI MDM: CPT

## 2018-03-23 PROCEDURE — 2580000003 HC RX 258: Performed by: EMERGENCY MEDICINE

## 2018-03-23 PROCEDURE — 87086 URINE CULTURE/COLONY COUNT: CPT

## 2018-03-23 PROCEDURE — 96372 THER/PROPH/DIAG INJ SC/IM: CPT

## 2018-03-23 PROCEDURE — 80048 BASIC METABOLIC PNL TOTAL CA: CPT

## 2018-03-23 PROCEDURE — 81003 URINALYSIS AUTO W/O SCOPE: CPT

## 2018-03-23 PROCEDURE — 36415 COLL VENOUS BLD VENIPUNCTURE: CPT

## 2018-03-23 RX ORDER — 0.9 % SODIUM CHLORIDE 0.9 %
1000 INTRAVENOUS SOLUTION INTRAVENOUS ONCE
Status: COMPLETED | OUTPATIENT
Start: 2018-03-23 | End: 2018-03-23

## 2018-03-23 RX ADMIN — INSULIN HUMAN 10 UNITS: 100 INJECTION, SOLUTION PARENTERAL at 13:49

## 2018-03-23 RX ADMIN — INSULIN HUMAN 6 UNITS: 100 INJECTION, SOLUTION PARENTERAL at 15:04

## 2018-03-23 RX ADMIN — SODIUM CHLORIDE 1000 ML: 9 INJECTION, SOLUTION INTRAVENOUS at 13:29

## 2018-03-23 ASSESSMENT — ENCOUNTER SYMPTOMS
WHEEZING: 0
FACIAL SWELLING: 0
BLOOD IN STOOL: 0
NAUSEA: 0
DIARRHEA: 0
SORE THROAT: 0
RHINORRHEA: 0
EYE DISCHARGE: 0
EYE REDNESS: 0
ABDOMINAL PAIN: 0
COLOR CHANGE: 0
BACK PAIN: 0
CONSTIPATION: 0
CHEST TIGHTNESS: 0
SINUS PRESSURE: 0
EYE PAIN: 0
TROUBLE SWALLOWING: 0
COUGH: 0
VOMITING: 0
SHORTNESS OF BREATH: 0

## 2018-03-23 NOTE — ED PROVIDER NOTES
[address]  eMERGENCY dEPARTMENT eNCOUnter      Pt Name: Yimi Lyn  MRN: [id number]  [place] 1965  Date of evaluation: 3/23/18      CHIEF COMPLAINT       Chief Complaint   Patient presents with    Blood Sugar Problem         HISTORY OF PRESENT ILLNESS    Yimi Lyn is a 48 y.o. female who presents complaining of high blood sugar. Patient is a known diabetic who only takes metformin for her sugars. Patient admits that she ran out of her medication probably about 2 weeks ago. Patient states for the last 4-5 days she has been feeling a little bit fatigued and has very dry mouth and urinating frequently. Patient didn't think about it until today that it could be her sugar and was noting that her sugars have been reading over 600. Patient denies any chest pain fevers cough or abdominal pain. Patient did have a bout of diarrhea today. REVIEW OF SYSTEMS       Review of Systems   Constitutional: Negative for activity change, appetite change, chills, diaphoresis and fever. HENT: Negative for congestion, ear pain, facial swelling, nosebleeds, rhinorrhea, sinus pressure, sore throat and trouble swallowing. Dry mouth   Eyes: Negative for pain, discharge and redness. Respiratory: Negative for cough, chest tightness, shortness of breath and wheezing. Cardiovascular: Negative for chest pain, palpitations and leg swelling. Gastrointestinal: Negative for abdominal pain, blood in stool, constipation, diarrhea, nausea and vomiting. Endocrine: Positive for polydipsia and polyuria. Genitourinary: Negative for difficulty urinating, dysuria, flank pain, frequency, genital sores and hematuria. Musculoskeletal: Negative for arthralgias, back pain, gait problem, joint swelling, myalgias and neck pain. Skin: Negative for color change, pallor, rash and wound.    Neurological: Negative for dizziness, tremors, seizures, syncope, speech difficulty, weakness, numbness and read by the radiologist and the interpretations are directly viewed by the emergency physician. LABS: All lab results were reviewed by myself, and all abnormals are listed below. Labs Reviewed   BASIC METABOLIC PANEL - Abnormal; Notable for the following:        Result Value    Glucose 482 (*)     Chloride 93 (*)     All other components within normal limits   CBC WITH AUTO DIFFERENTIAL - Abnormal; Notable for the following:     RBC 5.41 (*)     Monocytes 9 (*)     All other components within normal limits   URINALYSIS - Abnormal; Notable for the following:     Glucose, Ur 3+ (*)     All other components within normal limits   POCT GLUCOSE - Normal   URINE CULTURE   BETA-HYDROXYBUTYRATE   MAGNESIUM   OSMOLALITY         MEDICAL DECISION MAKING:     Patient's symptoms are most consistent with elevated blood sugar. Will do testing to make sure that she is not in DKA. Will treat her appropriately. EMERGENCY DEPARTMENT COURSE:   Vitals:    Vitals:    03/23/18 1300 03/23/18 1341 03/23/18 1437 03/23/18 1440   BP: (!) 145/83  (!) 149/85    Pulse: 96 88 88 87   Resp: 16 18 12 18   Temp:       TempSrc:       SpO2: 97%   99%   Weight:       Height:           The patient was given the following medications while in the emergency department:  Orders Placed This Encounter   Medications    0.9 % sodium chloride bolus    insulin regular (HUMULIN R;NOVOLIN R) injection 10 Units    insulin regular (HUMULIN R;NOVOLIN R) injection 6 Units    metFORMIN (GLUCOPHAGE) 1000 MG tablet     Sig: Take 1 tablet by mouth 2 times daily (with meals)     Dispense:  30 tablet     Refill:  0       -------------------------  2:51 PM  Patient was reevaluated and updated on results. After a shot of an sling her sugar has come down nicely so we will give her one more small dose and then discharge her home with a prescription for her metformin.   Is also counseled on getting rid of the sodas that she is drinking and decreasing the sugar

## 2018-03-24 LAB
CULTURE: NORMAL
CULTURE: NORMAL
GLUCOSE BLD-MCNC: 356 MG/DL (ref 65–105)
Lab: NORMAL
SPECIMEN DESCRIPTION: NORMAL
SPECIMEN DESCRIPTION: NORMAL
STATUS: NORMAL

## 2018-03-26 ENCOUNTER — OFFICE VISIT (OUTPATIENT)
Dept: INTERNAL MEDICINE | Age: 53
End: 2018-03-26

## 2018-03-26 ENCOUNTER — TELEPHONE (OUTPATIENT)
Dept: INTERNAL MEDICINE | Age: 53
End: 2018-03-26

## 2018-03-26 VITALS
HEIGHT: 66 IN | WEIGHT: 239 LBS | SYSTOLIC BLOOD PRESSURE: 138 MMHG | DIASTOLIC BLOOD PRESSURE: 85 MMHG | HEART RATE: 115 BPM | BODY MASS INDEX: 38.41 KG/M2

## 2018-03-26 DIAGNOSIS — G47.33 OSA (OBSTRUCTIVE SLEEP APNEA): ICD-10-CM

## 2018-03-26 DIAGNOSIS — G89.29 CHRONIC LEFT SHOULDER PAIN: ICD-10-CM

## 2018-03-26 DIAGNOSIS — M25.512 CHRONIC LEFT SHOULDER PAIN: ICD-10-CM

## 2018-03-26 DIAGNOSIS — E11.42 DIABETIC POLYNEUROPATHY ASSOCIATED WITH TYPE 2 DIABETES MELLITUS (HCC): ICD-10-CM

## 2018-03-26 DIAGNOSIS — Z12.31 SCREENING MAMMOGRAM, ENCOUNTER FOR: ICD-10-CM

## 2018-03-26 DIAGNOSIS — E11.8 TYPE 2 DIABETES MELLITUS WITH COMPLICATION, UNSPECIFIED LONG TERM INSULIN USE STATUS: Primary | ICD-10-CM

## 2018-03-26 PROBLEM — Z12.11 COLON CANCER SCREENING: Status: RESOLVED | Noted: 2018-01-19 | Resolved: 2018-03-26

## 2018-03-26 LAB
GLUCOSE BLD-MCNC: 383 MG/DL
HBA1C MFR BLD: 13.8 %

## 2018-03-26 PROCEDURE — 99213 OFFICE O/P EST LOW 20 MIN: CPT

## 2018-03-26 PROCEDURE — 83036 HEMOGLOBIN GLYCOSYLATED A1C: CPT | Performed by: INTERNAL MEDICINE

## 2018-03-26 PROCEDURE — 99214 OFFICE O/P EST MOD 30 MIN: CPT | Performed by: INTERNAL MEDICINE

## 2018-03-26 PROCEDURE — 82962 GLUCOSE BLOOD TEST: CPT | Performed by: INTERNAL MEDICINE

## 2018-03-26 RX ORDER — GABAPENTIN 300 MG/1
300 CAPSULE ORAL 3 TIMES DAILY
Qty: 90 CAPSULE | Refills: 2 | Status: SHIPPED | OUTPATIENT
Start: 2018-03-26 | End: 2018-06-11 | Stop reason: CLARIF

## 2018-03-26 RX ORDER — GLIPIZIDE 10 MG/1
20 TABLET ORAL
Qty: 120 TABLET | Refills: 2 | Status: SHIPPED | OUTPATIENT
Start: 2018-03-26 | End: 2018-06-27

## 2018-03-26 ASSESSMENT — ENCOUNTER SYMPTOMS
HEMOPTYSIS: 0
ABDOMINAL PAIN: 0
BLURRED VISION: 0
DOUBLE VISION: 0
COUGH: 0
NAUSEA: 0
HEARTBURN: 0

## 2018-04-03 ENCOUNTER — TELEPHONE (OUTPATIENT)
Dept: INTERNAL MEDICINE | Age: 53
End: 2018-04-03

## 2018-04-04 ENCOUNTER — HOSPITAL ENCOUNTER (OUTPATIENT)
Dept: PHYSICAL THERAPY | Age: 53
Setting detail: THERAPIES SERIES
Discharge: HOME | End: 2018-04-04

## 2018-04-11 ENCOUNTER — HOSPITAL ENCOUNTER (OUTPATIENT)
Dept: WOMENS IMAGING | Age: 53
Discharge: HOME | End: 2018-04-13

## 2018-04-11 DIAGNOSIS — Z12.31 SCREENING MAMMOGRAM, ENCOUNTER FOR: ICD-10-CM

## 2018-04-11 PROCEDURE — 77063 BREAST TOMOSYNTHESIS BI: CPT

## 2018-04-13 ENCOUNTER — TELEPHONE (OUTPATIENT)
Dept: INTERNAL MEDICINE | Age: 53
End: 2018-04-13

## 2018-04-16 DIAGNOSIS — E11.8 TYPE 2 DIABETES MELLITUS WITH COMPLICATION, WITHOUT LONG-TERM CURRENT USE OF INSULIN (HCC): Primary | ICD-10-CM

## 2018-04-21 ENCOUNTER — HOSPITAL ENCOUNTER (EMERGENCY)
Age: 53
Discharge: HOME | End: 2018-04-21
Attending: EMERGENCY MEDICINE

## 2018-04-21 VITALS
RESPIRATION RATE: 14 BRPM | BODY MASS INDEX: 38.73 KG/M2 | HEIGHT: 66 IN | HEART RATE: 89 BPM | TEMPERATURE: 97.7 F | DIASTOLIC BLOOD PRESSURE: 90 MMHG | OXYGEN SATURATION: 99 % | SYSTOLIC BLOOD PRESSURE: 163 MMHG | WEIGHT: 241 LBS

## 2018-04-21 DIAGNOSIS — M25.512 CHRONIC LEFT SHOULDER PAIN: Primary | ICD-10-CM

## 2018-04-21 DIAGNOSIS — G89.29 CHRONIC LEFT SHOULDER PAIN: Primary | ICD-10-CM

## 2018-04-21 PROCEDURE — 99282 EMERGENCY DEPT VISIT SF MDM: CPT

## 2018-04-21 RX ORDER — TRAMADOL HYDROCHLORIDE 50 MG/1
50 TABLET ORAL EVERY 6 HOURS PRN
Qty: 12 TABLET | Refills: 0 | Status: SHIPPED | OUTPATIENT
Start: 2018-04-21 | End: 2018-04-24

## 2018-04-21 ASSESSMENT — PAIN DESCRIPTION - ORIENTATION: ORIENTATION: LEFT

## 2018-04-21 ASSESSMENT — PAIN DESCRIPTION - PAIN TYPE: TYPE: CHRONIC PAIN

## 2018-04-21 ASSESSMENT — PAIN DESCRIPTION - LOCATION: LOCATION: SHOULDER

## 2018-04-21 ASSESSMENT — PAIN SCALES - GENERAL: PAINLEVEL_OUTOF10: 8

## 2018-04-23 ENCOUNTER — TELEPHONE (OUTPATIENT)
Dept: INTERNAL MEDICINE | Age: 53
End: 2018-04-23

## 2018-04-24 ENCOUNTER — TELEPHONE (OUTPATIENT)
Dept: INTERNAL MEDICINE | Age: 53
End: 2018-04-24

## 2018-04-26 ENCOUNTER — HOSPITAL ENCOUNTER (EMERGENCY)
Age: 53
Discharge: HOME | End: 2018-04-26
Attending: EMERGENCY MEDICINE

## 2018-04-26 VITALS
HEART RATE: 103 BPM | SYSTOLIC BLOOD PRESSURE: 175 MMHG | OXYGEN SATURATION: 98 % | WEIGHT: 241 LBS | BODY MASS INDEX: 38.73 KG/M2 | HEIGHT: 66 IN | DIASTOLIC BLOOD PRESSURE: 99 MMHG | TEMPERATURE: 97.6 F | RESPIRATION RATE: 18 BRPM

## 2018-04-26 DIAGNOSIS — R11.2 NON-INTRACTABLE VOMITING WITH NAUSEA, UNSPECIFIED VOMITING TYPE: Primary | ICD-10-CM

## 2018-04-26 LAB
ABSOLUTE EOS #: 0.1 K/UL (ref 0–0.4)
ABSOLUTE IMMATURE GRANULOCYTE: ABNORMAL K/UL (ref 0–0.3)
ABSOLUTE LYMPH #: 1.5 K/UL (ref 1–4.8)
ABSOLUTE MONO #: 0.5 K/UL (ref 0.1–1.3)
ALBUMIN SERPL-MCNC: 4.3 G/DL (ref 3.5–5.2)
ALBUMIN/GLOBULIN RATIO: ABNORMAL (ref 1–2.5)
ALP BLD-CCNC: 216 U/L (ref 35–104)
ALT SERPL-CCNC: 55 U/L (ref 5–33)
ANION GAP SERPL CALCULATED.3IONS-SCNC: 14 MMOL/L (ref 9–17)
AST SERPL-CCNC: 72 U/L
BASOPHILS # BLD: 1 % (ref 0–2)
BASOPHILS ABSOLUTE: 0.1 K/UL (ref 0–0.2)
BILIRUB SERPL-MCNC: 0.22 MG/DL (ref 0.3–1.2)
BUN BLDV-MCNC: 4 MG/DL (ref 6–20)
BUN/CREAT BLD: ABNORMAL (ref 9–20)
CALCIUM SERPL-MCNC: 8.9 MG/DL (ref 8.6–10.4)
CHLORIDE BLD-SCNC: 97 MMOL/L (ref 98–107)
CO2: 26 MMOL/L (ref 20–31)
CREAT SERPL-MCNC: 0.6 MG/DL (ref 0.5–0.9)
DIFFERENTIAL TYPE: ABNORMAL
EOSINOPHILS RELATIVE PERCENT: 3 % (ref 0–4)
GFR AFRICAN AMERICAN: >60 ML/MIN
GFR NON-AFRICAN AMERICAN: >60 ML/MIN
GFR SERPL CREATININE-BSD FRML MDRD: ABNORMAL ML/MIN/{1.73_M2}
GFR SERPL CREATININE-BSD FRML MDRD: ABNORMAL ML/MIN/{1.73_M2}
GLUCOSE BLD-MCNC: 266 MG/DL (ref 70–99)
HCT VFR BLD CALC: 47.1 % (ref 36–46)
HEMOGLOBIN: 15.4 G/DL (ref 12–16)
IMMATURE GRANULOCYTES: ABNORMAL %
LIPASE: 20 U/L (ref 13–60)
LYMPHOCYTES # BLD: 26 % (ref 24–44)
MCH RBC QN AUTO: 26.3 PG (ref 26–34)
MCHC RBC AUTO-ENTMCNC: 32.8 G/DL (ref 31–37)
MCV RBC AUTO: 80.2 FL (ref 80–100)
MONOCYTES # BLD: 9 % (ref 1–7)
NRBC AUTOMATED: ABNORMAL PER 100 WBC
PDW BLD-RTO: 12.6 % (ref 11.5–14.9)
PLATELET # BLD: 217 K/UL (ref 150–450)
PLATELET ESTIMATE: ABNORMAL
PMV BLD AUTO: 8.7 FL (ref 6–12)
POTASSIUM SERPL-SCNC: 4.1 MMOL/L (ref 3.7–5.3)
RBC # BLD: 5.88 M/UL (ref 4–5.2)
RBC # BLD: ABNORMAL 10*6/UL
SEG NEUTROPHILS: 61 % (ref 36–66)
SEGMENTED NEUTROPHILS ABSOLUTE COUNT: 3.5 K/UL (ref 1.3–9.1)
SODIUM BLD-SCNC: 137 MMOL/L (ref 135–144)
TOTAL PROTEIN: 7.3 G/DL (ref 6.4–8.3)
WBC # BLD: 5.7 K/UL (ref 3.5–11)
WBC # BLD: ABNORMAL 10*3/UL

## 2018-04-26 PROCEDURE — 80053 COMPREHEN METABOLIC PANEL: CPT

## 2018-04-26 PROCEDURE — 85025 COMPLETE CBC W/AUTO DIFF WBC: CPT

## 2018-04-26 PROCEDURE — 36415 COLL VENOUS BLD VENIPUNCTURE: CPT

## 2018-04-26 PROCEDURE — 96374 THER/PROPH/DIAG INJ IV PUSH: CPT

## 2018-04-26 PROCEDURE — 99283 EMERGENCY DEPT VISIT LOW MDM: CPT

## 2018-04-26 PROCEDURE — 83690 ASSAY OF LIPASE: CPT

## 2018-04-26 PROCEDURE — 2580000003 HC RX 258: Performed by: EMERGENCY MEDICINE

## 2018-04-26 PROCEDURE — 6360000002 HC RX W HCPCS: Performed by: EMERGENCY MEDICINE

## 2018-04-26 RX ORDER — ONDANSETRON 4 MG/1
4 TABLET, ORALLY DISINTEGRATING ORAL EVERY 8 HOURS PRN
Qty: 10 TABLET | Refills: 0 | Status: SHIPPED | OUTPATIENT
Start: 2018-04-26 | End: 2018-06-11 | Stop reason: ALTCHOICE

## 2018-04-26 RX ORDER — 0.9 % SODIUM CHLORIDE 0.9 %
1000 INTRAVENOUS SOLUTION INTRAVENOUS ONCE
Status: COMPLETED | OUTPATIENT
Start: 2018-04-26 | End: 2018-04-26

## 2018-04-26 RX ORDER — ONDANSETRON 2 MG/ML
4 INJECTION INTRAMUSCULAR; INTRAVENOUS ONCE
Status: COMPLETED | OUTPATIENT
Start: 2018-04-26 | End: 2018-04-26

## 2018-04-26 RX ADMIN — SODIUM CHLORIDE 1000 ML: 9 INJECTION, SOLUTION INTRAVENOUS at 01:38

## 2018-04-26 RX ADMIN — ONDANSETRON 4 MG: 2 INJECTION INTRAMUSCULAR; INTRAVENOUS at 01:38

## 2018-04-26 ASSESSMENT — ENCOUNTER SYMPTOMS
COUGH: 0
DIARRHEA: 0
VOMITING: 1
RESPIRATORY NEGATIVE: 1
ABDOMINAL PAIN: 0
BACK PAIN: 0
SHORTNESS OF BREATH: 0
EYES NEGATIVE: 1
NAUSEA: 1

## 2018-04-30 ENCOUNTER — TELEPHONE (OUTPATIENT)
Dept: INTERNAL MEDICINE | Age: 53
End: 2018-04-30

## 2018-05-02 ENCOUNTER — OFFICE VISIT (OUTPATIENT)
Dept: ORTHOPEDIC SURGERY | Age: 53
End: 2018-05-02

## 2018-05-02 VITALS — BODY MASS INDEX: 38.73 KG/M2 | WEIGHT: 241 LBS | HEIGHT: 66 IN

## 2018-05-02 DIAGNOSIS — M67.912 ROTATOR CUFF DISORDER, LEFT: Primary | ICD-10-CM

## 2018-05-02 PROCEDURE — 99213 OFFICE O/P EST LOW 20 MIN: CPT | Performed by: STUDENT IN AN ORGANIZED HEALTH CARE EDUCATION/TRAINING PROGRAM

## 2018-05-04 ENCOUNTER — TELEPHONE (OUTPATIENT)
Dept: INTERNAL MEDICINE | Age: 53
End: 2018-05-04

## 2018-05-07 ENCOUNTER — TELEPHONE (OUTPATIENT)
Dept: INTERNAL MEDICINE | Age: 53
End: 2018-05-07

## 2018-05-07 DIAGNOSIS — F41.9 ANXIETY: Primary | ICD-10-CM

## 2018-05-08 RX ORDER — DIAZEPAM 2 MG/1
2 TABLET ORAL EVERY 6 HOURS PRN
Qty: 3 TABLET | Refills: 0 | Status: SHIPPED | OUTPATIENT
Start: 2018-05-08 | End: 2018-05-09

## 2018-05-09 ENCOUNTER — OFFICE VISIT (OUTPATIENT)
Dept: INTERNAL MEDICINE | Age: 53
End: 2018-05-09

## 2018-05-09 ENCOUNTER — HOSPITAL ENCOUNTER (OUTPATIENT)
Dept: MRI IMAGING | Age: 53
Discharge: HOME | End: 2018-05-11

## 2018-05-09 VITALS
WEIGHT: 248 LBS | DIASTOLIC BLOOD PRESSURE: 88 MMHG | SYSTOLIC BLOOD PRESSURE: 138 MMHG | BODY MASS INDEX: 39.86 KG/M2 | HEART RATE: 102 BPM | HEIGHT: 66 IN

## 2018-05-09 DIAGNOSIS — M67.912 ROTATOR CUFF DISORDER, LEFT: ICD-10-CM

## 2018-05-09 DIAGNOSIS — M67.912 TENDINOPATHY OF LEFT ROTATOR CUFF: Primary | ICD-10-CM

## 2018-05-09 PROCEDURE — 99213 OFFICE O/P EST LOW 20 MIN: CPT | Performed by: INTERNAL MEDICINE

## 2018-05-09 PROCEDURE — 99212 OFFICE O/P EST SF 10 MIN: CPT | Performed by: INTERNAL MEDICINE

## 2018-05-09 PROCEDURE — 73221 MRI JOINT UPR EXTREM W/O DYE: CPT

## 2018-05-09 RX ORDER — TRAMADOL HYDROCHLORIDE 50 MG/1
50 TABLET ORAL 2 TIMES DAILY PRN
Qty: 30 TABLET | Refills: 0 | Status: SHIPPED | OUTPATIENT
Start: 2018-05-09 | End: 2018-05-24

## 2018-05-09 ASSESSMENT — ENCOUNTER SYMPTOMS
COUGH: 0
BLURRED VISION: 0
DOUBLE VISION: 0
HEARTBURN: 0
HEMOPTYSIS: 0
ABDOMINAL PAIN: 0
NAUSEA: 0

## 2018-05-21 ENCOUNTER — TELEPHONE (OUTPATIENT)
Dept: INTERNAL MEDICINE | Age: 53
End: 2018-05-21

## 2018-05-23 ENCOUNTER — OFFICE VISIT (OUTPATIENT)
Dept: ORTHOPEDIC SURGERY | Age: 53
End: 2018-05-23

## 2018-05-23 VITALS — HEIGHT: 66 IN | BODY MASS INDEX: 39.86 KG/M2 | WEIGHT: 248 LBS

## 2018-05-23 DIAGNOSIS — M75.120 COMPLETE TEAR OF ROTATOR CUFF, UNSPECIFIED LATERALITY: Primary | ICD-10-CM

## 2018-05-23 PROCEDURE — 99213 OFFICE O/P EST LOW 20 MIN: CPT | Performed by: STUDENT IN AN ORGANIZED HEALTH CARE EDUCATION/TRAINING PROGRAM

## 2018-06-11 ENCOUNTER — HOSPITAL ENCOUNTER (OUTPATIENT)
Dept: PREADMISSION TESTING | Age: 53
Discharge: HOME | End: 2018-06-15

## 2018-06-11 VITALS
HEART RATE: 95 BPM | SYSTOLIC BLOOD PRESSURE: 185 MMHG | OXYGEN SATURATION: 96 % | DIASTOLIC BLOOD PRESSURE: 118 MMHG | TEMPERATURE: 98.5 F | RESPIRATION RATE: 18 BRPM | BODY MASS INDEX: 39.21 KG/M2 | WEIGHT: 244 LBS | HEIGHT: 66 IN

## 2018-06-11 LAB
BUN BLDV-MCNC: 5 MG/DL (ref 6–20)
CREAT SERPL-MCNC: 0.6 MG/DL (ref 0.5–0.9)
GFR AFRICAN AMERICAN: >60 ML/MIN
GFR NON-AFRICAN AMERICAN: >60 ML/MIN
GFR SERPL CREATININE-BSD FRML MDRD: ABNORMAL ML/MIN/{1.73_M2}
GFR SERPL CREATININE-BSD FRML MDRD: ABNORMAL ML/MIN/{1.73_M2}
GLUCOSE BLD-MCNC: 215 MG/DL (ref 70–99)
HCT VFR BLD CALC: 42.5 % (ref 36.3–47.1)
HEMOGLOBIN: 13.5 G/DL (ref 11.9–15.1)

## 2018-06-11 PROCEDURE — 85014 HEMATOCRIT: CPT

## 2018-06-11 PROCEDURE — 82947 ASSAY GLUCOSE BLOOD QUANT: CPT

## 2018-06-11 PROCEDURE — 82565 ASSAY OF CREATININE: CPT

## 2018-06-11 PROCEDURE — 84520 ASSAY OF UREA NITROGEN: CPT

## 2018-06-11 PROCEDURE — 85018 HEMOGLOBIN: CPT

## 2018-06-11 PROCEDURE — 36415 COLL VENOUS BLD VENIPUNCTURE: CPT

## 2018-06-11 RX ORDER — SODIUM CHLORIDE, SODIUM LACTATE, POTASSIUM CHLORIDE, CALCIUM CHLORIDE 600; 310; 30; 20 MG/100ML; MG/100ML; MG/100ML; MG/100ML
1000 INJECTION, SOLUTION INTRAVENOUS CONTINUOUS
Status: CANCELLED | OUTPATIENT
Start: 2018-06-11

## 2018-06-11 ASSESSMENT — PAIN SCALES - GENERAL: PAINLEVEL_OUTOF10: 8

## 2018-06-11 ASSESSMENT — PAIN DESCRIPTION - ONSET: ONSET: ON-GOING

## 2018-06-11 ASSESSMENT — PAIN DESCRIPTION - FREQUENCY: FREQUENCY: CONTINUOUS

## 2018-06-11 ASSESSMENT — PAIN DESCRIPTION - LOCATION: LOCATION: SHOULDER

## 2018-06-11 ASSESSMENT — PAIN DESCRIPTION - DESCRIPTORS: DESCRIPTORS: ACHING;CONSTANT;THROBBING

## 2018-06-11 ASSESSMENT — PAIN DESCRIPTION - PROGRESSION: CLINICAL_PROGRESSION: GRADUALLY WORSENING

## 2018-06-11 ASSESSMENT — PAIN DESCRIPTION - PAIN TYPE: TYPE: CHRONIC PAIN

## 2018-06-11 ASSESSMENT — PAIN DESCRIPTION - ORIENTATION: ORIENTATION: LEFT

## 2018-06-14 ENCOUNTER — TELEPHONE (OUTPATIENT)
Dept: ORTHOPEDIC SURGERY | Age: 53
End: 2018-06-14

## 2018-06-19 ENCOUNTER — ANESTHESIA (OUTPATIENT)
Dept: OPERATING ROOM | Age: 53
End: 2018-06-19

## 2018-06-19 ENCOUNTER — ANESTHESIA EVENT (OUTPATIENT)
Dept: OPERATING ROOM | Age: 53
End: 2018-06-19

## 2018-06-19 ENCOUNTER — HOSPITAL ENCOUNTER (OUTPATIENT)
Age: 53
Setting detail: OUTPATIENT SURGERY
Discharge: HOME | End: 2018-06-19
Attending: ORTHOPAEDIC SURGERY | Admitting: ORTHOPAEDIC SURGERY

## 2018-06-19 VITALS
HEIGHT: 66 IN | HEART RATE: 85 BPM | OXYGEN SATURATION: 96 % | SYSTOLIC BLOOD PRESSURE: 162 MMHG | TEMPERATURE: 97.5 F | BODY MASS INDEX: 39.21 KG/M2 | RESPIRATION RATE: 16 BRPM | WEIGHT: 244 LBS | DIASTOLIC BLOOD PRESSURE: 95 MMHG

## 2018-06-19 VITALS — TEMPERATURE: 96.5 F | SYSTOLIC BLOOD PRESSURE: 164 MMHG | OXYGEN SATURATION: 99 % | DIASTOLIC BLOOD PRESSURE: 103 MMHG

## 2018-06-19 DIAGNOSIS — G89.29 CHRONIC LEFT SHOULDER PAIN: Primary | ICD-10-CM

## 2018-06-19 DIAGNOSIS — M25.512 CHRONIC LEFT SHOULDER PAIN: Primary | ICD-10-CM

## 2018-06-19 LAB
GLUCOSE BLD-MCNC: 241 MG/DL (ref 65–105)
GLUCOSE BLD-MCNC: 269 MG/DL (ref 65–105)

## 2018-06-19 PROCEDURE — 64415 NJX AA&/STRD BRCH PLXS IMG: CPT | Performed by: ANESTHESIOLOGY

## 2018-06-19 PROCEDURE — 2500000003 HC RX 250 WO HCPCS: Performed by: ANESTHESIOLOGY

## 2018-06-19 PROCEDURE — 29826 SHO ARTHRS SRG DECOMPRESSION: CPT | Performed by: ORTHOPAEDIC SURGERY

## 2018-06-19 PROCEDURE — 7100000011 HC PHASE II RECOVERY - ADDTL 15 MIN: Performed by: ORTHOPAEDIC SURGERY

## 2018-06-19 PROCEDURE — 2500000003 HC RX 250 WO HCPCS

## 2018-06-19 PROCEDURE — 7100000010 HC PHASE II RECOVERY - FIRST 15 MIN: Performed by: ORTHOPAEDIC SURGERY

## 2018-06-19 PROCEDURE — 2500000003 HC RX 250 WO HCPCS: Performed by: ORTHOPAEDIC SURGERY

## 2018-06-19 PROCEDURE — 3700000000 HC ANESTHESIA ATTENDED CARE: Performed by: ORTHOPAEDIC SURGERY

## 2018-06-19 PROCEDURE — 2720000003 HC MISC SUTURE/STAPLES/RELOADS/ETC: Performed by: ORTHOPAEDIC SURGERY

## 2018-06-19 PROCEDURE — 7100000000 HC PACU RECOVERY - FIRST 15 MIN: Performed by: ORTHOPAEDIC SURGERY

## 2018-06-19 PROCEDURE — 29827 SHO ARTHRS SRG RT8TR CUF RPR: CPT | Performed by: ORTHOPAEDIC SURGERY

## 2018-06-19 PROCEDURE — 6360000002 HC RX W HCPCS: Performed by: ANESTHESIOLOGY

## 2018-06-19 PROCEDURE — 3600000004 HC SURGERY LEVEL 4 BASE: Performed by: ORTHOPAEDIC SURGERY

## 2018-06-19 PROCEDURE — 3700000001 HC ADD 15 MINUTES (ANESTHESIA): Performed by: ORTHOPAEDIC SURGERY

## 2018-06-19 PROCEDURE — 6370000000 HC RX 637 (ALT 250 FOR IP): Performed by: ANESTHESIOLOGY

## 2018-06-19 PROCEDURE — 29823 SHO ARTHRS SRG XTNSV DBRDMT: CPT | Performed by: ORTHOPAEDIC SURGERY

## 2018-06-19 PROCEDURE — 2580000003 HC RX 258: Performed by: ORTHOPAEDIC SURGERY

## 2018-06-19 PROCEDURE — C1713 ANCHOR/SCREW BN/BN,TIS/BN: HCPCS | Performed by: ORTHOPAEDIC SURGERY

## 2018-06-19 PROCEDURE — 2500000003 HC RX 250 WO HCPCS: Performed by: NURSE ANESTHETIST, CERTIFIED REGISTERED

## 2018-06-19 PROCEDURE — 3600000014 HC SURGERY LEVEL 4 ADDTL 15MIN: Performed by: ORTHOPAEDIC SURGERY

## 2018-06-19 PROCEDURE — 7100000001 HC PACU RECOVERY - ADDTL 15 MIN: Performed by: ORTHOPAEDIC SURGERY

## 2018-06-19 PROCEDURE — 6360000002 HC RX W HCPCS: Performed by: NURSE ANESTHETIST, CERTIFIED REGISTERED

## 2018-06-19 PROCEDURE — 2720000010 HC SURG SUPPLY STERILE: Performed by: ORTHOPAEDIC SURGERY

## 2018-06-19 PROCEDURE — 2580000003 HC RX 258: Performed by: ANESTHESIOLOGY

## 2018-06-19 PROCEDURE — 82947 ASSAY GLUCOSE BLOOD QUANT: CPT

## 2018-06-19 PROCEDURE — 76942 ECHO GUIDE FOR BIOPSY: CPT | Performed by: ANESTHESIOLOGY

## 2018-06-19 DEVICE — MAGNUM 2 KNOTLESS IMPLANT
Type: IMPLANTABLE DEVICE | Site: SHOULDER | Status: FUNCTIONAL
Brand: MAGNUM

## 2018-06-19 RX ORDER — ROCURONIUM BROMIDE 10 MG/ML
INJECTION, SOLUTION INTRAVENOUS PRN
Status: DISCONTINUED | OUTPATIENT
Start: 2018-06-19 | End: 2018-06-19 | Stop reason: SDUPTHER

## 2018-06-19 RX ORDER — GLYCOPYRROLATE 1 MG/5 ML
SYRINGE (ML) INTRAVENOUS PRN
Status: DISCONTINUED | OUTPATIENT
Start: 2018-06-19 | End: 2018-06-19 | Stop reason: SDUPTHER

## 2018-06-19 RX ORDER — OXYCODONE HYDROCHLORIDE AND ACETAMINOPHEN 5; 325 MG/1; MG/1
1-2 TABLET ORAL EVERY 6 HOURS PRN
Qty: 60 TABLET | Refills: 0 | Status: SHIPPED | OUTPATIENT
Start: 2018-06-19 | End: 2018-06-23 | Stop reason: SINTOL

## 2018-06-19 RX ORDER — MIDAZOLAM HYDROCHLORIDE 1 MG/ML
INJECTION INTRAMUSCULAR; INTRAVENOUS PRN
Status: DISCONTINUED | OUTPATIENT
Start: 2018-06-19 | End: 2018-06-19 | Stop reason: SDUPTHER

## 2018-06-19 RX ORDER — DEXAMETHASONE SODIUM PHOSPHATE 10 MG/ML
INJECTION INTRAMUSCULAR; INTRAVENOUS PRN
Status: DISCONTINUED | OUTPATIENT
Start: 2018-06-19 | End: 2018-06-19 | Stop reason: SDUPTHER

## 2018-06-19 RX ORDER — FENTANYL CITRATE 50 UG/ML
INJECTION, SOLUTION INTRAMUSCULAR; INTRAVENOUS PRN
Status: DISCONTINUED | OUTPATIENT
Start: 2018-06-19 | End: 2018-06-19 | Stop reason: SDUPTHER

## 2018-06-19 RX ORDER — KETOROLAC TROMETHAMINE 30 MG/ML
30 INJECTION, SOLUTION INTRAMUSCULAR; INTRAVENOUS ONCE
Status: COMPLETED | OUTPATIENT
Start: 2018-06-19 | End: 2018-06-19

## 2018-06-19 RX ORDER — LABETALOL HYDROCHLORIDE 5 MG/ML
INJECTION, SOLUTION INTRAVENOUS
Status: COMPLETED
Start: 2018-06-19 | End: 2018-06-19

## 2018-06-19 RX ORDER — DIPHENHYDRAMINE HYDROCHLORIDE 50 MG/ML
12.5 INJECTION INTRAMUSCULAR; INTRAVENOUS
Status: DISCONTINUED | OUTPATIENT
Start: 2018-06-19 | End: 2018-06-19 | Stop reason: HOSPADM

## 2018-06-19 RX ORDER — ONDANSETRON 2 MG/ML
INJECTION INTRAMUSCULAR; INTRAVENOUS PRN
Status: DISCONTINUED | OUTPATIENT
Start: 2018-06-19 | End: 2018-06-19 | Stop reason: SDUPTHER

## 2018-06-19 RX ORDER — ONDANSETRON 2 MG/ML
4 INJECTION INTRAMUSCULAR; INTRAVENOUS
Status: DISCONTINUED | OUTPATIENT
Start: 2018-06-19 | End: 2018-06-19 | Stop reason: HOSPADM

## 2018-06-19 RX ORDER — ESOMEPRAZOLE MAGNESIUM 20 MG/1
20 FOR SUSPENSION ORAL NIGHTLY
COMMUNITY
End: 2018-10-13

## 2018-06-19 RX ORDER — FENTANYL CITRATE 50 UG/ML
25 INJECTION, SOLUTION INTRAMUSCULAR; INTRAVENOUS EVERY 5 MIN PRN
Status: DISCONTINUED | OUTPATIENT
Start: 2018-06-19 | End: 2018-06-19 | Stop reason: HOSPADM

## 2018-06-19 RX ORDER — OXYCODONE HYDROCHLORIDE AND ACETAMINOPHEN 5; 325 MG/1; MG/1
1 TABLET ORAL
Status: COMPLETED | OUTPATIENT
Start: 2018-06-19 | End: 2018-06-19

## 2018-06-19 RX ORDER — BUPIVACAINE HYDROCHLORIDE 2.5 MG/ML
15 INJECTION, SOLUTION EPIDURAL; INFILTRATION; INTRACAUDAL ONCE
Status: COMPLETED | OUTPATIENT
Start: 2018-06-19 | End: 2018-06-19

## 2018-06-19 RX ORDER — FENTANYL CITRATE 50 UG/ML
50 INJECTION, SOLUTION INTRAMUSCULAR; INTRAVENOUS EVERY 5 MIN PRN
Status: DISCONTINUED | OUTPATIENT
Start: 2018-06-19 | End: 2018-06-19 | Stop reason: HOSPADM

## 2018-06-19 RX ORDER — FENTANYL CITRATE 50 UG/ML
75 INJECTION, SOLUTION INTRAMUSCULAR; INTRAVENOUS ONCE
Status: COMPLETED | OUTPATIENT
Start: 2018-06-19 | End: 2018-06-19

## 2018-06-19 RX ORDER — MIDAZOLAM HYDROCHLORIDE 1 MG/ML
1 INJECTION INTRAMUSCULAR; INTRAVENOUS EVERY 5 MIN PRN
Status: COMPLETED | OUTPATIENT
Start: 2018-06-19 | End: 2018-06-19

## 2018-06-19 RX ORDER — MAGNESIUM HYDROXIDE 1200 MG/15ML
LIQUID ORAL CONTINUOUS PRN
Status: DISCONTINUED | OUTPATIENT
Start: 2018-06-19 | End: 2018-06-19 | Stop reason: HOSPADM

## 2018-06-19 RX ORDER — ACETAMINOPHEN 500 MG
1000 TABLET ORAL EVERY 4 HOURS PRN
COMMUNITY
End: 2018-11-05

## 2018-06-19 RX ORDER — PROPOFOL 10 MG/ML
INJECTION, EMULSION INTRAVENOUS PRN
Status: DISCONTINUED | OUTPATIENT
Start: 2018-06-19 | End: 2018-06-19 | Stop reason: SDUPTHER

## 2018-06-19 RX ORDER — SODIUM CHLORIDE, SODIUM LACTATE, POTASSIUM CHLORIDE, CALCIUM CHLORIDE 600; 310; 30; 20 MG/100ML; MG/100ML; MG/100ML; MG/100ML
1000 INJECTION, SOLUTION INTRAVENOUS CONTINUOUS
Status: DISCONTINUED | OUTPATIENT
Start: 2018-06-19 | End: 2018-06-19 | Stop reason: HOSPADM

## 2018-06-19 RX ORDER — LABETALOL HYDROCHLORIDE 5 MG/ML
10 INJECTION, SOLUTION INTRAVENOUS ONCE
Status: COMPLETED | OUTPATIENT
Start: 2018-06-19 | End: 2018-06-19

## 2018-06-19 RX ORDER — LIDOCAINE HYDROCHLORIDE 10 MG/ML
INJECTION, SOLUTION EPIDURAL; INFILTRATION; INTRACAUDAL; PERINEURAL PRN
Status: DISCONTINUED | OUTPATIENT
Start: 2018-06-19 | End: 2018-06-19 | Stop reason: SDUPTHER

## 2018-06-19 RX ORDER — BUPIVACAINE HYDROCHLORIDE 5 MG/ML
20 INJECTION, SOLUTION EPIDURAL; INTRACAUDAL ONCE
Status: COMPLETED | OUTPATIENT
Start: 2018-06-19 | End: 2018-06-19

## 2018-06-19 RX ORDER — CLINDAMYCIN PHOSPHATE 900 MG/50ML
900 INJECTION INTRAVENOUS ONCE
Status: COMPLETED | OUTPATIENT
Start: 2018-06-19 | End: 2018-06-19

## 2018-06-19 RX ADMIN — LABETALOL HYDROCHLORIDE 10 MG: 5 INJECTION, SOLUTION INTRAVENOUS at 12:54

## 2018-06-19 RX ADMIN — Medication 20 ML: at 12:18

## 2018-06-19 RX ADMIN — NEOSTIGMINE METHYLSULFATE 5 MG: 1 INJECTION, SOLUTION INTRAMUSCULAR; INTRAVENOUS; SUBCUTANEOUS at 09:41

## 2018-06-19 RX ADMIN — BUPIVACAINE HYDROCHLORIDE 37.5 MG: 2.5 INJECTION, SOLUTION EPIDURAL; INFILTRATION; INTRACAUDAL; PERINEURAL at 08:07

## 2018-06-19 RX ADMIN — MIDAZOLAM HYDROCHLORIDE 2 MG: 1 INJECTION, SOLUTION INTRAMUSCULAR; INTRAVENOUS at 08:14

## 2018-06-19 RX ADMIN — OXYCODONE HYDROCHLORIDE AND ACETAMINOPHEN 1 TABLET: 5; 325 TABLET ORAL at 11:07

## 2018-06-19 RX ADMIN — DEXAMETHASONE SODIUM PHOSPHATE 4 MG: 10 INJECTION INTRAMUSCULAR; INTRAVENOUS at 08:31

## 2018-06-19 RX ADMIN — Medication 1 MG: at 09:41

## 2018-06-19 RX ADMIN — MIDAZOLAM HYDROCHLORIDE 1 MG: 1 INJECTION, SOLUTION INTRAMUSCULAR; INTRAVENOUS at 07:57

## 2018-06-19 RX ADMIN — SODIUM CHLORIDE, POTASSIUM CHLORIDE, SODIUM LACTATE AND CALCIUM CHLORIDE 1000 ML: 600; 310; 30; 20 INJECTION, SOLUTION INTRAVENOUS at 07:14

## 2018-06-19 RX ADMIN — INSULIN LISPRO 5 UNITS: 100 INJECTION, SOLUTION INTRAVENOUS; SUBCUTANEOUS at 07:43

## 2018-06-19 RX ADMIN — FENTANYL CITRATE 50 MCG: 50 INJECTION INTRAMUSCULAR; INTRAVENOUS at 08:32

## 2018-06-19 RX ADMIN — FENTANYL CITRATE 75 MCG: 50 INJECTION INTRAMUSCULAR; INTRAVENOUS at 07:57

## 2018-06-19 RX ADMIN — KETOROLAC TROMETHAMINE 30 MG: 30 INJECTION, SOLUTION INTRAMUSCULAR at 11:37

## 2018-06-19 RX ADMIN — MIDAZOLAM HYDROCHLORIDE 1 MG: 1 INJECTION, SOLUTION INTRAMUSCULAR; INTRAVENOUS at 07:44

## 2018-06-19 RX ADMIN — FENTANYL CITRATE 25 MCG: 50 INJECTION INTRAMUSCULAR; INTRAVENOUS at 07:46

## 2018-06-19 RX ADMIN — ROCURONIUM BROMIDE 40 MG: 10 INJECTION INTRAVENOUS at 08:18

## 2018-06-19 RX ADMIN — ONDANSETRON 4 MG: 2 INJECTION, SOLUTION INTRAMUSCULAR; INTRAVENOUS at 09:41

## 2018-06-19 RX ADMIN — ROCURONIUM BROMIDE 10 MG: 10 INJECTION INTRAVENOUS at 08:27

## 2018-06-19 RX ADMIN — CLINDAMYCIN PHOSPHATE 900 MG: 18 INJECTION, SOLUTION INTRAVENOUS at 08:37

## 2018-06-19 RX ADMIN — PROPOFOL 200 MG: 10 INJECTION, EMULSION INTRAVENOUS at 08:17

## 2018-06-19 RX ADMIN — FENTANYL CITRATE 50 MCG: 50 INJECTION INTRAMUSCULAR; INTRAVENOUS at 08:17

## 2018-06-19 RX ADMIN — LIDOCAINE HYDROCHLORIDE 50 MG: 10 INJECTION, SOLUTION EPIDURAL; INFILTRATION; INTRACAUDAL; PERINEURAL at 08:17

## 2018-06-19 ASSESSMENT — PAIN SCALES - GENERAL
PAINLEVEL_OUTOF10: 6
PAINLEVEL_OUTOF10: 0
PAINLEVEL_OUTOF10: 0
PAINLEVEL_OUTOF10: 6
PAINLEVEL_OUTOF10: 5
PAINLEVEL_OUTOF10: 6
PAINLEVEL_OUTOF10: 3
PAINLEVEL_OUTOF10: 5
PAINLEVEL_OUTOF10: 4
PAINLEVEL_OUTOF10: 1
PAINLEVEL_OUTOF10: 6
PAINLEVEL_OUTOF10: 0
PAINLEVEL_OUTOF10: 7
PAINLEVEL_OUTOF10: 7
PAINLEVEL_OUTOF10: 8
PAINLEVEL_OUTOF10: 7

## 2018-06-19 ASSESSMENT — PULMONARY FUNCTION TESTS
PIF_VALUE: 21
PIF_VALUE: 63
PIF_VALUE: 21
PIF_VALUE: 20
PIF_VALUE: 21
PIF_VALUE: 20
PIF_VALUE: 20
PIF_VALUE: 42
PIF_VALUE: 1
PIF_VALUE: 21
PIF_VALUE: 19
PIF_VALUE: 21
PIF_VALUE: 21
PIF_VALUE: 20
PIF_VALUE: 19
PIF_VALUE: 20
PIF_VALUE: 2
PIF_VALUE: 20
PIF_VALUE: 2
PIF_VALUE: 21
PIF_VALUE: 24
PIF_VALUE: 23
PIF_VALUE: 20
PIF_VALUE: 1
PIF_VALUE: 18
PIF_VALUE: 30
PIF_VALUE: 20
PIF_VALUE: 21
PIF_VALUE: 20
PIF_VALUE: 21
PIF_VALUE: 20
PIF_VALUE: 3
PIF_VALUE: 24
PIF_VALUE: 20
PIF_VALUE: 20
PIF_VALUE: 23
PIF_VALUE: 21
PIF_VALUE: 24
PIF_VALUE: 21
PIF_VALUE: 20
PIF_VALUE: 20
PIF_VALUE: 1
PIF_VALUE: 20
PIF_VALUE: 2
PIF_VALUE: 20
PIF_VALUE: 21
PIF_VALUE: 25
PIF_VALUE: 20
PIF_VALUE: 21
PIF_VALUE: 20
PIF_VALUE: 21
PIF_VALUE: 20
PIF_VALUE: 19
PIF_VALUE: 4
PIF_VALUE: 20
PIF_VALUE: 1
PIF_VALUE: 36
PIF_VALUE: 20
PIF_VALUE: 21
PIF_VALUE: 21
PIF_VALUE: 20
PIF_VALUE: 21
PIF_VALUE: 20
PIF_VALUE: 21
PIF_VALUE: 20
PIF_VALUE: 2
PIF_VALUE: 20
PIF_VALUE: 20
PIF_VALUE: 21
PIF_VALUE: 20
PIF_VALUE: 21
PIF_VALUE: 21
PIF_VALUE: 20
PIF_VALUE: 21
PIF_VALUE: 20

## 2018-06-19 ASSESSMENT — LIFESTYLE VARIABLES: SMOKING_STATUS: 0

## 2018-06-19 ASSESSMENT — PAIN DESCRIPTION - LOCATION: LOCATION: SHOULDER

## 2018-06-19 ASSESSMENT — PAIN DESCRIPTION - FREQUENCY: FREQUENCY: CONTINUOUS

## 2018-06-19 ASSESSMENT — PAIN - FUNCTIONAL ASSESSMENT: PAIN_FUNCTIONAL_ASSESSMENT: 0-10

## 2018-06-19 ASSESSMENT — PAIN DESCRIPTION - ORIENTATION: ORIENTATION: LEFT

## 2018-06-19 ASSESSMENT — ENCOUNTER SYMPTOMS
STRIDOR: 0
SHORTNESS OF BREATH: 0

## 2018-06-19 ASSESSMENT — PAIN DESCRIPTION - PAIN TYPE: TYPE: SURGICAL PAIN

## 2018-06-19 ASSESSMENT — PAIN DESCRIPTION - DESCRIPTORS
DESCRIPTORS: ACHING
DESCRIPTORS: ACHING;CONSTANT

## 2018-06-20 DIAGNOSIS — Z12.11 COLON CANCER SCREENING: ICD-10-CM

## 2018-06-22 ENCOUNTER — TELEPHONE (OUTPATIENT)
Dept: ORTHOPEDIC SURGERY | Age: 53
End: 2018-06-22

## 2018-06-23 ENCOUNTER — HOSPITAL ENCOUNTER (EMERGENCY)
Age: 53
Discharge: HOME | End: 2018-06-23
Attending: EMERGENCY MEDICINE

## 2018-06-23 VITALS
RESPIRATION RATE: 16 BRPM | TEMPERATURE: 98.1 F | BODY MASS INDEX: 37.77 KG/M2 | HEART RATE: 100 BPM | WEIGHT: 234 LBS | DIASTOLIC BLOOD PRESSURE: 103 MMHG | OXYGEN SATURATION: 95 % | SYSTOLIC BLOOD PRESSURE: 177 MMHG

## 2018-06-23 DIAGNOSIS — G89.18 POST-OPERATIVE PAIN: ICD-10-CM

## 2018-06-23 DIAGNOSIS — L29.8 ITCHING DUE TO DRUG: Primary | ICD-10-CM

## 2018-06-23 DIAGNOSIS — T50.905A ITCHING DUE TO DRUG: Primary | ICD-10-CM

## 2018-06-23 PROCEDURE — 6370000000 HC RX 637 (ALT 250 FOR IP): Performed by: STUDENT IN AN ORGANIZED HEALTH CARE EDUCATION/TRAINING PROGRAM

## 2018-06-23 PROCEDURE — 99283 EMERGENCY DEPT VISIT LOW MDM: CPT

## 2018-06-23 RX ORDER — HYDROCODONE BITARTRATE AND ACETAMINOPHEN 5; 325 MG/1; MG/1
1 TABLET ORAL ONCE
Status: COMPLETED | OUTPATIENT
Start: 2018-06-23 | End: 2018-06-23

## 2018-06-23 RX ORDER — HYDROXYZINE HYDROCHLORIDE 10 MG/1
10 TABLET, FILM COATED ORAL ONCE
Status: COMPLETED | OUTPATIENT
Start: 2018-06-23 | End: 2018-06-23

## 2018-06-23 RX ORDER — HYDROXYZINE HYDROCHLORIDE 25 MG/1
25 TABLET, FILM COATED ORAL 3 TIMES DAILY PRN
Qty: 9 TABLET | Refills: 0 | Status: SHIPPED | OUTPATIENT
Start: 2018-06-23 | End: 2019-07-30

## 2018-06-23 RX ORDER — HYDROCODONE BITARTRATE AND ACETAMINOPHEN 5; 325 MG/1; MG/1
1 TABLET ORAL
Qty: 15 TABLET | Refills: 0 | Status: SHIPPED | OUTPATIENT
Start: 2018-06-23 | End: 2018-06-27 | Stop reason: SDUPTHER

## 2018-06-23 RX ADMIN — HYDROXYZINE HYDROCHLORIDE 10 MG: 10 TABLET ORAL at 15:15

## 2018-06-23 RX ADMIN — HYDROCODONE BITARTRATE AND ACETAMINOPHEN 1 TABLET: 5; 325 TABLET ORAL at 15:15

## 2018-06-23 ASSESSMENT — PAIN SCALES - GENERAL
PAINLEVEL_OUTOF10: 5
PAINLEVEL_OUTOF10: 8
PAINLEVEL_OUTOF10: 9

## 2018-06-23 ASSESSMENT — PAIN DESCRIPTION - ORIENTATION: ORIENTATION: LEFT

## 2018-06-23 ASSESSMENT — PAIN DESCRIPTION - DESCRIPTORS: DESCRIPTORS: SHARP;ACHING

## 2018-06-23 ASSESSMENT — PAIN DESCRIPTION - ONSET: ONSET: GRADUAL

## 2018-06-23 ASSESSMENT — PAIN DESCRIPTION - PAIN TYPE: TYPE: ACUTE PAIN

## 2018-06-23 ASSESSMENT — PAIN DESCRIPTION - PROGRESSION: CLINICAL_PROGRESSION: GRADUALLY WORSENING

## 2018-06-23 ASSESSMENT — PAIN DESCRIPTION - LOCATION: LOCATION: SHOULDER

## 2018-06-23 ASSESSMENT — PAIN DESCRIPTION - FREQUENCY: FREQUENCY: CONTINUOUS

## 2018-06-24 ASSESSMENT — ENCOUNTER SYMPTOMS
TROUBLE SWALLOWING: 0
VOMITING: 0
DIARRHEA: 0
STRIDOR: 0
NAUSEA: 0
BACK PAIN: 0
WHEEZING: 0
VOICE CHANGE: 0
CONSTIPATION: 0
COUGH: 0
ABDOMINAL PAIN: 0
SHORTNESS OF BREATH: 0

## 2018-06-27 ENCOUNTER — OFFICE VISIT (OUTPATIENT)
Dept: ORTHOPEDIC SURGERY | Age: 53
End: 2018-06-27

## 2018-06-27 ENCOUNTER — OFFICE VISIT (OUTPATIENT)
Dept: INTERNAL MEDICINE | Age: 53
End: 2018-06-27

## 2018-06-27 VITALS
BODY MASS INDEX: 38.74 KG/M2 | HEART RATE: 106 BPM | DIASTOLIC BLOOD PRESSURE: 100 MMHG | WEIGHT: 240 LBS | SYSTOLIC BLOOD PRESSURE: 124 MMHG

## 2018-06-27 VITALS
HEIGHT: 66 IN | WEIGHT: 242 LBS | HEART RATE: 92 BPM | SYSTOLIC BLOOD PRESSURE: 143 MMHG | DIASTOLIC BLOOD PRESSURE: 97 MMHG | BODY MASS INDEX: 38.89 KG/M2

## 2018-06-27 DIAGNOSIS — G89.18 POST-OPERATIVE PAIN: ICD-10-CM

## 2018-06-27 DIAGNOSIS — E11.8 TYPE 2 DIABETES MELLITUS WITH COMPLICATION, UNSPECIFIED LONG TERM INSULIN USE STATUS: Primary | ICD-10-CM

## 2018-06-27 DIAGNOSIS — M75.122 COMPLETE TEAR OF LEFT ROTATOR CUFF: Primary | ICD-10-CM

## 2018-06-27 DIAGNOSIS — G89.29 CHRONIC LEFT SHOULDER PAIN: ICD-10-CM

## 2018-06-27 DIAGNOSIS — M25.512 CHRONIC LEFT SHOULDER PAIN: ICD-10-CM

## 2018-06-27 DIAGNOSIS — I10 ESSENTIAL HYPERTENSION: ICD-10-CM

## 2018-06-27 LAB — GLUCOSE BLD-MCNC: 382 MG/DL

## 2018-06-27 PROCEDURE — 82962 GLUCOSE BLOOD TEST: CPT | Performed by: INTERNAL MEDICINE

## 2018-06-27 PROCEDURE — 99024 POSTOP FOLLOW-UP VISIT: CPT | Performed by: ORTHOPAEDIC SURGERY

## 2018-06-27 PROCEDURE — 99213 OFFICE O/P EST LOW 20 MIN: CPT | Performed by: INTERNAL MEDICINE

## 2018-06-27 PROCEDURE — 99214 OFFICE O/P EST MOD 30 MIN: CPT | Performed by: INTERNAL MEDICINE

## 2018-06-27 RX ORDER — LISINOPRIL 5 MG/1
5 TABLET ORAL DAILY
Qty: 30 TABLET | Refills: 2 | Status: SHIPPED | OUTPATIENT
Start: 2018-06-27 | End: 2018-10-03 | Stop reason: SDUPTHER

## 2018-06-27 RX ORDER — HYDROCODONE BITARTRATE AND ACETAMINOPHEN 5; 325 MG/1; MG/1
1 TABLET ORAL 2 TIMES DAILY PRN
Qty: 30 TABLET | Refills: 0 | Status: SHIPPED | OUTPATIENT
Start: 2018-06-27 | End: 2018-07-12

## 2018-06-27 ASSESSMENT — ENCOUNTER SYMPTOMS
BLURRED VISION: 0
ABDOMINAL PAIN: 0
NAUSEA: 0
DOUBLE VISION: 0
COUGH: 0
HEARTBURN: 0
HEMOPTYSIS: 0

## 2018-06-29 ENCOUNTER — HOSPITAL ENCOUNTER (OUTPATIENT)
Age: 53
Setting detail: SPECIMEN
Discharge: HOME | End: 2018-06-29

## 2018-06-29 ENCOUNTER — HOSPITAL ENCOUNTER (OUTPATIENT)
Dept: PHYSICAL THERAPY | Age: 53
Setting detail: THERAPIES SERIES
Discharge: HOME | End: 2018-06-29

## 2018-06-29 DIAGNOSIS — Z11.4 SCREENING FOR HIV WITHOUT PRESENCE OF RISK FACTORS: ICD-10-CM

## 2018-06-29 DIAGNOSIS — Z13.220 SCREENING FOR HYPERLIPIDEMIA: ICD-10-CM

## 2018-06-29 DIAGNOSIS — E11.8 TYPE 2 DIABETES MELLITUS WITH COMPLICATION, UNSPECIFIED LONG TERM INSULIN USE STATUS: ICD-10-CM

## 2018-06-29 LAB
CHOLESTEROL/HDL RATIO: 4.2
CHOLESTEROL: 200 MG/DL
CREATININE URINE: 50.6 MG/DL (ref 28–217)
HDLC SERPL-MCNC: 48 MG/DL
HIV AG/AB: NONREACTIVE
LDL CHOLESTEROL: 113 MG/DL (ref 0–130)
MICROALBUMIN/CREAT 24H UR: <12 MG/L
MICROALBUMIN/CREAT UR-RTO: NORMAL MCG/MG CREAT
TRIGL SERPL-MCNC: 194 MG/DL
VLDLC SERPL CALC-MCNC: ABNORMAL MG/DL (ref 1–30)

## 2018-06-29 PROCEDURE — 82570 ASSAY OF URINE CREATININE: CPT

## 2018-06-29 PROCEDURE — 80061 LIPID PANEL: CPT

## 2018-06-29 PROCEDURE — 36415 COLL VENOUS BLD VENIPUNCTURE: CPT

## 2018-06-29 PROCEDURE — 82043 UR ALBUMIN QUANTITATIVE: CPT

## 2018-06-29 PROCEDURE — 97110 THERAPEUTIC EXERCISES: CPT

## 2018-06-29 PROCEDURE — 87389 HIV-1 AG W/HIV-1&-2 AB AG IA: CPT

## 2018-06-29 PROCEDURE — 97161 PT EVAL LOW COMPLEX 20 MIN: CPT

## 2018-07-02 ENCOUNTER — TELEPHONE (OUTPATIENT)
Dept: INTERNAL MEDICINE | Age: 53
End: 2018-07-02

## 2018-07-06 ENCOUNTER — TELEPHONE (OUTPATIENT)
Dept: INTERNAL MEDICINE | Age: 53
End: 2018-07-06

## 2018-07-06 NOTE — TELEPHONE ENCOUNTER
PC to patient to let her know she has been approved for Humalog through [facility] and the samples are here at the office for . Patient out of town right now, she will call office when she returns.

## 2018-07-24 ENCOUNTER — TELEPHONE (OUTPATIENT)
Dept: ADMINISTRATIVE | Age: 53
End: 2018-07-24

## 2018-08-07 ENCOUNTER — TELEPHONE (OUTPATIENT)
Dept: INTERNAL MEDICINE | Age: 53
End: 2018-08-07

## 2018-08-07 NOTE — TELEPHONE ENCOUNTER
PC to patient regarding blood sugars. Patient states she is still in [place] and plans to return on Saturday, 08/11/18. She states she hasn't been taking her medications as she doesn't have any right now. She states since she can't check her blood sugar, she has been walking and has lost some weight in which she hopes will help. Asked patient to call as soon as she is home so that we can get her restarted on medications. She still has [facility] medications here for .

## 2018-08-13 ENCOUNTER — HOSPITAL ENCOUNTER (OUTPATIENT)
Dept: PHYSICAL THERAPY | Age: 53
Setting detail: THERAPIES SERIES
Discharge: HOME | End: 2018-08-13

## 2018-08-13 PROCEDURE — 97110 THERAPEUTIC EXERCISES: CPT

## 2018-08-13 NOTE — PROGRESS NOTES
[x] [facility]       Outpatient Physical                Therapy         [address]       Phone: [phone]       Fax: [phone]       Physical Therapy Progress Note    Date: 2018      Patient: Naveed Kate  : 1965  MRN: [id number]: Errol Baig MD               Insurance: self pay  Medical Diagnosis: Complete tear of L rotator cuff M75.122                      Rehab Codes: M25.512, M25.612, M62.512  Onset Date: 17               Next 's appt: to schedule soon                            Total visits attended:2  Cancels/No shows:0/0  Date of initial visit: 2018                Date of final visit: 2018    Subjective:  Pain:  [x] Yes  [] No  Location: L Shoulder  Pain Rating: (0-10 scale) 6/10  Pain altered Tx:  [x] No  [] Yes  Action:  Comments: Pt returns to PT after being gone for 1 month, Pt has been doing ex on own, having family member stretch her arm. Pt reports she is no longer using sling, and has been using her arm. Objective:  Test Measurements:     ROM                 LEFT        SHLD FLEX 121°   SHLD ABD 88°   SHLD ER (@ end range abd) 14°   SHLD IR  (@ end range abd) 58°         Elbow Ext  5°  8/15      Function: Pt has been using arm more at home, doing housework. Pt now able to sleep in her bed. Assessment:  STG:(to be met in 12 treatments)  1. ? Pain: Pt to report pn on average to be less than 6/10 at worst and 4/10 on average. 2. ? ROM: Pt to increase PROM of L shoulder: Abd- 100°, Flex 85°, ER 45° Elbow: Ext 10°  3. ? Strength:Pt to increase L: Elbow- flex/ext 4-/5 without pain,  Wrist- flex 4/5 without pain  4. ? Function: Pt to decrease score on DASH to 70% disability or lower. 5. Independent with Home Exercise Programs  LTG:(to be met in 24 treatments)  1. Pt to report pn on average to be less than 4/10 at worst and 2/10 on average.   2. Pt to increase AROM of L shoulder Abd- 140°, Flex 130°, ER 65° Elbow Ext 5°  3. Pt to increase strength in B Shoulder 4+/5 Elbow 5/5 Wrist 5/5  4. Pt to decrease score on Dash to 40% disability or lower          Treatment to Date:  [x] Therapeutic Exercise    [x] Modalities:  [] Therapeutic Activity    [] Ultrasound  [] Electrical Stimulation  [] Gait Training     [] Massage       [] Lumbar/Cervical Traction  [] Neuromuscular Re-education [x] Cold/hotpack [] Iontophoresis: 4 mg/mL  [x] Instruction in Home Exercise Program                     Dexamethasone Sodium  [x] Manual Therapy             Phosphate 40-80 mAmin  [] Aquatic Therapy                   [] Vasocompression/   [] Other:  [] Dry Needling                                           Game Ready        Patient Status:     [x] Continue per initial plan of care. [] Additional visits necessary. [x] Other: Original order (dated 06/27/2018) was for 4 weeks, for PROM only. It has now been 6 weeks since order was written, but Pt has only completed 2 PT treatments. Please advise when Pt able to begin AAROM, AROM, and strengthening. Will cont w/PROM only until receive new order. Electronically signed by Gloria Brown PT on 8/13/2018 at 3:38 PM        If you have any questions or concerns, please don't hesitate to call. Thank you for your referral.    Physician Signature:________________________________Date:__________________  By signing above or cosigning this note, I have reviewed this plan of care and certify a need for medically necessary rehabilitation services.      *PLEASE SIGN ABOVE AND FAX BACK ALL PAGES*

## 2018-08-15 ENCOUNTER — HOSPITAL ENCOUNTER (OUTPATIENT)
Dept: PHYSICAL THERAPY | Age: 53
Setting detail: THERAPIES SERIES
Discharge: HOME | End: 2018-08-15

## 2018-08-15 PROCEDURE — 97110 THERAPEUTIC EXERCISES: CPT

## 2018-08-15 NOTE — FLOWSHEET NOTE
with increased pain with ex this date. Pt cont to be limited with all movements due to pain. Cont to await OK to begin AROM and strengthening from surgeon. ROM            A/P    LEFT    8/13   SHLD FLEX 121°   SHLD ABD 88°   SHLD ER (@ end range abd) 14°   SHLD IR  (@ end range abd) 58°       Elbow Ext  5°  8/15       Specific Instructions for next treatment:    Treatment Charges: Mins Units   [x]  Modalities HP 15 --   [x]  Ther Exercise 40 3   []  Manual Therapy     []  Ther Activities     []  Aquatics     []  Vasocompression     []  Other     Total Treatment time 40 min        Assessment: [x] Progressing toward goals. [] No change. [] Other:    STG: (to be met in 12 treatments)  1. ? Pain: Pt to report pn on average to be less than 6/10 at worst and 4/10 on average. 2. ? ROM: Pt to increase PROM of L shoulder: Abd- 100°, Flex 85°, ER 45° Elbow: Ext 10°  3. ? Strength:Pt to increase L: Elbow- flex/ext 4-/5 without pain,  Wrist- flex 4/5 without pain  4. ? Function: Pt to decrease score on DASH to 70% disability or lower. 5. Independent with Home Exercise Programs  LTG: (to be met in 24 treatments)  1. Pt to report pn on average to be less than 4/10 at worst and 2/10 on average. 2. Pt to increase AROM of L shoulder Abd- 140°, Flex 130°, ER 65° Elbow Ext 5°  3. Pt to increase strength in B Shoulder 4+/5 Elbow 5/5 Wrist 5/5  4. Pt to decrease score on Dash to 40% disability or lower            Pt. Education:  [] Yes  [] No  [] Reviewed Prior HEP/Ed  Method of Education: [] Verbal  [] Demo  [] Written  Comprehension of Education:  [] Verbalizes understanding. [] Demonstrates understanding. [] Needs review. [] Demonstrates/verbalizes HEP/Ed previously given. Plan: [x] Continue per plan of care.    [] Other:      Time In:1004           Time Out:11:07     Electronically signed by:  Merrick Peters, PT

## 2018-08-17 ENCOUNTER — TELEPHONE (OUTPATIENT)
Dept: INTERNAL MEDICINE | Age: 53
End: 2018-08-17

## 2018-08-17 ENCOUNTER — HOSPITAL ENCOUNTER (OUTPATIENT)
Dept: PHYSICAL THERAPY | Age: 53
Setting detail: THERAPIES SERIES
Discharge: HOME | End: 2018-08-17

## 2018-08-17 PROCEDURE — 97110 THERAPEUTIC EXERCISES: CPT

## 2018-08-17 NOTE — TELEPHONE ENCOUNTER
Patient here to  3 boxes of [facility] Humalog. Also given 20 contour test strips to monitor blood sugar. Patient will call next week and update how blood sugars are runnings.

## 2018-08-20 ENCOUNTER — HOSPITAL ENCOUNTER (OUTPATIENT)
Dept: PHYSICAL THERAPY | Age: 53
Setting detail: THERAPIES SERIES
Discharge: HOME | End: 2018-08-20

## 2018-08-20 PROCEDURE — 97110 THERAPEUTIC EXERCISES: CPT

## 2018-08-20 NOTE — FLOWSHEET NOTE
[x] [facility]       Outpatient Physical        Therapy       [address]       Phone: [phone]       Fax: [phone]       Physical Therapy Daily Treatment Note    Date:  2018  Patient Name:  Misael Carmona    :  1965  MRN: [id number]  Physician: Hina Robles MD               Insurance: self pay  Medical Diagnosis: Complete tear of L rotator cuff M75.122                      Rehab Codes: B15.318, M25.612, M62.512  Onset Date: 17               Next 's appt: 18       Visit# / total visits:   Cancels/No Shows: 0/0    Subjective:    Pain:  [x] Yes  [] No Location: L Shoulder Pain Rating: (0-10 scale) 6/10   Pain altered Tx:  [] No  [] Yes  Action:  Comments: Patient reports no change. Pt questioning why with PROM can bring arm overhead, but can't raise it overhead AROM    Objective:  Modalities: HP (cerv) seated in chair at end of Rx for 15 minutes  Precautions:  Exercises:  Exercise Reps/ Time Weight/ Level  Comments   SciFit 8min ML1.0 x Educated Pt to have legs do work so PROM for L UE, progressed time 8          Codman's 20x  x M/L, A/P, CW, CCW   Wand abd 15x  x instructed to keep PROM          Seated        Bicep curls 20x 2lb x    Scap Retractions  10x 5sec x    Table slides 10x 3sec x Flexion, abd, added 8/20   Wax on/wax off 10x ea  x Added 8/20          Supine        Wand Chest press 15x  x Cane    Horiz abd 15x  x cane   Wand ER stretch 15x 3sec x Added 8/20   Triceps press  10x 1lb x 8/20 Pt w/difficulty controlling even w/R hand stabilizing, may need to remove weight next Rx   PROM L shoulder, elbow 15 min  x    Other: Cont ex per above log, progressed weight triceps press and time SciFit. Initiated wand ER stretch, table slides and wax on/wax off on table. Cont PROM.   Cont HP at end of Rx.         ROM            A/P    LEFT       SHLD FLEX 121°   SHLD ABD 88°   SHLD ER (@ end range abd) 14°   SHLD IR  (@ end range abd) 58° Elbow Ext  5°  8/15       Specific Instructions for next treatment:    Treatment Charges: Mins Units   [x]  Modalities HP 15 --   [x]  Ther Exercise 36 2   []  Manual Therapy     []  Ther Activities     []  Aquatics     []  Vasocompression     []  Other     Total Treatment time 36 min        Assessment: [x] Progressing toward goals. [] No change. [] Other:    STG: (to be met in 12 treatments)  1. ? Pain: Pt to report pn on average to be less than 6/10 at worst and 4/10 on average. 2. ? ROM: Pt to increase PROM of L shoulder: Abd- 100°, Flex 85°, ER 45° Elbow: Ext 10°  3. ? Strength:Pt to increase L: Elbow- flex/ext 4-/5 without pain,  Wrist- flex 4/5 without pain  4. ? Function: Pt to decrease score on DASH to 70% disability or lower. 5. Independent with Home Exercise Programs  LTG: (to be met in 24 treatments)  1. Pt to report pn on average to be less than 4/10 at worst and 2/10 on average. 2. Pt to increase AROM of L shoulder Abd- 140°, Flex 130°, ER 65° Elbow Ext 5°  3. Pt to increase strength in B Shoulder 4+/5 Elbow 5/5 Wrist 5/5  4. Pt to decrease score on Dash to 40% disability or lower            Pt. Education:  [x] Yes  [] No  [x] Reviewed Prior HEP/Ed  Method of Education: [x] Verbal  [x] Demo  [] Written  Comprehension of Education:  [x] Verbalizes understanding. [x] Demonstrates understanding. [x] Needs review. [] Demonstrates/verbalizes HEP/Ed previously given. Plan: [x] Continue per plan of care.    [] Other:      Time In: 10:01           Time Out: 11:10    Electronically signed by:  Ngoc Knox, PT

## 2018-08-22 ENCOUNTER — HOSPITAL ENCOUNTER (OUTPATIENT)
Dept: PHYSICAL THERAPY | Age: 53
Setting detail: THERAPIES SERIES
Discharge: HOME | End: 2018-08-22

## 2018-08-22 ENCOUNTER — OFFICE VISIT (OUTPATIENT)
Dept: ORTHOPEDIC SURGERY | Age: 53
End: 2018-08-22

## 2018-08-22 DIAGNOSIS — Z98.890 S/P LEFT ROTATOR CUFF REPAIR: Primary | ICD-10-CM

## 2018-08-22 PROCEDURE — 99024 POSTOP FOLLOW-UP VISIT: CPT | Performed by: STUDENT IN AN ORGANIZED HEALTH CARE EDUCATION/TRAINING PROGRAM

## 2018-08-22 PROCEDURE — 97110 THERAPEUTIC EXERCISES: CPT

## 2018-08-22 RX ORDER — IBUPROFEN 800 MG/1
800 TABLET ORAL EVERY 6 HOURS PRN
COMMUNITY
End: 2018-11-05

## 2018-08-22 NOTE — FLOWSHEET NOTE
[x] [facility]       Outpatient Physical        Therapy       [address]       Phone: [phone]       Fax: [phone]       Physical Therapy Daily Treatment Note    Date:  2018  Patient Name:  Misael Carmona    :  1965  MRN: [id number]  Physician: Hina Robles MD               Insurance: self pay  Medical Diagnosis: Complete tear of L rotator cuff M75.122                      Rehab Codes: Y32.954, M25.612, M62.512  Onset Date: 17               Next 's appt: 18       Visit# / total visits:   Cancels/No Shows: 0/0    Subjective:    Pain:  [x] Yes  [] No Location: L Shoulder Pain Rating: (0-10 scale) 6/10   Pain altered Tx:  [] No  [] Yes  Action:  Comments: Patient reports hasn't done anything this week, but is still sore due to rain. Pt with limited time this date due to Dr vargas. Objective:  Modalities: HP (cerv) seated in chair at end of Rx for 15 minutes  Precautions:  Exercises:  Exercise Reps/ Time Weight/ Level  Comments   SciFit 8min ML1.0 x Educated Pt to have legs do work so PROM for L UE,  6 min due to pt time constraint          Codman's 20x  x M/L, A/P, CW, CCW   Wand abd 15x  x instructed to keep PROM, with gentle stretch at end           Seated        Bicep curls 20x 2lb x    Scap Retractions  10x 5sec x    Table slides 10x 3sec x Flexion, abd   Wax on/wax off 10x ea  x           Supine        Wand Chest press 15x  x Cane    Horiz abd 15x  x cane   Wand ER stretch 15x 3sec x    Triceps press  15x  x  held weight    PROM L shoulder, elbow 16 min      Other: Cont ex per above log, no increase in ex due to Pt time constraint. Cont PROM. Cont HP at end of Rx, limited time due to Pt Dr vargas.          ROM                LEFT        SHLD FLEX 121° 147°   SHLD ABD 88° 112°   SHLD ER (@ end range abd) 14° 53°   SHLD IR  (@ end range abd) 58° 62°        Elbow Ext  5°  8/15 5°       Specific Instructions for next treatment: await [id number] Yulia Harris from Dr to progress to AROM/strengthening ex    Treatment Charges: Mins Units   [x]  Modalities HP 10 --   [x]  Ther Exercise 40 3   []  Manual Therapy     []  Ther Activities     []  Aquatics     []  Vasocompression     []  Other     Total Treatment time 40 min        Assessment: [x] Progressing toward goals. [] No change. [] Other:    STG: (to be met in 12 treatments)  1. ? Pain: Pt to report pn on average to be less than 6/10 at worst and 4/10 on average. 2. ? ROM: Pt to increase PROM of L shoulder: Abd- 100°, Flex 85°, ER 45° Elbow: Ext 10°  3. ? Strength:Pt to increase L: Elbow- flex/ext 4-/5 without pain,  Wrist- flex 4/5 without pain  4. ? Function: Pt to decrease score on DASH to 70% disability or lower. 5. Independent with Home Exercise Programs  LTG: (to be met in 24 treatments)  1. Pt to report pn on average to be less than 4/10 at worst and 2/10 on average. 2. Pt to increase AROM of L shoulder Abd- 140°, Flex 130°, ER 65° Elbow Ext 5°  3. Pt to increase strength in B Shoulder 4+/5 Elbow 5/5 Wrist 5/5  4. Pt to decrease score on Dash to 40% disability or lower            Pt. Education:  [x] Yes  [] No  [x] Reviewed Prior HEP/Ed  Method of Education: [x] Verbal  [x] Demo  [] Written  Comprehension of Education:  [x] Verbalizes understanding. [x] Demonstrates understanding. [x] Needs review. [] Demonstrates/verbalizes HEP/Ed previously given. Plan: [x] Continue per plan of care.    [] Other:      Time In: 14:02           Time Out: 1500    Electronically signed by:  Carolee Knox, PT

## 2018-08-22 NOTE — PROGRESS NOTES
(BLOOD GLUCOSE METER) KIT Dx: DM-2. Use 2-3 times daily 1 kit 0    Lancets MISC Dx: DM-2. Use 2-3 times daily 100 each 3    Glucose Blood (BLOOD GLUCOSE TEST STRIPS) STRP Dx: DM-2. Use 2-3 times daily 100 strip 3     No current facility-administered medications for this visit. Allergies:    Pcn [penicillins]; Demerol hcl [meperidine]; Tape Cynthea Scott tape]; and Morphine    Social History:   Social History     Social History    Marital status: Legally      Spouse name: N/A    Number of children: 3    Years of education: N/A     Occupational History          Social History Main Topics    Smoking status: Former Smoker     Packs/day: 2.00     Years: 34.00     Types: Cigarettes     Start date: 1983     Quit date: 9/28/2017    Smokeless tobacco: Never Used    Alcohol use 0.0 oz/week      Comment: rare    Drug use: No    Sexual activity: No     Other Topics Concern    Not on file     Social History Narrative    No narrative on file       Family History:  Family History   Problem Relation Age of Onset    Diabetes Mother     Heart Disease Mother         murmur    Heart Disease Father         MVP    Diabetes Sister     Diabetes Maternal Grandmother     High Blood Pressure Maternal Grandmother     Heart Failure Maternal Grandfather     Heart Attack Paternal Grandmother        REVIEW OF SYSTEMS:  Constitutional: Negative for fever and chills. Respiratory: Negative for cough, shortness of breath and wheezing. Cardiovascular: Negative for chest pain and palpitations. GI: Denies any nausea, vomiting, abdominal pain   Musculoskeletal: Positive for mild left shoulder pain    PHYSICAL EXAM:  There were no vitals taken for this visit. Gen: AAOx3, NAD, appears stated age    CV: no dependent edema, distal pulses 2+    Chest: unlabored respirations, equal chest rise bilaterally, no audible wheezes    Extremity: Surgical incisions are well-healed.  Mild tenderness to palpation around the rotator cuff footprint. Strength 3/5 with abduction. Actively able to forward flex and abduction to 90°. Passive range of motion: Forward flexion to 150°, abduction to 150°, external rotation at 30°. AIN/PIN/radial/median/ulnar nerve distribution motor and sensation grossly intact. Radial pulse 2+. Radiology:   No new images obtained in clinic today. ASSESSMENT:     1. S/P left rotator cuff repair         PLAN:  Patient is doing well at this time. Okay to begin active strengthening and range of motion with physical therapy. Patient to call when she is ready to return to work. Otherwise follow-up in 4 weeks for reevaluation of her range of motion. Patient will likely require restrictions for return to work to include no pushing wheelchairs or lifting with her left arm. Raphael Ford,   PGY-2 Orthopedic Surgery  4:33 PM 08/22/18      No orders of the defined types were placed in this encounter.       Orders Placed This Encounter   Procedures   [facility] - [facility]     Referral Priority:   Routine     Referral Type:   Eval and Treat     Referral Reason:   Specialty Services Required     Requested Specialty:   Physical Therapy     Number of Visits Requested:   1        Electronically signed by Raphael Ford on 8/22/2018 at 4:25 PM

## 2018-08-24 ENCOUNTER — HOSPITAL ENCOUNTER (OUTPATIENT)
Dept: PHYSICAL THERAPY | Age: 53
Setting detail: THERAPIES SERIES
Discharge: HOME | End: 2018-08-24

## 2018-08-24 PROCEDURE — 97110 THERAPEUTIC EXERCISES: CPT

## 2018-08-24 NOTE — FLOWSHEET NOTE
[x] [facility]       Outpatient Physical        Therapy       [address]       Phone: [phone]       Fax: [phone]       Physical Therapy Daily Treatment Note    Date:  2018  Patient Name:  Mario Keller    :  1965  MRN: [id number]  Physician: Sarah Santo MD               Insurance: self pay  Medical Diagnosis: Complete tear of L rotator cuff M75.122                      Rehab Codes: W80.979, M25.612, M62.512  Onset Date: 17               Next 's appt: 18       Visit# / total visits:   Cancels/No Shows: 0/0    Subjective:    Pain:  [x] Yes  [] No Location: L Shoulder Pain Rating: (0-10 scale) 6/10   Pain altered Tx:  [x] No  [] Yes  Action:  Comments: Patient saw , he is pleased with her ROM, Ok'd to begin AROM and strengthening. Objective:  Modalities: HP (cerv) seated in chair at end of Rx for 15 minutes  Precautions:  Exercises:  OK to begin strengthening and AROM L Shoulder  Exercise Reps/ Time Weight/ Level  Comments   SciFit 8min ML2.0 x Increased resistance , will change to UBE soon   Corner stretch 3x 20sec  x Added    Codman's --   M/L, A/P, CW, CCW   Wall slides 10x  x Flex, abd, added    Wand abd 15x 1lb x instructed to keep PROM, with gentle stretch at end    Wand ext  10x  x Added    Wax on/wax off *   Change to wall next Rx   Tband triceps       -Ext        -Rows                      Seated        Bicep curls 20x 2lb x    Scap Retractions  10x 5sec x    Table slides 10x 5sec x Flexion, abd   ER stretch on table 3x 20sec  x    Wax on/wax off 20x ea  x Change to wall next Rx          Supine        Wand Chest press 20x 1lb x Cane    Horiz abd 15x 1lb x cane   Wand flex 15x 1lb x Added    Wand ER stretch 15x 3sec x    Triceps press  20x  x    PROM L shoulder, elbow 10 min  x    Other: Cont ex per above log, progressed weight wand ex. Initiated corner stretch, wall slides, wand ext, and wand flex. Cont PROM. Cont HP at end of Rx, limited time HP due to Pt time constraint (due to her ride). Specific Instructions for next treatment: progress to UBE, additional AROM ex    Treatment Charges: Mins Units   [x]  Modalities HP 10 --   [x]  Ther Exercise 44 3   []  Manual Therapy     []  Ther Activities     []  Aquatics     []  Vasocompression     []  Other     Total Treatment time 44 min        Assessment: [x] Progressing toward goals. [] No change. [] Other:    STG: (to be met in 12 treatments)  1. ? Pain: Pt to report pn on average to be less than 6/10 at worst and 4/10 on average. 2. ? ROM: Pt to increase PROM of L shoulder: Abd- 100°, Flex 85°, ER 45° Elbow: Ext 10°  3. ? Strength:Pt to increase L: Elbow- flex/ext 4-/5 without pain,  Wrist- flex 4/5 without pain  4. ? Function: Pt to decrease score on DASH to 70% disability or lower. 5. Independent with Home Exercise Programs  LTG: (to be met in 24 treatments)  1. Pt to report pn on average to be less than 4/10 at worst and 2/10 on average. 2. Pt to increase AROM of L shoulder Abd- 140°, Flex 130°, ER 65° Elbow Ext 5°  3. Pt to increase strength in B Shoulder 4+/5 Elbow 5/5 Wrist 5/5  4. Pt to decrease score on Dash to 40% disability or lower            Pt. Education:  [x] Yes  [] No  [x] Reviewed Prior HEP/Ed  Method of Education: [x] Verbal  [x] Demo  [] Written  Comprehension of Education:  [x] Verbalizes understanding. [x] Demonstrates understanding. [x] Needs review. [] Demonstrates/verbalizes HEP/Ed previously given. Plan: [x] Continue per plan of care.    [] Other:      Time In: 1001           Time Out: 11:05    Electronically signed by:  Di Babb PT

## 2018-08-29 ENCOUNTER — HOSPITAL ENCOUNTER (OUTPATIENT)
Dept: PHYSICAL THERAPY | Age: 53
Setting detail: THERAPIES SERIES
Discharge: HOME | End: 2018-08-29

## 2018-08-29 PROCEDURE — 97110 THERAPEUTIC EXERCISES: CPT

## 2018-08-31 ENCOUNTER — HOSPITAL ENCOUNTER (OUTPATIENT)
Dept: PHYSICAL THERAPY | Age: 53
Setting detail: THERAPIES SERIES
Discharge: HOME | End: 2018-08-31

## 2018-08-31 PROCEDURE — 97110 THERAPEUTIC EXERCISES: CPT

## 2018-09-04 ENCOUNTER — TELEPHONE (OUTPATIENT)
Dept: INTERNAL MEDICINE | Age: 53
End: 2018-09-04

## 2018-09-05 ENCOUNTER — HOSPITAL ENCOUNTER (OUTPATIENT)
Dept: PHYSICAL THERAPY | Age: 53
Setting detail: THERAPIES SERIES
Discharge: HOME | End: 2018-09-05

## 2018-09-05 PROCEDURE — 97110 THERAPEUTIC EXERCISES: CPT

## 2018-09-05 NOTE — FLOWSHEET NOTE
[x] [facility]       Outpatient Physical        Therapy       [address]       Phone: [phone]       Fax: [phone]       Physical Therapy Daily Treatment Note    Date:  2018  Patient Name:  Amy Denney    :  1965  MRN: [id number]  Physician: Kiesha Hodge MD               Insurance: self pay  Medical Diagnosis: Complete tear of L rotator cuff M75.122                      Rehab Codes: P22.674, M25.612, M62.512  Onset Date: 17               Next 's appt: 18       Visit# / total visits:   Cancels/No Shows: 10    Subjective:    Pain:  [x] Yes  [] No Location: L Shoulder Pain Rating: (0-10 scale) 5/10   Pain altered Tx:  [x] No  [] Yes  Action:  Comments: Pt reports is to RTW Monday 9/10, feels will need to end PT at that time. Pt reports her shoulder is sore today, was bothering her last night, used heat during the night.      Objective:  Modalities: HP (cerv) seated in chair at end of Rx for 15 minutes    Precautions:  Exercises:  OK to begin strengthening and AROM L Shoulder  Exercise Reps/ Time Weight/ Level  Comments   SciFit -- ML2.0   Changed to UBE   UBE 3'F/3'R ML1.0 x Added    Corner stretch 3x 20sec  x    Codman's --   M/L, A/P, CW, CCW   Wall slides 10x  x Flex, abd   Wand abd 15x 1lb x instructed to keep PROM, with gentle stretch at end    Wand ext  10x 1lb x Progressed weight    Wax on/wax off 15x  x On wall CW, CWW-progressed reps    Tband triceps 10x Green  x    -Ext  10x Green  x    -Rows  10x Green  x                  Seated        Bicep curls 20x 3lb x Progressed weight    Scap Retractions  10x 5sec/ 2lb x    Table slides 10x 5sec x Flexion, abd   ER stretch on table 3x 20sec  x    Wax on/wax off -- -- - Changed to wall          Supine        Wand Chest press 20x 1lb x    Horiz abd 20x 1lb x    Wand flex 20x 1lb x    Wand ER stretch 15x 3sec x    SA punch x15  x     ABCs x2  x    Triceps press  20x 1lb x Progressed

## 2018-09-14 ENCOUNTER — TELEPHONE (OUTPATIENT)
Dept: INTERNAL MEDICINE | Age: 53
End: 2018-09-14

## 2018-09-14 NOTE — TELEPHONE ENCOUNTER
PC to patient to discuss blood sugars and patient mentioned that she has been experiencing pain on the right side in her perineal area x 2 weeks which has progressively worsened. Patient states she is sure she does not have any sores/abscesses in the area. She states it feel muscular in nature but she can hardly sit any longer. Patient is not scheduled again until 09/28/18 and states she cannot get out of work to come in any sooner. Explained I would send message to Dr. Jose L Bell but she may need to come in and be evaluated. Please advise.

## 2018-09-17 ENCOUNTER — TELEPHONE (OUTPATIENT)
Dept: INTERNAL MEDICINE | Age: 53
End: 2018-09-17

## 2018-09-20 ENCOUNTER — TELEPHONE (OUTPATIENT)
Dept: INTERNAL MEDICINE | Age: 53
End: 2018-09-20

## 2018-10-03 ENCOUNTER — OFFICE VISIT (OUTPATIENT)
Dept: INTERNAL MEDICINE | Age: 53
End: 2018-10-03
Payer: MEDICAID

## 2018-10-03 VITALS
DIASTOLIC BLOOD PRESSURE: 86 MMHG | WEIGHT: 244.4 LBS | SYSTOLIC BLOOD PRESSURE: 138 MMHG | HEIGHT: 66 IN | HEART RATE: 88 BPM | BODY MASS INDEX: 39.28 KG/M2

## 2018-10-03 DIAGNOSIS — E11.8 TYPE 2 DIABETES MELLITUS WITH COMPLICATION, UNSPECIFIED WHETHER LONG TERM INSULIN USE: Primary | ICD-10-CM

## 2018-10-03 DIAGNOSIS — I10 ESSENTIAL HYPERTENSION: ICD-10-CM

## 2018-10-03 DIAGNOSIS — G89.29 CHRONIC RIGHT-SIDED LOW BACK PAIN WITH RIGHT-SIDED SCIATICA: ICD-10-CM

## 2018-10-03 DIAGNOSIS — M25.551 PAIN OF RIGHT HIP JOINT: ICD-10-CM

## 2018-10-03 DIAGNOSIS — Z23 NEED FOR INFLUENZA VACCINATION: ICD-10-CM

## 2018-10-03 DIAGNOSIS — M54.41 CHRONIC RIGHT-SIDED LOW BACK PAIN WITH RIGHT-SIDED SCIATICA: ICD-10-CM

## 2018-10-03 LAB — HBA1C MFR BLD: 14 %

## 2018-10-03 PROCEDURE — G0444 DEPRESSION SCREEN ANNUAL: HCPCS | Performed by: INTERNAL MEDICINE

## 2018-10-03 PROCEDURE — 99214 OFFICE O/P EST MOD 30 MIN: CPT | Performed by: INTERNAL MEDICINE

## 2018-10-03 PROCEDURE — 90686 IIV4 VACC NO PRSV 0.5 ML IM: CPT | Performed by: INTERNAL MEDICINE

## 2018-10-03 PROCEDURE — 99211 OFF/OP EST MAY X REQ PHY/QHP: CPT | Performed by: INTERNAL MEDICINE

## 2018-10-03 PROCEDURE — 83036 HEMOGLOBIN GLYCOSYLATED A1C: CPT | Performed by: INTERNAL MEDICINE

## 2018-10-03 RX ORDER — LISINOPRIL 5 MG/1
5 TABLET ORAL DAILY
Qty: 30 TABLET | Refills: 2 | Status: SHIPPED | OUTPATIENT
Start: 2018-10-03 | End: 2018-12-28 | Stop reason: SDUPTHER

## 2018-10-03 RX ORDER — TIZANIDINE 4 MG/1
4 TABLET ORAL NIGHTLY
Qty: 30 TABLET | Refills: 0 | Status: SHIPPED | OUTPATIENT
Start: 2018-10-03 | End: 2018-11-19 | Stop reason: SDUPTHER

## 2018-10-03 ASSESSMENT — PATIENT HEALTH QUESTIONNAIRE - PHQ9
8. MOVING OR SPEAKING SO SLOWLY THAT OTHER PEOPLE COULD HAVE NOTICED. OR THE OPPOSITE, BEING SO FIGETY OR RESTLESS THAT YOU HAVE BEEN MOVING AROUND A LOT MORE THAN USUAL: 3
9. THOUGHTS THAT YOU WOULD BE BETTER OFF DEAD, OR OF HURTING YOURSELF: 2
7. TROUBLE CONCENTRATING ON THINGS, SUCH AS READING THE NEWSPAPER OR WATCHING TELEVISION: 3
SUM OF ALL RESPONSES TO PHQ9 QUESTIONS 1 & 2: 6
3. TROUBLE FALLING OR STAYING ASLEEP: 3
6. FEELING BAD ABOUT YOURSELF - OR THAT YOU ARE A FAILURE OR HAVE LET YOURSELF OR YOUR FAMILY DOWN: 2
10. IF YOU CHECKED OFF ANY PROBLEMS, HOW DIFFICULT HAVE THESE PROBLEMS MADE IT FOR YOU TO DO YOUR WORK, TAKE CARE OF THINGS AT HOME, OR GET ALONG WITH OTHER PEOPLE: 2
SUM OF ALL RESPONSES TO PHQ QUESTIONS 1-9: 25
4. FEELING TIRED OR HAVING LITTLE ENERGY: 3
2. FEELING DOWN, DEPRESSED OR HOPELESS: 3
1. LITTLE INTEREST OR PLEASURE IN DOING THINGS: 3
5. POOR APPETITE OR OVEREATING: 3
SUM OF ALL RESPONSES TO PHQ QUESTIONS 1-9: 25

## 2018-10-03 ASSESSMENT — ENCOUNTER SYMPTOMS
BLURRED VISION: 0
ABDOMINAL PAIN: 0
NAUSEA: 0
DOUBLE VISION: 0
HEARTBURN: 0
COUGH: 0
HEMOPTYSIS: 0

## 2018-10-03 NOTE — PROGRESS NOTES
No current facility-administered medications for this visit. Social History   Substance Use Topics    Smoking status: Former Smoker     Packs/day: 2.00     Years: 34.00     Types: Cigarettes     Start date: 28     Quit date: 9/28/2017    Smokeless tobacco: Never Used    Alcohol use 0.0 oz/week      Comment: rare       Family History   Problem Relation Age of Onset    Diabetes Mother     Heart Disease Mother         murmur    Heart Disease Father         MVP    Diabetes Sister     Diabetes Maternal Grandmother     High Blood Pressure Maternal Grandmother     Heart Failure Maternal Grandfather     Heart Attack Paternal Grandmother         REVIEW OF SYSTEMS:  Review of Systems   Constitutional: Negative for chills and fever. HENT: Negative for congestion, ear discharge and nosebleeds. Eyes: Negative for blurred vision and double vision. Respiratory: Negative for cough and hemoptysis. Cardiovascular: Negative for chest pain and palpitations. Gastrointestinal: Negative for abdominal pain, heartburn and nausea. Genitourinary: Negative for dysuria and urgency. Musculoskeletal: Negative for myalgias and neck pain. Neurological: Negative for dizziness and headaches. Endo/Heme/Allergies: Does not bruise/bleed easily. Psychiatric/Behavioral: Negative for depression and suicidal ideas. PHYSICAL EXAM:  Vitals:    10/03/18 1555 10/03/18 1642   BP: (!) 154/70 138/86   Site: Right Upper Arm Right Upper Arm   Position: Sitting Sitting   Cuff Size: Large Adult Large Adult   Pulse: 87 88   Weight: 244 lb 6.4 oz (110.9 kg)    Height: 5' 6\" (1.676 m)      BP Readings from Last 3 Encounters:   10/03/18 138/86   06/27/18 (!) 143/97   06/27/18 (!) 124/100        Physical Exam   Constitutional: She is oriented to person, place, and time and well-developed, well-nourished, and in no distress. No distress. HENT:   Mouth/Throat: No oropharyngeal exudate. Neck: Normal range of motion.  Neck

## 2018-10-03 NOTE — PATIENT INSTRUCTIONS
X-RAY INSTRUCTIONS    Your doctor has ordered an X-ray for you. This will be done at [facility], located at [address] Report to the Admitting Department, located on the main floor of the medical center behind the information desk. Please remember to bring your X-ray order with you. FOR THIS TESTING, YOU DO NOT NEED AN APPOINTMENT. Please take boostrix script to your pharmacy for administration     Return appointment card and Summary of Care was reviewed and copy was given to the patient.   MARIELY

## 2018-10-04 DIAGNOSIS — E11.8 TYPE 2 DIABETES MELLITUS WITH COMPLICATION, UNSPECIFIED WHETHER LONG TERM INSULIN USE: ICD-10-CM

## 2018-10-09 ENCOUNTER — TELEPHONE (OUTPATIENT)
Dept: INTERNAL MEDICINE | Age: 53
End: 2018-10-09

## 2018-10-13 ENCOUNTER — APPOINTMENT (OUTPATIENT)
Dept: GENERAL RADIOLOGY | Age: 53
End: 2018-10-13

## 2018-10-13 ENCOUNTER — HOSPITAL ENCOUNTER (EMERGENCY)
Age: 53
Discharge: HOME | End: 2018-10-13
Attending: EMERGENCY MEDICINE

## 2018-10-13 VITALS
BODY MASS INDEX: 39.21 KG/M2 | SYSTOLIC BLOOD PRESSURE: 150 MMHG | TEMPERATURE: 97.9 F | DIASTOLIC BLOOD PRESSURE: 87 MMHG | HEART RATE: 79 BPM | WEIGHT: 244 LBS | RESPIRATION RATE: 16 BRPM | OXYGEN SATURATION: 95 % | HEIGHT: 66 IN

## 2018-10-13 DIAGNOSIS — E11.8 TYPE 2 DIABETES MELLITUS WITH COMPLICATION (HCC): ICD-10-CM

## 2018-10-13 DIAGNOSIS — M25.512 ACUTE PAIN OF LEFT SHOULDER: Primary | ICD-10-CM

## 2018-10-13 PROCEDURE — 99283 EMERGENCY DEPT VISIT LOW MDM: CPT

## 2018-10-13 PROCEDURE — 73030 X-RAY EXAM OF SHOULDER: CPT

## 2018-10-13 ASSESSMENT — PAIN DESCRIPTION - FREQUENCY: FREQUENCY: CONTINUOUS

## 2018-10-13 ASSESSMENT — ENCOUNTER SYMPTOMS
EYE REDNESS: 0
EYE DISCHARGE: 0
RHINORRHEA: 0
NAUSEA: 0
DIARRHEA: 0
VOMITING: 0
SHORTNESS OF BREATH: 0
COLOR CHANGE: 0
COUGH: 0
SORE THROAT: 0

## 2018-10-13 ASSESSMENT — PAIN DESCRIPTION - ORIENTATION: ORIENTATION: LEFT

## 2018-10-13 ASSESSMENT — PAIN DESCRIPTION - DESCRIPTORS: DESCRIPTORS: THROBBING

## 2018-10-13 ASSESSMENT — PAIN DESCRIPTION - LOCATION: LOCATION: SHOULDER

## 2018-10-13 ASSESSMENT — PAIN SCALES - GENERAL: PAINLEVEL_OUTOF10: 8

## 2018-10-13 ASSESSMENT — PAIN DESCRIPTION - PAIN TYPE: TYPE: ACUTE PAIN

## 2018-10-13 NOTE — ED NOTES
Pt. States rotator cuff repair, 6/19/18. States that she has been healing well. States that last night during sleep did a \"cat stretch\" above her head and initially did not feel pain. Upon waking felt pain in that shoulder that has progressively gotten worse. States she can't move the shoulder without pain. States she has to hold the arm close to her body. Pt. Rates her pain 8/10 and describes the pain as throbbing.      Mukul Sparrow RN  10/13/18 [id number]

## 2018-10-13 NOTE — ED PROVIDER NOTES
by mouth 3 times daily as needed for Itching  Qty: 9 tablet, Refills: 0      acetaminophen (TYLENOL) 500 MG tablet Take 1,000 mg by mouth every 4 hours as needed for Pain      VITAMIN E PO Take 500 Units by mouth three times a week      vitamin D (CHOLECALCIFEROL) 1000 UNIT TABS tablet Take 1,000 Units by mouth every morning       Blood Glucose Monitoring Suppl (BLOOD GLUCOSE METER) KIT Dx: DM-2. Use 2-3 times daily  Qty: 1 kit, Refills: 0    Associated Diagnoses: Type 2 diabetes mellitus without complication ([address]      Lancets MISC Dx: DM-2. Use 2-3 times daily  Qty: 100 each, Refills: 3    Associated Diagnoses: Type 2 diabetes mellitus without complication (HCC)      Glucose Blood (BLOOD GLUCOSE TEST STRIPS) STRP Dx: DM-2. Use 2-3 times daily  Qty: 100 strip, Refills: 3    Associated Diagnoses: Type 2 diabetes mellitus without complication (HCC)             ALLERGIES     is allergic to pcn [penicillins]; demerol hcl [meperidine]; tape [adhesive tape]; and morphine. FAMILY HISTORY     indicated that her mother is alive. She indicated that her father is . She indicated that both of her sisters are alive. She indicated that her brother is alive. She indicated that her maternal grandmother is . She indicated that her maternal grandfather is . She indicated that her paternal grandmother is . She indicated that her paternal grandfather is . She indicated that her daughter is alive. She indicated that her son is alive. family history includes Diabetes in her maternal grandmother, mother, and sister; Heart Attack in her paternal grandmother; Heart Disease in her father and mother; Heart Failure in her maternal grandfather; High Blood Pressure in her maternal grandmother. SOCIAL HISTORY      reports that she quit smoking about 12 months ago. Her smoking use included Cigarettes. She started smoking about 35 years ago. She has a 68.00 pack-year smoking history.  She has never used smokeless tobacco. She reports that she drinks alcohol. She reports that she does not use drugs. PHYSICAL EXAM     INITIAL VITALS:  height is 5' 6\" (1.676 m) and weight is 244 lb (110.7 kg). Her oral temperature is 97.9 °F (36.6 °C). Her blood pressure is 150/87 (abnormal) and her pulse is 79. Her respiration is 16 and oxygen saturation is 95%. Physical Exam   Constitutional: She appears well-developed and well-nourished. Non-toxic appearance. She does not appear ill. HENT:   Head: Normocephalic and atraumatic. Eyes: Pupils are equal, round, and reactive to light. Conjunctivae and EOM are normal.   Neck: Trachea normal and normal range of motion. Neck supple. Cardiovascular: Normal rate, regular rhythm, S1 normal and S2 normal.    No murmur heard. Pulmonary/Chest: Effort normal and breath sounds normal. No accessory muscle usage. No respiratory distress. She exhibits no tenderness and no deformity. Abdominal: Normal appearance and bowel sounds are normal. She exhibits no distension, no abdominal bruit and no ascites. There is no tenderness. There is no rigidity, no rebound and no guarding. Musculoskeletal:        Left shoulder: She exhibits decreased range of motion, tenderness and bony tenderness. Neurological: GCS eye subscore is 4. GCS verbal subscore is 5. GCS motor subscore is 6. Psychiatric: Her speech is normal.       DIFFERENTIAL DIAGNOSIS/MDM:   51-year-old female presents with left shoulder pain, plan as x-rays and reevaluation. Patient's pain started after she stretched her left arm, she has a history of recent surgery. She has no chest or abdominal pain, no shortness of breath, there is nothing to suggest acute coronary syndrome.     DIAGNOSTIC RESULTS     EKG: All EKG's are interpreted by the Emergency Department Physician who either signs or Co-signs this chart in the absence of a cardiologist.        RADIOLOGY:   I directly visualized the following  images and reviewed the radiologist interpretations:  XR SHOULDER LEFT (MIN 2 VIEWS)   Final Result   No acute abnormality. Rotator cuff postsurgical changes. LABS:  Labs Reviewed - No data to display          EMERGENCY DEPARTMENT COURSE:   Vitals:    Vitals:    10/13/18 0952   BP: (!) 150/87   Pulse: 79   Resp: 16   Temp: 97.9 °F (36.6 °C)   TempSrc: Oral   SpO2: 95%   Weight: 244 lb (110.7 kg)   Height: 5' 6\" (1.676 m)         CRITICAL CARE:      CONSULTS:  None      PROCEDURES:      FINAL IMPRESSION      1.  Acute pain of left shoulder          DISPOSITION/PLAN   DISPOSITION Decision To Discharge 10/13/2018 10:54:57 AM          PATIENT REFERRED TO:  Michelle Corbett MD  [address] [place] [address]  [phone]    Schedule an appointment as soon as possible for a visit in 2 days        DISCHARGE MEDICATIONS:  Current Discharge Medication List          (Please note that portions of this note were completed with a voice recognition program.  Efforts were made to edit the dictations but occasionally words are mis-transcribed.)    Diana Lee  Emergency Medicine                 Diana Lee MD  10/13/18 0243

## 2018-10-17 ENCOUNTER — TELEPHONE (OUTPATIENT)
Dept: INTERNAL MEDICINE | Age: 53
End: 2018-10-17

## 2018-10-17 DIAGNOSIS — E11.42 TYPE 2 DIABETES MELLITUS WITH POLYNEUROPATHY (HCC): Primary | ICD-10-CM

## 2018-10-22 ENCOUNTER — APPOINTMENT (OUTPATIENT)
Dept: GENERAL RADIOLOGY | Age: 53
End: 2018-10-22

## 2018-10-22 ENCOUNTER — HOSPITAL ENCOUNTER (EMERGENCY)
Age: 53
Discharge: HOME | End: 2018-10-22
Attending: EMERGENCY MEDICINE

## 2018-10-22 VITALS
RESPIRATION RATE: 16 BRPM | TEMPERATURE: 98.1 F | DIASTOLIC BLOOD PRESSURE: 80 MMHG | HEART RATE: 84 BPM | OXYGEN SATURATION: 99 % | WEIGHT: 244 LBS | BODY MASS INDEX: 39.21 KG/M2 | SYSTOLIC BLOOD PRESSURE: 137 MMHG | HEIGHT: 66 IN

## 2018-10-22 DIAGNOSIS — S20.212A CONTUSION OF RIB ON LEFT SIDE, INITIAL ENCOUNTER: Primary | ICD-10-CM

## 2018-10-22 PROCEDURE — 99283 EMERGENCY DEPT VISIT LOW MDM: CPT

## 2018-10-22 PROCEDURE — 71101 X-RAY EXAM UNILAT RIBS/CHEST: CPT

## 2018-10-22 RX ORDER — HYDROCODONE BITARTRATE AND ACETAMINOPHEN 5; 325 MG/1; MG/1
1 TABLET ORAL EVERY 8 HOURS PRN
Qty: 10 TABLET | Refills: 0 | Status: SHIPPED | OUTPATIENT
Start: 2018-10-22 | End: 2018-10-26

## 2018-10-22 ASSESSMENT — ENCOUNTER SYMPTOMS
VOMITING: 0
TROUBLE SWALLOWING: 0
SHORTNESS OF BREATH: 0
COUGH: 0
NAUSEA: 0

## 2018-10-22 NOTE — ED PROVIDER NOTES
[address] ED  eMERGENCYdEPARTMENT eNCOUnter      Pt Name: Yuly Nicole  MRN: [id number]  [place] 1965  Date of evaluation: 10/22/2018  Provider:KIMANI TAYLOR CNP    CHIEF COMPLAINT       Chief Complaint   Patient presents with    Rib Pain         HISTORY OF PRESENT ILLNESS  (Location/Symptom, Timing/Onset, Context/Setting, Quality, Duration, Modifying Factors, Severity.)   Yuly Nicole is a 48 y.o. female who presents to the emergency department With complains of left-sided rib pain. Patient states that yesterday she was leaning over a tub given her dog a bath. Patient states that her ribs were against the tub edge and she felt a \"pop\" followed by pain. States that pain was tolerable at that time. Patient states that evening she had \"double sneeze\" and pain got much worse. States that pain is left-sided, sharp, worsened by deep breathing or movement. States she can feel like something is pulling in that area. Took ibuprofen and Tylenol for pain with mild improvement. No cough or hemoptysis. No shortness of breath or radiation of pain. No abdominal pain, nausea or vomiting. No fever or chills. No recent illness. Nursing Notes were reviewed and I agree. REVIEW OF SYSTEMS    (2-9 systems for level 4,10 or more for level 5)     Review of Systems   Constitutional: Negative for chills and fever. HENT: Negative for trouble swallowing. Respiratory: Negative for cough and shortness of breath. Cardiovascular: Positive for chest pain (left sided rib pain). Negative for palpitations. Gastrointestinal: Negative for nausea and vomiting. Except as noted above the remainder of the review of systems was reviewed andnegative.        PAST MEDICAL HISTORY         Diagnosis Date    Allergic rhinitis 10/21/2014    Anxiety     Arthritis     Bleeds easily ([address]     per pt, has never had a work up   Freddie Mackenzie Complete rotator cuff tear of left shoulder 2018    Diabetes

## 2018-10-31 ENCOUNTER — TELEPHONE (OUTPATIENT)
Dept: INTERNAL MEDICINE | Age: 53
End: 2018-10-31

## 2018-11-05 ENCOUNTER — HOSPITAL ENCOUNTER (EMERGENCY)
Age: 53
Discharge: HOME | End: 2018-11-05
Attending: EMERGENCY MEDICINE
Payer: COMMERCIAL

## 2018-11-05 ENCOUNTER — OFFICE VISIT (OUTPATIENT)
Dept: ORTHOPEDIC SURGERY | Age: 53
End: 2018-11-05
Payer: COMMERCIAL

## 2018-11-05 ENCOUNTER — APPOINTMENT (OUTPATIENT)
Dept: GENERAL RADIOLOGY | Age: 53
End: 2018-11-05
Payer: COMMERCIAL

## 2018-11-05 VITALS
HEART RATE: 90 BPM | TEMPERATURE: 98.2 F | WEIGHT: 237 LBS | DIASTOLIC BLOOD PRESSURE: 97 MMHG | BODY MASS INDEX: 37.2 KG/M2 | HEIGHT: 67 IN | OXYGEN SATURATION: 99 % | SYSTOLIC BLOOD PRESSURE: 151 MMHG | RESPIRATION RATE: 18 BRPM

## 2018-11-05 VITALS — WEIGHT: 236.99 LBS | HEIGHT: 66 IN | BODY MASS INDEX: 38.09 KG/M2

## 2018-11-05 DIAGNOSIS — Z98.890 S/P LEFT ROTATOR CUFF REPAIR: Primary | ICD-10-CM
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
## 2018-11-05 PROCEDURE — 6360000002 HC RX W HCPCS: Performed by: EMERGENCY MEDICINE

## 2018-11-05 PROCEDURE — 99212 OFFICE O/P EST SF 10 MIN: CPT | Performed by: STUDENT IN AN ORGANIZED HEALTH CARE EDUCATION/TRAINING PROGRAM

## 2018-11-05 PROCEDURE — 72072 X-RAY EXAM THORAC SPINE 3VWS: CPT

## 2018-11-05 PROCEDURE — 73030 X-RAY EXAM OF SHOULDER: CPT

## 2018-11-05 PROCEDURE — 96372 THER/PROPH/DIAG INJ SC/IM: CPT

## 2018-11-05 PROCEDURE — 99284 EMERGENCY DEPT VISIT MOD MDM: CPT

## 2018-11-05 RX ORDER — IBUPROFEN 800 MG/1
800 TABLET ORAL ONCE
Status: DISCONTINUED | OUTPATIENT
Start: 2018-11-05 | End: 2018-11-05

## 2018-11-05 RX ORDER — ACETAMINOPHEN 500 MG
1000 TABLET ORAL ONCE
Status: DISCONTINUED | OUTPATIENT
Start: 2018-11-05 | End: 2018-11-05

## 2018-11-05 RX ORDER — KETOROLAC TROMETHAMINE 15 MG/ML
15 INJECTION, SOLUTION INTRAMUSCULAR; INTRAVENOUS ONCE
Status: COMPLETED | OUTPATIENT
Start: 2018-11-05 | End: 2018-11-05

## 2018-11-05 RX ORDER — IBUPROFEN 800 MG/1
800 TABLET ORAL EVERY 8 HOURS PRN
Qty: 30 TABLET | Refills: 0 | Status: SHIPPED | OUTPATIENT
Start: 2018-11-05 | End: 2018-12-17 | Stop reason: ALTCHOICE

## 2018-11-05 RX ORDER — ACETAMINOPHEN 325 MG/1
650 TABLET ORAL EVERY 6 HOURS PRN
Qty: 60 TABLET | Refills: 0 | Status: SHIPPED | OUTPATIENT
Start: 2018-11-05 | End: 2019-04-16

## 2018-11-05 RX ADMIN — KETOROLAC TROMETHAMINE 15 MG: 15 INJECTION, SOLUTION INTRAMUSCULAR; INTRAVENOUS at 10:18

## 2018-11-05 ASSESSMENT — ENCOUNTER SYMPTOMS
ABDOMINAL PAIN: 0
SHORTNESS OF BREATH: 0
RHINORRHEA: 0

## 2018-11-05 ASSESSMENT — PAIN DESCRIPTION - ORIENTATION: ORIENTATION: LEFT;RIGHT

## 2018-11-05 ASSESSMENT — PAIN DESCRIPTION - LOCATION: LOCATION: SHOULDER

## 2018-11-05 ASSESSMENT — PAIN DESCRIPTION - DESCRIPTORS: DESCRIPTORS: ACHING

## 2018-11-05 ASSESSMENT — PAIN SCALES - GENERAL
PAINLEVEL_OUTOF10: 8
PAINLEVEL_OUTOF10: 8

## 2018-11-05 ASSESSMENT — PAIN DESCRIPTION - PAIN TYPE: TYPE: ACUTE PAIN

## 2018-11-05 NOTE — TELEPHONE ENCOUNTER
Pt came to window picked up her PAP humalog and asked if Trulicity was available to order on her  PAP jose cares plan. Spoke with Yesi Marie pt is approved through April 2019, writer ordered trulicity 7.97 mg should be shipped out in 7-10 business days.

## 2018-11-09 NOTE — PROGRESS NOTES
performed by Ty Pereira DO at [address] [facility] COLONOSCOPY N/A 2/21/2018    COLONOSCOPY POLYPECTOMY SNARE/COLD BIOPSY performed by Billie Montes De Oca MD at [facility]    with revision x1    LARYNGOSCOPY N/A 9/29/2017    DIRECT MICRO LARYNGOSCOPY WITH EXCISION VOCAL CORD LESION  performed by Eyad Joseph MD at [address] [place] [address] [place] Right 1988    MI EGD TRANSORAL BIOPSY SINGLE/MULTIPLE N/A 9/27/2017    EGD BIOPSY performed by Billie Montes De Oca MD at [facility] ARTHROSCOP,SURG,W/ROTAT CUFF REPR Left 6/19/2018    SHOULDER ARTHROSCOPY WITH ROTATOR CUFF REPAIR, SUBACROMIAL DECOMPRESSION performed by Micehlle Corbett MD at [address] [facility] [address] Left     SHOULDER ARTHROSCOPY WITH ROTATOR CUFF REPAIR, SUBACROMIAL DECOMPRESSION (Left        Current Medications:   Current Outpatient Prescriptions   Medication Sig Dispense Refill    ibuprofen (ADVIL;MOTRIN) 800 MG tablet Take 800 mg by mouth every 6 hours as needed for Pain      metFORMIN (GLUCOPHAGE) 500 MG tablet Take 1 tablet by mouth 3 times daily (with meals) 90 tablet 2    diclofenac (VOLTAREN) 50 MG EC tablet Take 1 tablet by mouth 3 times daily (with meals) 60 tablet 2    Insulin Degludec (TRESIBA FLEXTOUCH) 100 UNIT/ML SOPN Inject 20 Units into the skin nightly 5 pen 2    insulin lispro (HUMALOG KWIKPEN) 100 UNIT/ML pen Inject 10 Units into the skin 3 times daily (before meals) 5 pen 2    lisinopril (PRINIVIL;ZESTRIL) 5 MG tablet Take 1 tablet by mouth daily 30 tablet 2    hydrOXYzine (ATARAX) 25 MG tablet Take 1 tablet by mouth 3 times daily as needed for Itching 9 tablet 0    acetaminophen (TYLENOL) 500 MG tablet Take 1,000 mg by mouth every 4 hours as needed for Pain      esomeprazole Magnesium (NEXIUM) 20 MG PACK Take 20 mg by mouth nightly      UNKNOWN TO PATIENT Some anti anxiety med left over from MRI      VITAMIN E PO Take 500 Units by mouth three times a week      vitamin D (CHOLECALCIFEROL) 1000 UNIT TABS tablet Take 1,000 Units by mouth every morning       VITAMIN K PO Take 100 mcg by mouth three times a week       Blood Glucose Monitoring Suppl (BLOOD GLUCOSE METER) KIT Dx: DM-2. Use 2-3 times daily 1 kit 0    Lancets MISC Dx: DM-2. Use 2-3 times daily 100 each 3    Glucose Blood (BLOOD GLUCOSE TEST STRIPS) STRP Dx: DM-2. Use 2-3 times daily 100 strip 3     No current facility-administered medications for this visit. Allergies:    Pcn [penicillins]; Demerol hcl [meperidine]; Tape Larena Hams tape]; and Morphine    Social History:   Social History     Social History    Marital status: Legally      Spouse name: N/A    Number of children: 3    Years of education: N/A     Occupational History          Social History Main Topics    Smoking status: Former Smoker     Packs/day: 2.00     Years: 34.00     Types: Cigarettes     Start date: 1983     Quit date: 9/28/2017    Smokeless tobacco: Never Used    Alcohol use 0.0 oz/week      Comment: rare    Drug use: No    Sexual activity: No     Other Topics Concern    Not on file     Social History Narrative    No narrative on file       Family History:  Family History   Problem Relation Age of Onset    Diabetes Mother     Heart Disease Mother         murmur    Heart Disease Father         MVP    Diabetes Sister     Diabetes Maternal Grandmother     High Blood Pressure Maternal Grandmother     Heart Failure Maternal Grandfather     Heart Attack Paternal Grandmother        REVIEW OF SYSTEMS:  Constitutional: Negative for fever and chills. Respiratory: Negative for cough, shortness of breath and wheezing. Cardiovascular: Negative for chest pain and palpitations. GI: Denies any nausea, vomiting, abdominal pain   Musculoskeletal: Positive for mild left shoulder pain    PHYSICAL EXAM:  There were no vitals taken for this visit.     Gen: AAOx3, NAD, appears stated age    CV: no dependent edema,

## 2018-11-19 DIAGNOSIS — M25.551 PAIN OF RIGHT HIP JOINT: ICD-10-CM

## 2018-11-19 DIAGNOSIS — M54.41 CHRONIC RIGHT-SIDED LOW BACK PAIN WITH RIGHT-SIDED SCIATICA: ICD-10-CM

## 2018-11-19 DIAGNOSIS — G89.29 CHRONIC RIGHT-SIDED LOW BACK PAIN WITH RIGHT-SIDED SCIATICA: ICD-10-CM

## 2018-11-20 RX ORDER — TIZANIDINE 4 MG/1
TABLET ORAL
Qty: 30 TABLET | Refills: 0 | Status: SHIPPED | OUTPATIENT
Start: 2018-11-20 | End: 2018-12-28 | Stop reason: SDUPTHER

## 2018-11-20 NOTE — TELEPHONE ENCOUNTER
escrib request for tiZANidine HCL 4MG TABLET patient does not have scheduled apt contacted pt left message for pt to contact to schedule. Health Maintenance   Topic Date Due    DTaP/Tdap/Td vaccine (1 - Tdap) 01/30/1986    Cervical cancer screen  10/11/2016    Diabetic retinal exam  11/10/2016    Shingles Vaccine (1 of 2 - 2 Dose Series) 01/30/2017    Diabetic foot exam  11/10/2018    A1C test (Diabetic or Prediabetic)  01/03/2019    Potassium monitoring  04/26/2019    Creatinine monitoring  06/11/2019    Diabetic microalbuminuria test  06/29/2019    Lipid screen  06/29/2019    Breast cancer screen  04/11/2020    Colon cancer screen colonoscopy  02/21/2021    Flu vaccine  Completed    Pneumococcal med risk  Completed    HIV screen  Completed             (applicable per patient's age: Cancer Screenings, Depression Screening, Fall Risk Screening, Immunizations)    Hemoglobin A1C (%)   Date Value   10/03/2018 14.0   03/26/2018 13.8   11/10/2017 10.2     Microalb/Crt.  Ratio (mcg/mg creat)   Date Value   06/29/2018 CANNOT BE CALCULATED     LDL Cholesterol (mg/dL)   Date Value   06/29/2018 113     LDL Calculated (mg/dL)   Date Value   03/19/2015 110     AST (U/L)   Date Value   04/26/2018 72 (H)     ALT (U/L)   Date Value   04/26/2018 55 (H)     BUN (mg/dL)   Date Value   06/11/2018 5 (L)      (goal A1C is < 7)   (goal LDL is <100) need 30-50% reduction from baseline     BP Readings from Last 3 Encounters:   11/05/18 (!) 151/97   10/22/18 137/80   10/13/18 (!) 150/87    (goal /80)      All Future Testing planned in [facility]:  Lab Frequency Next Occurrence   Sleep Study with PAP Titration Once 04/26/2018   Baseline Diagnostic Sleep Study Once 04/26/2018   XR HIP RIGHT (2-3 VIEWS) Once 11/18/2018   XR LUMBAR SPINE (2-3 VIEWS) Once 11/18/2018       Next Visit Date:  Future Appointments  Date Time Provider Shana Redding   12/17/2018 2:20 PM SCHEDULE, [facility]

## 2018-11-28 DIAGNOSIS — I10 ESSENTIAL HYPERTENSION: ICD-10-CM

## 2018-11-28 RX ORDER — LISINOPRIL 5 MG/1
TABLET ORAL
Qty: 30 TABLET | Refills: 1 | Status: SHIPPED | OUTPATIENT
Start: 2018-11-28 | End: 2018-12-28 | Stop reason: SDUPTHER

## 2018-12-03 ENCOUNTER — TELEPHONE (OUTPATIENT)
Dept: INTERNAL MEDICINE | Age: 53
End: 2018-12-03

## 2018-12-03 NOTE — LETTER
[address]  [address] [facility] [address] [facility]  Phone: [phone]  Fax: [phone]    Thuy Ojeda MD        December 3, 2018    Aung Santana [facility] [address] [facility] [address] [place]      Dear Morena Tubbs: We are sending this letter because your PCP ordered X-Rays for you to have done at your last visit here and they have not yet been completed. If you could please take them to any [facility] and have them done before your next visit we would greatly appreciate it. If you do not have a follow-up appointment scheduled can you please contact our office to set up an appointment. \    If you have any questions or concerns, please don't hesitate to call.     Sincerely,        Thuy Ojeda MD

## 2018-12-17 ENCOUNTER — OFFICE VISIT (OUTPATIENT)
Dept: ORTHOPEDIC SURGERY | Age: 53
End: 2018-12-17

## 2018-12-17 VITALS — HEIGHT: 66 IN | BODY MASS INDEX: 38.09 KG/M2 | WEIGHT: 236.99 LBS

## 2018-12-17 DIAGNOSIS — Z98.890 S/P LEFT ROTATOR CUFF REPAIR: ICD-10-CM

## 2018-12-17 DIAGNOSIS — M75.42 SHOULDER IMPINGEMENT, LEFT: Primary | ICD-10-CM

## 2018-12-17 PROCEDURE — 99213 OFFICE O/P EST LOW 20 MIN: CPT | Performed by: ORTHOPAEDIC SURGERY

## 2018-12-17 RX ORDER — DICLOFENAC SODIUM 75 MG/1
75 TABLET, DELAYED RELEASE ORAL 2 TIMES DAILY
Qty: 60 TABLET | Refills: 2 | Status: SHIPPED | OUTPATIENT
Start: 2018-12-17 | End: 2019-04-16 | Stop reason: SDUPTHER

## 2018-12-17 ASSESSMENT — ENCOUNTER SYMPTOMS
DIARRHEA: 0
CONSTIPATION: 0
COUGH: 0
NAUSEA: 0

## 2018-12-17 NOTE — PROGRESS NOTES
which may escape proof reading.  In such instances, actual meaning can be extrapolated by contextual diversion      Electronically signed by Olimpia Rosario DO, FAOAO on 12/24/2018 at 1:54 PM

## 2018-12-26 ENCOUNTER — HOSPITAL ENCOUNTER (OUTPATIENT)
Dept: PHYSICAL THERAPY | Facility: CLINIC | Age: 53
Setting detail: THERAPIES SERIES
Discharge: HOME | End: 2018-12-26

## 2018-12-26 PROCEDURE — 9900000066 HC EVALUATION (SELF-PAY)

## 2018-12-26 PROCEDURE — 9900000068 HC ESTIM TX  UNATTENDED (SELF-PAY)

## 2018-12-26 PROCEDURE — 9900000067 HC THERAPEUTIC EXERCISE EA 15 MINS (SELF-PAY)
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
## 2018-12-28 ENCOUNTER — OFFICE VISIT (OUTPATIENT)
Dept: INTERNAL MEDICINE | Age: 53
End: 2018-12-28

## 2018-12-28 ENCOUNTER — HOSPITAL ENCOUNTER (OUTPATIENT)
Age: 53
Discharge: HOME | End: 2018-12-30

## 2018-12-28 ENCOUNTER — HOSPITAL ENCOUNTER (OUTPATIENT)
Dept: GENERAL RADIOLOGY | Age: 53
Discharge: HOME | End: 2018-12-30

## 2018-12-28 VITALS
HEIGHT: 67 IN | DIASTOLIC BLOOD PRESSURE: 88 MMHG | BODY MASS INDEX: 39.18 KG/M2 | SYSTOLIC BLOOD PRESSURE: 136 MMHG | HEART RATE: 98 BPM | WEIGHT: 249.6 LBS

## 2018-12-28 DIAGNOSIS — G89.29 CHRONIC RIGHT-SIDED LOW BACK PAIN WITH RIGHT-SIDED SCIATICA: ICD-10-CM

## 2018-12-28 DIAGNOSIS — E11.8 TYPE 2 DIABETES MELLITUS WITH COMPLICATION, UNSPECIFIED WHETHER LONG TERM INSULIN USE: Primary | ICD-10-CM

## 2018-12-28 DIAGNOSIS — M54.41 CHRONIC RIGHT-SIDED LOW BACK PAIN WITH RIGHT-SIDED SCIATICA: ICD-10-CM

## 2018-12-28 DIAGNOSIS — M25.551 PAIN OF RIGHT HIP JOINT: ICD-10-CM

## 2018-12-28 DIAGNOSIS — I10 ESSENTIAL HYPERTENSION: ICD-10-CM

## 2018-12-28 LAB — HBA1C MFR BLD: 8.3 %

## 2018-12-28 PROCEDURE — 72100 X-RAY EXAM L-S SPINE 2/3 VWS: CPT

## 2018-12-28 PROCEDURE — 99211 OFF/OP EST MAY X REQ PHY/QHP: CPT | Performed by: INTERNAL MEDICINE

## 2018-12-28 PROCEDURE — 73502 X-RAY EXAM HIP UNI 2-3 VIEWS: CPT

## 2018-12-28 PROCEDURE — 99213 OFFICE O/P EST LOW 20 MIN: CPT | Performed by: INTERNAL MEDICINE

## 2018-12-28 PROCEDURE — 83036 HEMOGLOBIN GLYCOSYLATED A1C: CPT | Performed by: INTERNAL MEDICINE

## 2018-12-28 RX ORDER — TIZANIDINE 4 MG/1
4 TABLET ORAL NIGHTLY
Qty: 30 TABLET | Refills: 5 | Status: SHIPPED | OUTPATIENT
Start: 2018-12-28 | End: 2019-04-16 | Stop reason: SDUPTHER

## 2018-12-28 RX ORDER — LISINOPRIL 5 MG/1
5 TABLET ORAL DAILY
Qty: 30 TABLET | Refills: 5 | Status: SHIPPED | OUTPATIENT
Start: 2018-12-28 | End: 2019-04-16 | Stop reason: SDUPTHER

## 2018-12-28 ASSESSMENT — ENCOUNTER SYMPTOMS
SORE THROAT: 0
VOMITING: 0
WHEEZING: 0
PHOTOPHOBIA: 0
SHORTNESS OF BREATH: 0
CONSTIPATION: 0
BLOOD IN STOOL: 0
DIARRHEA: 0
EYE PAIN: 0
NAUSEA: 0
ABDOMINAL PAIN: 0
EYE DISCHARGE: 0
COUGH: 0
COLOR CHANGE: 0

## 2018-12-28 NOTE — PROGRESS NOTES
DIABETES and HYPERTENSION visit    BP Readings from Last 3 Encounters:   11/05/18 (!) 151/97   10/22/18 137/80   10/13/18 (!) 150/87        Hemoglobin A1C (%)   Date Value   10/03/2018 14.0   03/26/2018 13.8   11/10/2017 10.2     Microalb/Crt. Ratio (mcg/mg creat)   Date Value   06/29/2018 CANNOT BE CALCULATED     LDL Cholesterol (mg/dL)   Date Value   06/29/2018 113     LDL Calculated (mg/dL)   Date Value   03/19/2015 110     HDL (mg/dL)   Date Value   06/29/2018 48     BUN (mg/dL)   Date Value   06/11/2018 5 (L)     CREATININE (mg/dL)   Date Value   06/11/2018 0.60     Glucose (mg/dL)   Date Value   06/27/2018 382            Have you changed or started any medications since your last visit including any over-the-counter medicines, vitamins, or herbal medicines? Vitamin A, calcium and Vitamin C  Have you stopped taking any of your medications? Is so, why? -  no  Are you having any side effects from any of your medications? - no    Have you seen any other physician or provider since your last visit? yes - Orthopedic's   Have you had any other diagnostic tests since your last visit? yes - x-rays   Have you been seen in the emergency room and/or had an admission in a hospital since we last saw you?  yes -    Have you had your routine dental cleaning in the past 6 months?  no     Have you had your annual diabetic retinal (eye) exam? Yes   (ensure copy of exam is in the chart)    Do you have an active [employer] account? If no, what is the barrier?   Yes    Patient Care Team:  Thuy De La Garza MD as PCP - General (Internal Medicine)    Medical History Review  Past Medical, Family, and Social History reviewed and does contribute to the patient presenting condition    Health Maintenance   Topic Date Due    DTaP/Tdap/Td vaccine (1 - Tdap) 01/30/1986    Cervical cancer screen  10/11/2016    Diabetic retinal exam  11/10/2016    Shingles Vaccine (1 of 2 - 2 Dose Series) 01/30/2017    Diabetic foot exam  11/10/2018    A1C

## 2018-12-28 NOTE — PROGRESS NOTES
[facility]  [facility] [address] [facility]  [address] [facility] [facility]  Dept: [phone]  Dept Fax: [phone]    Office Progress/Follow Up Note  Date ofpatient's visit: 12/28/2018  Patient's Name:  Cedric Moreau YOB: 1965            Patient Care Team:  Thuy Flores MD as PCP - General (Internal Medicine)  ================================================================    REASON FOR VISIT/CHIEF COMPLAINT:  Diabetes (6 week follow up) and Medication Refill (Needs Tizandine refilled)    HISTORY OF PRESENTING ILLNESS:  History was obtained from: patient. Masood Elkins a 48 y.o. is here for a follow up visit. Overall patient is doing well. She came in today for follow-up of uncontrolled diabetes. She has been compliant with her medications. Repeat A1c today is 8.3, which is an improvement from A1c of 11.53 months ago. She has no new complaints today. She is due for medication refills. She has history of hypertension which is controlled. She also has history of chronic shoulder pain for which she is o(n Zanaflex.   Problem list, medications and blood work reviewed       Patient Active Problem List   Diagnosis    Allergic rhinitis    Fibromyalgia    Pure hypercholesterolemia    Chronic cholecystitis    Gastroesophageal reflux disease with esophagitis    Type 2 diabetes mellitus with complication ([address]    Diabetic polyneuropathy associated with type 2 diabetes mellitus ([address]    Chronic left shoulder pain    Essential hypertension       Health Maintenance Due   Topic Date Due    DTaP/Tdap/Td vaccine (1 - Tdap) 01/30/1986    Cervical cancer screen  10/11/2016    Diabetic retinal exam  11/10/2016    Shingles Vaccine (1 of 2 - 2 Dose Series) 01/30/2017    Diabetic foot exam  11/10/2018       Allergies   Allergen Reactions    Pcn [Penicillins] Hives and Other (See Comments)     Lose motor function of legs and collaps   

## 2019-01-02 ENCOUNTER — TELEPHONE (OUTPATIENT)
Dept: INTERNAL MEDICINE | Age: 54
End: 2019-01-02

## 2019-01-02 ENCOUNTER — HOSPITAL ENCOUNTER (OUTPATIENT)
Dept: PHYSICAL THERAPY | Facility: CLINIC | Age: 54
Setting detail: THERAPIES SERIES
Discharge: HOME | End: 2019-01-02
Payer: COMMERCIAL

## 2019-01-02 PROCEDURE — 97110 THERAPEUTIC EXERCISES: CPT

## 2019-01-02 PROCEDURE — G0283 ELEC STIM OTHER THAN WOUND: HCPCS

## 2019-01-03 ENCOUNTER — HOSPITAL ENCOUNTER (OUTPATIENT)
Dept: PHYSICAL THERAPY | Facility: CLINIC | Age: 54
Setting detail: THERAPIES SERIES
Discharge: HOME | End: 2019-01-03
Payer: COMMERCIAL

## 2019-01-03 NOTE — FLOWSHEET NOTE
[] [address]  [address]    [phone]  F: [phone] [] [address]  [facility] [address]   [address]  P: [phone]  F: [phone] [] [place]  [address] [facility]  P: [phone]  F: [phone] [] [address] [facility] [address]   [place]: [phone]  F: [phone] [] [place]  [address] [facility] [address]  [place]: [phone]   F: [phone]      Physical Therapy Cancel/No Show note    Date: 1/3/2019  Patient: Airam De La Cruz  : 1965  MRN: [id number]    Cancels/No Shows to date:     For today's appointment patient:  [x]  Cancelled  []  Rescheduled appointment  []  No-show     Reason given by patient:  []  Patient ill  [x]  Conflicting appointment  []  No transportation    []  Conflict with work  []  No reason given  []  Weather related  []  Other:      Comments:      [x]  Next appointment was confirmed    Electronically signed by: Jennifer Bergman PTA

## 2019-01-07 ENCOUNTER — HOSPITAL ENCOUNTER (OUTPATIENT)
Dept: PHYSICAL THERAPY | Facility: CLINIC | Age: 54
Setting detail: THERAPIES SERIES
Discharge: HOME | End: 2019-01-07
Payer: COMMERCIAL

## 2019-01-07 PROCEDURE — 97110 THERAPEUTIC EXERCISES: CPT

## 2019-01-07 PROCEDURE — G0283 ELEC STIM OTHER THAN WOUND: HCPCS

## 2019-01-09 ENCOUNTER — HOSPITAL ENCOUNTER (OUTPATIENT)
Dept: PHYSICAL THERAPY | Facility: CLINIC | Age: 54
Setting detail: THERAPIES SERIES
Discharge: HOME | End: 2019-01-09
Payer: COMMERCIAL

## 2019-01-09 PROCEDURE — 97110 THERAPEUTIC EXERCISES: CPT

## 2019-01-09 PROCEDURE — G0283 ELEC STIM OTHER THAN WOUND: HCPCS

## 2019-01-09 PROCEDURE — 9900000067 HC THERAPEUTIC EXERCISE EA 15 MINS (SELF-PAY)

## 2019-01-09 PROCEDURE — 9900000068 HC ESTIM TX  UNATTENDED (SELF-PAY)

## 2019-01-10 ENCOUNTER — HOSPITAL ENCOUNTER (OUTPATIENT)
Dept: PHYSICAL THERAPY | Facility: CLINIC | Age: 54
Setting detail: THERAPIES SERIES
Discharge: HOME | End: 2019-01-10
Payer: COMMERCIAL

## 2019-01-14 ENCOUNTER — HOSPITAL ENCOUNTER (OUTPATIENT)
Dept: PHYSICAL THERAPY | Facility: CLINIC | Age: 54
Setting detail: THERAPIES SERIES
Discharge: HOME | End: 2019-01-14
Payer: COMMERCIAL

## 2019-01-14 PROCEDURE — 97110 THERAPEUTIC EXERCISES: CPT

## 2019-01-14 PROCEDURE — G0283 ELEC STIM OTHER THAN WOUND: HCPCS

## 2019-01-16 ENCOUNTER — HOSPITAL ENCOUNTER (OUTPATIENT)
Dept: PHYSICAL THERAPY | Facility: CLINIC | Age: 54
Setting detail: THERAPIES SERIES
Discharge: HOME | End: 2019-01-16
Payer: COMMERCIAL

## 2019-01-16 PROCEDURE — 97110 THERAPEUTIC EXERCISES: CPT

## 2019-01-16 PROCEDURE — G0283 ELEC STIM OTHER THAN WOUND: HCPCS

## 2019-01-17 ENCOUNTER — APPOINTMENT (OUTPATIENT)
Dept: PHYSICAL THERAPY | Facility: CLINIC | Age: 54
End: 2019-01-17
Payer: COMMERCIAL

## 2019-01-21 ENCOUNTER — HOSPITAL ENCOUNTER (OUTPATIENT)
Dept: PHYSICAL THERAPY | Facility: CLINIC | Age: 54
Setting detail: THERAPIES SERIES
Discharge: HOME | End: 2019-01-21
Payer: COMMERCIAL

## 2019-01-21 NOTE — FLOWSHEET NOTE
[] [address]  [address]    [phone]  F: [phone] [x] [address] [facility] [address]   [address]  P: [phone]  F: [phone] [] [address]  [address] [facility]  P: [phone]  F: [phone] [] [address] [facility]   [address]   [place]: [phone]  F: [phone] [] [address]  [place]: [phone]   F: [phone]      Physical Therapy Cancel/No Show note    Date: 2019  Patient: Nessa Godinez  : 1965  MRN: [id number]    Cancels/No Shows to date: 3/0    For today's appointment patient:  [x]  Cancelled  []  Rescheduled appointment  []  No-show     Reason given by patient:  []  Patient ill  []  Conflicting appointment  []  No transportation    []  Conflict with work  []  No reason given  [x]  Weather related: Pt reports it's too cold.   []  Other:      Comments:      [x]  Next appointment was confirmed    Electronically signed by: Kristina Bobo, PT No

## 2019-01-29 NOTE — FLOWSHEET NOTE
[] [address]  [address]    [phone]  F: [phone] [] [address]  [facility] [address]   [address]  P: [phone]  F: [phone] [] [place]  [address] [facility]  P: [phone]  F: [phone] [] [address]  [facility]   [address]   [place]: [phone]  F: [phone] [] [place] [address] [facility] [address]  [place]: [phone]   F: [phone]      Physical Therapy Cancel/No Show note    Date: 2019  Patient: Ledy Bender  : 1965  MRN: [id number]    Cancels/No Shows to date: 4cx/0ns    For 2019 appointment patient:  [x]  Cancelled  []  Rescheduled appointment  []  No-show     Reason given by patient:  []  Patient ill  []  Conflicting appointment  []  No transportation    []  Conflict with work  []  No reason given  [x]  Weather related  []  Other:      Comments:      [x]  Next appointment 2019 was confirmed.     Electronically signed by: Alida Brandon

## 2019-01-30 ENCOUNTER — HOSPITAL ENCOUNTER (OUTPATIENT)
Dept: PHYSICAL THERAPY | Facility: CLINIC | Age: 54
Setting detail: THERAPIES SERIES
Discharge: HOME | End: 2019-01-30
Payer: COMMERCIAL

## 2019-02-04 ENCOUNTER — HOSPITAL ENCOUNTER (OUTPATIENT)
Dept: PHYSICAL THERAPY | Facility: CLINIC | Age: 54
Setting detail: THERAPIES SERIES
Discharge: HOME | End: 2019-02-04
Payer: COMMERCIAL

## 2019-02-04 PROCEDURE — G0283 ELEC STIM OTHER THAN WOUND: HCPCS

## 2019-02-04 PROCEDURE — 97110 THERAPEUTIC EXERCISES: CPT

## 2019-02-06 ENCOUNTER — HOSPITAL ENCOUNTER (OUTPATIENT)
Dept: PHYSICAL THERAPY | Facility: CLINIC | Age: 54
Setting detail: THERAPIES SERIES
Discharge: HOME | End: 2019-02-06
Payer: COMMERCIAL

## 2019-02-06 PROCEDURE — G0283 ELEC STIM OTHER THAN WOUND: HCPCS

## 2019-02-06 PROCEDURE — 97110 THERAPEUTIC EXERCISES: CPT

## 2019-02-13 ENCOUNTER — HOSPITAL ENCOUNTER (OUTPATIENT)
Dept: PHYSICAL THERAPY | Facility: CLINIC | Age: 54
Setting detail: THERAPIES SERIES
Discharge: HOME | End: 2019-02-13
Payer: COMMERCIAL
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
## 2019-02-14 ENCOUNTER — HOSPITAL ENCOUNTER (OUTPATIENT)
Dept: PHYSICAL THERAPY | Facility: CLINIC | Age: 54
Setting detail: THERAPIES SERIES
Discharge: HOME | End: 2019-02-14
Payer: COMMERCIAL

## 2019-02-14 PROCEDURE — 97110 THERAPEUTIC EXERCISES: CPT

## 2019-02-14 PROCEDURE — G0283 ELEC STIM OTHER THAN WOUND: HCPCS

## 2019-02-19 ENCOUNTER — HOSPITAL ENCOUNTER (OUTPATIENT)
Dept: PHYSICAL THERAPY | Facility: CLINIC | Age: 54
Setting detail: THERAPIES SERIES
Discharge: HOME | End: 2019-02-19
Payer: COMMERCIAL

## 2019-02-19 PROCEDURE — 97110 THERAPEUTIC EXERCISES: CPT

## 2019-02-19 PROCEDURE — G0283 ELEC STIM OTHER THAN WOUND: HCPCS

## 2019-02-20 ENCOUNTER — HOSPITAL ENCOUNTER (EMERGENCY)
Age: 54
Discharge: HOME | End: 2019-02-20
Attending: EMERGENCY MEDICINE

## 2019-02-20 ENCOUNTER — HOSPITAL ENCOUNTER (OUTPATIENT)
Dept: PHYSICAL THERAPY | Facility: CLINIC | Age: 54
Setting detail: THERAPIES SERIES
Discharge: HOME | End: 2019-02-20
Payer: COMMERCIAL

## 2019-02-20 ENCOUNTER — TELEPHONE (OUTPATIENT)
Dept: INTERNAL MEDICINE | Age: 54
End: 2019-02-20

## 2019-02-20 VITALS
OXYGEN SATURATION: 97 % | TEMPERATURE: 99.1 F | HEART RATE: 98 BPM | SYSTOLIC BLOOD PRESSURE: 158 MMHG | DIASTOLIC BLOOD PRESSURE: 102 MMHG | RESPIRATION RATE: 18 BRPM

## 2019-02-20 DIAGNOSIS — M54.12 CERVICAL RADICULOPATHY: Primary | ICD-10-CM

## 2019-02-20 PROCEDURE — 6370000000 HC RX 637 (ALT 250 FOR IP): Performed by: STUDENT IN AN ORGANIZED HEALTH CARE EDUCATION/TRAINING PROGRAM

## 2019-02-20 PROCEDURE — 6360000002 HC RX W HCPCS: Performed by: STUDENT IN AN ORGANIZED HEALTH CARE EDUCATION/TRAINING PROGRAM

## 2019-02-20 PROCEDURE — 96372 THER/PROPH/DIAG INJ SC/IM: CPT

## 2019-02-20 PROCEDURE — 99282 EMERGENCY DEPT VISIT SF MDM: CPT

## 2019-02-20 RX ORDER — TRAMADOL HYDROCHLORIDE 50 MG/1
50 TABLET ORAL EVERY 6 HOURS PRN
Qty: 12 TABLET | Refills: 0 | Status: SHIPPED | OUTPATIENT
Start: 2019-02-20 | End: 2019-02-23

## 2019-02-20 RX ORDER — PREDNISONE 20 MG/1
40 TABLET ORAL DAILY
Qty: 10 TABLET | Refills: 0 | Status: SHIPPED | OUTPATIENT
Start: 2019-02-20 | End: 2019-02-25

## 2019-02-20 RX ORDER — NALOXONE HYDROCHLORIDE 1 MG/ML
2 INJECTION INTRAMUSCULAR; INTRAVENOUS; SUBCUTANEOUS PRN
Status: DISCONTINUED | OUTPATIENT
Start: 2019-02-20 | End: 2019-02-20

## 2019-02-20 RX ORDER — KETOROLAC TROMETHAMINE 15 MG/ML
15 INJECTION, SOLUTION INTRAMUSCULAR; INTRAVENOUS ONCE
Status: COMPLETED | OUTPATIENT
Start: 2019-02-20 | End: 2019-02-20

## 2019-02-20 RX ORDER — PREDNISONE 20 MG/1
40 TABLET ORAL ONCE
Status: COMPLETED | OUTPATIENT
Start: 2019-02-20 | End: 2019-02-20

## 2019-02-20 RX ADMIN — KETOROLAC TROMETHAMINE 15 MG: 15 INJECTION, SOLUTION INTRAMUSCULAR; INTRAVENOUS at 18:02

## 2019-02-20 RX ADMIN — PREDNISONE 40 MG: 20 TABLET ORAL at 18:02

## 2019-02-20 ASSESSMENT — ENCOUNTER SYMPTOMS
SINUS PAIN: 0
EYE REDNESS: 0
ABDOMINAL PAIN: 0
BLOOD IN STOOL: 0
SHORTNESS OF BREATH: 0
VOMITING: 0
COUGH: 0
NAUSEA: 0
WHEEZING: 0
APNEA: 0
DIARRHEA: 0
EYE PAIN: 0
RHINORRHEA: 0

## 2019-02-20 ASSESSMENT — PAIN SCALES - GENERAL
PAINLEVEL_OUTOF10: 8
PAINLEVEL_OUTOF10: 8

## 2019-02-20 ASSESSMENT — PAIN DESCRIPTION - DESCRIPTORS: DESCRIPTORS: ACHING;DISCOMFORT

## 2019-02-20 ASSESSMENT — PAIN DESCRIPTION - LOCATION: LOCATION: SHOULDER

## 2019-02-20 ASSESSMENT — PAIN DESCRIPTION - ORIENTATION: ORIENTATION: LEFT

## 2019-02-20 NOTE — TELEPHONE ENCOUNTER
Received refill request form from [address] [place] [address], filled out placed in PCP in basket for signing.

## 2019-02-25 ENCOUNTER — HOSPITAL ENCOUNTER (OUTPATIENT)
Dept: PHYSICAL THERAPY | Facility: CLINIC | Age: 54
Setting detail: THERAPIES SERIES
Discharge: HOME | End: 2019-02-25
Payer: COMMERCIAL

## 2019-02-25 PROCEDURE — G0283 ELEC STIM OTHER THAN WOUND: HCPCS

## 2019-02-25 PROCEDURE — 97110 THERAPEUTIC EXERCISES: CPT

## 2019-02-27 ENCOUNTER — HOSPITAL ENCOUNTER (OUTPATIENT)
Dept: PHYSICAL THERAPY | Facility: CLINIC | Age: 54
Setting detail: THERAPIES SERIES
Discharge: HOME | End: 2019-02-27
Payer: COMMERCIAL

## 2019-02-27 PROCEDURE — G0283 ELEC STIM OTHER THAN WOUND: HCPCS

## 2019-02-27 PROCEDURE — 97110 THERAPEUTIC EXERCISES: CPT

## 2019-03-04 ENCOUNTER — HOSPITAL ENCOUNTER (OUTPATIENT)
Dept: PHYSICAL THERAPY | Facility: CLINIC | Age: 54
Setting detail: THERAPIES SERIES
Discharge: HOME | End: 2019-03-04
Payer: COMMERCIAL

## 2019-03-04 PROCEDURE — 97110 THERAPEUTIC EXERCISES: CPT

## 2019-03-04 PROCEDURE — G0283 ELEC STIM OTHER THAN WOUND: HCPCS

## 2019-03-06 ENCOUNTER — HOSPITAL ENCOUNTER (OUTPATIENT)
Dept: PHYSICAL THERAPY | Facility: CLINIC | Age: 54
Setting detail: THERAPIES SERIES
Discharge: HOME | End: 2019-03-06
Payer: COMMERCIAL

## 2019-03-06 PROCEDURE — 97110 THERAPEUTIC EXERCISES: CPT

## 2019-03-06 PROCEDURE — G0283 ELEC STIM OTHER THAN WOUND: HCPCS

## 2019-03-11 ENCOUNTER — HOSPITAL ENCOUNTER (OUTPATIENT)
Dept: PHYSICAL THERAPY | Facility: CLINIC | Age: 54
Setting detail: THERAPIES SERIES
Discharge: HOME | End: 2019-03-11
Payer: COMMERCIAL

## 2019-03-11 PROCEDURE — 97110 THERAPEUTIC EXERCISES: CPT

## 2019-03-11 PROCEDURE — G0283 ELEC STIM OTHER THAN WOUND: HCPCS

## 2019-03-13 ENCOUNTER — HOSPITAL ENCOUNTER (OUTPATIENT)
Dept: PHYSICAL THERAPY | Facility: CLINIC | Age: 54
Setting detail: THERAPIES SERIES
Discharge: HOME | End: 2019-03-13
Payer: COMMERCIAL

## 2019-03-13 PROCEDURE — G0283 ELEC STIM OTHER THAN WOUND: HCPCS

## 2019-03-13 PROCEDURE — 97110 THERAPEUTIC EXERCISES: CPT

## 2019-03-18 ENCOUNTER — HOSPITAL ENCOUNTER (OUTPATIENT)
Dept: PHYSICAL THERAPY | Facility: CLINIC | Age: 54
Setting detail: THERAPIES SERIES
Discharge: HOME | End: 2019-03-18
Payer: COMMERCIAL

## 2019-03-18 PROCEDURE — G0283 ELEC STIM OTHER THAN WOUND: HCPCS

## 2019-03-18 PROCEDURE — 97110 THERAPEUTIC EXERCISES: CPT

## 2019-03-20 ENCOUNTER — HOSPITAL ENCOUNTER (OUTPATIENT)
Dept: PHYSICAL THERAPY | Facility: CLINIC | Age: 54
Setting detail: THERAPIES SERIES
Discharge: HOME | End: 2019-03-20
Payer: COMMERCIAL

## 2019-03-20 PROCEDURE — 97110 THERAPEUTIC EXERCISES: CPT

## 2019-03-20 PROCEDURE — G0283 ELEC STIM OTHER THAN WOUND: HCPCS

## 2019-03-27 ENCOUNTER — HOSPITAL ENCOUNTER (OUTPATIENT)
Dept: PHYSICAL THERAPY | Facility: CLINIC | Age: 54
Setting detail: THERAPIES SERIES
Discharge: HOME | End: 2019-03-27
Payer: COMMERCIAL

## 2019-03-27 PROCEDURE — G0283 ELEC STIM OTHER THAN WOUND: HCPCS

## 2019-03-27 PROCEDURE — 97110 THERAPEUTIC EXERCISES: CPT

## 2019-04-15 NOTE — TELEPHONE ENCOUNTER
Called pt and notified her PAP supplies arrived at clinic and were available for . Pt stated she has enough to last her until her next apt and would  the supplies then so she would only use one ins ride.

## 2019-04-16 ENCOUNTER — OFFICE VISIT (OUTPATIENT)
Dept: INTERNAL MEDICINE | Age: 54
End: 2019-04-16
Payer: COMMERCIAL

## 2019-04-16 VITALS
HEART RATE: 92 BPM | DIASTOLIC BLOOD PRESSURE: 88 MMHG | HEIGHT: 66 IN | BODY MASS INDEX: 40.76 KG/M2 | WEIGHT: 253.6 LBS | SYSTOLIC BLOOD PRESSURE: 142 MMHG

## 2019-04-16 DIAGNOSIS — I10 ESSENTIAL HYPERTENSION: ICD-10-CM

## 2019-04-16 DIAGNOSIS — E11.42 TYPE 2 DIABETES MELLITUS WITH POLYNEUROPATHY (HCC): ICD-10-CM

## 2019-04-16 DIAGNOSIS — M75.42 SHOULDER IMPINGEMENT, LEFT: ICD-10-CM

## 2019-04-16 DIAGNOSIS — E11.8 TYPE 2 DIABETES MELLITUS WITH COMPLICATION, WITH LONG-TERM CURRENT USE OF INSULIN (HCC): ICD-10-CM

## 2019-04-16 DIAGNOSIS — M54.41 CHRONIC RIGHT-SIDED LOW BACK PAIN WITH RIGHT-SIDED SCIATICA: ICD-10-CM

## 2019-04-16 DIAGNOSIS — G89.29 CHRONIC RIGHT-SIDED LOW BACK PAIN WITH RIGHT-SIDED SCIATICA: ICD-10-CM

## 2019-04-16 DIAGNOSIS — M25.551 PAIN OF RIGHT HIP JOINT: ICD-10-CM

## 2019-04-16 DIAGNOSIS — M54.41 CHRONIC RIGHT-SIDED LOW BACK PAIN WITH RIGHT-SIDED SCIATICA: Primary | ICD-10-CM

## 2019-04-16 DIAGNOSIS — G56.03 BILATERAL CARPAL TUNNEL SYNDROME: ICD-10-CM

## 2019-04-16 DIAGNOSIS — Z79.4 TYPE 2 DIABETES MELLITUS WITH COMPLICATION, WITH LONG-TERM CURRENT USE OF INSULIN (HCC): ICD-10-CM

## 2019-04-16 DIAGNOSIS — Z98.890 S/P LEFT ROTATOR CUFF REPAIR: ICD-10-CM

## 2019-04-16 DIAGNOSIS — E11.8 TYPE 2 DIABETES MELLITUS WITH COMPLICATION, UNSPECIFIED WHETHER LONG TERM INSULIN USE: Primary | ICD-10-CM

## 2019-04-16 DIAGNOSIS — G89.29 CHRONIC RIGHT-SIDED LOW BACK PAIN WITH RIGHT-SIDED SCIATICA: Primary | ICD-10-CM

## 2019-04-16 LAB — HBA1C MFR BLD: 10.5 %

## 2019-04-16 PROCEDURE — 99211 OFF/OP EST MAY X REQ PHY/QHP: CPT | Performed by: INTERNAL MEDICINE

## 2019-04-16 PROCEDURE — 83036 HEMOGLOBIN GLYCOSYLATED A1C: CPT | Performed by: INTERNAL MEDICINE

## 2019-04-16 PROCEDURE — 99214 OFFICE O/P EST MOD 30 MIN: CPT | Performed by: INTERNAL MEDICINE

## 2019-04-16 RX ORDER — LISINOPRIL 5 MG/1
5 TABLET ORAL DAILY
Qty: 30 TABLET | Refills: 5 | Status: SHIPPED | OUTPATIENT
Start: 2019-04-16 | End: 2019-07-26 | Stop reason: SDUPTHER

## 2019-04-16 RX ORDER — DULOXETIN HYDROCHLORIDE 60 MG/1
60 CAPSULE, DELAYED RELEASE ORAL DAILY
Qty: 30 CAPSULE | Refills: 5 | Status: SHIPPED | OUTPATIENT
Start: 2019-04-16 | End: 2019-04-16

## 2019-04-16 RX ORDER — TIZANIDINE 4 MG/1
4 TABLET ORAL NIGHTLY
Qty: 30 TABLET | Refills: 5 | Status: SHIPPED | OUTPATIENT
Start: 2019-04-16 | End: 2019-08-16 | Stop reason: SDUPTHER

## 2019-04-16 RX ORDER — DICLOFENAC SODIUM 75 MG/1
75 TABLET, DELAYED RELEASE ORAL 2 TIMES DAILY
Qty: 60 TABLET | Refills: 5 | Status: SHIPPED | OUTPATIENT
Start: 2019-04-16 | End: 2020-07-09

## 2019-04-16 RX ORDER — PREGABALIN 75 MG/1
75 CAPSULE ORAL 2 TIMES DAILY
Qty: 60 CAPSULE | Refills: 3 | Status: SHIPPED | OUTPATIENT
Start: 2019-04-16 | End: 2019-11-26

## 2019-04-16 ASSESSMENT — PATIENT HEALTH QUESTIONNAIRE - PHQ9
SUM OF ALL RESPONSES TO PHQ9 QUESTIONS 1 & 2: 0
SUM OF ALL RESPONSES TO PHQ QUESTIONS 1-9: 0
2. FEELING DOWN, DEPRESSED OR HOPELESS: 0
1. LITTLE INTEREST OR PLEASURE IN DOING THINGS: 0
SUM OF ALL RESPONSES TO PHQ QUESTIONS 1-9: 0

## 2019-04-16 ASSESSMENT — ENCOUNTER SYMPTOMS
BLOOD IN STOOL: 0
DIARRHEA: 0
CONSTIPATION: 0
SORE THROAT: 0
EYE DISCHARGE: 0
ABDOMINAL PAIN: 0
COUGH: 0
VOMITING: 0
EYE PAIN: 0
PHOTOPHOBIA: 0
NAUSEA: 0
COLOR CHANGE: 0
SHORTNESS OF BREATH: 0
WHEEZING: 0

## 2019-04-16 NOTE — PATIENT INSTRUCTIONS
Printed script for Wrist Splint signed and given to pt along w/ list of supply pharmacies. Return To Clinic 6/25/2019. After Visit Summary  given and reviewed. --MA    It is very important for your care that you keep your appointment. If for some reason you are unable to keep your appointment it is equally important that you call our office at [phone] to cancel your appointment and reschedule. Failure to do so may result in your termination from our practice.

## 2019-04-16 NOTE — PROGRESS NOTES
[facility]  [facility] [address] [facility]  [address] [facility] [address]  Dept: [phone]  Dept Fax: [phone]    Office Progress/Follow Up Note  Date ofpatient's visit: 4/16/2019  Patient's Name:  Snoya Farias YOB: 1965            Patient Care Team:  Thuy Lopez MD as PCP - General (Internal Medicine)  ================================================================    REASON FOR VISIT/CHIEF COMPLAINT:  3 Month Follow-Up; Diabetes; Hypertension; and Pain (shoulder pain)    HISTORY OF PRESENTING ILLNESS:  History was obtained from: patient. Wicho Elkins a 48 y.o. is here for a low of visit. Patient has history of diabetes type 2, she is both on insulin and oral hypoglycemics. A1c 3 months ago was 8.3. Today A1c is 10.2. Patient reported that she has not been taking insulin and has been drinking a lot of soda. She has 2 main complaints today are first one is pain on the left shoulder which has been a chronic problem for her. She has had surgery on that shoulder. She follows up with orthopedics. She is also complaining of recurrent numbness and tingling in bilateral finger. She has history of carpal tunnel syndrome and has had a remote surgery. She doesn't use any wrist splint at this time. She is a  and uses both hands on a daily basis. She was on Neurontin but reports side effects with it. She would like to try different medication to help with her pain.   Problem list, medications and blood work reviewed       Patient Active Problem List   Diagnosis    Allergic rhinitis    Fibromyalgia    Pure hypercholesterolemia    Chronic cholecystitis    Gastroesophageal reflux disease with esophagitis    Type 2 diabetes mellitus with complication ([address]    Diabetic polyneuropathy associated with type 2 diabetes mellitus ([address]    Chronic left shoulder pain    Essential hypertension       Health Maintenance Due   Topic Date Due    Hepatitis B Vaccine (1 of 3 - Risk 3-dose series) 01/30/1986    DTaP/Tdap/Td vaccine (1 - Tdap) 01/30/1986    Cervical cancer screen  10/11/2016    Diabetic retinal exam  11/10/2016    Shingles Vaccine (1 of 2) 01/30/2017    Diabetic foot exam  11/10/2018    A1C test (Diabetic or Prediabetic)  03/28/2019    Potassium monitoring  04/26/2019       Allergies   Allergen Reactions    Pcn [Penicillins] Hives and Other (See Comments)     Lose motor function of legs and collaps    Demerol Hcl [Meperidine] Other (See Comments)     Panic attack    Tape [place]ools Tape] Other (See Comments)     Blister      Morphine Nausea And Vomiting and Other (See Comments)     Stomach pain         Current Outpatient Medications   Medication Sig Dispense Refill    lisinopril (PRINIVIL;ZESTRIL) 5 MG tablet Take 1 tablet by mouth daily 30 tablet 5    tiZANidine (ZANAFLEX) 4 MG tablet Take 1 tablet by mouth nightly 30 tablet 5    diclofenac (VOLTAREN) 75 MG EC tablet Take 1 tablet by mouth 2 times daily 60 tablet 5    metFORMIN (GLUCOPHAGE) 500 MG tablet Take 1 tablet by mouth 2 times daily (with meals) 60 tablet 5    insulin lispro (HUMALOG KWIKPEN) 100 UNIT/ML pen Inject 10 Units into the skin 3 times daily (before meals) 5 pen 5    Insulin Degludec (TRESIBA FLEXTOUCH) 100 UNIT/ML SOPN Inject 40 Units into the skin nightly 5 pen 5    Elastic Bandages & Supports (WRIST SPLINT LEFT/RIGHT) MISC Dx: CTS bilateral, use daiy 2 each 0    DULoxetine (CYMBALTA) 60 MG extended release capsule Take 1 capsule by mouth daily 30 capsule 5    hydrOXYzine (ATARAX) 25 MG tablet Take 1 tablet by mouth 3 times daily as needed for Itching 9 tablet 0    VITAMIN E PO Take 500 Units by mouth three times a week      vitamin D (CHOLECALCIFEROL) 1000 UNIT TABS tablet Take 1,000 Units by mouth every morning       Blood Glucose Monitoring Suppl (BLOOD GLUCOSE METER) KIT Dx: DM-2. Use 2-3 times daily 1 kit 0    Lancets MISC Dx: DM-2. Adult   Pulse: 92    Weight: 253 lb 9.6 oz (115 kg)    Height: 5' 6.5\" (1.689 m)      BP Readings from Last 3 Encounters:   04/16/19 (!) 142/88   02/20/19 (!) 158/102   12/28/18 136/88        Physical Exam   Constitutional: She is oriented to person, place, and time. No distress. HENT:   Head: Normocephalic and atraumatic. Right Ear: External ear normal.   Left Ear: External ear normal.   Nose: Nose normal.   Mouth/Throat: No oropharyngeal exudate. Eyes: Pupils are equal, round, and reactive to light. EOM are normal.   Neck: Normal range of motion. Neck supple. No thyromegaly present. Cardiovascular: Normal rate, regular rhythm, normal heart sounds and intact distal pulses. Pulmonary/Chest: Effort normal and breath sounds normal. No respiratory distress. She has no wheezes. Abdominal: Soft. Bowel sounds are normal. She exhibits no distension and no mass. There is no tenderness. Musculoskeletal: Normal range of motion. She exhibits no edema or tenderness. Neurological: She is alert and oriented to person, place, and time. No cranial nerve deficit. Skin: Skin is warm. No rash noted. She is not diaphoretic. No erythema. Psychiatric: Judgment normal.         DIAGNOSTIC FINDINGS:  CBC:  Lab Results   Component Value Date    WBC 5.7 04/26/2018    HGB 13.5 06/11/2018     04/26/2018       BMP:    Lab Results   Component Value Date     04/26/2018    K 4.1 04/26/2018    CL 97 04/26/2018    CO2 26 04/26/2018    BUN 5 06/11/2018    CREATININE 0.60 06/11/2018    GLUCOSE 382 06/27/2018       HEMOGLOBIN A1C:   Lab Results   Component Value Date    LABA1C 10.5 04/16/2019       FASTING LIPID PANEL:  Lab Results   Component Value Date    CHOL 200 (H) 06/29/2018    HDL 48 06/29/2018    TRIG 194 (H) 06/29/2018       ASSESSMENT AND PLAN:  Ernetso was seen today for 3 month follow-up, diabetes, hypertension and pain.     Diagnoses and all orders for this visit:    Type 2 diabetes mellitus with complication, unspecified whether long term insulin use (HCC)  -     POCT glycosylated hemoglobin (Hb A1C)  -     insulin lispro (HUMALOG KWIKPEN) 100 UNIT/ML pen; Inject 10 Units into the skin 3 times daily (before meals)  -     Insulin Degludec (TRESIBA FLEXTOUCH) 100 UNIT/ML SOPN; Inject 40 Units into the skin nightly    Essential hypertension  -     lisinopril (PRINIVIL;ZESTRIL) 5 MG tablet; Take 1 tablet by mouth daily    Pain of right hip joint  -     tiZANidine (ZANAFLEX) 4 MG tablet; Take 1 tablet by mouth nightly  -     DULoxetine (CYMBALTA) 60 MG extended release capsule; Take 1 capsule by mouth daily    Chronic right-sided low back pain with right-sided sciatica  -     tiZANidine (ZANAFLEX) 4 MG tablet; Take 1 tablet by mouth nightly    Shoulder impingement, left  -     diclofenac (VOLTAREN) 75 MG EC tablet; Take 1 tablet by mouth 2 times daily  -     DULoxetine (CYMBALTA) 60 MG extended release capsule; Take 1 capsule by mouth daily    S/P left rotator cuff repair  -     diclofenac (VOLTAREN) 75 MG EC tablet; Take 1 tablet by mouth 2 times daily    Type 2 diabetes mellitus with complication (HCC)  -     metFORMIN (GLUCOPHAGE) 500 MG tablet; Take 1 tablet by mouth 2 times daily (with meals)    Bilateral carpal tunnel syndrome  -     Elastic Bandages & Supports (WRIST SPLINT LEFT/RIGHT) MISC; Dx: CTS bilateral, use daiy  -     DULoxetine (CYMBALTA) 60 MG extended release capsule; Take 1 capsule by mouth daily      FOLLOW UP AND INSTRUCTIONS:  · Return in about 3 months (around 7/16/2019). · Carlos Alvarenga received counseling on the following healthy behaviors: nutrition and exercise    · Discussed use, benefit, and side effects of prescribed medications. Barriers to medication compliance addressed. All patient questions answered. Pt voiced understanding.     · Patient given educational materials - see patient instructions    Thuy Kruse MD, [place], [address]  Internal Medicine Physician  [facility]

## 2019-04-16 NOTE — PROGRESS NOTES
monitoring  04/26/2019    Creatinine monitoring  06/11/2019    Diabetic microalbuminuria test  06/29/2019    Lipid screen  06/29/2019    A1C test (Diabetic or Prediabetic)  12/28/2019    Breast cancer screen  04/11/2020    Colon cancer screen colonoscopy  02/21/2021    Flu vaccine  Completed    Pneumococcal 0-64 years Vaccine  Completed    HIV screen  Completed

## 2019-04-17 ENCOUNTER — OFFICE VISIT (OUTPATIENT)
Dept: ORTHOPEDIC SURGERY | Age: 54
End: 2019-04-17

## 2019-04-17 VITALS — WEIGHT: 253.53 LBS | HEIGHT: 66 IN | BODY MASS INDEX: 40.75 KG/M2

## 2019-04-17 DIAGNOSIS — M25.512 CHRONIC LEFT SHOULDER PAIN: Primary | ICD-10-CM

## 2019-04-17 DIAGNOSIS — G89.29 CHRONIC LEFT SHOULDER PAIN: Primary | ICD-10-CM

## 2019-04-17 PROCEDURE — 99213 OFFICE O/P EST LOW 20 MIN: CPT | Performed by: STUDENT IN AN ORGANIZED HEALTH CARE EDUCATION/TRAINING PROGRAM

## 2019-04-17 PROCEDURE — 20610 DRAIN/INJ JOINT/BURSA W/O US: CPT | Performed by: STUDENT IN AN ORGANIZED HEALTH CARE EDUCATION/TRAINING PROGRAM

## 2019-04-17 PROCEDURE — 96372 THER/PROPH/DIAG INJ SC/IM: CPT | Performed by: STUDENT IN AN ORGANIZED HEALTH CARE EDUCATION/TRAINING PROGRAM

## 2019-04-17 RX ORDER — METHYLPREDNISOLONE ACETATE 80 MG/ML
80 INJECTION, SUSPENSION INTRA-ARTICULAR; INTRALESIONAL; INTRAMUSCULAR; SOFT TISSUE ONCE
Status: COMPLETED | OUTPATIENT
Start: 2019-04-17 | End: 2019-04-18

## 2019-04-17 RX ORDER — LIDOCAINE HYDROCHLORIDE 10 MG/ML
0.5 INJECTION, SOLUTION INFILTRATION; PERINEURAL ONCE
Status: COMPLETED | OUTPATIENT
Start: 2019-04-17 | End: 2019-04-18

## 2019-04-17 RX ORDER — BUPIVACAINE HYDROCHLORIDE 2.5 MG/ML
0.5 INJECTION, SOLUTION INFILTRATION; PERINEURAL ONCE
Status: COMPLETED | OUTPATIENT
Start: 2019-04-17 | End: 2019-04-18

## 2019-04-17 NOTE — PROGRESS NOTES
[address] [place] [address] [place] [address]  Dept: [phone]  Dept Fax: [phone]        Ambulatory Follow Up      Subjective:   Esau Harvey is a 48y.o. year old female who presents to our office today for routine followup regarding her     Chief Complaint   Patient presents with    Shoulder Pain     left       HPI  Patient is a 47-year-old female here today for follow-up regarding left shoulder pain status post left shoulder rotator cuff repair with subacromial decompression on 6/19/2018. Patient has continued pain located over the anterior superior aspect of the shoulder as well as laterally just distal to the acromion. Patient has been doing physical therapy throughout the entire year. She states that she just finished up his physical therapy recently. She notes that she has been doing exercises at home however only 1-2 per week. Patient has been taking ibuprofen for pain relief with minimal relief. Patient notes occasional numbness in her 2nd and 3rd digits. Patient does have a history of carpal tunnel. She denies any neck pain. She denies any new falls or injuries. Patient denies any significant weakness. Patient denies any recent fevers, chills, chest pain, shortness breath, nausea, vomiting. Review of Systems  Negative otherwise noted in HPI. I have reviewed the CC, HPI, ROS, PMH, FHX, Social History. I agree with the documentation provided by other staff, residents, and/or medical students and have reviewed their documentation prior to providing my signature indicating agreement. Objective :   General: Esau Harvey is a 48 y.o. female who is alert and oriented and sitting comfortably in our office. Neuro: alert. oriented  Eyes: Extra-ocular muscles intact  Mouth: Oral mucosa moist. No perioral lesions  Pulm: Respirations unlabored and regular.   Skin: warm, well perfused  Psych:   Patient has good fund of knowledge and displays understanging of exam, diagnosis, and plan. LUE: Range of motion forward flexion to 140° abduction to 95° of external rotation to 20° internal rotation to L5. 5/5 strength in external rotation and abduction internal rotation. Tender to palpation over the left AC joint. positive crossarm. Positive Alyson's, positive Tinel's at the carpal tunnel. Compartments soft. 2+ rad pulse. Median/Radial/Ulnar/AIN/PIN motor intact. Median/Radial/Ulnar nerve SILT. Radiology:   No new radiographic images taken at today's visit. Assessment:    1. Left acromioclavicular joint osteoarthritis. 2. Postop left shoulder arthroscopy with rotator cuff repair and subacromial decompression. 3. Left carpal tunnel syndrome. Plan:   - Patient was encouraged to continue with her home therapy exercises and to decrease the frequency continue working on range of motion and stretching exercises. - Degree acute tenderness directly over the before meals joint. The injection was performed at today's visit. Patient tolerated the injection well and noted an interval improvement in her symptoms. See procedure note below for details. - Has for the carpal tunnel recommend night splints and anti-inflammatories  - If symptoms worsen we'll consider an EMG/NCS to ensure this is not due to her neuropathy.   - Patient was encouraged to continue with anti-inflammatories for pain control.   - Patient can follow-up in 4-6 weeks for repeat evaluation. Procedure: Risks, benefits, and alternatives have been discussed regarding therapeutic and diagnostic injection of the Left AC joint. Patient agreed to move forward with the proposed procedure. After sterile prep of the Left AC joint we proceeded to insert a 25 gauge needle into the joint, injecting 2cc bupivicaine and 80 mg Depo-medrol. Patient tolerated the procedure well and expressed interval improvement in symptoms.        Ra Arvizu,   PGY-2, Department of [address] [place] [address] [address]55 Rocky Velasquez, [place], [place]  9:82 PM 4/17/2019

## 2019-04-18 ENCOUNTER — TELEPHONE (OUTPATIENT)
Dept: INTERNAL MEDICINE | Age: 54
End: 2019-04-18

## 2019-04-18 RX ADMIN — METHYLPREDNISOLONE ACETATE 80 MG: 80 INJECTION, SUSPENSION INTRA-ARTICULAR; INTRALESIONAL; INTRAMUSCULAR; SOFT TISSUE at 10:31

## 2019-04-18 RX ADMIN — BUPIVACAINE HYDROCHLORIDE 1.25 MG: 2.5 INJECTION, SOLUTION INFILTRATION; PERINEURAL at 10:29

## 2019-04-18 RX ADMIN — LIDOCAINE HYDROCHLORIDE 0.5 ML: 10 INJECTION, SOLUTION INFILTRATION; PERINEURAL at 10:31

## 2019-04-18 NOTE — TELEPHONE ENCOUNTER
Patient called to let us know she got another steroid shot yesterday in the orthopedic clinic. She is monitoring her blood sugars as follows:    04/17  6 PM:  151 9 PM:  151  04/18  FBS:    197    I instructed her to continue monitoring closely. Call on call provider this weekend if blood sugars rise.

## 2019-04-26 DIAGNOSIS — E11.9 DM (DIABETES MELLITUS) (HCC): ICD-10-CM

## 2019-06-06 ENCOUNTER — TELEPHONE (OUTPATIENT)
Dept: INTERNAL MEDICINE | Age: 54
End: 2019-06-06

## 2019-06-21 DIAGNOSIS — E11.42 DIABETIC POLYNEUROPATHY ASSOCIATED WITH TYPE 2 DIABETES MELLITUS (HCC): ICD-10-CM

## 2019-06-21 RX ORDER — GABAPENTIN 300 MG/1
CAPSULE ORAL
Qty: 90 CAPSULE | Refills: 1 | Status: SHIPPED | OUTPATIENT
Start: 2019-06-21 | End: 2019-08-09 | Stop reason: SINTOL

## 2019-07-06 ENCOUNTER — HOSPITAL ENCOUNTER (EMERGENCY)
Age: 54
Discharge: HOME | End: 2019-07-06
Attending: EMERGENCY MEDICINE

## 2019-07-06 VITALS
TEMPERATURE: 98.8 F | OXYGEN SATURATION: 100 % | DIASTOLIC BLOOD PRESSURE: 100 MMHG | RESPIRATION RATE: 15 BRPM | HEART RATE: 94 BPM | SYSTOLIC BLOOD PRESSURE: 155 MMHG

## 2019-07-06 DIAGNOSIS — B37.0 THRUSH: Primary | ICD-10-CM

## 2019-07-06 PROCEDURE — 99282 EMERGENCY DEPT VISIT SF MDM: CPT

## 2019-07-06 ASSESSMENT — ENCOUNTER SYMPTOMS
SHORTNESS OF BREATH: 0
TROUBLE SWALLOWING: 0

## 2019-07-06 ASSESSMENT — PAIN DESCRIPTION - FREQUENCY: FREQUENCY: CONTINUOUS

## 2019-07-06 ASSESSMENT — PAIN DESCRIPTION - PAIN TYPE: TYPE: ACUTE PAIN

## 2019-07-06 ASSESSMENT — PAIN DESCRIPTION - LOCATION: LOCATION: MOUTH

## 2019-07-06 ASSESSMENT — PAIN DESCRIPTION - DESCRIPTORS: DESCRIPTORS: ACHING

## 2019-07-06 ASSESSMENT — PAIN DESCRIPTION - ORIENTATION: ORIENTATION: LEFT

## 2019-07-06 ASSESSMENT — PAIN SCALES - GENERAL: PAINLEVEL_OUTOF10: 5

## 2019-07-06 NOTE — ED PROVIDER NOTES
(Left, 2018). Social History     Socioeconomic History    Marital status: Legally      Spouse name: Not on file    Number of children: 3    Years of education: Not on file    Highest education level: Not on file   Occupational History    Occupation:    Social Needs    Financial resource strain: Not on file    Food insecurity:     Worry: Not on file     Inability: Not on file    Transportation needs:     Medical: Not on file     Non-medical: Not on file   Tobacco Use    Smoking status: Former Smoker     Packs/day: 2.00     Years: 34.00     Pack years: 68.00     Types: Cigarettes     Start date:      Last attempt to quit: 2017     Years since quittin.7    Smokeless tobacco: Never Used   Substance and Sexual Activity    Alcohol use:  Yes     Alcohol/week: 0.0 oz     Comment: rare    Drug use: No    Sexual activity: Never   Lifestyle    Physical activity:     Days per week: Not on file     Minutes per session: Not on file    Stress: Not on file   Relationships    Social connections:     Talks on phone: Not on file     Gets together: Not on file     Attends Hoahaoism service: Not on file     Active member of club or organization: Not on file     Attends meetings of clubs or organizations: Not on file     Relationship status: Not on file    Intimate partner violence:     Fear of current or ex partner: Not on file     Emotionally abused: Not on file     Physically abused: Not on file     Forced sexual activity: Not on file   Other Topics Concern    Not on file   Social History Narrative    Not on file       Family History   Problem Relation Age of Onset    Diabetes Mother     Heart Disease Mother         murmur    Heart Disease Father         MVP    Diabetes Sister     Diabetes Maternal Grandmother     High Blood Pressure Maternal Grandmother     Heart Failure Maternal Grandfather     Heart Attack Paternal Grandmother        Allergies:  Pcn

## 2019-07-24 ENCOUNTER — OFFICE VISIT (OUTPATIENT)
Dept: ORTHOPEDIC SURGERY | Age: 54
End: 2019-07-24

## 2019-07-24 VITALS — HEIGHT: 66 IN | WEIGHT: 253.53 LBS | BODY MASS INDEX: 40.75 KG/M2

## 2019-07-24 DIAGNOSIS — G89.29 CHRONIC LEFT SHOULDER PAIN: Primary | ICD-10-CM

## 2019-07-24 DIAGNOSIS — G56.02 CARPAL TUNNEL SYNDROME OF LEFT WRIST: ICD-10-CM

## 2019-07-24 DIAGNOSIS — M25.512 CHRONIC LEFT SHOULDER PAIN: Primary | ICD-10-CM

## 2019-07-24 PROCEDURE — 99213 OFFICE O/P EST LOW 20 MIN: CPT | Performed by: STUDENT IN AN ORGANIZED HEALTH CARE EDUCATION/TRAINING PROGRAM

## 2019-07-24 PROCEDURE — 20610 DRAIN/INJ JOINT/BURSA W/O US: CPT | Performed by: STUDENT IN AN ORGANIZED HEALTH CARE EDUCATION/TRAINING PROGRAM

## 2019-07-24 RX ORDER — COVID-19 ANTIGEN TEST
KIT MISCELLANEOUS
COMMUNITY
End: 2019-07-30

## 2019-07-24 RX ORDER — ACETAMINOPHEN 325 MG/1
650 TABLET ORAL EVERY 6 HOURS PRN
COMMUNITY
End: 2019-07-30

## 2019-07-24 RX ORDER — METHYLPREDNISOLONE ACETATE 80 MG/ML
80 INJECTION, SUSPENSION INTRA-ARTICULAR; INTRALESIONAL; INTRAMUSCULAR; SOFT TISSUE ONCE
Status: COMPLETED | OUTPATIENT
Start: 2019-07-24 | End: 2019-07-25

## 2019-07-24 RX ORDER — BUPIVACAINE HYDROCHLORIDE 2.5 MG/ML
2 INJECTION, SOLUTION INFILTRATION; PERINEURAL ONCE
Status: COMPLETED | OUTPATIENT
Start: 2019-07-24 | End: 2019-07-25

## 2019-07-24 ASSESSMENT — ENCOUNTER SYMPTOMS
SHORTNESS OF BREATH: 0
NAUSEA: 0
VOMITING: 0

## 2019-07-24 NOTE — PROGRESS NOTES
[facility]  [facility]  [address] [facility] [address]  Dept: [phone]  Dept Fax: [phone]        Ambulatory Follow Up      Subjective:   Sienna Barr is a 48y.o. year old female who presents to our office today for routine followup regarding her No diagnosis found. .    Chief Complaint   Patient presents with    Shoulder Pain     left     HPI    Patient is a 57-year-old ambidextrous female comes in today for routine follow-up of her left shoulder pain and left hand numbness tingling. Briefly, patient has a history of a left shoulder scope rotator cuff repair performed in June 2018. She has had continued discomfort to the shoulder that is exacerbated with reaching out and grabbing heavy objects. Patient was last seen in our office in April of this year provided with a left AC joint injection. She reports that it provided good relief for several months. Day, patient reports that she has had return of her symptoms. 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 She takes a muscle relaxer as prescribed by her PCP as well as over-the-counter anti-inflammatories and Tylenol which she says provides minimal relief. Patient also reports that a significant amount of her pain comes from the bra strap across her shoulder and then when she is not wearing her bra she has less pain in the shoulder, though it is still present. Today, patient also reports that she has right shoulder pain that is similar in nature to her left shoulder pain that has recently restarted. Regards to her left hand numbness and tingling, she reports that she has had chronic dysesthesias to her hand she occasionally has to [place] her hand off\" to help. Review of Systems   Constitutional: Negative for chills and fever. Respiratory: Negative for shortness of breath. Cardiovascular: Negative for chest pain. Gastrointestinal: Negative for nausea and vomiting. Neurological: Positive for numbness (dysesthesias to left hand). Objective :   Ht 5' 6.5\" (1.689 m)   Wt 253 lb 8.5 oz (115 kg)   BMI 40.31 kg/m²  Body mass index is 40.31 kg/m². General: Graeme Morrell is a 48 y.o. female who is alert and oriented and sitting comfortably in our office. Ortho Exam  LUE: Tenderness to palpation to anterior and lateral aspects of shoulder. Mild tenderness palpation over AC joint. Forward flexion and abduction to approximately 115 degrees each. External rotation to 60 degrees, internal rotation to back pocket. Negative belly press test.  Negative Mcintosh. Negative Yergason's and speeds. Negative crossarm test.  Pain extending into palm and digits with Durkan's test.  RUE: Tenderness to palpation to lateral aspect of shoulder adjacent to acromion forward flexion abduction to approximately 120 degrees each. External rotation to 65 degrees. Mildly positive Mcintosh. Neuro: alert and oriented to person and place. Eyes: Extra-ocular muscles intact  Mouth: Oral mucosa moist. No perioral lesions  Pulm: Respirations unlabored and regular. Symmetric chest excursion without outward deformity is noted. Skin: warm, well perfused  Psych: Patient has good fund of knowledge and displays understanging of exam, diagnosis, and plan. Assessment:     Left rotator cuff tear  Left carpal tunnel syndrom    Plan: Today, we feel that most of patient's left shoulder symptoms are likely coming from subacromial impingement and not AC arthritis, which seems to have largely resolved at this point. We do believe that a significant amount of her pain likely comes from the bra strap across her arm which likely lies over her [facility] joint. She was provided with a left subacromial injection in office today.   She is advised to monitor her blood sugars very closely due to her recent hemoglobin A1c of 10.1. We will not provide her with a right shoulder injection at this time, instead, favoring waiting 1 further week due to her history of elevated blood sugars. Patient is advised to monitor for signs and symptoms of infection and come to the ER or call our office if they occur. We also provided the patient with a referral for [facility] for left upper extremity EMG. Patient to follow-up in 1 week for consideration of right shoulder injection and follow-up after EMG is complete to discuss results. Follow up:No follow-ups on file. No orders of the defined types were placed in this encounter. No orders of the defined types were placed in this encounter.       Electronically signed by Netta Alaniz DO on 7/24/2019 at 1:31 PM

## 2019-07-25 RX ADMIN — BUPIVACAINE HYDROCHLORIDE 5 MG: 2.5 INJECTION, SOLUTION INFILTRATION; PERINEURAL at 08:38

## 2019-07-25 RX ADMIN — METHYLPREDNISOLONE ACETATE 80 MG: 80 INJECTION, SUSPENSION INTRA-ARTICULAR; INTRALESIONAL; INTRAMUSCULAR; SOFT TISSUE at 08:39

## 2019-07-26 ENCOUNTER — OFFICE VISIT (OUTPATIENT)
Dept: INTERNAL MEDICINE | Age: 54
End: 2019-07-26

## 2019-07-26 VITALS
HEIGHT: 66 IN | SYSTOLIC BLOOD PRESSURE: 149 MMHG | DIASTOLIC BLOOD PRESSURE: 88 MMHG | HEART RATE: 78 BPM | BODY MASS INDEX: 38.86 KG/M2 | WEIGHT: 241.8 LBS

## 2019-07-26 DIAGNOSIS — I10 ESSENTIAL HYPERTENSION: ICD-10-CM

## 2019-07-26 DIAGNOSIS — E55.9 VITAMIN D DEFICIENCY: ICD-10-CM

## 2019-07-26 DIAGNOSIS — K76.0 HEPATIC STEATOSIS: ICD-10-CM

## 2019-07-26 DIAGNOSIS — K21.00 GERD WITH ESOPHAGITIS: ICD-10-CM

## 2019-07-26 DIAGNOSIS — E08.42 DIABETIC POLYNEUROPATHY ASSOCIATED WITH DIABETES MELLITUS DUE TO UNDERLYING CONDITION (HCC): ICD-10-CM

## 2019-07-26 DIAGNOSIS — G89.29 CHRONIC LEFT SHOULDER PAIN: ICD-10-CM

## 2019-07-26 DIAGNOSIS — M25.512 CHRONIC LEFT SHOULDER PAIN: ICD-10-CM

## 2019-07-26 DIAGNOSIS — E11.8 TYPE 2 DIABETES MELLITUS WITH COMPLICATION, UNSPECIFIED WHETHER LONG TERM INSULIN USE: Primary | ICD-10-CM

## 2019-07-26 DIAGNOSIS — Z23 NEED FOR PROPHYLACTIC VACCINATION AGAINST DIPHTHERIA-TETANUS-PERTUSSIS (DTP): ICD-10-CM

## 2019-07-26 DIAGNOSIS — E66.9 OBESITY (BMI 35.0-39.9 WITHOUT COMORBIDITY): ICD-10-CM

## 2019-07-26 DIAGNOSIS — E11.649 HYPOGLYCEMIC EPISODE IN PATIENT WITH DIABETES MELLITUS (HCC): ICD-10-CM

## 2019-07-26 DIAGNOSIS — B37.0 THRUSH: ICD-10-CM

## 2019-07-26 LAB — HBA1C MFR BLD: 8.2 %

## 2019-07-26 PROCEDURE — 83036 HEMOGLOBIN GLYCOSYLATED A1C: CPT | Performed by: STUDENT IN AN ORGANIZED HEALTH CARE EDUCATION/TRAINING PROGRAM

## 2019-07-26 PROCEDURE — 99211 OFF/OP EST MAY X REQ PHY/QHP: CPT | Performed by: INTERNAL MEDICINE

## 2019-07-26 PROCEDURE — 99214 OFFICE O/P EST MOD 30 MIN: CPT | Performed by: STUDENT IN AN ORGANIZED HEALTH CARE EDUCATION/TRAINING PROGRAM

## 2019-07-26 PROCEDURE — 82962 GLUCOSE BLOOD TEST: CPT | Performed by: STUDENT IN AN ORGANIZED HEALTH CARE EDUCATION/TRAINING PROGRAM

## 2019-07-26 RX ORDER — LISINOPRIL 5 MG/1
5 TABLET ORAL DAILY
Qty: 30 TABLET | Refills: 5 | Status: SHIPPED | OUTPATIENT
Start: 2019-07-26 | End: 2019-12-10 | Stop reason: DRUGHIGH

## 2019-07-26 RX ORDER — TRAMADOL HYDROCHLORIDE 50 MG/1
50 TABLET ORAL DAILY PRN
Qty: 12 TABLET | Refills: 0 | Status: SHIPPED | OUTPATIENT
Start: 2019-07-26 | End: 2019-07-30

## 2019-07-26 ASSESSMENT — ENCOUNTER SYMPTOMS
RESPIRATORY NEGATIVE: 1
CHOKING: 0
EYE DISCHARGE: 0
ABDOMINAL DISTENTION: 0
APNEA: 0

## 2019-07-27 ENCOUNTER — HOSPITAL ENCOUNTER (EMERGENCY)
Age: 54
Discharge: HOME | End: 2019-07-27
Attending: EMERGENCY MEDICINE

## 2019-07-27 VITALS
SYSTOLIC BLOOD PRESSURE: 185 MMHG | TEMPERATURE: 98.2 F | DIASTOLIC BLOOD PRESSURE: 101 MMHG | RESPIRATION RATE: 20 BRPM | OXYGEN SATURATION: 96 % | HEART RATE: 87 BPM

## 2019-07-27 DIAGNOSIS — M25.512 CHRONIC LEFT SHOULDER PAIN: Primary | ICD-10-CM

## 2019-07-27 DIAGNOSIS — G89.29 CHRONIC LEFT SHOULDER PAIN: Primary | ICD-10-CM

## 2019-07-27 PROCEDURE — 99283 EMERGENCY DEPT VISIT LOW MDM: CPT

## 2019-07-27 PROCEDURE — 6370000000 HC RX 637 (ALT 250 FOR IP): Performed by: STUDENT IN AN ORGANIZED HEALTH CARE EDUCATION/TRAINING PROGRAM

## 2019-07-27 RX ORDER — LIDOCAINE 4 G/G
1 PATCH TOPICAL DAILY
Qty: 7 PATCH | Refills: 0 | Status: SHIPPED | OUTPATIENT
Start: 2019-07-27 | End: 2021-03-16 | Stop reason: SDUPTHER

## 2019-07-27 RX ORDER — LIDOCAINE 4 G/G
1 PATCH TOPICAL ONCE
Status: DISCONTINUED | OUTPATIENT
Start: 2019-07-27 | End: 2019-07-27 | Stop reason: HOSPADM

## 2019-07-27 RX ORDER — OXYCODONE HYDROCHLORIDE 5 MG/1
5 TABLET ORAL ONCE
Status: COMPLETED | OUTPATIENT
Start: 2019-07-27 | End: 2019-07-27

## 2019-07-27 RX ADMIN — OXYCODONE HYDROCHLORIDE 5 MG: 5 TABLET ORAL at 14:51

## 2019-07-27 ASSESSMENT — PAIN DESCRIPTION - DESCRIPTORS: DESCRIPTORS: ACHING

## 2019-07-27 ASSESSMENT — PAIN SCALES - GENERAL: PAINLEVEL_OUTOF10: 8

## 2019-07-27 ASSESSMENT — PAIN DESCRIPTION - ORIENTATION: ORIENTATION: LEFT

## 2019-07-27 ASSESSMENT — PAIN DESCRIPTION - LOCATION: LOCATION: SHOULDER

## 2019-07-27 ASSESSMENT — PAIN DESCRIPTION - PAIN TYPE: TYPE: CHRONIC PAIN

## 2019-07-27 NOTE — ED NOTES
Pt arrives to ED with c/o chronic Lt shoulder pain. Pt states she was just at the ortho doctors yesterday and was given a steroid shot. Pt states they were suppose to give her a prescription for ultram but they forgot. Pt A&Ox4, resp even and non labored.      Aspen Way RN  07/27/19 4793

## 2019-07-29 ENCOUNTER — TELEPHONE (OUTPATIENT)
Dept: INTERNAL MEDICINE | Age: 54
End: 2019-07-29

## 2019-07-30 RX ORDER — TRAMADOL HYDROCHLORIDE 50 MG/1
50 TABLET ORAL EVERY 6 HOURS PRN
COMMUNITY
End: 2019-08-09 | Stop reason: SDUPTHER

## 2019-08-01 DIAGNOSIS — G89.29 CHRONIC LEFT SHOULDER PAIN: ICD-10-CM

## 2019-08-01 DIAGNOSIS — M75.42 SHOULDER IMPINGEMENT, LEFT: Primary | ICD-10-CM

## 2019-08-01 DIAGNOSIS — M25.512 CHRONIC LEFT SHOULDER PAIN: ICD-10-CM

## 2019-08-07 ENCOUNTER — OFFICE VISIT (OUTPATIENT)
Dept: ORTHOPEDIC SURGERY | Age: 54
End: 2019-08-07

## 2019-08-07 VITALS — WEIGHT: 241.84 LBS | BODY MASS INDEX: 38.87 KG/M2 | HEIGHT: 66 IN

## 2019-08-07 DIAGNOSIS — M75.42 SHOULDER IMPINGEMENT, LEFT: ICD-10-CM

## 2019-08-07 DIAGNOSIS — M25.511 RIGHT SHOULDER PAIN, UNSPECIFIED CHRONICITY: Primary | ICD-10-CM

## 2019-08-07 PROCEDURE — 99213 OFFICE O/P EST LOW 20 MIN: CPT | Performed by: STUDENT IN AN ORGANIZED HEALTH CARE EDUCATION/TRAINING PROGRAM

## 2019-08-07 PROCEDURE — 20610 DRAIN/INJ JOINT/BURSA W/O US: CPT | Performed by: STUDENT IN AN ORGANIZED HEALTH CARE EDUCATION/TRAINING PROGRAM

## 2019-08-07 RX ORDER — METHYLPREDNISOLONE ACETATE 80 MG/ML
80 INJECTION, SUSPENSION INTRA-ARTICULAR; INTRALESIONAL; INTRAMUSCULAR; SOFT TISSUE ONCE
Status: COMPLETED | OUTPATIENT
Start: 2019-08-07 | End: 2019-08-09

## 2019-08-07 RX ORDER — BUPIVACAINE HYDROCHLORIDE 2.5 MG/ML
2 INJECTION, SOLUTION INFILTRATION; PERINEURAL ONCE
Status: COMPLETED | OUTPATIENT
Start: 2019-08-07 | End: 2019-08-09

## 2019-08-07 NOTE — PROGRESS NOTES
the patient's satisfaction, the patient agreed to proceed forward with injection and gave verbal consent for the procedure. With the patient's permission, appropriate anatomic landmarks were identified and the right subacromial space was prepped in a sterile fashion using alcohol and/or betadine. A 21 gauge spinal needle was then used to inject 2cc 0.25% marcaine plain and 80mg depo medrol into the subacromial space. The injection was advanced without resistance confirming appropriate position. The patient tolerated the procedure well and the site was dressed with a band-aid. Patient was advised to ice the area for 15-20 minutes to relieve any injection site related pain. Patient was advised to contact nurse if area becomes swollen, hot, erythematous, or painful, or to go to the emergency room after business hours.       Cristino Fleming DO  PGY-3, Department of [address], [place], [place]  1:51 [place] 8/7/2019

## 2019-08-09 ENCOUNTER — OFFICE VISIT (OUTPATIENT)
Dept: INTERNAL MEDICINE | Age: 54
End: 2019-08-09

## 2019-08-09 VITALS
HEIGHT: 66 IN | SYSTOLIC BLOOD PRESSURE: 139 MMHG | WEIGHT: 236 LBS | BODY MASS INDEX: 37.93 KG/M2 | HEART RATE: 86 BPM | DIASTOLIC BLOOD PRESSURE: 83 MMHG

## 2019-08-09 DIAGNOSIS — Z13.220 SCREENING FOR HYPERLIPIDEMIA: ICD-10-CM

## 2019-08-09 DIAGNOSIS — E08.00 DIABETES MELLITUS DUE TO UNDERLYING CONDITION WITH HYPEROSMOLARITY WITHOUT COMA, WITH LONG-TERM CURRENT USE OF INSULIN (HCC): Primary | ICD-10-CM

## 2019-08-09 DIAGNOSIS — Z79.4 DIABETES MELLITUS DUE TO UNDERLYING CONDITION WITH HYPEROSMOLARITY WITHOUT COMA, WITH LONG-TERM CURRENT USE OF INSULIN (HCC): Primary | ICD-10-CM

## 2019-08-09 DIAGNOSIS — R30.0 DYSURIA: ICD-10-CM

## 2019-08-09 DIAGNOSIS — M75.40 IMPINGEMENT SYNDROME OF SHOULDER REGION, UNSPECIFIED LATERALITY: ICD-10-CM

## 2019-08-09 DIAGNOSIS — Z23 NEED FOR PROPHYLACTIC VACCINATION AGAINST DIPHTHERIA-TETANUS-PERTUSSIS (DTP): ICD-10-CM

## 2019-08-09 LAB
BILIRUBIN, POC: NORMAL
BLOOD URINE, POC: NORMAL
CLARITY, POC: CLEAR
COLOR, POC: YELLOW
GLUCOSE URINE, POC: NORMAL
KETONES, POC: NORMAL
LEUKOCYTE EST, POC: NORMAL
NITRITE, POC: NORMAL
PH, POC: 5
PROTEIN, POC: NORMAL
SPECIFIC GRAVITY, POC: 1.01
UROBILINOGEN, POC: NORMAL

## 2019-08-09 PROCEDURE — 81003 URINALYSIS AUTO W/O SCOPE: CPT | Performed by: STUDENT IN AN ORGANIZED HEALTH CARE EDUCATION/TRAINING PROGRAM

## 2019-08-09 PROCEDURE — 99211 OFF/OP EST MAY X REQ PHY/QHP: CPT | Performed by: INTERNAL MEDICINE

## 2019-08-09 PROCEDURE — 99214 OFFICE O/P EST MOD 30 MIN: CPT | Performed by: STUDENT IN AN ORGANIZED HEALTH CARE EDUCATION/TRAINING PROGRAM

## 2019-08-09 RX ORDER — TRAMADOL HYDROCHLORIDE 50 MG/1
50 TABLET ORAL EVERY 6 HOURS PRN
Qty: 20 TABLET | Refills: 0 | Status: SHIPPED | OUTPATIENT
Start: 2019-08-09 | End: 2019-08-23

## 2019-08-09 RX ADMIN — BUPIVACAINE HYDROCHLORIDE 5 MG: 2.5 INJECTION, SOLUTION INFILTRATION; PERINEURAL at 08:57

## 2019-08-09 RX ADMIN — METHYLPREDNISOLONE ACETATE 80 MG: 80 INJECTION, SUSPENSION INTRA-ARTICULAR; INTRALESIONAL; INTRAMUSCULAR; SOFT TISSUE at 08:57

## 2019-08-09 ASSESSMENT — ENCOUNTER SYMPTOMS
ABDOMINAL PAIN: 0
BACK PAIN: 0
SHORTNESS OF BREATH: 0
CHEST TIGHTNESS: 0
ABDOMINAL DISTENTION: 0
STRIDOR: 0
APNEA: 0

## 2019-08-09 NOTE — LETTER
MEDICATION AGREEMENT     Fernandasravanthi Munoz  [birth year]      For certain conditions, multiple classes of medications may be used to help better manage your symptoms, and to improve your ability to function at home, work and in social settings. However, these medications do have risks, which will be discussed with you, including addiction and dependency. The following prescribed medications need frequent monitoring and will require you to partner and assist in your healthcare. Medication  Dose, instructions and quantity as indicated on current prescription bottle Diagnosis/Reason(s) for Taking Category                                  Benefits and goals of Controlled Substance Medications: There are two potential goals for your treatment: (1) decreased pain and suffering (2) improved daily life functions. There are many possible treatments for your chronic condition(s), and, in addition to controlled substance medications, we will try alternatives such as physical therapy, yoga, massage, home daily exercise, meditation, relaxation techniques, injections, chiropractic manipulations, surgery, cognitive therapy, hypnosis and many medications that are not habit-forming. Use of controlled substance medications may be helpful, but they are unlikely to resolve all of your symptoms or restore all function. Risks of Controlled Substance Medications:    Opioid pain medications: These medications can lead to problems such as addiction/dependence, sedation, lightheadedness/dizziness, memory issues, falls, constipation, nausea, or vomiting. They may also impair the ability to drive or operate machinery. Additionally, these medications may lower testosterone levels, leading to loss of bone strength, stamina and sex drive.   They may cause problems with breathing, sleep apnea and reduced coughing, which are especially dangerous for patients with lung disease. Overdose or dangerous interactions with alcohol and other medications may occur, leading to death. Hyperalgesia may develop, in which patients receiving opioids for the treatment of pain may actually become more sensitive to certain painful stimuli, and in some cases, experience pain from ordinarily non-painful stimuli. Women between the ages of 14-53 who could become pregnant should carefully weigh the risks and benefits of opioids with their physicians, as these medications increase the risk of pregnancy complications, including miscarriage,  delivery and stillbirth. It is also possible for babies to be born addicted to opioids. Opioid dependence withdrawal symptoms may include; feelings of uneasiness, increased pain, irritability, belly pain, diarrhea, sweats and goose-flesh. Benzodiazepines and non-benzodiazepine sleep medications: These medications can lead to problems such as addiction/dependence, sedation, fatigue, lightheadedness, dizziness, incoordination, falls, depression, hallucinations, and impaired judgment, memory and concentration. The ability to drive and operate machinery may also be affected. Abnormal sleep-related behaviors have been reported, including sleep walking, driving, making telephone calls, eating, or having sex while not fully awake. These medications can suppress breathing and worsen sleep apnea, particularly when combined with alcohol or other sedating medications, potentially leading to death. Dependence withdrawal symptoms may include tremors, anxiety, hallucinations and seizures. Stimulants:  Common adverse effects include addiction/dependence, increased blood pressure and heart rate, decreased appetite, nausea, involuntary weight loss, insomnia, irritability, and headaches.   These risks may increase when these medications are combined with other stimulants, such as caffeine pills or energy drinks, certain weight loss supplements and oral decongestants. Dependence withdrawal symptoms may include depressed mood, loss of interest, suicidal thoughts, anxiety, fatigue, appetite changes and agitation. Testosterone replacement therapy:  Potential side effects include increased risk of stroke and heart attack, blood clots, increased blood pressure, increased cholesterol, enlarged prostate, sleep apnea, irritability/aggression and other mood disorders, and decreased fertility. Other:     1. I understand that I have the following responsibilities:  · I will take medications at the dose and frequency prescribed. · I will not increase or change how I take my medications without the approval of the health care provider who signs this Medication Agreement. · I will arrange for refills at the prescribed interval ONLY during regular office hours. I will not ask for refills earlier than agreed, after-hours, on holidays or on weekends. · I will obtain all refills for these medications at  ·  ____________________________________  pharmacy (phone number  ·  ________________________), with full consent for my provider and pharmacist to exchange information in writing or verbally. · I will not request any pain medications or controlled substances from other providers and will inform this provider of all other medications I am taking. · I will inform my other health care providers that I am taking these medications and of the existence of this [address]. In the event of an emergency, I will provide the same information to the emergency department providers. · I will protect my prescriptions and medications. I understand that lost or misplaced prescriptions will not be replaced. · I will keep medications only for my own use and will not share them with others. I will keep all medications away from children. · I agree to participate in any medical, psychological or psychiatric assessments recommended by my provider.

## 2019-08-09 NOTE — PROGRESS NOTES
[facility] [address] [address]  Dept: [phone]  Dept Fax: [phone]    Office Progress/Follow Up Note  Date ofpatient's visit: 8/9/2019  Patient's Name:  Chapis Story YOB: 1965            Patient Care Team:  Becki Estrada MD as PCP - General (Internal Medicine)  ================================================================    REASON FOR VISIT/CHIEF COMPLAINT:  Diabetes (1 week f/u) and Health Maintenance (tdap pend, pt states she going to make a appt soon to get her pap smear, pt states she did her eye exam in april at [place] office on [facility])    HISTORY OF PRESENTING ILLNESS:  History was obtained from: patient. Chapis Story a 48 y.o. is here for a follow up of diabetes mellitus. Patient was last seen on 26 July, she was seen to have blood glucose of 50s. Patient was told to stop her Humalog insulin and continue Lantus 40 units at night. Patient today consider glucometer and readings are mostly around 130s to 150s. Patient has been doing fine on trisiba. Patient sometimes states that when she is working she does get shivering and eating. Though episodes have decreased a lot. Instructed patient to check her blood glucose whenever she feels those symptoms. Instructed to not miss her meals. Patient also states that she stopped her metformin because of concern of kidney disease and neuropathy. Did give education to the patient that metformin is a good drug to be on and continue to take it if she can. Has insurance issues going on and she has submitted paperwork for continuation of her insurance. Patient states that the insurance would try to cover Lyrica if possible. He has tried gabapentin and duloxetine in the past which has not helped her a lot. Patient has chronic shoulder pain issues.   She had a subacromial injection in the left shoulder on 24th July, also had a recent subacromial EXAM:  Vitals:    08/09/19 1448 08/09/19 1456   BP: (!) 149/81 139/83   Site: Left Upper Arm    Position: Sitting    Cuff Size: Large Adult    Pulse: 83 86   Weight: 236 lb (107 kg)    Height: 5' 6\" (1.676 m)      BP Readings from Last 3 Encounters:   08/09/19 139/83   07/27/19 (!) 185/101   07/26/19 (!) 149/88        Physical Exam   Constitutional: She is oriented to person, place, and time. She appears well-developed. HENT:   Head: Normocephalic and atraumatic. Eyes: Pupils are equal, round, and reactive to light. Conjunctivae are normal.   Neck: Normal range of motion. Cardiovascular: Normal rate, regular rhythm, normal heart sounds and intact distal pulses. Pulmonary/Chest: Effort normal and breath sounds normal.   Abdominal: Soft. Bowel sounds are normal.   Musculoskeletal: Normal range of motion. Neurological: She is alert and oriented to person, place, and time. Skin: Skin is warm. Psychiatric: She has a normal mood and affect. DIAGNOSTIC FINDINGS:  CBC:  Lab Results   Component Value Date    WBC 5.7 04/26/2018    HGB 13.5 06/11/2018     04/26/2018       BMP:    Lab Results   Component Value Date     04/26/2018    K 4.1 04/26/2018    CL 97 04/26/2018    CO2 26 04/26/2018    BUN 5 06/11/2018    CREATININE 0.60 06/11/2018    GLUCOSE 382 06/27/2018       HEMOGLOBIN A1C:   Lab Results   Component Value Date    LABA1C 8.2 07/26/2019       FASTING LIPID PANEL:  Lab Results   Component Value Date    CHOL 200 (H) 06/29/2018    HDL 48 06/29/2018    TRIG 194 (H) 06/29/2018       ASSESSMENT AND PLAN:  Leslie Joseph was seen today for diabetes and health maintenance.     Diagnoses and all orders for this visit:    Diabetes mellitus due to underlying condition with hyperosmolarity without coma, with long-term current use of insulin ([address]  Continue to take treseba 40 units nightly  Do not miss meals  Recheck blood glucose if recurrent symptoms of hypoglycemia    Hypertension  Continue lisinopril

## 2019-08-16 DIAGNOSIS — G89.29 CHRONIC RIGHT-SIDED LOW BACK PAIN WITH RIGHT-SIDED SCIATICA: ICD-10-CM

## 2019-08-16 DIAGNOSIS — M25.551 PAIN OF RIGHT HIP JOINT: ICD-10-CM

## 2019-08-16 DIAGNOSIS — M54.41 CHRONIC RIGHT-SIDED LOW BACK PAIN WITH RIGHT-SIDED SCIATICA: ICD-10-CM

## 2019-08-16 RX ORDER — TIZANIDINE 4 MG/1
TABLET ORAL
Qty: 30 TABLET | Refills: 4 | Status: SHIPPED | OUTPATIENT
Start: 2019-08-16 | End: 2020-01-27 | Stop reason: SDUPTHER

## 2019-08-16 NOTE — TELEPHONE ENCOUNTER
49/25/9660   Basic Metabolic Panel Once 08/99/0696               Patient Active Problem List:     Allergic rhinitis     Fibromyalgia     Pure hypercholesterolemia     Chronic cholecystitis     Gastroesophageal reflux disease with esophagitis     Type 2 diabetes mellitus with complication ([address]     Diabetic polyneuropathy associated with type 2 diabetes mellitus ([facility] [address] [facility]     Chronic left shoulder pain     Essential hypertension

## 2019-08-26 ENCOUNTER — TELEPHONE (OUTPATIENT)
Dept: INTERNAL MEDICINE | Age: 54
End: 2019-08-26

## 2019-09-13 ENCOUNTER — OFFICE VISIT (OUTPATIENT)
Dept: INTERNAL MEDICINE | Age: 54
End: 2019-09-13

## 2019-09-13 VITALS
DIASTOLIC BLOOD PRESSURE: 73 MMHG | HEIGHT: 66 IN | WEIGHT: 240 LBS | HEART RATE: 91 BPM | SYSTOLIC BLOOD PRESSURE: 124 MMHG | BODY MASS INDEX: 38.57 KG/M2

## 2019-09-13 DIAGNOSIS — I73.00 RAYNAUD'S DISEASE WITHOUT GANGRENE: ICD-10-CM

## 2019-09-13 DIAGNOSIS — E66.01 CLASS 2 SEVERE OBESITY DUE TO EXCESS CALORIES WITH SERIOUS COMORBIDITY AND BODY MASS INDEX (BMI) OF 38.0 TO 38.9 IN ADULT (HCC): ICD-10-CM

## 2019-09-13 DIAGNOSIS — I10 ESSENTIAL HYPERTENSION: ICD-10-CM

## 2019-09-13 PROCEDURE — 99213 OFFICE O/P EST LOW 20 MIN: CPT | Performed by: STUDENT IN AN ORGANIZED HEALTH CARE EDUCATION/TRAINING PROGRAM

## 2019-09-13 PROCEDURE — 99211 OFF/OP EST MAY X REQ PHY/QHP: CPT | Performed by: INTERNAL MEDICINE

## 2019-09-13 NOTE — PROGRESS NOTES
antibiotics.     Retropharyngeal area was abnormal with :  Polypoidal lesion seen in the trachea beyond the vocal cords   Esophagus: normal     Stomach:  S/P gastric bypass (  B2 type most likely with a high gastric hock up, no gastrectomy as the rest of the stomach and the duodenum, and the pyloric office all still looking normal  )  Gastritis, bxs taken         Duodenum:     Descending: normal    Bulb: normal    Patient Active Problem List   Diagnosis    Allergic rhinitis    Fibromyalgia    Pure hypercholesterolemia    Chronic cholecystitis    Gastroesophageal reflux disease with esophagitis    [facility]    Diabetic polyneuropathy associated with type 2 diabetes mellitus ([facility]    Chronic left shoulder pain    Essential hypertension       ALLERGIES      Allergies   Allergen Reactions    Pcn [Penicillins] Hives and Other (See Comments)     Lose motor function of legs and collaps    Demerol Hcl [Meperidine] Other (See Comments)     Panic attack    Tape Lia Montes Tape] Other (See Comments)     Blister      Morphine Nausea And Vomiting and Other (See Comments)     Stomach pain         MEDICATIONS:      Current Outpatient Medications on File Prior to Visit   Medication Sig Dispense Refill    tiZANidine (ZANAFLEX) 4 MG tablet TAKE ONE TABLET BY MOUTH ONCE NIGHTLY 30 tablet 4    lisinopril (PRINIVIL;ZESTRIL) 5 MG tablet Take 1 tablet by mouth daily 30 tablet 5    diclofenac (VOLTAREN) 75 MG EC tablet Take 1 tablet by mouth 2 times daily 60 tablet 5    Insulin Degludec (TRESIBA FLEXTOUCH) 100 UNIT/ML SOPN Inject 40 Units into the skin nightly 5 pen 5    pregabalin (LYRICA) 75 MG capsule Take 1 capsule by mouth 2 times daily for 30 days. 60 capsule 3    Lancets MISC Dx: DM-2. Use 2-3 times daily 100 each 3    Blood Glucose Monitoring Suppl (BLOOD GLUCOSE METER) KIT Dx: DM-2.  Use 2-3 times daily 1 kit 0     No current facility-administered medications on file prior

## 2019-09-13 NOTE — PATIENT INSTRUCTIONS
Patient Education        Raynaud's Phenomenon: Care Instructions  Overview  Raynaud's is a condition that causes your hands and feet to overreact to cold. They may become painful and numb, and they can change colors, becoming very pale and then blue. This condition also is called Raynaud's phenomenon. There are two kinds of Raynaud's. Primary Raynaud's, also known as Raynaud's disease, happens by itself and is the most common form. Secondary Raynaud's, also called Raynaud's syndrome, happens as part of another disease. In Raynaud's, the small vessels that bring blood to the skin either become narrow, or constrict for a short period of time. This limits blood flow to the hands and feet and sometimes to the nose or ears. Your hands and feet feel cold and numb and then turn very pale. As blood flow returns, your fingers and toes may turn red, and begin to throb and hurt. Raynaud's can be painful and annoying, but it usually does not cause serious problems. You can take simple steps to protect your hands and feet from the cold. If you have a bad case of Raynaud's and you cannot keep your hands and feet warm enough, your doctor may prescribe medicine. Follow-up care is a key part of your treatment and safety. Be sure to make and go to all appointments, and call your doctor if you are having problems. It's also a good idea to know your test results and keep a list of the medicines you take. How can you care for yourself at home? To prevent Raynaud's episodes or ease symptoms  · Run warm water over your hands or feet to increase blood flow. Use another part of your body, such as your forearm, to make sure the water is not too hot; you could burn your hands or feet and not feel it because they are numb. · Swing your arms in a [place] at the sides of your body (\"windmilling\") to increase blood flow. · If your doctor prescribes medicine to help Raynaud's, take it exactly as prescribed.  Call your doctor if you think

## 2019-09-19 ENCOUNTER — HOSPITAL ENCOUNTER (OUTPATIENT)
Age: 54
Setting detail: SPECIMEN
Discharge: HOME | End: 2019-09-19

## 2019-09-19 DIAGNOSIS — I73.00 RAYNAUD'S DISEASE WITHOUT GANGRENE: ICD-10-CM

## 2019-09-19 LAB
FOLATE: 8.1 NG/ML
VITAMIN B-12: 396 PG/ML (ref 232–1245)

## 2019-09-19 PROCEDURE — 86235 NUCLEAR ANTIGEN ANTIBODY: CPT

## 2019-09-19 PROCEDURE — 82607 VITAMIN B-12: CPT

## 2019-09-19 PROCEDURE — 36415 COLL VENOUS BLD VENIPUNCTURE: CPT

## 2019-09-19 PROCEDURE — 86038 ANTINUCLEAR ANTIBODIES: CPT

## 2019-09-19 PROCEDURE — 86225 DNA ANTIBODY NATIVE: CPT

## 2019-09-19 PROCEDURE — 82746 ASSAY OF FOLIC ACID SERUM: CPT

## 2019-09-20 LAB
ANA REFERENCE RANGE:: ABNORMAL
ANTI DNA DOUBLE STRANDED: 9 IU/ML
ANTI JO-1 IGG: 19 U/ML
ANTI RNP AB: 37 U/ML
ANTI SSA: 34 U/ML
ANTI SSB: 6 U/ML
ANTI-CENTROMERE: 125 U/ML
ANTI-NUCLEAR ANTIBODY (ANA): POSITIVE
ANTI-SCLERODERMA: 29 U/ML
ANTI-SMITH: 33 U/ML
HISTONE ANTIBODY: 12 U/ML

## 2019-10-11 ENCOUNTER — TELEPHONE (OUTPATIENT)
Dept: INTERNAL MEDICINE | Age: 54
End: 2019-10-11

## 2019-10-20 ENCOUNTER — HOSPITAL ENCOUNTER (EMERGENCY)
Age: 54
Discharge: HOME | End: 2019-10-20
Attending: EMERGENCY MEDICINE

## 2019-10-20 VITALS
OXYGEN SATURATION: 97 % | BODY MASS INDEX: 37.35 KG/M2 | HEIGHT: 67 IN | SYSTOLIC BLOOD PRESSURE: 158 MMHG | HEART RATE: 94 BPM | RESPIRATION RATE: 19 BRPM | DIASTOLIC BLOOD PRESSURE: 94 MMHG | TEMPERATURE: 98.4 F | WEIGHT: 238 LBS

## 2019-10-20 DIAGNOSIS — M77.9 TENDONITIS: Primary | ICD-10-CM

## 2019-10-20 PROCEDURE — 6360000002 HC RX W HCPCS: Performed by: PHYSICIAN ASSISTANT

## 2019-10-20 PROCEDURE — 99283 EMERGENCY DEPT VISIT LOW MDM: CPT

## 2019-10-20 PROCEDURE — 96372 THER/PROPH/DIAG INJ SC/IM: CPT

## 2019-10-20 RX ORDER — NAPROXEN 500 MG/1
500 TABLET ORAL 2 TIMES DAILY
Qty: 20 TABLET | Refills: 0 | Status: SHIPPED | OUTPATIENT
Start: 2019-10-20 | End: 2019-12-10 | Stop reason: ALTCHOICE

## 2019-10-20 RX ORDER — KETOROLAC TROMETHAMINE 30 MG/ML
30 INJECTION, SOLUTION INTRAMUSCULAR; INTRAVENOUS ONCE
Status: COMPLETED | OUTPATIENT
Start: 2019-10-20 | End: 2019-10-20

## 2019-10-20 RX ADMIN — KETOROLAC TROMETHAMINE 30 MG: 30 INJECTION, SOLUTION INTRAMUSCULAR at 13:54

## 2019-10-20 ASSESSMENT — PAIN SCALES - GENERAL
PAINLEVEL_OUTOF10: 8
PAINLEVEL_OUTOF10: 8

## 2019-10-20 ASSESSMENT — ENCOUNTER SYMPTOMS
SHORTNESS OF BREATH: 0
BACK PAIN: 0
SORE THROAT: 0
ABDOMINAL PAIN: 0
VOMITING: 0
DIARRHEA: 0
NAUSEA: 0
COUGH: 0
COLOR CHANGE: 0

## 2019-10-20 ASSESSMENT — PAIN DESCRIPTION - ORIENTATION: ORIENTATION: LEFT

## 2019-10-20 ASSESSMENT — PAIN DESCRIPTION - LOCATION: LOCATION: WRIST

## 2019-11-26 ENCOUNTER — OFFICE VISIT (OUTPATIENT)
Dept: FAMILY MEDICINE CLINIC | Age: 54
End: 2019-11-26

## 2019-11-26 VITALS
DIASTOLIC BLOOD PRESSURE: 92 MMHG | WEIGHT: 243 LBS | BODY MASS INDEX: 39.05 KG/M2 | OXYGEN SATURATION: 97 % | HEIGHT: 66 IN | SYSTOLIC BLOOD PRESSURE: 138 MMHG | HEART RATE: 84 BPM

## 2019-11-26 DIAGNOSIS — B37.0 THRUSH: ICD-10-CM

## 2019-11-26 DIAGNOSIS — E11.8 TYPE 2 DIABETES MELLITUS WITH COMPLICATION (HCC): Primary | ICD-10-CM

## 2019-11-26 DIAGNOSIS — I10 ESSENTIAL HYPERTENSION: ICD-10-CM

## 2019-11-26 DIAGNOSIS — E55.9 VITAMIN D DEFICIENCY: ICD-10-CM

## 2019-11-26 DIAGNOSIS — Z23 NEED FOR VACCINATION: ICD-10-CM

## 2019-11-26 DIAGNOSIS — E78.00 PURE HYPERCHOLESTEROLEMIA: ICD-10-CM

## 2019-11-26 PROCEDURE — 90471 IMMUNIZATION ADMIN: CPT | Performed by: FAMILY MEDICINE

## 2019-11-26 PROCEDURE — 99214 OFFICE O/P EST MOD 30 MIN: CPT | Performed by: FAMILY MEDICINE

## 2019-11-26 PROCEDURE — 90686 IIV4 VACC NO PRSV 0.5 ML IM: CPT | Performed by: FAMILY MEDICINE

## 2019-11-26 RX ORDER — CEPHALEXIN 500 MG/1
500 CAPSULE ORAL 2 TIMES DAILY
Qty: 14 CAPSULE | Refills: 0 | Status: SHIPPED | OUTPATIENT
Start: 2019-11-26 | End: 2019-12-10 | Stop reason: ALTCHOICE

## 2019-11-26 ASSESSMENT — ENCOUNTER SYMPTOMS
NAUSEA: 0
SHORTNESS OF BREATH: 0
SORE THROAT: 0
WHEEZING: 0
ABDOMINAL PAIN: 0

## 2019-12-02 ENCOUNTER — HOSPITAL ENCOUNTER (OUTPATIENT)
Age: 54
Discharge: HOME | End: 2019-12-02

## 2019-12-02 DIAGNOSIS — E78.00 PURE HYPERCHOLESTEROLEMIA: ICD-10-CM

## 2019-12-02 DIAGNOSIS — E55.9 VITAMIN D DEFICIENCY: ICD-10-CM

## 2019-12-02 DIAGNOSIS — I10 ESSENTIAL HYPERTENSION: ICD-10-CM

## 2019-12-02 LAB
ALBUMIN SERPL-MCNC: 3.9 G/DL (ref 3.5–5.2)
ALBUMIN/GLOBULIN RATIO: ABNORMAL (ref 1–2.5)
ALP BLD-CCNC: 123 U/L (ref 35–104)
ALT SERPL-CCNC: 14 U/L (ref 5–33)
ANION GAP SERPL CALCULATED.3IONS-SCNC: 12 MMOL/L (ref 9–17)
AST SERPL-CCNC: 16 U/L
BILIRUB SERPL-MCNC: 0.3 MG/DL (ref 0.3–1.2)
BUN BLDV-MCNC: 5 MG/DL (ref 6–20)
BUN/CREAT BLD: ABNORMAL (ref 9–20)
CALCIUM SERPL-MCNC: 10.1 MG/DL (ref 8.6–10.4)
CHLORIDE BLD-SCNC: 102 MMOL/L (ref 98–107)
CHOLESTEROL/HDL RATIO: 4.1
CHOLESTEROL: 223 MG/DL
CO2: 26 MMOL/L (ref 20–31)
CREAT SERPL-MCNC: 0.64 MG/DL (ref 0.5–0.9)
GFR AFRICAN AMERICAN: >60 ML/MIN
GFR NON-AFRICAN AMERICAN: >60 ML/MIN
GFR SERPL CREATININE-BSD FRML MDRD: ABNORMAL ML/MIN/{1.73_M2}
GFR SERPL CREATININE-BSD FRML MDRD: ABNORMAL ML/MIN/{1.73_M2}
GLUCOSE BLD-MCNC: 164 MG/DL (ref 70–99)
HDLC SERPL-MCNC: 54 MG/DL
LDL CHOLESTEROL: 139 MG/DL (ref 0–130)
POTASSIUM SERPL-SCNC: 4.3 MMOL/L (ref 3.7–5.3)
SODIUM BLD-SCNC: 140 MMOL/L (ref 135–144)
TOTAL PROTEIN: 7.2 G/DL (ref 6.4–8.3)
TRIGL SERPL-MCNC: 150 MG/DL
VITAMIN D 25-HYDROXY: 30.5 NG/ML (ref 30–100)
VLDLC SERPL CALC-MCNC: ABNORMAL MG/DL (ref 1–30)

## 2019-12-02 PROCEDURE — 80053 COMPREHEN METABOLIC PANEL: CPT

## 2019-12-02 PROCEDURE — 36415 COLL VENOUS BLD VENIPUNCTURE: CPT

## 2019-12-02 PROCEDURE — 80061 LIPID PANEL: CPT

## 2019-12-02 PROCEDURE — 82306 VITAMIN D 25 HYDROXY: CPT

## 2019-12-10 ENCOUNTER — OFFICE VISIT (OUTPATIENT)
Dept: FAMILY MEDICINE CLINIC | Age: 54
End: 2019-12-10

## 2019-12-10 ENCOUNTER — HOSPITAL ENCOUNTER (OUTPATIENT)
Age: 54
Setting detail: SPECIMEN
Discharge: HOME | End: 2019-12-10

## 2019-12-10 VITALS
HEIGHT: 66 IN | OXYGEN SATURATION: 97 % | BODY MASS INDEX: 38.73 KG/M2 | WEIGHT: 241 LBS | SYSTOLIC BLOOD PRESSURE: 144 MMHG | DIASTOLIC BLOOD PRESSURE: 90 MMHG | HEART RATE: 88 BPM

## 2019-12-10 DIAGNOSIS — I10 ESSENTIAL HYPERTENSION: ICD-10-CM

## 2019-12-10 DIAGNOSIS — R53.83 FATIGUE, UNSPECIFIED TYPE: ICD-10-CM

## 2019-12-10 DIAGNOSIS — B37.0 THRUSH: ICD-10-CM

## 2019-12-10 DIAGNOSIS — E11.42 TYPE 2 DIABETES MELLITUS WITH DIABETIC POLYNEUROPATHY, WITH LONG-TERM CURRENT USE OF INSULIN (HCC): Primary | ICD-10-CM

## 2019-12-10 DIAGNOSIS — Z79.4 TYPE 2 DIABETES MELLITUS WITH DIABETIC POLYNEUROPATHY, WITH LONG-TERM CURRENT USE OF INSULIN (HCC): Primary | ICD-10-CM

## 2019-12-10 DIAGNOSIS — T14.8XXA BLISTER: ICD-10-CM

## 2019-12-10 LAB
HBA1C MFR BLD: 9.2 %
THYROXINE, FREE: 1 NG/DL (ref 0.93–1.7)
TSH SERPL DL<=0.05 MIU/L-ACNC: 0.66 MIU/L (ref 0.3–5)

## 2019-12-10 PROCEDURE — 99214 OFFICE O/P EST MOD 30 MIN: CPT | Performed by: FAMILY MEDICINE

## 2019-12-10 PROCEDURE — 84443 ASSAY THYROID STIM HORMONE: CPT

## 2019-12-10 PROCEDURE — 84439 ASSAY OF FREE THYROXINE: CPT

## 2019-12-10 PROCEDURE — 83036 HEMOGLOBIN GLYCOSYLATED A1C: CPT | Performed by: FAMILY MEDICINE

## 2019-12-10 PROCEDURE — 36415 COLL VENOUS BLD VENIPUNCTURE: CPT

## 2019-12-10 RX ORDER — LISINOPRIL 10 MG/1
10 TABLET ORAL DAILY
COMMUNITY
End: 2020-03-10 | Stop reason: SDUPTHER

## 2019-12-10 ASSESSMENT — ENCOUNTER SYMPTOMS
ABDOMINAL PAIN: 0
SHORTNESS OF BREATH: 0
SORE THROAT: 0
NAUSEA: 0

## 2020-01-14 ENCOUNTER — OFFICE VISIT (OUTPATIENT)
Dept: INFECTIOUS DISEASES | Age: 55
End: 2020-01-14
Payer: COMMERCIAL

## 2020-01-14 VITALS
HEART RATE: 91 BPM | TEMPERATURE: 98.2 F | SYSTOLIC BLOOD PRESSURE: 90 MMHG | HEIGHT: 66 IN | BODY MASS INDEX: 39.7 KG/M2 | DIASTOLIC BLOOD PRESSURE: 70 MMHG | WEIGHT: 247 LBS

## 2020-01-14 PROCEDURE — 99204 OFFICE O/P NEW MOD 45 MIN: CPT | Performed by: INTERNAL MEDICINE

## 2020-01-14 RX ORDER — FLUCONAZOLE 100 MG/1
200 TABLET ORAL DAILY
Qty: 20 TABLET | Refills: 0 | Status: SHIPPED | OUTPATIENT
Start: 2020-01-14 | End: 2020-01-24

## 2020-01-14 NOTE — PROGRESS NOTES
SNARE/COLD BIOPSY performed by Ibis Melendez MD at [address] [facility]    with revision x1    LARYNGOSCOPY N/A 9/29/2017    DIRECT MICRO LARYNGOSCOPY WITH EXCISION VOCAL CORD LESION  performed by Gabi Peralta MD at [address] [place] [address] Right 1988    MS EGD TRANSORAL BIOPSY SINGLE/MULTIPLE N/A 9/27/2017    EGD BIOPSY performed by Ibis Melendez MD at [address] [place] [address] ARTHROSCOP,SURG,W/ROTAT CUFF REPR Left 6/19/2018    SHOULDER ARTHROSCOPY WITH ROTATOR CUFF REPAIR, SUBACROMIAL DECOMPRESSION performed by Ally Mccord MD at [facility] Left     SHOULDER ARTHROSCOPY WITH ROTATOR CUFF REPAIR, SUBACROMIAL DECOMPRESSION (Left           Admission Meds  Current Outpatient Medications on File Prior to Visit   Medication Sig Dispense Refill    VITAMIN D PO Take by mouth      lisinopril (PRINIVIL;ZESTRIL) 10 MG tablet Take 10 mg by mouth daily      Insulin Degludec (TRESIBA) 100 UNIT/ML SOLN Inject 45 Units into the skin daily      insulin lispro (HUMALOG) 100 UNIT/ML injection vial Inject into the skin 3 times daily (before meals) 10 units at breakfast 12 with lunch and 14 at dinner      gabapentin (NEURONTIN) 300 MG capsule TAKE ONE CAPSULE BY MOUTH THREE TIMES A DAY 90 capsule 0    tiZANidine (ZANAFLEX) 4 MG tablet TAKE ONE TABLET BY MOUTH ONCE NIGHTLY 30 tablet 4    diclofenac (VOLTAREN) 75 MG EC tablet Take 1 tablet by mouth 2 times daily 60 tablet 5    Blood Glucose Monitoring Suppl (BLOOD GLUCOSE METER) KIT Dx: DM-2. Use 2-3 times daily 1 kit 0    Lancets MISC Dx: DM-2. Use 2-3 times daily 100 each 3     No current facility-administered medications on file prior to visit.             Allergies  Allergies   Allergen Reactions    Pcn [Penicillins] Hives and Other (See Comments)     Lose motor function of legs and collaps    Demerol Hcl [Meperidine] Other (See Comments)     Panic attack    Tape Cem Bio Tape] Other (See Comments) - 0.90 mg/dL Final     AST   Date Value Ref Range Status   12/02/2019 16 <32 U/L Final   04/26/2018 72 (H) <32 U/L Final   02/23/2018 21 <32 U/L Final     ALT   Date Value Ref Range Status   12/02/2019 14 5 - 33 U/L Final   04/26/2018 55 (H) 5 - 33 U/L Final   02/23/2018 27 5 - 33 U/L Final     Bilirubin, Direct   Date Value Ref Range Status   02/23/2018 0.09 <0.31 mg/dL Final   10/13/2017 <0.08 <0.31 mg/dL Final   09/25/2017 0.10 <0.31 mg/dL Final     Total Bilirubin   Date Value Ref Range Status   12/02/2019 0.30 0.3 - 1.2 mg/dL Final   04/26/2018 0.22 (L) 0.3 - 1.2 mg/dL Final   02/23/2018 0.21 (L) 0.3 - 1.2 mg/dL Final     Alkaline Phosphatase   Date Value Ref Range Status   12/02/2019 123 (H) 35 - 104 U/L Final   04/26/2018 216 (H) 35 - 104 U/L Final   02/23/2018 136 (H) 35 - 104 U/L Final     Lipase   Date Value Ref Range Status   04/26/2018 20 13 - 60 U/L Final     Comment:     Performed at [address] [place] [address] Dr Jeremy Jacobs. [address]   [phone]     02/23/2018 17 13 - 60 U/L Final     Comment:     Performed at [address] [place] [address] Dr Jeremy Jacobs.  [address]   [phone]     10/13/2017 16 13 - 60 U/L Final     Comment:     [facility] [address] [address] [phone]     Amylase   Date Value Ref Range Status   10/13/2017 35 28 - 100 U/L Final     Comment:     [facility] [address] [address] [phone]   07/13/2015 27 (L) 28 - 100 U/L Final     Comment:     Performed at [facility] [address], [address] [facility] [phone]     02/06/2014 31 20 - 104 U/L Final     Comment:     Performed at [facility] [address], [address] [facility] [phone]     No results found for: PROTIME, INR  No results found for: PTT  No results found for: OCCULTBLD  No results found for: GLUMET                    Assessment:   Open wound to the right breast   Thrush   Diabetes mellitus   Diagnosis

## 2020-01-21 ENCOUNTER — HOSPITAL ENCOUNTER (OUTPATIENT)
Age: 55
Setting detail: SPECIMEN
Discharge: HOME | End: 2020-01-21

## 2020-01-21 LAB
HIV AG/AB: NONREACTIVE
IGA: 298 MG/DL (ref 70–400)
IGG: 1058 MG/DL (ref 700–1600)
IGM: 80 MG/DL (ref 40–230)

## 2020-01-21 PROCEDURE — 82784 ASSAY IGA/IGD/IGG/IGM EACH: CPT

## 2020-01-21 PROCEDURE — 87389 HIV-1 AG W/HIV-1&-2 AB AG IA: CPT

## 2020-01-21 PROCEDURE — 36415 COLL VENOUS BLD VENIPUNCTURE: CPT

## 2020-01-27 RX ORDER — TIZANIDINE 4 MG/1
4 TABLET ORAL DAILY PRN
Qty: 15 TABLET | Refills: 0 | Status: SHIPPED | OUTPATIENT
Start: 2020-01-27 | End: 2020-03-02 | Stop reason: SDUPTHER

## 2020-01-27 NOTE — TELEPHONE ENCOUNTER
Insulin is expensive and she is almost out so she would like metformin back. Please send that to [facility] on [place].     Tizanidine goes to [facility]

## 2020-01-28 ENCOUNTER — OFFICE VISIT (OUTPATIENT)
Dept: INFECTIOUS DISEASES | Age: 55
End: 2020-01-28
Payer: COMMERCIAL

## 2020-01-28 VITALS
WEIGHT: 245 LBS | DIASTOLIC BLOOD PRESSURE: 100 MMHG | SYSTOLIC BLOOD PRESSURE: 144 MMHG | BODY MASS INDEX: 38.45 KG/M2 | HEART RATE: 92 BPM | HEIGHT: 67 IN | TEMPERATURE: 98.6 F

## 2020-01-28 PROCEDURE — 99213 OFFICE O/P EST LOW 20 MIN: CPT | Performed by: INTERNAL MEDICINE

## 2020-01-28 NOTE — PROGRESS NOTES
Infectious disease Consult Note      Patient: Kulwinder Dobbs  : 1965  Acct#:  [de-identified]     Date:  2020    Subjective:       History of Present Illness  Patient is a 48 y. o.female   Chief Complaint   Patient presents with    Follow-up   The patient seen right breast open area with some surrounding redness, also had a thrush. She states that she was bit by insect in 2019 had multiple skin lesions on the arms and right breast was given a course of oral antibiotics all lesions healed except for one open area on the right breast.  She also was complaining of thrush that was treated with nystatin with no resolution. History of diabetes, diabetic neuropathy. Interval history 2020.   She is feeling better today, finished her course of Diflucan and the thrush resolved, right breast ulcer healed with no surrounding redness, no new complaints  HIV screen was negative and immunoglobulin levels were normal    Past Medical History:   Diagnosis Date    Allergic rhinitis 10/21/2014    Anxiety     Arthritis     Bleeds easily ([address]     per pt, has never had a work up   Parada Plunk Complete rotator cuff tear of left shoulder     Diabetes mellitus ([address]     On Insulin, metformin, Glipizide    Diabetic polyneuropathy associated with type 2 diabetes mellitus ([address] 3/26/2018    Fibromyalgia     GERD (gastroesophageal reflux disease) 10/21/2014    H/O transfusion     History of blood transfusion     Obesity 10/21/2014    Pure hypercholesterolemia 2016    Tobacco abuse     Type II or unspecified type diabetes mellitus without mention of complication, not stated as uncontrolled     Unspecified sleep apnea     CPAP machine is broken    Wears glasses       Past Surgical History:   Procedure Laterality Date    BRONCHOSCOPY  2017    BRONCHOSCOPY BRUSHINGS performed by Gurwinder Palma MD at [facility] Right     CHOLECYSTECTOMY N/A 2017 Allergies  Allergies   Allergen Reactions    Pcn [Penicillins] Hives and Other (See Comments)     Lose motor function of legs and collaps    Demerol Hcl [Meperidine] Other (See Comments)     Panic attack    Tape Sherleen Galileo Tape] Other (See Comments)     Blister      Morphine Nausea And Vomiting and Other (See Comments)     Stomach pain        Social   Social History     Tobacco Use    Smoking status: Former Smoker     Packs/day: 2.00     Years: 34.00     Pack years: 68.00     Types: Cigarettes     Start date:      Last attempt to quit: 2017     Years since quittin.3    Smokeless tobacco: Never Used   Substance Use Topics    Alcohol use: Yes     Alcohol/week: 0.0 standard drinks     Comment: rare               Family History   Problem Relation Age of Onset    Diabetes Mother     Heart Disease Mother         murmur    Heart Disease Father         MVP    Diabetes Sister     Diabetes Maternal Grandmother     High Blood Pressure Maternal Grandmother     Heart Failure Maternal Grandfather     Heart Attack Paternal Grandmother           Review of Systems    Other than above 14systems reviewed were negative . Physical Exam  BP (!) 144/100   Pulse 92   Temp 98.6 °F (37 °C)   Ht 5' 6.5\" (1.689 m)   Wt 245 lb (111.1 kg)   BMI 38.95 kg/m²           General Appearance: alert and oriented to person, place and time, well-developed and well-nourished, in no acute distress  Skin: warm and dry, no rash   Right breast wound healed  Old scars to the upper arms  Head: normocephalic and atraumatic  Eyes: pupils equal, round  ENT: hearing grossly normal bilaterally. Thrush resolved  Neck: neck supple and non tender . Pulmonary/Chest: clear to auscultation bilaterally- no wheezes, rales or rhonchi, normal air movement, no respiratory distress  Cardiovascular: normal rate, regular rhythm, normal S1 and S2, no murmurs.   Abdomen: soft, non-tender, non-distended, normal bowel sounds, no masses or organomegaly  Extremities: no cyanosis, clubbing or edema  Musculoskeletal: normal range of motion, no joint swelling, deformity or tenderness  Neurologic: no cranial nerve deficit and muscle strength normal    Data Review:    WBC   Date Value Ref Range Status   04/26/2018 5.7 3.5 - 11.0 k/uL Final   03/23/2018 6.7 3.5 - 11.0 k/uL Final   02/23/2018 5.3 3.5 - 11.0 k/uL Final     Hemoglobin   Date Value Ref Range Status   06/11/2018 13.5 11.9 - 15.1 g/dL Final   04/26/2018 15.4 12.0 - 16.0 g/dL Final   03/23/2018 14.9 12.0 - 16.0 g/dL Final     Hematocrit   Date Value Ref Range Status   06/11/2018 42.5 36.3 - 47.1 % Final     Comment:     [facility] [address] [facility], [address] [place] [address] [phone]   04/26/2018 47.1 (H) 36 - 46 % Final   03/23/2018 44.1 36 - 46 % Final     MCV   Date Value Ref Range Status   04/26/2018 80.2 80 - 100 fL Final   03/23/2018 81.6 80 - 100 fL Final   02/23/2018 81.4 80 - 100 fL Final     Platelets   Date Value Ref Range Status   04/26/2018 217 150 - 450 k/uL Final   03/23/2018 208 150 - 450 k/uL Final   02/23/2018 218 150 - 450 k/uL Final     Sodium   Date Value Ref Range Status   12/02/2019 140 135 - 144 mmol/L Final   04/26/2018 137 135 - 144 mmol/L Final   03/23/2018 135 135 - 144 mmol/L Final     Potassium   Date Value Ref Range Status   12/02/2019 4.3 3.7 - 5.3 mmol/L Final   04/26/2018 4.1 3.7 - 5.3 mmol/L Final   03/23/2018 4.2 3.7 - 5.3 mmol/L Final     Chloride   Date Value Ref Range Status   12/02/2019 102 98 - 107 mmol/L Final   04/26/2018 97 (L) 98 - 107 mmol/L Final   03/23/2018 93 (L) 98 - 107 mmol/L Final     CO2   Date Value Ref Range Status   12/02/2019 26 20 - 31 mmol/L Final   04/26/2018 26 20 - 31 mmol/L Final   03/23/2018 25 20 - 31 mmol/L Final     BUN   Date Value Ref Range Status   12/02/2019 5 (L) 6 - 20 mg/dL Final   06/11/2018 5 (L) 6 - 20 mg/dL Final   04/26/2018 4 (L) 6 - 20 mg/dL Final     CREATININE   Date Value Ref Range Status   12/02/2019 Assessment:   Open wound to the right breast healed  Thrush treated and resolved  Diabetes mellitus      Recommendations:   No antibiotics or work-up at this point  Follow-up with primary care physician  Follow-up with me as needed  No orders of the defined types were placed in this encounter. Thank you for allowing me to participate in the care of your patient. Please feel free to contact me with any questions or concerns.      Mandy Rose MD

## 2020-02-07 NOTE — FLOWSHEET NOTE
"/74 (BP Location: Left arm, Patient Position: Sitting, Cuff Size: Adult Regular)   Pulse 73   Temp 97.3  F (36.3  C) (Oral)   Resp 16   Ht 1.556 m (5' 1.25\")   Wt 71.3 kg (157 lb 1.6 oz)   LMP 12/01/2007   SpO2 100%   BMI 29.44 kg/m      "
end of Rx, limited time HP due to Pt time constraint (due to her ride). Specific Instructions for next treatment: progress to UBE, additional AROM ex    Treatment Charges: Mins Units   [x]  Modalities HP 10 --   [x]  Ther Exercise 50 3   []  Manual Therapy     []  Ther Activities     []  Aquatics     []  Vasocompression     []  Other     Total Treatment time 50 min 3       Assessment: [x] Progressing toward goals. Increased difficulty noted changing wax on/ off to wall. Increased difficulty elevated L UE using R UE to assist first movement. Patient reporting less difficulty during supine cane exercises, showing improvements in UE control to lower cane. [] No change. [] Other:    STG: (to be met in 12 treatments)  1. ? Pain: Pt to report pn on average to be less than 6/10 at worst and 4/10 on average. 2. ? ROM: Pt to increase PROM of L shoulder: Abd- 100°, Flex 85°, ER 45° Elbow: Ext 10°  3. ? Strength:Pt to increase L: Elbow- flex/ext 4-/5 without pain,  Wrist- flex 4/5 without pain  4. ? Function: Pt to decrease score on DASH to 70% disability or lower. 5. Independent with Home Exercise Programs  LTG: (to be met in 24 treatments)  1. Pt to report pn on average to be less than 4/10 at worst and 2/10 on average. 2. Pt to increase AROM of L shoulder Abd- 140°, Flex 130°, ER 65° Elbow Ext 5°  3. Pt to increase strength in B Shoulder 4+/5 Elbow 5/5 Wrist 5/5  4. Pt to decrease score on Dash to 40% disability or lower            Pt. Education:  [x] Yes  [] No  [x] Reviewed Prior HEP/Ed  Method of Education: [x] Verbal  [x] Demo  [] Written  Comprehension of Education:  [x] Verbalizes understanding. [x] Demonstrates understanding. [x] Needs review. [] Demonstrates/verbalizes HEP/Ed previously given. Plan: [x] Continue per plan of care.    [] Other:      Time In: 1005           Time Out: 0051    Electronically signed by:  CHRIS Grande    Evaluation/treatment performed by Student

## 2020-02-10 ENCOUNTER — TELEPHONE (OUTPATIENT)
Dept: FAMILY MEDICINE CLINIC | Age: 55
End: 2020-02-10

## 2020-02-10 RX ORDER — GABAPENTIN 300 MG/1
CAPSULE ORAL
Qty: 90 CAPSULE | Refills: 0 | Status: SHIPPED | OUTPATIENT
Start: 2020-02-10 | End: 2020-03-23

## 2020-02-10 NOTE — TELEPHONE ENCOUNTER
Please Approve or Refuse.   Send to Pharmacy per Pt's Request:   Next Visit Date:  3/10/2020   Last Visit Date: 12/10/2019    Hemoglobin A1C (%)   Date Value   12/10/2019 9.2   07/26/2019 8.2   04/16/2019 10.5             ( goal A1C is < 7)   BP Readings from Last 3 Encounters:   01/28/20 (!) 144/100   01/14/20 90/70   12/10/19 (!) 144/90          (goal 120/80)  BUN   Date Value Ref Range Status   12/02/2019 5 (L) 6 - 20 mg/dL Final     CREATININE   Date Value Ref Range Status   12/02/2019 0.64 0.50 - 0.90 mg/dL Final     Potassium   Date Value Ref Range Status   12/02/2019 4.3 3.7 - 5.3 mmol/L Final

## 2020-02-10 NOTE — TELEPHONE ENCOUNTER
Please Approve or Refuse. Send to Pharmacy per Pt's Request: Patient needs a refill on Metformin but she is requesting dosage to be put back to 500 mg because it is tearing up her stomach. Please advise. She needs this script sent to [facility]       Next Visit Date:  3/10/2020   Last Visit Date: 12/10/2019    Hemoglobin A1C (%)   Date Value   12/10/2019 9.2   07/26/2019 8.2   04/16/2019 10.5             ( goal A1C is < 7)   BP Readings from Last 3 Encounters:   01/28/20 (!) 144/100   01/14/20 90/70   12/10/19 (!) 144/90          (goal 120/80)  BUN   Date Value Ref Range Status   12/02/2019 5 (L) 6 - 20 mg/dL Final     CREATININE   Date Value Ref Range Status   12/02/2019 0.64 0.50 - 0.90 mg/dL Final     Potassium   Date Value Ref Range Status   12/02/2019 4.3 3.7 - 5.3 mmol/L Final

## 2020-02-13 ENCOUNTER — OFFICE VISIT (OUTPATIENT)
Dept: FAMILY MEDICINE CLINIC | Age: 55
End: 2020-02-13
Payer: COMMERCIAL

## 2020-02-13 VITALS
DIASTOLIC BLOOD PRESSURE: 86 MMHG | TEMPERATURE: 98.2 F | OXYGEN SATURATION: 97 % | HEART RATE: 89 BPM | WEIGHT: 247 LBS | BODY MASS INDEX: 39.27 KG/M2 | SYSTOLIC BLOOD PRESSURE: 138 MMHG

## 2020-02-13 PROCEDURE — 99213 OFFICE O/P EST LOW 20 MIN: CPT | Performed by: FAMILY MEDICINE

## 2020-02-13 ASSESSMENT — PATIENT HEALTH QUESTIONNAIRE - PHQ9
SUM OF ALL RESPONSES TO PHQ9 QUESTIONS 1 & 2: 0
SUM OF ALL RESPONSES TO PHQ QUESTIONS 1-9: 0
SUM OF ALL RESPONSES TO PHQ QUESTIONS 1-9: 0
1. LITTLE INTEREST OR PLEASURE IN DOING THINGS: 0
2. FEELING DOWN, DEPRESSED OR HOPELESS: 0

## 2020-02-13 ASSESSMENT — ENCOUNTER SYMPTOMS
SHORTNESS OF BREATH: 0
NAUSEA: 0
ABDOMINAL PAIN: 0
SORE THROAT: 0

## 2020-02-13 NOTE — PROGRESS NOTES
3-dose series) 01/30/1986    Cervical cancer screen  10/11/2016    Shingles Vaccine (1 of 2) 01/30/2017    Diabetic microalbuminuria test  06/29/2019    A1C test (Diabetic or Prediabetic)  03/10/2020    Breast cancer screen  04/11/2020    Diabetic foot exam  07/26/2020    Diabetic retinal exam  11/30/2020    Lipid screen  12/02/2020    Potassium monitoring  12/02/2020    Creatinine monitoring  12/02/2020    Colon cancer screen colonoscopy  02/21/2021    Flu vaccine  Completed    Pneumococcal 0-64 years Vaccine  Completed    HIV screen  Completed    Hepatitis A vaccine  Aged Out    Hib vaccine  Aged Out    Meningococcal (ACWY) vaccine  Aged Out     HPI  72-year-old female is seen in the office today for follow-up for her diabetes was on metformin it gave her abdominal pain so I told her to stop it . She was on [place] and she could not afford it and her sugars are running in 200s she takes Humalog couple of times a day she can take it with her meals she states she is working so I told her we will start her on Novolin and and see if she can afford that which will help her. She also saw infectious disease for her thrush and she stated it has come back so I told her to call her and see what needs to be done I recommended for her to go to dermatologist also but she states she cannot afford to go  Review of Systems   Constitutional: Negative for appetite change. HENT: Negative for ear pain, sneezing and sore throat. Eyes: Negative for visual disturbance. Respiratory: Negative for shortness of breath. Cardiovascular: Negative for chest pain. Gastrointestinal: Negative for abdominal pain and nausea. Genitourinary: Negative for frequency, pelvic pain and vaginal discharge. Musculoskeletal: Negative for arthralgias. Neurological: Negative for dizziness and headaches. Objective:   Physical Exam  Vitals signs and nursing note reviewed.    Constitutional:       Appearance: She is well-developed. She is obese. Comments: /86 (Site: Left Upper Arm, Position: Sitting, Cuff Size: Large Adult)   Pulse 89   Temp 98.2 °F (36.8 °C) (Oral)   Wt 247 lb (112 kg)   SpO2 97%   BMI 39.27 kg/m²    HENT:      Head: Normocephalic. Nose: Nose normal.      Mouth/Throat:      Comments:  white patch present on her palate  Eyes:      Conjunctiva/sclera: Conjunctivae normal.   Neck:      Thyroid: No thyromegaly. Cardiovascular:      Rate and Rhythm: Normal rate and regular rhythm. Pulmonary:      Breath sounds: Normal breath sounds. No rales. Abdominal:      General: Bowel sounds are normal.      Palpations: Abdomen is soft. Tenderness: There is no abdominal tenderness. Musculoskeletal:         General: No tenderness. Neurological:      Mental Status: She is alert and oriented to person, place, and time. Assessment:        Diagnosis Orders   1. Type 2 diabetes mellitus with diabetic polyneuropathy, with long-term current use of insulin (HCC)   uncontrolled start Novolin N   2. Essential hypertension   controlled             Plan:       No orders of the defined types were placed in this encounter. No orders of the defined types were placed in this encounter. Return in about 2 weeks (around 2/27/2020) for diabetes.     Continue current medications reviewed from the chart              Kevin Salinas, [facility]

## 2020-02-23 NOTE — PROGRESS NOTES
CDU Daily Progress Note  Attending Physician       Pt Name: Lexy Oshea  MRN: [id number]  [place] 1965  Date of evaluation: 09/29/17    I performed a history and physical examination of the patient and discussed management with the resident. I reviewed the residents note and agree with the documented findings and plan of care. Any areas of disagreement are noted on the chart. I was personally present for the key portions of any procedures. I have documented in the chart those procedures where I was not present during the key portions. I have reviewed the emergency nurses triage note. I agree with the chief complaint, past medical history, past surgical history, allergies, medications, social and family history as documented unless otherwise noted below. Documentation of the HPI, Physical Exam and Medical Decision Making performed by medical students or scribes is based on my personal performance of the HPI, PE and MDM. For Physician Assistant/ Nurse Practitioner cases/documentation I have personally evaluated this patient and have completed at least one if not all key elements of the E/M (history, physical exam, and MDM). Additional findings are as noted. The Family History, Social History and Review of Systems are unchanged from the previous day. No significant events overnight. Late note for 9/28/17: ENT and GS consult.  OR 9/29/17    Manuel Jeffery DO  Attending Physician  Critical Decision Unit Pt discharged by MD tracy. Pt provided with discharge instructions Rx and instructions on follow up care. Pt out of ED ambulatory accompanied by spouse.

## 2020-02-24 ENCOUNTER — TELEPHONE (OUTPATIENT)
Dept: INFECTIOUS DISEASES | Age: 55
End: 2020-02-24

## 2020-03-02 RX ORDER — TIZANIDINE 4 MG/1
4 TABLET ORAL DAILY PRN
Qty: 15 TABLET | Refills: 0 | Status: SHIPPED | OUTPATIENT
Start: 2020-03-02 | End: 2020-03-30 | Stop reason: SDUPTHER

## 2020-03-10 ENCOUNTER — HOSPITAL ENCOUNTER (EMERGENCY)
Age: 55
Discharge: HOME | End: 2020-03-10
Attending: EMERGENCY MEDICINE
Payer: COMMERCIAL

## 2020-03-10 ENCOUNTER — OFFICE VISIT (OUTPATIENT)
Dept: FAMILY MEDICINE CLINIC | Age: 55
End: 2020-03-10
Payer: COMMERCIAL

## 2020-03-10 VITALS
OXYGEN SATURATION: 96 % | HEIGHT: 66 IN | HEART RATE: 90 BPM | RESPIRATION RATE: 18 BRPM | DIASTOLIC BLOOD PRESSURE: 104 MMHG | TEMPERATURE: 99 F | WEIGHT: 245 LBS | BODY MASS INDEX: 39.37 KG/M2 | SYSTOLIC BLOOD PRESSURE: 171 MMHG

## 2020-03-10 VITALS
HEART RATE: 90 BPM | SYSTOLIC BLOOD PRESSURE: 140 MMHG | DIASTOLIC BLOOD PRESSURE: 80 MMHG | WEIGHT: 247.6 LBS | BODY MASS INDEX: 39.79 KG/M2 | OXYGEN SATURATION: 95 % | HEIGHT: 66 IN

## 2020-03-10 LAB
DIRECT EXAM: NORMAL
HBA1C MFR BLD: 11.1 %
Lab: NORMAL
SPECIMEN DESCRIPTION: NORMAL

## 2020-03-10 PROCEDURE — 87880 STREP A ASSAY W/OPTIC: CPT

## 2020-03-10 PROCEDURE — 99283 EMERGENCY DEPT VISIT LOW MDM: CPT

## 2020-03-10 PROCEDURE — 83036 HEMOGLOBIN GLYCOSYLATED A1C: CPT | Performed by: FAMILY MEDICINE

## 2020-03-10 PROCEDURE — 6370000000 HC RX 637 (ALT 250 FOR IP): Performed by: STUDENT IN AN ORGANIZED HEALTH CARE EDUCATION/TRAINING PROGRAM

## 2020-03-10 PROCEDURE — 99214 OFFICE O/P EST MOD 30 MIN: CPT | Performed by: FAMILY MEDICINE

## 2020-03-10 RX ORDER — IBUPROFEN 400 MG/1
400 TABLET ORAL EVERY 6 HOURS PRN
Qty: 120 TABLET | Refills: 0 | Status: SHIPPED | OUTPATIENT
Start: 2020-03-10 | End: 2020-07-23

## 2020-03-10 RX ORDER — LISINOPRIL 10 MG/1
10 TABLET ORAL DAILY
Qty: 30 TABLET | Refills: 2 | Status: SHIPPED | OUTPATIENT
Start: 2020-03-10 | End: 2020-05-05 | Stop reason: SDUPTHER

## 2020-03-10 RX ORDER — INSULIN DEGLUDEC INJECTION 100 U/ML
20 INJECTION, SOLUTION SUBCUTANEOUS DAILY
Qty: 2 PEN | Refills: 1 | Status: SHIPPED | OUTPATIENT
Start: 2020-03-10 | End: 2020-07-09 | Stop reason: ALTCHOICE

## 2020-03-10 RX ORDER — ACETAMINOPHEN 325 MG/1
650 TABLET ORAL EVERY 4 HOURS PRN
Status: DISCONTINUED | OUTPATIENT
Start: 2020-03-10 | End: 2020-03-10 | Stop reason: HOSPADM

## 2020-03-10 RX ORDER — INSULIN DEGLUDEC 200 U/ML
INJECTION, SOLUTION SUBCUTANEOUS
COMMUNITY
End: 2020-03-10 | Stop reason: SDUPTHER

## 2020-03-10 RX ORDER — IBUPROFEN 400 MG/1
400 TABLET ORAL EVERY 6 HOURS PRN
Status: DISCONTINUED | OUTPATIENT
Start: 2020-03-10 | End: 2020-03-10 | Stop reason: HOSPADM

## 2020-03-10 RX ORDER — ACETAMINOPHEN 325 MG/1
325 TABLET ORAL EVERY 6 HOURS PRN
Qty: 120 TABLET | Refills: 0 | Status: SHIPPED | OUTPATIENT
Start: 2020-03-10 | End: 2020-09-01

## 2020-03-10 RX ADMIN — IBUPROFEN 400 MG: 400 TABLET, FILM COATED ORAL at 20:20

## 2020-03-10 RX ADMIN — ACETAMINOPHEN 650 MG: 325 TABLET ORAL at 20:20

## 2020-03-10 ASSESSMENT — PAIN DESCRIPTION - FREQUENCY: FREQUENCY: CONTINUOUS

## 2020-03-10 ASSESSMENT — PAIN DESCRIPTION - PAIN TYPE: TYPE: ACUTE PAIN

## 2020-03-10 ASSESSMENT — PAIN SCALES - GENERAL
PAINLEVEL_OUTOF10: 7
PAINLEVEL_OUTOF10: 7

## 2020-03-10 ASSESSMENT — ENCOUNTER SYMPTOMS
NAUSEA: 0
ABDOMINAL PAIN: 0
SHORTNESS OF BREATH: 0
SORE THROAT: 0

## 2020-03-10 ASSESSMENT — PAIN DESCRIPTION - DESCRIPTORS: DESCRIPTORS: BURNING;TENDER

## 2020-03-10 ASSESSMENT — PAIN DESCRIPTION - LOCATION: LOCATION: THROAT

## 2020-03-10 NOTE — PROGRESS NOTES
Visit Information    Have you changed or started any medications since your last visit including any over-the-counter medicines, vitamins, or herbal medicines? no   Are you having any side effects from any of your medications? -  no  Have you stopped taking any of your medications? Is so, why? -  no    Have you seen any other physician or provider since your last visit? Yes - Records Obtained  Have you had any other diagnostic tests since your last visit? No  Have you been seen in the emergency room and/or had an admission to a hospital since we last saw you? No  Have you had your routine dental cleaning in the past 6 months? no    Have you activated your [employer] account? If not, what are your barriers? Yes     Patient Care Team:  Alexander Gonzalez MD as PCP - General (Family Medicine)  Alexander Gonzalez MD as PCP - [facility]    Medical History Review  Past Medical, Family, and Social History reviewed and does not contribute to the patient presenting condition    Health Maintenance   Topic Date Due    Hepatitis B vaccine (1 of 3 - Risk 3-dose series) 01/30/1986    DTaP/Tdap/Td vaccine (1 - Tdap) 01/30/1986    Cervical cancer screen  10/11/2016    Shingles Vaccine (1 of 2) 01/30/2017    Diabetic microalbuminuria test  06/29/2019    A1C test (Diabetic or Prediabetic)  03/10/2020    Breast cancer screen  04/11/2020    Diabetic foot exam  07/26/2020    Diabetic retinal exam  11/30/2020    Lipid screen  12/02/2020    Potassium monitoring  12/02/2020    Creatinine monitoring  12/02/2020    Colon cancer screen colonoscopy  02/21/2021    Flu vaccine  Completed    Pneumococcal 0-64 years Vaccine  Completed    HIV screen  Completed    Hepatitis A vaccine  Aged Out    Hib vaccine  Aged Out    Meningococcal (ACWY) vaccine  Aged Out     Subjective:      Patient ID: Riya Hartley is a 50 y.o. female.     HPI  61-year-old female is seen in the office today for follow-up for her diabetes Tenderness: There is no abdominal tenderness. Musculoskeletal:         General: No tenderness. Skin:     Findings: No rash. Neurological:      Mental Status: She is alert and oriented to person, place, and time. hb A1c is 11.1    Assessment:        Diagnosis Orders   1. Type 2 diabetes mellitus with diabetic polyneuropathy, with long-term current use of insulin (HCC)   uncontrolled  POCT glycosylated hemoglobin (Hb A1C)    Michele Akers MD, Endocrinology, [facility]   2. Essential hypertension               Plan:         Orders Placed This Encounter   Procedures   Gilmer Lim MD, Endocrinology, [facility]     Referral Priority:   Routine     Referral Type:   Eval and Treat     Referral Reason:   Specialty Services Required     Referred to Provider:   Milly Rodgers MD     Requested Specialty:   Endocrinology     Number of Visits Requested:   1    POCT glycosylated hemoglobin (Hb A1C)     Orders Placed This Encounter   Medications    Insulin Degludec (TRESIBA FLEXTOUCH) 100 UNIT/ML SOPN     Sig: Inject 20 Units into the skin daily     Dispense:  2 pen     Refill:  1    lisinopril (PRINIVIL;ZESTRIL) 10 MG tablet     Sig: Take 1 tablet by mouth daily     Dispense:  30 tablet     Refill:  2     Return in about 3 months (around 6/10/2020) for diabetes, HTN.     Continue current medications reviewed from the chart            Tavo Jaquez MD

## 2020-03-10 NOTE — ED PROVIDER NOTES
Emergency Medicine Attending Note    I have seen and examined the patient in conjunction with the Resident/MLP. Please see my key portion documented:      I agree with the assessment and plan as discussed with Dr. Angelica Recinos. Electronically signed:  Art Trivedi M.D.            Tisha Jhaveri MD  03/10/20 Dylan Davis MD  03/10/20 [id number]

## 2020-03-10 NOTE — ED PROVIDER NOTES
[facility]  ED  Emergency Department Encounter  Emergency Medicine Resident     Pt Name: Rodger Fink  MRN: [id number]  [place] 1965  Date of evaluation: 3/10/20  PCP:  Tavo Jaquez MD    [address] [place] [address]       Chief Complaint   Patient presents with    Pharyngitis     since this morning       HISTORY OFPRESENT ILLNESS  (Location/Symptom, Timing/Onset, Context/Setting, Quality, Duration, Modifying Factors,Severity.)      Rodger Fink is a 82-year-old female who presents with isolated pharyngitis starting this morning. Patient denies fever, runny nose, ringing of the ears, cough, chest pain, nausea, vomiting, change in bowel or bladder function. States that she had progressively worsening pharyngitis since this morning, has a history of strep throat in the past and states that this feels particularly similar to when she had strep throat. PAST MEDICAL / SURGICAL / SOCIAL / FAMILY HISTORY      has a past medical history of Allergic rhinitis, Anxiety, Arthritis, Bleeds easily ([address] Complete rotator cuff tear of left shoulder, Diabetes mellitus ([address] Diabetic polyneuropathy associated with type 2 diabetes mellitus ([address] Fibromyalgia, GERD (gastroesophageal reflux disease), H/O transfusion, History of blood transfusion, Obesity, Pure hypercholesterolemia, Tobacco abuse, Type II or unspecified type diabetes mellitus without mention of complication, not stated as uncontrolled, Unspecified sleep apnea, and Wears glasses. has a past surgical history that includes Gastric bypass surgery (1987); ovarian cyst removal (Right, 1988); Carpal tunnel release (Right, 2002); bronchoscopy (9/27/2017); pr egd transoral biopsy single/multiple (N/A, 9/27/2017); Cholecystectomy (N/A, 9/29/2017); laryngoscopy (N/A, 9/29/2017); Colonoscopy (N/A, 2/21/2018); Rotator cuff repair (Left); and pr shldr arthroscop,surg,w/rotat cuff repr (Left, 6/19/2018).      Social History Socioeconomic History    Marital status: Legally      Spouse name: Not on file    Number of children: 3    Years of education: Not on file    Highest education level: Not on file   Occupational History    Occupation:    Social Needs    Financial resource strain: Not on file    Food insecurity     Worry: Not on file     Inability: Not on file   [employer] needs     Medical: Not on file     Non-medical: Not on file   Tobacco Use    Smoking status: Former Smoker     Packs/day: 2.00     Years: 34.00     Pack years: 68.00     Types: Cigarettes     Start date:      Last attempt to quit: 2017     Years since quittin.4    Smokeless tobacco: Never Used   Substance and Sexual Activity    Alcohol use: Yes     Alcohol/week: 0.0 standard drinks     Comment: rare    Drug use: No    Sexual activity: Never   Lifestyle    Physical activity     Days per week: Not on file     Minutes per session: Not on file    Stress: Not on file   Relationships    Social connections     Talks on phone: Not on file     Gets together: Not on file     Attends Jehovah's witness service: Not on file     Active member of club or organization: Not on file     Attends meetings of clubs or organizations: Not on file     Relationship status: Not on file    Intimate partner violence     Fear of current or ex partner: Not on file     Emotionally abused: Not on file     Physically abused: Not on file     Forced sexual activity: Not on file   Other Topics Concern    Not on file   Social History Narrative    Not on file       Family History   Problem Relation Age of Onset    Diabetes Mother     Heart Disease Mother         murmur    Heart Disease Father         MVP    Diabetes Sister     Diabetes Maternal Grandmother     High Blood Pressure Maternal Grandmother     Heart Failure Maternal Grandfather     Heart Attack Paternal Grandmother        Allergies:  Pcn [penicillins];  Demerol hcl [meperidine]; Metformin and related; Tape [adhesive tape]; and Morphine    Home Medications:  Prior to Admission medications    Medication Sig Start Date End Date Taking? Authorizing Provider   acetaminophen (TYLENOL) 325 MG tablet Take 1 tablet by mouth every 6 hours as needed for Pain 3/10/20  Yes Blanca Diamond MD   ibuprofen (IBU) 400 MG tablet Take 1 tablet by mouth every 6 hours as needed for Pain 3/10/20  Yes Blanca Diamond MD   Insulin Degludec (TRESIBA FLEXTOUCH) 100 UNIT/ML SOPN Inject 20 Units into the skin daily 3/10/20   Bob Shine MD   lisinopril (PRINIVIL;ZESTRIL) 10 MG tablet Take 1 tablet by mouth daily 3/10/20   Bob Shine MD   tiZANidine (ZANAFLEX) 4 MG tablet Take 1 tablet by mouth daily as needed (Muscle spasms) 3/2/20   Bob Shine MD   insulin NPH (HUMULIN N;NOVOLIN N) 100 UNIT/ML injection vial Inject 20 Units into the skin every morning    Historical Provider, MD   gabapentin (NEURONTIN) 300 MG capsule TAKE ONE CAPSULE BY MOUTH THREE TIMES A DAY 2/10/20 3/10/20  Bob Shine MD   VITAMIN D PO Take by mouth    Historical Provider, MD   diclofenac (VOLTAREN) 75 MG EC tablet Take 1 tablet by mouth 2 times daily 4/16/19   Thuy Yates MD   Blood Glucose Monitoring Suppl (BLOOD GLUCOSE METER) KIT Dx: DM-2. Use 2-3 times daily 10/6/15   Thuy Yates MD   Lancets MISC Dx: DM-2. Use 2-3 times daily 10/6/15   Thuy Yates MD       REVIEW OFSYSTEMS    (2-9 systems for level 4, 10 or more for level 5)      Review of Systems   Constitutional: Negative for chills, fatigue and fever. HENT: Positive for sore throat. Negative for hearing loss, rhinorrhea, sneezing, tinnitus and trouble swallowing. Eyes: Negative for photophobia and visual disturbance. Respiratory: Negative for chest tightness, shortness of breath and wheezing. Cardiovascular: Negative for chest pain and palpitations.    Gastrointestinal: Negative for abdominal distention, abdominal pain, constipation, diarrhea, nausea and vomiting. Endocrine: Negative for polyuria. Genitourinary: Negative for dysuria, flank pain, frequency, vaginal bleeding and vaginal discharge. Musculoskeletal: Negative for arthralgias, back pain, gait problem and neck pain. Neurological: Negative for dizziness, tremors, seizures, syncope, facial asymmetry, numbness and headaches. Psychiatric/Behavioral: Negative for self-injury and suicidal ideas. PHYSICAL EXAM   (up to 7 for level 4, 8 or more forlevel 5)      INITIAL VITALS:   ED Triage Vitals [03/10/20 1928]   BP Temp Temp Source Pulse Resp SpO2 Height Weight   (!) 171/104 99 °F (37.2 °C) Oral 90 16 96 % 5' 6\" (1.676 m) 245 lb (111.1 kg)       Physical Exam  Constitutional:       General: She is not in acute distress. Appearance: She is well-developed. She is not diaphoretic. HENT:      Head: Normocephalic and atraumatic. Right Ear: External ear normal.      Left Ear: External ear normal.      Mouth/Throat:      Mouth: Mucous membranes are moist.      Pharynx: Posterior oropharyngeal erythema present. No oropharyngeal exudate. Eyes:      Conjunctiva/sclera: Conjunctivae normal.   Neck:      Musculoskeletal: Normal range of motion and neck supple. Trachea: No tracheal deviation. Cardiovascular:      Rate and Rhythm: Normal rate and regular rhythm. Heart sounds: Normal heart sounds. Pulmonary:      Effort: Pulmonary effort is normal.      Breath sounds: Normal breath sounds. Abdominal:      General: Bowel sounds are normal. There is no distension. Palpations: Abdomen is soft. Tenderness: There is no abdominal tenderness. There is no guarding. Musculoskeletal: Normal range of motion. General: No deformity. Skin:     General: Skin is warm. Neurological:      Mental Status: She is alert and oriented to person, place, and time. Cranial Nerves: No cranial nerve deficit.          DIFFERENTIAL  DIAGNOSIS     PLAN (LABS / IMAGING / EKG):  Orders Placed This Encounter   Procedures    Strep Screen Group A Throat       MEDICATIONS ORDERED:  Orders Placed This Encounter   Medications    DISCONTD: ibuprofen (ADVIL;MOTRIN) tablet 400 mg    DISCONTD: acetaminophen (TYLENOL) tablet 650 mg    acetaminophen (TYLENOL) 325 MG tablet     Sig: Take 1 tablet by mouth every 6 hours as needed for Pain     Dispense:  120 tablet     Refill:  0    ibuprofen (IBU) 400 MG tablet     Sig: Take 1 tablet by mouth every 6 hours as needed for Pain     Dispense:  120 tablet     Refill:  0       DDX: Pharyngitis, strep pharyngitis, viral exanthem    Initial MDM/Plan: 50 y.o. female who presents with pharyngitis. Plan to do strep test    DIAGNOSTIC RESULTS / EMERGENCYDEPARTMENT COURSE / MDM     LABS:  Labs Reviewed   STREP SCREEN GROUP A THROAT         RADIOLOGY:  No results found. EKG      All EKG's are interpreted by the Emergency Department Physicianwho either signs or Co-signs this chart in the absence of a cardiologist.    EMERGENCY DEPARTMENT COURSE:  ED Course as of Mar 11 0034   Tue Mar 10, 2020   1959 Patient not in room     [GP]      ED Course User Index  [GP] Sharif Meyers MD   Strep negative, presumptive viral pharyngitis, patient told to follow-up with primary care provider, patient given return precautions. Patient understands discharge instructions      PROCEDURES:  None    CONSULTS:  None    CRITICAL CARE:  Please see attending note    FINAL IMPRESSION      1.  Acute pharyngitis, unspecified etiology          DISPOSITION / PLAN     DISPOSITION Decision To Discharge 03/10/2020 08:56:47 PM      PATIENT REFERRED TO:  [facility]  [address] [address]  [phone]    As needed    Donny Munson MD  [facility] [address]  [address] [place] [address]  [phone]      As needed      DISCHARGE MEDICATIONS:  Discharge Medication List as of 3/10/2020  8:58 PM      START taking these medications Details   acetaminophen (TYLENOL) 325 MG tablet Take 1 tablet by mouth every 6 hours as needed for Pain, Disp-120 tablet, R-0Print      ibuprofen (IBU) 400 MG tablet Take 1 tablet by mouth every 6 hours as needed for Pain, Disp-120 tablet, R-0Print             Ricardo Acosta MD  Emergency Medicine Resident    (Please note that portions of this note were completed with a voice recognition program.Efforts were made to edit the dictations but occasionally words are mis-transcribed.)       Ricardo Acosta MD  Resident  03/11/20 [id number]

## 2020-03-11 ENCOUNTER — TELEPHONE (OUTPATIENT)
Dept: FAMILY MEDICINE CLINIC | Age: 55
End: 2020-03-11

## 2020-03-11 ASSESSMENT — ENCOUNTER SYMPTOMS
ABDOMINAL PAIN: 0
TROUBLE SWALLOWING: 0
CONSTIPATION: 0
RHINORRHEA: 0
WHEEZING: 0
SORE THROAT: 1
ABDOMINAL DISTENTION: 0
NAUSEA: 0
BACK PAIN: 0
CHEST TIGHTNESS: 0
PHOTOPHOBIA: 0
SHORTNESS OF BREATH: 0
DIARRHEA: 0
VOMITING: 0

## 2020-03-12 RX ORDER — GLUCOSAMINE HCL/CHONDROITIN SU 500-400 MG
CAPSULE ORAL
Qty: 100 STRIP | Refills: 2 | Status: SHIPPED | OUTPATIENT
Start: 2020-03-12 | End: 2020-09-01

## 2020-03-12 NOTE — TELEPHONE ENCOUNTER
Please Approve or Refuse.   Send to Pharmacy per Pt's Request:      Next Visit Date:  Visit date not found   Last Visit Date: 3/10/2020    Hemoglobin A1C (%)   Date Value   03/10/2020 11.1   12/10/2019 9.2   07/26/2019 8.2             ( goal A1C is < 7)   BP Readings from Last 3 Encounters:   03/10/20 (!) 171/104   03/10/20 (!) 140/80   02/13/20 138/86          (goal 120/80)  BUN   Date Value Ref Range Status   12/02/2019 5 (L) 6 - 20 mg/dL Final     CREATININE   Date Value Ref Range Status   12/02/2019 0.64 0.50 - 0.90 mg/dL Final     Potassium   Date Value Ref Range Status   12/02/2019 4.3 3.7 - 5.3 mmol/L Final

## 2020-03-23 RX ORDER — GABAPENTIN 300 MG/1
CAPSULE ORAL
Qty: 90 CAPSULE | Refills: 0 | Status: SHIPPED | OUTPATIENT
Start: 2020-03-23 | End: 2020-06-11 | Stop reason: SDUPTHER

## 2020-03-24 ENCOUNTER — OFFICE VISIT (OUTPATIENT)
Dept: INFECTIOUS DISEASES | Age: 55
End: 2020-03-24
Payer: COMMERCIAL

## 2020-03-24 VITALS
TEMPERATURE: 99 F | HEIGHT: 66 IN | SYSTOLIC BLOOD PRESSURE: 148 MMHG | HEART RATE: 92 BPM | DIASTOLIC BLOOD PRESSURE: 98 MMHG | WEIGHT: 244.93 LBS | BODY MASS INDEX: 39.36 KG/M2

## 2020-03-24 PROCEDURE — 99213 OFFICE O/P EST LOW 20 MIN: CPT | Performed by: INTERNAL MEDICINE

## 2020-03-24 RX ORDER — FLUCONAZOLE 100 MG/1
200 TABLET ORAL DAILY
Qty: 56 TABLET | Refills: 0 | Status: SHIPPED | OUTPATIENT
Start: 2020-03-24 | End: 2020-04-21

## 2020-03-24 NOTE — PROGRESS NOTES
Infectious disease Consult Note      Patient: Makeda Marcial  : 1965  Acct#:  [de-identified]     Date:  3/24/2020    Subjective:       History of Present Illness  Patient is a 50 y. o.female   Chief Complaint   Patient presents with   Morelia Fitzpatrick    Follow-up     History of diabetes, diabetic neuropathy. Interval history 3/24/2020. The patient is here because of the thrush is back, also complaining of difficulty swallowing, denied any fever or chills. She was evaluated at the ER on 3/10/2020 for sore throat, rapid strep test was negative. Her blood sugar is not under control, patient states that she cannot afford medication.   HIV screen was negative and immunoglobulin levels were normal    Past Medical History:   Diagnosis Date    Allergic rhinitis 10/21/2014    Anxiety     Arthritis     Bleeds easily ([address]     per pt, has never had a work up   Sherman Arana Complete rotator cuff tear of left shoulder     Diabetes mellitus ([address]     On Insulin, metformin, Glipizide    Diabetic polyneuropathy associated with type 2 diabetes mellitus ([address] 3/26/2018    Fibromyalgia     GERD (gastroesophageal reflux disease) 10/21/2014    H/O transfusion     History of blood transfusion     Obesity 10/21/2014    Pure hypercholesterolemia 2016    Tobacco abuse     Type II or unspecified type diabetes mellitus without mention of complication, not stated as uncontrolled     Unspecified sleep apnea     CPAP machine is broken    Wears glasses       Past Surgical History:   Procedure Laterality Date    BRONCHOSCOPY  2017    BRONCHOSCOPY BRUSHINGS performed by Maryan Yoder MD at [facility] 2002    CHOLECYSTECTOMY N/A 2017    CHOLECYSTECTOMY OPEN performed by Jose Angel Simmons DO at [address] [place] [address] N/A 2018    COLONOSCOPY POLYPECTOMY SNARE/COLD BIOPSY performed by Van Guthrie MD at [address] [place] [address]    with revision x1    LARYNGOSCOPY N/A 9/29/2017    DIRECT MICRO LARYNGOSCOPY WITH EXCISION VOCAL CORD LESION  performed by Izzy Peterson MD at [facility] [place] Right 1988    OR EGD TRANSORAL BIOPSY SINGLE/MULTIPLE N/A 9/27/2017    EGD BIOPSY performed by Debby Jeffery MD at [facility] ARTHROSCOP,SURG,W/ROTAT CUFF REPR Left 6/19/2018    SHOULDER ARTHROSCOPY WITH ROTATOR CUFF REPAIR, SUBACROMIAL DECOMPRESSION performed by Ced Paiz MD at [facility] Left     SHOULDER ARTHROSCOPY WITH ROTATOR CUFF REPAIR, SUBACROMIAL DECOMPRESSION (Left           Admission Meds  Current Outpatient Medications on File Prior to Visit   Medication Sig Dispense Refill    gabapentin (NEURONTIN) 300 MG capsule TAKE ONE CAPSULE BY MOUTH THREE TIMES A DAY 90 capsule 0    blood glucose monitor strips Test three times a day  Please fill script for Contour Next test strips 100 strip 2    insulin NPH (HUMULIN N;NOVOLIN N) 100 UNIT/ML injection vial Inject 20 Units into the skin every morning 1 vial 1    Insulin Degludec (TRESIBA FLEXTOUCH) 100 UNIT/ML SOPN Inject 20 Units into the skin daily 2 pen 1    lisinopril (PRINIVIL;ZESTRIL) 10 MG tablet Take 1 tablet by mouth daily 30 tablet 2    acetaminophen (TYLENOL) 325 MG tablet Take 1 tablet by mouth every 6 hours as needed for Pain 120 tablet 0    ibuprofen (IBU) 400 MG tablet Take 1 tablet by mouth every 6 hours as needed for Pain 120 tablet 0    tiZANidine (ZANAFLEX) 4 MG tablet Take 1 tablet by mouth daily as needed (Muscle spasms) 15 tablet 0    VITAMIN D PO Take by mouth      diclofenac (VOLTAREN) 75 MG EC tablet Take 1 tablet by mouth 2 times daily 60 tablet 5    Blood Glucose Monitoring Suppl (BLOOD GLUCOSE METER) KIT Dx: DM-2. Use 2-3 times daily 1 kit 0    Lancets MISC Dx: DM-2. Use 2-3 times daily 100 each 3     No current facility-administered medications on file prior to visit.             Allergies  Allergies   Allergen Reactions    Pcn [Penicillins] Hives and Other (See Comments)     Lose motor function of legs and collaps    Demerol Hcl [Meperidine] Other (See Comments)     Panic attack    Metformin And Related      Abdominal pain    Tape Sherleen Galileo Tape] Other (See Comments)     Blister      Morphine Nausea And Vomiting and Other (See Comments)     Stomach pain        Social   Social History     Tobacco Use    Smoking status: Former Smoker     Packs/day: 2.00     Years: 34.00     Pack years: 68.00     Types: Cigarettes     Start date:      Last attempt to quit: 2017     Years since quittin.4    Smokeless tobacco: Never Used   Substance Use Topics    Alcohol use: Yes     Alcohol/week: 0.0 standard drinks     Comment: rare               Family History   Problem Relation Age of Onset    Diabetes Mother     Heart Disease Mother         murmur    Heart Disease Father         MVP    Diabetes Sister     Diabetes Maternal Grandmother     High Blood Pressure Maternal Grandmother     Heart Failure Maternal Grandfather     Heart Attack Paternal Grandmother           Review of Systems    Other than above 14systems reviewed were negative . Physical Exam  BP (!) 148/98   Pulse 92   Temp 99 °F (37.2 °C)   Ht 5' 5.98\" (1.676 m)   Wt 244 lb 14.9 oz (111.1 kg)   LMP  (LMP Unknown)   BMI 39.55 kg/m²           General Appearance: alert and oriented to person, place and time, well-developed and well-nourished, in no acute distress  Skin: warm and dry, no rash   Right breast wound healed  Old scars to the upper arms  Head: normocephalic and atraumatic  Eyes: pupils equal, round  ENT: hearing grossly normal bilaterally. Thrush resolved  Neck: neck supple and non tender . Pulmonary/Chest: clear to auscultation bilaterally- no wheezes, rales or rhonchi, normal air movement, no respiratory distress  Cardiovascular: normal rate, regular rhythm, normal S1 and S2, no murmurs.   Abdomen: soft, non-tender, non-distended, normal bowel sounds, no masses or organomegaly  Extremities: no cyanosis, clubbing or edema  Musculoskeletal: normal range of motion, no joint swelling, deformity or tenderness  Neurologic: no cranial nerve deficit and muscle strength normal    Data Review:    WBC   Date Value Ref Range Status   04/26/2018 5.7 3.5 - 11.0 k/uL Final   03/23/2018 6.7 3.5 - 11.0 k/uL Final   02/23/2018 5.3 3.5 - 11.0 k/uL Final     Hemoglobin   Date Value Ref Range Status   06/11/2018 13.5 11.9 - 15.1 g/dL Final   04/26/2018 15.4 12.0 - 16.0 g/dL Final   03/23/2018 14.9 12.0 - 16.0 g/dL Final     Hematocrit   Date Value Ref Range Status   06/11/2018 42.5 36.3 - 47.1 % Final     Comment:     [facility] [address] [facility], [address] [place] [address] [phone]   04/26/2018 47.1 (H) 36 - 46 % Final   03/23/2018 44.1 36 - 46 % Final     MCV   Date Value Ref Range Status   04/26/2018 80.2 80 - 100 fL Final   03/23/2018 81.6 80 - 100 fL Final   02/23/2018 81.4 80 - 100 fL Final     Platelets   Date Value Ref Range Status   04/26/2018 217 150 - 450 k/uL Final   03/23/2018 208 150 - 450 k/uL Final   02/23/2018 218 150 - 450 k/uL Final     Sodium   Date Value Ref Range Status   12/02/2019 140 135 - 144 mmol/L Final   04/26/2018 137 135 - 144 mmol/L Final   03/23/2018 135 135 - 144 mmol/L Final     Potassium   Date Value Ref Range Status   12/02/2019 4.3 3.7 - 5.3 mmol/L Final   04/26/2018 4.1 3.7 - 5.3 mmol/L Final   03/23/2018 4.2 3.7 - 5.3 mmol/L Final     Chloride   Date Value Ref Range Status   12/02/2019 102 98 - 107 mmol/L Final   04/26/2018 97 (L) 98 - 107 mmol/L Final   03/23/2018 93 (L) 98 - 107 mmol/L Final     CO2   Date Value Ref Range Status   12/02/2019 26 20 - 31 mmol/L Final   04/26/2018 26 20 - 31 mmol/L Final   03/23/2018 25 20 - 31 mmol/L Final     BUN   Date Value Ref Range Status   12/02/2019 5 (L) 6 - 20 mg/dL Final   06/11/2018 5 (L) 6 - 20 mg/dL Final   04/26/2018 4 (L) 6 - 20 mg/dL Final     CREATININE OCCULTBLD  No results found for: GLUMET                    Assessment:   Recurrent thrush with suspected candidal esophagitis  Uncontrolled diabetes mellitus      Recommendations:   Diflucan p.o. 200 twice a day for 2 weeks.   May need EGD if no improvement      Sharita Brock MD

## 2020-03-30 ENCOUNTER — TELEPHONE (OUTPATIENT)
Dept: FAMILY MEDICINE CLINIC | Age: 55
End: 2020-03-30

## 2020-03-30 RX ORDER — BUSPIRONE HYDROCHLORIDE 10 MG/1
10 TABLET ORAL 3 TIMES DAILY
Qty: 90 TABLET | Refills: 0 | Status: SHIPPED | OUTPATIENT
Start: 2020-03-30 | End: 2020-04-29

## 2020-03-30 RX ORDER — TIZANIDINE 4 MG/1
4 TABLET ORAL DAILY PRN
Qty: 15 TABLET | Refills: 0 | Status: SHIPPED | OUTPATIENT
Start: 2020-03-30 | End: 2020-05-05 | Stop reason: SDUPTHER

## 2020-03-30 NOTE — TELEPHONE ENCOUNTER
Please let the patient know to  prescription from pharmacy. Please explain to her we do not give Xanax, but I did send BuSpar which works in the same way but is not controlled, and is nonsedating, safe, and it will help her out. Alternatively she could try Benadryl from over-the-counter however that is  very sedating and I do not recommend as a first-line. I suggest her to try BuSpar    Requested Prescriptions     Signed Prescriptions Disp Refills    tiZANidine (ZANAFLEX) 4 MG tablet 15 tablet 0     Sig: Take 1 tablet by mouth daily as needed (Muscle spasms)     Authorizing Provider: Vangie Win    busPIRone (BUSPAR) 10 MG tablet 90 tablet 0     Sig: Take 1 tablet by mouth 3 times daily     Authorizing Provider: Jacob Gutierrez [facility] [address] [facility] [place] [address]  Phone: [phone] Fax: [phone]    Thank you! FYI    No future appointments.     Controlled Substances Monitoring:

## 2020-03-31 RX ORDER — BUSPIRONE HYDROCHLORIDE 5 MG/1
5 TABLET ORAL 3 TIMES DAILY PRN
Qty: 90 TABLET | Refills: 0 | Status: SHIPPED | OUTPATIENT
Start: 2020-03-31 | End: 2021-01-04 | Stop reason: SDUPTHER

## 2020-03-31 NOTE — TELEPHONE ENCOUNTER
Please let the patient know to  prescription from pharmacy. Requested Prescriptions     Signed Prescriptions Disp Refills    tiZANidine (ZANAFLEX) 4 MG tablet 15 tablet 0     Sig: Take 1 tablet by mouth daily as needed (Muscle spasms)     Authorizing Provider: Chaz Schmidt    busPIRone (BUSPAR) 10 MG tablet 90 tablet 0     Sig: Take 1 tablet by mouth 3 times daily     Authorizing Provider: Chaz Schmidt    busPIRone (BUSPAR) 5 MG tablet 90 tablet 0     Sig: Take 1 tablet by mouth 3 times daily as needed (Anxiety)     Authorizing Provider: Alicia Callahan [address] [place] [phone] [place] [phone]  [facility] [address]  Phone: [phone] Fax: [phone]    Thank you! FYI    No future appointments.     Controlled Substances Monitoring:

## 2020-05-05 RX ORDER — TIZANIDINE 4 MG/1
4 TABLET ORAL DAILY PRN
Qty: 180 TABLET | Refills: 0 | Status: SHIPPED | OUTPATIENT
Start: 2020-05-05 | End: 2020-08-03

## 2020-05-05 RX ORDER — LISINOPRIL 10 MG/1
10 TABLET ORAL DAILY
Qty: 90 TABLET | Refills: 0 | Status: SHIPPED | OUTPATIENT
Start: 2020-05-05 | End: 2020-07-31 | Stop reason: SDUPTHER

## 2020-06-11 RX ORDER — GABAPENTIN 300 MG/1
CAPSULE ORAL
Qty: 90 CAPSULE | Refills: 0 | Status: SHIPPED | OUTPATIENT
Start: 2020-06-11 | End: 2020-07-22

## 2020-07-09 ENCOUNTER — TELEMEDICINE (OUTPATIENT)
Dept: FAMILY MEDICINE CLINIC | Age: 55
End: 2020-07-09

## 2020-07-09 VITALS — DIASTOLIC BLOOD PRESSURE: 90 MMHG | SYSTOLIC BLOOD PRESSURE: 150 MMHG

## 2020-07-09 PROBLEM — R53.83 FATIGUE: Status: ACTIVE | Noted: 2020-07-09

## 2020-07-09 PROCEDURE — 81003 URINALYSIS AUTO W/O SCOPE: CPT | Performed by: FAMILY MEDICINE

## 2020-07-09 PROCEDURE — 99214 OFFICE O/P EST MOD 30 MIN: CPT | Performed by: FAMILY MEDICINE

## 2020-07-09 RX ORDER — POTASSIUM CHLORIDE 750 MG/1
10 TABLET, FILM COATED, EXTENDED RELEASE ORAL DAILY
Qty: 30 TABLET | Refills: 3 | Status: SHIPPED | OUTPATIENT
Start: 2020-07-09 | End: 2020-09-01 | Stop reason: SDUPTHER

## 2020-07-09 RX ORDER — HYDROCHLOROTHIAZIDE 25 MG/1
25 TABLET ORAL DAILY
Qty: 30 TABLET | Refills: 3 | Status: SHIPPED | OUTPATIENT
Start: 2020-07-09 | End: 2020-09-01

## 2020-07-09 RX ORDER — GLIPIZIDE 5 MG/1
5 TABLET ORAL DAILY
Qty: 90 TABLET | Refills: 1 | Status: SHIPPED | OUTPATIENT
Start: 2020-07-09 | End: 2020-07-31 | Stop reason: SDUPTHER

## 2020-07-09 RX ORDER — ATORVASTATIN CALCIUM 10 MG/1
10 TABLET, FILM COATED ORAL DAILY
Qty: 90 TABLET | Refills: 2 | Status: SHIPPED | OUTPATIENT
Start: 2020-07-09 | End: 2020-09-01 | Stop reason: SDUPTHER

## 2020-07-09 ASSESSMENT — ENCOUNTER SYMPTOMS
DIARRHEA: 0
COUGH: 0
NAUSEA: 0
SHORTNESS OF BREATH: 0
RESPIRATORY NEGATIVE: 1
ABDOMINAL PAIN: 0
CONSTIPATION: 0

## 2020-07-09 NOTE — PROGRESS NOTES
[address] [address]  Phone: [phone], Fax: [phone]    TELEHEALTH EVALUATION -- Audio/Visual (During QJRGZ-21 public health emergency)    Patient ID verified by me prior to start of this visit    Alina Arthur (:  1965) has requested an audio/video evaluation for the following concern(s):  Chief Complaint   Patient presents with    Fall     ~ 1.5 weeks ago, pain on bottom      HPI:  Alina Arthur (:  1965) is a 50 y.o. female was an established patient Dr. Tom Denney. Patient has a history of hypertension, diabetes, hypercholesterolemia, fatigue,    HYPERTENSION  Azuloscar Oneill Afshan 50 y.o. female  has a history of hypertension. Patient is currently on lisinopril. Patient's most recent BP -Home blood pressure reading systolic of 1 60-2 60 with diastolic of 56-44. Patient is unable to afford medications. I question adherence. BP Readings from Last 1 Encounters:   20 (!) 148/98   Patient denies any adverse reaction to this therapy. Patient does monitor BP at home. Blood pressure is not well controlled. Cardiovascular risk factors: diabetes mellitus, dyslipidemia, hypertension, obesity (BMI >= 30 kg/m2) and sedentary lifestyle. Use of agents associated with hypertension: NSAIDS. History of target organ damage: none. DIABETES MELLITUS  Patient has a history of diabetes mellitus. medication compliance:  compliant most of the time, diabetic diet compliance:  compliant most of the time, home glucose monitoring: are performed regularly. Patient's recent   Lab Results   Component Value Date    LABA1C 11.1 03/10/2020   . This patient is on NPH insulin. Patient is responding poorly with this therapy. Patient reports home glucose monitoring as Unstable readings. Patient denieshypoglycemia episodes such as{symptoms. Patient admits to polyphagia. .  The patient has not seen an ophthalmologist with in one last year.    The LARYNGOSCOPY WITH EXCISION VOCAL CORD LESION  performed by Leigh Chaparro MD at [address] [place] Right 1988    AL EGD TRANSORAL BIOPSY SINGLE/MULTIPLE N/A 9/27/2017    EGD BIOPSY performed by Apryl Whalen MD at [facility] ARTHROSCOP,SURG,W/ROTAT CUFF REPR Left 6/19/2018    SHOULDER ARTHROSCOPY WITH ROTATOR CUFF REPAIR, SUBACROMIAL DECOMPRESSION performed by Lizbeth Marcus MD at [address] [facility] Left     SHOULDER ARTHROSCOPY WITH ROTATOR CUFF REPAIR, SUBACROMIAL DECOMPRESSION (Left      Family History   Problem Relation Age of Onset    Diabetes Mother     Heart Disease Mother         murmur    Heart Disease Father         MVP    Diabetes Sister     Diabetes Maternal Grandmother     High Blood Pressure Maternal Grandmother     Heart Failure Maternal Grandfather     Heart Attack Paternal Grandmother      Current Outpatient Medications   Medication Sig Dispense Refill    hydroCHLOROthiazide (HYDRODIURIL) 25 MG tablet Take 1 tablet by mouth daily Take with Potassium supplement 30 tablet 3    potassium chloride (KLOR-CON) 10 MEQ extended release tablet Take 1 tablet by mouth daily Take with HCTZ 30 tablet 3    glipiZIDE (GLUCOTROL) 5 MG tablet Take 1 tablet by mouth daily With biggest meal 90 tablet 1    atorvastatin (LIPITOR) 10 MG tablet Take 1 tablet by mouth daily 90 tablet 2    gabapentin (NEURONTIN) 300 MG capsule Take one capsule by mouth three times a day 90 capsule 0    lisinopril (PRINIVIL;ZESTRIL) 10 MG tablet Take 1 tablet by mouth daily 90 tablet 0    tiZANidine (ZANAFLEX) 4 MG tablet Take 1 tablet by mouth daily as needed (Muscle spasms) 180 tablet 0    blood glucose monitor strips Test three times a day  Please fill script for Contour Next test strips 100 strip 2    insulin NPH (HUMULIN N;NOVOLIN N) 100 UNIT/ML injection vial Inject 20 Units into the skin every morning 1 vial 1    acetaminophen (TYLENOL) 325 MG tablet Take 1 tablet by mouth every 6 hours as needed for Pain 120 tablet 0    ibuprofen (IBU) 400 MG tablet Take 1 tablet by mouth every 6 hours as needed for Pain 120 tablet 0    VITAMIN D PO Take by mouth      Blood Glucose Monitoring Suppl (BLOOD GLUCOSE METER) KIT Dx: DM-2. Use 2-3 times daily 1 kit 0    Lancets MISC Dx: DM-2. Use 2-3 times daily 100 each 3     No current facility-administered medications for this visit. Allergies   Allergen Reactions    Pcn [Penicillins] Hives and Other (See Comments)     Lose motor function of legs and collaps    Demerol Hcl [Meperidine] Other (See Comments)     Panic attack    Metformin And Related      Abdominal pain    Tape Marylou Rousseau Tape] Other (See Comments)     Blister      Morphine Nausea And Vomiting and Other (See Comments)     Stomach pain        Prior to Visit Medications    Medication Sig Taking?  Authorizing Provider   hydroCHLOROthiazide (HYDRODIURIL) 25 MG tablet Take 1 tablet by mouth daily Take with Potassium supplement Yes KIMANI Haas CNP   potassium chloride (KLOR-CON) 10 MEQ extended release tablet Take 1 tablet by mouth daily Take with HCTZ Yes KIMANI Haas CNP   glipiZIDE (GLUCOTROL) 5 MG tablet Take 1 tablet by mouth daily With biggest meal Yes KIMANI Haas CNP   atorvastatin (LIPITOR) 10 MG tablet Take 1 tablet by mouth daily Yes KIMANI Haas CNP   gabapentin (NEURONTIN) 300 MG capsule Take one capsule by mouth three times a day Yes KIMANI Haas CNP   lisinopril (PRINIVIL;ZESTRIL) 10 MG tablet Take 1 tablet by mouth daily Yes KIMANI Haas CNP   tiZANidine (ZANAFLEX) 4 MG tablet Take 1 tablet by mouth daily as needed (Muscle spasms) Yes KIMANI Haas CNP   blood glucose monitor strips Test three times a day  Please fill script for Contour Next test strips Yes Fantasma Rodriguez MD   insulin NPH (HUMULIN N;NOVOLIN N) 100 UNIT/ML injection vial Inject 20 Units into the skin every morning Yes Junior Fabio MD   acetaminophen (TYLENOL) 325 MG tablet Take 1 tablet by mouth every 6 hours as needed for Pain Yes Kaelyn Velasquez MD   ibuprofen (IBU) 400 MG tablet Take 1 tablet by mouth every 6 hours as needed for Pain Yes Kaelyn Velasquez MD   VITAMIN D PO Take by mouth Yes Historical Provider, MD   Blood Glucose Monitoring Suppl (BLOOD GLUCOSE METER) KIT Dx: DM-2. Use 2-3 times daily Yes Thuy De La Rosa MD   Lancets MISC Dx: DM-2. Use 2-3 times daily Yes Thuy De La Rosa MD       Social History     Tobacco Use    Smoking status: Former Smoker     Packs/day: 2.00     Years: 34.00     Pack years: 68.00     Types: Cigarettes     Start date:      Last attempt to quit: 2017     Years since quittin.7    Smokeless tobacco: Never Used   Substance Use Topics    Alcohol use: Yes     Alcohol/week: 0.0 standard drinks     Comment: rare    Drug use: No        PHYSICAL EXAMINATION:  Vital Signs: (As obtained by patient/caregiver or practitioner observation)    Constitutional: [x] Appears well-developed and well-nourished [x] No apparent distress      [x] Abnormal- Morbid Obesity  Mental status  [x] Alert and awake  [x] Oriented to person/place/time [x]Able to follow commands      Eyes:  EOM    [x]  Normal  [] Abnormal-  Sclera  [x]  Normal  [] Abnormal -         Discharge [x]  None visible  [] Abnormal -    HENT:   [x] Normocephalic, atraumatic.   [] Abnormal   [x] Mouth/Throat: Mucous membranes are moist.     External Ears [x] Normal  [] Abnormal-     Neck: [x] No visualized mass     Pulmonary/Chest: [x] Respiratory effort normal.  [x] No visualized signs of difficulty breathing or respiratory distress        [] Abnormal     Musculoskeletal:   [x] Normal gait with no signs of ataxia         [x] Normal range of motion of neck        [] Abnormal-     Neurological:        [x] No Facial Asymmetry (Cranial nerve 7 motor function) (limited exam to video visit)          [x] No gaze palsy        [] Abnormal-     Skin:        [x] No significant exanthematous lesions or discoloration noted on facial skin         [] Abnormal-     Psychiatric:       [x] Normal Affect [x] No Hallucinations        [x] Abnormal- Anxious, with pressured speech    Other pertinent observable physical exam findings- Concerned affect  Lab Results   Component Value Date    WBC 5.7 04/26/2018    HGB 13.5 06/11/2018    HCT 42.5 06/11/2018    MCV 80.2 04/26/2018     04/26/2018     Lab Results   Component Value Date     12/02/2019    K 4.3 12/02/2019     12/02/2019    CO2 26 12/02/2019    BUN 5 12/02/2019    CREATININE 0.64 12/02/2019    GLUCOSE 164 12/02/2019    CALCIUM 10.1 12/02/2019      Lab Results   Component Value Date    ALT 14 12/02/2019    AST 16 12/02/2019    ALKPHOS 123 (H) 12/02/2019    BILITOT 0.30 12/02/2019     Lab Results   Component Value Date    TSH 0.66 12/10/2019     Lab Results   Component Value Date    CHOL 223 (H) 12/02/2019    CHOL 200 (H) 06/29/2018    CHOL 181 03/19/2015     Lab Results   Component Value Date    TRIG 150 (H) 12/02/2019    TRIG 194 (H) 06/29/2018    TRIG 107 03/19/2015     Lab Results   Component Value Date    HDL 54 12/02/2019    HDL 48 06/29/2018    HDL 50 03/19/2015     Lab Results   Component Value Date    LDLCALC 110 03/19/2015    LDLCHOLESTEROL 139 (H) 12/02/2019    LDLCHOLESTEROL 113 06/29/2018     Lab Results   Component Value Date    VLDL NOT REPORTED 12/02/2019    VLDL NOT REPORTED 06/29/2018    VLDL 21 03/19/2015     Lab Results   Component Value Date    CHOLHDLRATIO 4.1 12/02/2019    CHOLHDLRATIO 4.2 06/29/2018    CHOLHDLRATIO 3.6 03/19/2015     Lab Results   Component Value Date    LABA1C 11.1 03/10/2020     Lab Results   Component Value Date    XKOPYMTM13 396 09/19/2019     Lab Results   Component Value Date    FOLATE 8.1 09/19/2019     No results found for: IRON, TIBC, FERRITIN  Lab Results   Component Value Date    VITD25 30.5 12/02/2019      Due to this being a TeleHealth encounter, evaluation of the following organ systems is limited: Vitals/Constitutional/EENT/Resp/CV/GI//MS/Neuro/Skin/Heme-Lymph-Imm. ASSESSMENT/PLAN:  1. Essential hypertension  Worsening  Added HCTZ and Kdur  Re evaluate renal function    - CBC Auto Differential; Future  - Comprehensive Metabolic Panel, Fasting; Future  - Urinalysis Reflex to Culture; Future  - hydroCHLOROthiazide (HYDRODIURIL) 25 MG tablet; Take 1 tablet by mouth daily Take with Potassium supplement  Dispense: 30 tablet; Refill: 3  - potassium chloride (KLOR-CON) 10 MEQ extended release tablet; Take 1 tablet by mouth daily Take with HCTZ  Dispense: 30 tablet; Refill: 3    2. Type 2 diabetes mellitus with diabetic polyneuropathy, with long-term current use of insulin (HCC)  Worsening  Added Glipizide as a bridge until she gets an insurance  Continue NPH dose for now. WOrk on getting an insurance    - Hemoglobin A1C; Future  - Microalbumin, Ur; Future  - glipiZIDE (GLUCOTROL) 5 MG tablet; Take 1 tablet by mouth daily With biggest meal  Dispense: 90 tablet; Refill: 1    3. Pure hypercholesterolemia (ASCVD score 21.7% July 2020)  Stable  Continue therapy Lipitor  Advised to decrease the consumption of red meats, fried foods, trans fats, sweets, sugary beverages. Advised to increase fish, vegetables, and fruits consumption. Advised to add fiber or OTC supplements in diet. Discussed weight loss which will result in improvement of lipids levels. Advised to increase daily physical activities and add regular exercises. - Lipid, Fasting; Future  - atorvastatin (LIPITOR) 10 MG tablet; Take 1 tablet by mouth daily  Dispense: 90 tablet; Refill: 2    4. Fatigue, unspecified type  Ongoing  Chronic  Evaluate for vit def and endocrine disorders  - TSH without Reflex; Future  - Vitamin D 25 Hydroxy; Future    5. Breast cancer screening by mammogram  Recommended  - NAT DIGITAL SCREEN W CAD BILATERAL;  Future    Controlled Substance Monitoring:  Acute and Chronic Pain Monitoring:   No flowsheet data found. Orders Placed This Encounter   Procedures    NAT DIGITAL SCREEN W CAD BILATERAL     Standing Status:   Future     Standing Expiration Date:   9/9/2021     Order Specific Question:   Reason for exam:     Answer:   screening    CBC Auto Differential     Standing Status:   Future     Standing Expiration Date:   7/9/2021    Comprehensive Metabolic Panel, Fasting     Standing Status:   Future     Standing Expiration Date:   7/9/2021    Hemoglobin A1C     Standing Status:   Future     Standing Expiration Date:   7/9/2021    Lipid, Fasting     Standing Status:   Future     Standing Expiration Date:   7/9/2021    Urinalysis Reflex to Culture     Standing Status:   Future     Standing Expiration Date:   7/9/2021     Order Specific Question:   SPECIFY(EX-CATH,MIDSTREAM,CYSTO,ETC)?      Answer:   midstream    TSH without Reflex     Standing Status:   Future     Standing Expiration Date:   7/9/2021    Vitamin D 25 Hydroxy     Standing Status:   Future     Standing Expiration Date:   7/9/2021    Microalbumin, Ur     Standing Status:   Future     Standing Expiration Date:   7/9/2021      Orders Placed This Encounter   Medications    hydroCHLOROthiazide (HYDRODIURIL) 25 MG tablet     Sig: Take 1 tablet by mouth daily Take with Potassium supplement     Dispense:  30 tablet     Refill:  3    potassium chloride (KLOR-CON) 10 MEQ extended release tablet     Sig: Take 1 tablet by mouth daily Take with HCTZ     Dispense:  30 tablet     Refill:  3    glipiZIDE (GLUCOTROL) 5 MG tablet     Sig: Take 1 tablet by mouth daily With biggest meal     Dispense:  90 tablet     Refill:  1    atorvastatin (LIPITOR) 10 MG tablet     Sig: Take 1 tablet by mouth daily     Dispense:  90 tablet     Refill:  2      Medications Discontinued During This Encounter   Medication Reason    Insulin Degludec (TRESIBA FLEXTOUCH) 100 UNIT/ML SOPN Therapy completed    guardian's) consent, to reduce the patient's risk of exposure to COVID-19 and provide necessary medical care. The patient (and/or legal guardian) has also been advised to contact this office for worsening conditions or problems, and seek emergency medical treatment and/or call 911 if deemed necessary. This note was completed by using the assistance of a speech-recognition program. However, inadvertent computerized transcription errors may be present. Although every effort was made to ensure accuracy, no guarantees can be provided that every mistake has been identified and corrected by editing.   Electronically signed by KIMANI Ramirez CNP on 7/9/20 at 10:28 AM EDT

## 2020-07-22 RX ORDER — GABAPENTIN 300 MG/1
CAPSULE ORAL
Qty: 90 CAPSULE | Refills: 0 | Status: SHIPPED | OUTPATIENT
Start: 2020-07-22 | End: 2020-09-01 | Stop reason: ALTCHOICE

## 2020-07-22 NOTE — TELEPHONE ENCOUNTER
Please Approve or Refuse.   Send to Pharmacy per Pt's Request:      Next Visit Date:  Visit date not found   Last Visit Date: 3/10/2020    Hemoglobin A1C (%)   Date Value   03/10/2020 11.1   12/10/2019 9.2   07/26/2019 8.2             ( goal A1C is < 7)   BP Readings from Last 3 Encounters:   07/09/20 (!) 150/90   03/24/20 (!) 148/98   03/10/20 (!) 171/104          (goal 120/80)  BUN   Date Value Ref Range Status   12/02/2019 5 (L) 6 - 20 mg/dL Final     CREATININE   Date Value Ref Range Status   12/02/2019 0.64 0.50 - 0.90 mg/dL Final     Potassium   Date Value Ref Range Status   12/02/2019 4.3 3.7 - 5.3 mmol/L Final

## 2020-07-23 ENCOUNTER — HOSPITAL ENCOUNTER (EMERGENCY)
Age: 55
Discharge: HOME | End: 2020-07-23
Attending: EMERGENCY MEDICINE

## 2020-07-23 VITALS
SYSTOLIC BLOOD PRESSURE: 167 MMHG | OXYGEN SATURATION: 97 % | BODY MASS INDEX: 38.89 KG/M2 | RESPIRATION RATE: 19 BRPM | HEART RATE: 87 BPM | TEMPERATURE: 98.8 F | WEIGHT: 242 LBS | DIASTOLIC BLOOD PRESSURE: 84 MMHG | HEIGHT: 66 IN

## 2020-07-23 PROCEDURE — 99283 EMERGENCY DEPT VISIT LOW MDM: CPT

## 2020-07-23 PROCEDURE — 6370000000 HC RX 637 (ALT 250 FOR IP): Performed by: STUDENT IN AN ORGANIZED HEALTH CARE EDUCATION/TRAINING PROGRAM

## 2020-07-23 RX ORDER — ACETAMINOPHEN 500 MG
1000 TABLET ORAL ONCE
Status: COMPLETED | OUTPATIENT
Start: 2020-07-23 | End: 2020-07-23

## 2020-07-23 RX ORDER — IBUPROFEN 400 MG/1
400 TABLET ORAL EVERY 6 HOURS PRN
Qty: 28 TABLET | Refills: 0 | Status: SHIPPED | OUTPATIENT
Start: 2020-07-23 | End: 2020-09-01

## 2020-07-23 RX ADMIN — ACETAMINOPHEN 1000 MG: 500 TABLET ORAL at 16:26

## 2020-07-23 ASSESSMENT — ENCOUNTER SYMPTOMS
COUGH: 0
SORE THROAT: 0
VOMITING: 0
TROUBLE SWALLOWING: 1
DIARRHEA: 0
CHEST TIGHTNESS: 0
BLOOD IN STOOL: 0
SHORTNESS OF BREATH: 0
CONSTIPATION: 0
ABDOMINAL PAIN: 0
NAUSEA: 0

## 2020-07-23 ASSESSMENT — PAIN DESCRIPTION - LOCATION: LOCATION: OTHER (COMMENT)

## 2020-07-23 ASSESSMENT — PAIN DESCRIPTION - PAIN TYPE: TYPE: ACUTE PAIN

## 2020-07-23 ASSESSMENT — PAIN SCALES - GENERAL: PAINLEVEL_OUTOF10: 8

## 2020-07-23 ASSESSMENT — PAIN DESCRIPTION - ORIENTATION: ORIENTATION: RIGHT

## 2020-07-23 NOTE — PROGRESS NOTES
Occupational Therapy   Initial Evaluation     Patient Name: Ashleigh Marquis  Age:  59 y.o., Sex:  female  Medical Record #: [id number]  Today's Date: 7/23/2020     Precautions  Precautions: Fall Risk    Assessment  Patient is 59 y.o. female with a diagnosis of alcohol withdrawal and pancreatitis. Appears to be at baseline. Mostly c/o pain all over and mild dizziness.    Plan    Recommend Occupational Therapy for Evaluation only    Discharge recommendations:  Anticipate that the patient will have no further occupational therapy needs after discharge from the hospital.        07/23/20 0833   Prior Living Situation   Housing / Facility Homeless   Lives with - Patient's Self Care Capacity Friends   Comments She reports that goes from place to place   Prior Level of ADL Function   Self Feeding Independent   Grooming / Hygiene Independent   Bathing Independent   Dressing Independent   Toileting Independent   Prior Level of IADL Function   Medication Management Independent   Laundry Independent   Kitchen Mobility Independent   Finances Independent   Home Management Independent   Shopping Independent   Prior Level Of Mobility Independent Without Device in Community;Independent Without Device in Home   Cognition    Cognition / Consciousness WDL   Level of Consciousness Alert   Comments very irritable, self limiting   Active ROM Upper Body   Active ROM Upper Body  WDL   Strength Upper Body   Upper Body Strength  WDL   Bed Mobility    Supine to Sit Supervised   Sit to Supine Supervised   Scooting Supervised   ADL Assessment   Grooming Supervision;Standing   Lower Body Dressing Supervision   Functional Mobility   Sit to Stand Supervised   Bed, Chair, Wheelchair Transfer Supervised   Mobility EOB>BR>BTB   Comments later observed pt up self in hallway no AD   Anticipated Discharge Equipment   DC Equipment None              04/16/19 (!) 142/88        Physical Exam   Constitutional: She is oriented to person, place, and time. She appears well-developed. HENT:   Head: Normocephalic. Right Ear: External ear normal.   Left Ear: External ear normal.   Eyes: Pupils are equal, round, and reactive to light. Conjunctivae are normal.   Neck: Normal range of motion. Cardiovascular: Normal rate, regular rhythm and normal heart sounds. Pulmonary/Chest: Effort normal and breath sounds normal.   Abdominal: Soft. Bowel sounds are normal.   Musculoskeletal: Normal range of motion. Neurological: She is alert and oriented to person, place, and time. Skin: Skin is warm. Psychiatric: She has a normal mood and affect. DIAGNOSTIC FINDINGS:  CBC:  Lab Results   Component Value Date    WBC 5.7 04/26/2018    HGB 13.5 06/11/2018     04/26/2018       BMP:    Lab Results   Component Value Date     04/26/2018    K 4.1 04/26/2018    CL 97 04/26/2018    CO2 26 04/26/2018    BUN 5 06/11/2018    CREATININE 0.60 06/11/2018    GLUCOSE 382 06/27/2018       HEMOGLOBIN A1C:   Lab Results   Component Value Date    LABA1C 10.5 04/16/2019       FASTING LIPID PANEL:  Lab Results   Component Value Date    CHOL 200 (H) 06/29/2018    HDL 48 06/29/2018    TRIG 194 (H) 06/29/2018       ASSESSMENT AND PLAN:  Sonal Quiroz was seen today for hypertension, diabetes, health maintenance and rash.     Diagnoses and all orders for this visit:    Diabetes mellitus due to underlying condition with hyperosmolarity without coma, with long-term current use of insulin ([address] with hypoglycemia episodes  Continue to take metformin  Foot exam normal   FASTING blood glucose- 53  Decrease long acting insulin to 30 units daily  Stop humalog  Continue trilucity    Essential hypertension  Lisinopril 5 mg    Diabetic polyneuropathy associated with diabetes mellitus due to underlying condition ([address]  We will try to start patient back on Lyrica  We will give tramadol for the time being.    GERD with esophagitis  Stable, off PPIs    Need for prophylactic vaccination against diphtheria-tetanus-pertussis (DTP)    Need for prophylactic vaccination and inoculation against varicella    Vitamin D deficiency  Check vitamin D levels, discontinue supplementation if levels are normal    Hepatic steatosis  Check LFT    Thrush  magic mouthwash for 10 days  Refer to ENT if it does not improve    Chronic left shoulder pain  Tramadol  Follow up with ortho    Obesity (BMI 35.0-39.9 without comorbidity)  Will refer to bariatric surgery once the insurance is fixed      FOLLOW UP AND INSTRUCTIONS:  No follow-ups on file. · Macey Carter received counseling on the following healthy behaviors: nutrition, exercise and medication adherence    · Discussed use, benefit, and side effects of prescribed medications. Barriers to medication compliance addressed. All patient questions answered. Pt voiced understanding. · Patient given educational materials - see patient instructions    Carolyn Mendez MD  INTERNAL MEDICINE RESIDENT, PGY-2  [id number] [place] [address]  8/71/2534,9:59 PM    This note is created with the assistance of a speech-recognition program. While intending to generate a document that actually reflects the content of thevisit, the document can still have some mistakes which may not have been identified and corrected by editing.

## 2020-07-23 NOTE — ED NOTES
Pt to ED with right side jaw pain, states she has no teeth, can chew fine but turning her head makes her jaw hurt. Pt arrived in NAD, no other complaints at this time. Pt speaking clearly and in full sentences.       Byron Parks RN  07/23/20 1516

## 2020-07-23 NOTE — ED PROVIDER NOTES
[facility]  ED  Emergency Department Encounter  EmergencyMedicine Resident     Pt Name:Tayler Abad  MRN: [id number]  [place] 1965  Date of evaluation: 7/23/20  PCP:  KIMANI Mejia - [facility]    CHIEF COMPLAINT       Chief Complaint   Patient presents with    Jaw Pain     right sided jaw pain worsening x 3 days        HISTORY OF PRESENT ILLNESS  (Location/Symptom, Timing/Onset, Context/Setting, Quality, Duration, Modifying Factors, Severity.)      Mariella Prado is a 50 y.o. female who presents with 3-day history of right-sided jaw pain, that radiates down to the lateral neck. Patient has a history of fibromyalgia. Patient reported onset of symptoms after waking up, pain is along the right SCM, it is sharp and 8 out of 10, and radiates down the length of the SCM. Exacerbated by movement, there is no pain when patient eats. Patient has no teeth, and is nontender on the inside of the mouth. Patient also has a history of oral thrush. Patient denies recent trauma or injury to the area, but does report sleeping \"weird\". PAST MEDICAL / SURGICAL / SOCIAL / FAMILY HISTORY      has a past medical history of Allergic rhinitis, Anxiety, Arthritis, Bleeds easily ([address] Complete rotator cuff tear of left shoulder, Diabetes mellitus ([address] Diabetic polyneuropathy associated with type 2 diabetes mellitus ([address] Fibromyalgia, GERD (gastroesophageal reflux disease), H/O transfusion, History of blood transfusion, Obesity, Pure hypercholesterolemia, Tobacco abuse, Type II or unspecified type diabetes mellitus without mention of complication, not stated as uncontrolled, Unspecified sleep apnea, and Wears glasses. has a past surgical history that includes Gastric bypass surgery (1987); ovarian cyst removal (Right, 1988); Carpal tunnel release (Right, 2002); bronchoscopy (9/27/2017); pr egd transoral biopsy single/multiple (N/A, 9/27/2017);  Cholecystectomy (N/A, 2017); laryngoscopy (N/A, 2017); Colonoscopy (N/A, 2018); Rotator cuff repair (Left); and pr shldr arthroscop,surg,w/rotat cuff repr (Left, 2018). Social History     Socioeconomic History    Marital status: Legally      Spouse name: Not on file    Number of children: 3    Years of education: Not on file    Highest education level: Not on file   Occupational History    Occupation:    Social Needs    Financial resource strain: Not on file    Food insecurity     Worry: Not on file     Inability: Not on file   [employer] needs     Medical: Not on file     Non-medical: Not on file   Tobacco Use    Smoking status: Former Smoker     Packs/day: 2.00     Years: 34.00     Pack years: 68.00     Types: Cigarettes     Start date:      Last attempt to quit: 2017     Years since quittin.8    Smokeless tobacco: Never Used   Substance and Sexual Activity    Alcohol use:  Yes     Alcohol/week: 0.0 standard drinks     Comment: rare    Drug use: No    Sexual activity: Never   Lifestyle    Physical activity     Days per week: Not on file     Minutes per session: Not on file    Stress: Not on file   Relationships    Social connections     Talks on phone: Not on file     Gets together: Not on file     Attends Confucianism service: Not on file     Active member of club or organization: Not on file     Attends meetings of clubs or organizations: Not on file     Relationship status: Not on file    Intimate partner violence     Fear of current or ex partner: Not on file     Emotionally abused: Not on file     Physically abused: Not on file     Forced sexual activity: Not on file   Other Topics Concern    Not on file   Social History Narrative    Not on file       Family History   Problem Relation Age of Onset    Diabetes Mother     Heart Disease Mother         murmur    Heart Disease Father         MVP    Diabetes Sister     Diabetes Maternal Grandmother  High Blood Pressure Maternal Grandmother     Heart Failure Maternal Grandfather     Heart Attack Paternal Grandmother        Allergies:  Pcn [penicillins]; Demerol hcl [meperidine]; Metformin and related; Tape [adhesive tape]; and Morphine    Home Medications:  Prior to Admission medications    Medication Sig Start Date End Date Taking? Authorizing Provider   ibuprofen (IBU) 400 MG tablet Take 1 tablet by mouth every 6 hours as needed for Pain 7/23/20 7/30/20 Yes Holly Patten MD   gabapentin (NEURONTIN) 300 MG capsule TAKE ONE CAPSULE BY MOUTH THREE TIMES A DAY 7/22/20 7/22/21  KIMANI Haas CNP   hydroCHLOROthiazide (HYDRODIURIL) 25 MG tablet Take 1 tablet by mouth daily Take with Potassium supplement 7/9/20 8/8/20  KIMANI Haas CNP   potassium chloride (KLOR-CON) 10 MEQ extended release tablet Take 1 tablet by mouth daily Take with HCTZ 7/9/20 8/8/20  KIAMNI Haas CNP   glipiZIDE (GLUCOTROL) 5 MG tablet Take 1 tablet by mouth daily With biggest meal 7/9/20   KIMANI Haas CNP   atorvastatin (LIPITOR) 10 MG tablet Take 1 tablet by mouth daily 7/9/20   KIMANI Haas CNP   lisinopril (PRINIVIL;ZESTRIL) 10 MG tablet Take 1 tablet by mouth daily 5/5/20 8/3/20  KIMANI Haas CNP   tiZANidine (ZANAFLEX) 4 MG tablet Take 1 tablet by mouth daily as needed (Muscle spasms) 5/5/20 8/3/20  KIMANI Haas CNP   blood glucose monitor strips Test three times a day  Please fill script for Contour Next test strips 3/12/20   Spencer Fernández MD   insulin NPH (HUMULIN N;NOVOLIN N) 100 UNIT/ML injection vial Inject 20 Units into the skin every morning 3/12/20   Spencer Fernández MD   acetaminophen (TYLENOL) 325 MG tablet Take 1 tablet by mouth every 6 hours as needed for Pain 3/10/20   Calli Harrell MD   VITAMIN D PO Take by mouth    Historical Provider, MD   Blood Glucose Monitoring Suppl (BLOOD GLUCOSE METER) KIT Dx: DM-2.  Use 2-3 times daily 10/6/15   Thuy Moncada MD   Lancets MISC Dx: DM-2. Use 2-3 times daily 10/6/15   Thuy Moncada MD       REVIEW OF SYSTEMS    (2-9 systems for level 4, 10 or more for level 5)      Review of Systems   Constitutional: Negative for chills, fatigue and fever. HENT: Positive for trouble swallowing (Chronic). Negative for hearing loss, mouth sores and sore throat. Eyes: Positive for visual disturbance (\"Wonky vision\"). Respiratory: Negative for cough, chest tightness and shortness of breath. Cardiovascular: Negative for chest pain and palpitations. Gastrointestinal: Negative for abdominal pain, blood in stool, constipation, diarrhea, nausea and vomiting. Genitourinary: Negative for dysuria and hematuria. Neurological: Positive for headaches. Negative for light-headedness. PHYSICAL EXAM   (up to 7 for level 4, 8 or more for level 5)      INITIAL VITALS:   BP (!) 167/84   Pulse 87   Temp 98.8 °F (37.1 °C) (Oral)   Resp 19   Ht 5' 6\" (1.676 m)   Wt 242 lb (109.8 kg)   SpO2 97%   BMI 39.06 kg/m²     Physical Exam  HENT:      Head: Normocephalic. Nose: Nose normal.      Mouth/Throat:      Mouth: Mucous membranes are moist.   Eyes:      Extraocular Movements: Extraocular movements intact. Pupils: Pupils are equal, round, and reactive to light. Neck:      Musculoskeletal: Neck supple. No neck rigidity. Comments: Tenderness in the right SCM on palpation  Full range of motion movement, however tenderness with movement  Cardiovascular:      Rate and Rhythm: Normal rate and regular rhythm. Pulses: Normal pulses. Heart sounds: Normal heart sounds. Pulmonary:      Effort: Pulmonary effort is normal.      Breath sounds: Normal breath sounds. Abdominal:      Palpations: Abdomen is soft. Skin:     General: Skin is warm. Capillary Refill: Capillary refill takes less than 2 seconds. Neurological:      General: No focal deficit present.       Mental Status:

## 2020-07-24 ENCOUNTER — TELEPHONE (OUTPATIENT)
Dept: FAMILY MEDICINE CLINIC | Age: 55
End: 2020-07-24

## 2020-07-28 ENCOUNTER — TELEPHONE (OUTPATIENT)
Dept: INFECTIOUS DISEASES | Age: 55
End: 2020-07-28

## 2020-07-28 NOTE — TELEPHONE ENCOUNTER
Cream sent to pharmacy in 2020 for her wound - pt couldn't afford at the time, now she wants to fill and Rx has . Phoned into pharmacy and gave verbal to WENDY. Informed pt if she developed any problems she will need f/u appt. Pt voiced understanding.

## 2020-07-31 ENCOUNTER — TELEPHONE (OUTPATIENT)
Dept: FAMILY MEDICINE CLINIC | Age: 55
End: 2020-07-31

## 2020-07-31 RX ORDER — GLIPIZIDE 5 MG/1
5 TABLET ORAL
Qty: 180 TABLET | Refills: 1 | Status: SHIPPED | OUTPATIENT
Start: 2020-07-31 | End: 2020-09-01 | Stop reason: ALTCHOICE

## 2020-07-31 RX ORDER — LISINOPRIL 20 MG/1
20 TABLET ORAL DAILY
Qty: 90 TABLET | Refills: 0 | Status: SHIPPED | OUTPATIENT
Start: 2020-07-31 | End: 2020-10-26

## 2020-08-28 ENCOUNTER — TELEPHONE (OUTPATIENT)
Dept: FAMILY MEDICINE CLINIC | Age: 55
End: 2020-08-28

## 2020-08-28 NOTE — TELEPHONE ENCOUNTER
Patient has a medicaid now , she would like to change her gabapentin (NEURONTIN) 300 MG capsule  to Lyrica medicine is not working , patient last seen on 7/9/20  Please Advice

## 2020-08-29 ENCOUNTER — HOSPITAL ENCOUNTER (OUTPATIENT)
Age: 55
Discharge: HOME | End: 2020-08-29
Payer: MEDICAID

## 2020-08-29 LAB
ABSOLUTE EOS #: 0.2 K/UL (ref 0–0.4)
ABSOLUTE IMMATURE GRANULOCYTE: ABNORMAL K/UL (ref 0–0.3)
ABSOLUTE LYMPH #: 1.9 K/UL (ref 1–4.8)
ABSOLUTE MONO #: 0.5 K/UL (ref 0.1–1.3)
ALBUMIN SERPL-MCNC: 4.2 G/DL (ref 3.5–5.2)
ALBUMIN/GLOBULIN RATIO: ABNORMAL (ref 1–2.5)
ALP BLD-CCNC: 142 U/L (ref 35–104)
ALT SERPL-CCNC: 20 U/L (ref 5–33)
ANION GAP SERPL CALCULATED.3IONS-SCNC: 7 MMOL/L (ref 9–17)
AST SERPL-CCNC: 17 U/L
BASOPHILS # BLD: 1 % (ref 0–2)
BASOPHILS ABSOLUTE: 0.1 K/UL (ref 0–0.2)
BILIRUB SERPL-MCNC: 0.32 MG/DL (ref 0.3–1.2)
BILIRUBIN URINE: NEGATIVE
BUN BLDV-MCNC: 8 MG/DL (ref 6–20)
BUN/CREAT BLD: ABNORMAL (ref 9–20)
CALCIUM SERPL-MCNC: 9.8 MG/DL (ref 8.6–10.4)
CHLORIDE BLD-SCNC: 103 MMOL/L (ref 98–107)
CHOLESTEROL, FASTING: 166 MG/DL
CHOLESTEROL/HDL RATIO: 3
CO2: 30 MMOL/L (ref 20–31)
COLOR: YELLOW
COMMENT UA: ABNORMAL
CREAT SERPL-MCNC: 0.74 MG/DL (ref 0.5–0.9)
CREATININE URINE: 62.2 MG/DL (ref 28–217)
DIFFERENTIAL TYPE: ABNORMAL
EOSINOPHILS RELATIVE PERCENT: 4 % (ref 0–4)
GFR AFRICAN AMERICAN: >60 ML/MIN
GFR NON-AFRICAN AMERICAN: >60 ML/MIN
GFR SERPL CREATININE-BSD FRML MDRD: ABNORMAL ML/MIN/{1.73_M2}
GFR SERPL CREATININE-BSD FRML MDRD: ABNORMAL ML/MIN/{1.73_M2}
GLUCOSE FASTING: 312 MG/DL (ref 70–99)
GLUCOSE URINE: ABNORMAL
HCT VFR BLD CALC: 42.2 % (ref 36–46)
HDLC SERPL-MCNC: 56 MG/DL
HEMOGLOBIN: 14.1 G/DL (ref 12–16)
IMMATURE GRANULOCYTES: ABNORMAL %
KETONES, URINE: NEGATIVE
LDL CHOLESTEROL: 77 MG/DL (ref 0–130)
LEUKOCYTE ESTERASE, URINE: NEGATIVE
LYMPHOCYTES # BLD: 41 % (ref 24–44)
MCH RBC QN AUTO: 26.8 PG (ref 26–34)
MCHC RBC AUTO-ENTMCNC: 33.3 G/DL (ref 31–37)
MCV RBC AUTO: 80.4 FL (ref 80–100)
MICROALBUMIN/CREAT 24H UR: <12 MG/L
MICROALBUMIN/CREAT UR-RTO: NORMAL MCG/MG CREAT
MONOCYTES # BLD: 12 % (ref 1–7)
NITRITE, URINE: NEGATIVE
NRBC AUTOMATED: ABNORMAL PER 100 WBC
PDW BLD-RTO: 13.7 % (ref 11.5–14.9)
PH UA: 6 (ref 5–8)
PLATELET # BLD: 209 K/UL (ref 150–450)
PLATELET ESTIMATE: ABNORMAL
PMV BLD AUTO: 8.6 FL (ref 6–12)
POTASSIUM SERPL-SCNC: 4.6 MMOL/L (ref 3.7–5.3)
PROTEIN UA: NEGATIVE
RBC # BLD: 5.25 M/UL (ref 4–5.2)
RBC # BLD: ABNORMAL 10*6/UL
SEG NEUTROPHILS: 42 % (ref 36–66)
SEGMENTED NEUTROPHILS ABSOLUTE COUNT: 2 K/UL (ref 1.3–9.1)
SODIUM BLD-SCNC: 140 MMOL/L (ref 135–144)
SPECIFIC GRAVITY UA: 1.01 (ref 1–1.03)
TOTAL PROTEIN: 7.1 G/DL (ref 6.4–8.3)
TRIGLYCERIDE, FASTING: 167 MG/DL
TSH SERPL DL<=0.05 MIU/L-ACNC: 0.97 MIU/L (ref 0.3–5)
TURBIDITY: CLEAR
URINE HGB: NEGATIVE
UROBILINOGEN, URINE: NORMAL
VITAMIN D 25-HYDROXY: 37.4 NG/ML (ref 30–100)
VLDLC SERPL CALC-MCNC: ABNORMAL MG/DL (ref 1–30)
WBC # BLD: 4.7 K/UL (ref 3.5–11)
WBC # BLD: ABNORMAL 10*3/UL

## 2020-08-29 PROCEDURE — 80053 COMPREHEN METABOLIC PANEL: CPT

## 2020-08-29 PROCEDURE — 82306 VITAMIN D 25 HYDROXY: CPT

## 2020-08-29 PROCEDURE — 84443 ASSAY THYROID STIM HORMONE: CPT

## 2020-08-29 PROCEDURE — 85025 COMPLETE CBC W/AUTO DIFF WBC: CPT

## 2020-08-29 PROCEDURE — 81003 URINALYSIS AUTO W/O SCOPE: CPT

## 2020-08-29 PROCEDURE — 36415 COLL VENOUS BLD VENIPUNCTURE: CPT

## 2020-08-29 PROCEDURE — 82570 ASSAY OF URINE CREATININE: CPT

## 2020-08-29 PROCEDURE — 80061 LIPID PANEL: CPT

## 2020-08-29 PROCEDURE — 83036 HEMOGLOBIN GLYCOSYLATED A1C: CPT

## 2020-08-29 PROCEDURE — 82043 UR ALBUMIN QUANTITATIVE: CPT

## 2020-08-30 LAB
ESTIMATED AVERAGE GLUCOSE: 275 MG/DL
HBA1C MFR BLD: 11.2 % (ref 4–6)

## 2020-08-31 PROBLEM — E11.42 DIABETIC POLYNEUROPATHY ASSOCIATED WITH TYPE 2 DIABETES MELLITUS (HCC): Status: ACTIVE | Noted: 2020-08-31

## 2020-08-31 ASSESSMENT — ENCOUNTER SYMPTOMS
ABDOMINAL PAIN: 0
SHORTNESS OF BREATH: 0
NAUSEA: 0
DIARRHEA: 0
RESPIRATORY NEGATIVE: 1
CONSTIPATION: 0
COUGH: 0

## 2020-08-31 NOTE — PROGRESS NOTES
[place] [address]   [address] [facility] [address]  Phone: [phone], Fax: [phone]    TELEHEALTH EVALUATION -- Audio/Visual (During BXZZQ-23 public health emergency)    Patient ID verified by me prior to start of this visit    Sarah Ovalle (:  1965) has requested an audio/video evaluation for the following concern(s):  Chief Complaint   Patient presents with    Discuss Labs    Medication Refill     patient states roughly 2 years ago lost insurance was previously taking tresiba, humolog, lyrica ( was previously using for neuropathy as gabapentin does not work well   , should she renew ? HPI:  Sarah Ovalle (:  1965) is a 50 y.o. female was an established patient Dr. Sidra Boyd. Patient has a history of hypertension, diabetes, hypercholesterolemia, polyneuropathy vitamin D deficiency, large breasts, and elevated LFTs. HYPERTENSION  Dagoberto Armas Afshan 50 y.o. female  has a history of hypertension. Patient is currently on lisinopril. Patient's most recent BP -Home blood pressure reading systolic of 1 40-1 60 with diastolic of 35-02. Patient is unable to afford medications. Patient was previously on hydrochlorothiazide. But because of her insurance issues she stopped this medication since her systolic blood pressures been consistently elevated I will add the hydrochlorothiazide back with the potassium supplements. BP Readings from Last 3 Encounters:   20 (!) 144/83   20 (!) 167/84   20 (!) 150/90            Patient denies any adverse reaction to this therapy. Blood pressure is not well controlled. Cardiovascular risk factors: diabetes mellitus, dyslipidemia, hypertension, obesity (BMI >= 30 kg/m2) and sedentary lifestyle. Use of agents associated with hypertension: NSAIDS. History of target organ damage: none. DIABETES MELLITUS  Patient has a history of diabetes mellitus.  medication compliance:  compliant most of the D  Patient has a known Vitamin D Deficiency. she had a course of supplementation for 12 weeks. she is due to get levels check. We continue to discuss ways to manage this condition. We also discussed ways to improve the levels. Such as learning food groups that are high in Vitamin D. We also discuss that sun exposure is beneficial however, too much sun and sun damage can potentially result in skin cancer. Vit D, 25-Hydroxy   Date Value Ref Range Status   08/29/2020 37.4 30.0 - 100.0 ng/mL Final     Comment:        Reference Range:  Vitamin D status         Range   Deficiency              <20 ng/mL   Mild Deficiency       20-30 ng/mL   Sufficiency           ng/mL   Toxicity               >100 ng/mL       Alis Joel  's most recent TSH level was normal. Results reviewed with the patient. Patient denies any history of thyroid disorders. Lab Results   Component Value Date    TSH 0.97 08/29/2020       CBC and CMP reviewed with Natanael Dotson : Elevated LFT's, will stop alcohol use, tylenol. Patient had to be on a statin we will continue to monitor every 6 months  Lab Results   Component Value Date    WBC 4.7 08/29/2020    HGB 14.1 08/29/2020    HCT 42.2 08/29/2020    MCV 80.4 08/29/2020     08/29/2020    LYMPHOPCT 41 08/29/2020    RBC 5.25 (H) 08/29/2020    MCH 26.8 08/29/2020    MCHC 33.3 08/29/2020    RDW 13.7 08/29/2020     Lab Results   Component Value Date     08/29/2020    K 4.6 08/29/2020     08/29/2020    CO2 30 08/29/2020    BUN 8 08/29/2020    CREATININE 0.74 08/29/2020    GLUCOSE 164 (H) 12/02/2019    CALCIUM 9.8 08/29/2020    PROT 7.1 08/29/2020    LABALBU 4.2 08/29/2020    BILITOT 0.32 08/29/2020    ALKPHOS 142 (H) 08/29/2020    AST 17 08/29/2020    ALT 20 08/29/2020    LABGLOM >60 08/29/2020    GFRAA >60 08/29/2020    GLOB NOT REPORTED 02/23/2018     Recent urinalysis reviewed with Natanael Dotson today.  WNL  Lab Results   Component Value Date    APPEARANCE URINE 08/04/2014    COLORU YELLOW 08/29/2020    NITRU NEGATIVE 08/29/2020    GLUCOSEU 3+ 08/29/2020    KETUA NEGATIVE 08/29/2020    UROBILINOGEN Normal 08/29/2020    BILIRUBINUR NEGATIVE 08/29/2020    BILIRUBINUR neg 08/09/2019    HGBUR NEGATIVE 08/29/2020    PROTEINU NEGATIVE 08/29/2020    PHUR 6.0 08/29/2020    LEUKOCYTESUR NEGATIVE 08/29/2020      Lab Results   Component Value Date/Time    CULTURE NO SIGNIFICANT GROWTH 03/23/2018 01:55 PM    CULTURE  03/23/2018 01:55 PM     Performed at [facility] [id number] [place] [address], [address] [place] [address] [phone]    SPECDESC . THROAT 03/10/2020 08:30 PM    SPECIAL NOT REPORTED 03/10/2020 08:30 PM           Review of Systems   Constitutional: Positive for activity change. Negative for chills, fatigue and fever. HENT: Negative. Negative for congestion and hearing loss. Eyes: Negative for visual disturbance. Respiratory: Negative. Negative for cough and shortness of breath. Cardiovascular: Negative. Negative for chest pain and palpitations. Elevated BP   Gastrointestinal: Negative for abdominal pain, constipation, diarrhea and nausea. Endocrine: Negative for cold intolerance. Genitourinary: Negative for dysuria and enuresis. Musculoskeletal: Positive for arthralgias, back pain, joint swelling and myalgias. Back pain, shoulder pains, large breasts G size   Skin: Negative for rash. Neurological: Positive for numbness (both feet). Negative for weakness, light-headedness and headaches. Hematological: Does not bruise/bleed easily. Psychiatric/Behavioral: Positive for sleep disturbance. The patient is nervous/anxious.         Patient Active Problem List    Diagnosis Date Noted    Large breasts 09/01/2020    Elevated LFTs 09/01/2020    Diabetic polyneuropathy associated with type 2 diabetes mellitus ([facility] 08/31/2020    Fatigue 07/09/2020    Vitamin D deficiency 11/26/2019    Essential hypertension 06/27/2018    Type 2 diabetes mellitus with diabetic polyneuropathy, with long-term current use of insulin ([address] 03/26/2018    Chronic left shoulder pain 03/26/2018    Gastroesophageal reflux disease with esophagitis 10/16/2017    Chronic cholecystitis 09/28/2017    Pure hypercholesterolemia 02/23/2016    Allergic rhinitis 10/21/2014    Fibromyalgia 10/21/2014        Past Medical History:   Diagnosis Date    Allergic rhinitis 10/21/2014    Anxiety     Arthritis     Bleeds easily ([address]     per pt, has never had a work up   Irina Craze Complete rotator cuff tear of left shoulder 2018    Diabetes mellitus ([address]     On Insulin, metformin, Glipizide    Diabetic polyneuropathy associated with type 2 diabetes mellitus ([address] 3/26/2018    Fibromyalgia     GERD (gastroesophageal reflux disease) 10/21/2014    H/O transfusion     History of blood transfusion     Obesity 10/21/2014    Pure hypercholesterolemia 2/23/2016    Tobacco abuse     Type II or unspecified type diabetes mellitus without mention of complication, not stated as uncontrolled     Unspecified sleep apnea     CPAP machine is broken    Wears glasses      Past Surgical History:   Procedure Laterality Date    BRONCHOSCOPY  9/27/2017    BRONCHOSCOPY BRUSHINGS performed by Kandi Poe MD at [facility] Right 2002    CHOLECYSTECTOMY N/A 9/29/2017    CHOLECYSTECTOMY OPEN performed by Elana Raygoza DO at [address] [place] [address] N/A 2/21/2018    COLONOSCOPY POLYPECTOMY SNARE/COLD BIOPSY performed by Callie Aguero MD at [address] [place] [address]    with revision x1    LARYNGOSCOPY N/A 9/29/2017    DIRECT MICRO LARYNGOSCOPY WITH EXCISION VOCAL CORD LESION  performed by Sofiya Patton MD at [address] [place] [address] Right 1988    SD EGD TRANSORAL BIOPSY SINGLE/MULTIPLE N/A 9/27/2017    EGD BIOPSY performed by Callie Aguero MD at [address] [place] [address] ARTHROSCOP,SURG,W/ROTAT CUFF REPR Left 6/19/2018    SHOULDER ARTHROSCOPY WITH ROTATOR CUFF REPAIR, SUBACROMIAL DECOMPRESSION performed by Katty Kevin MD at [address] Left     SHOULDER ARTHROSCOPY WITH ROTATOR CUFF REPAIR, SUBACROMIAL DECOMPRESSION (Left      Family History   Problem Relation Age of Onset    Diabetes Mother     Heart Disease Mother         murmur    Heart Disease Father         MVP    Diabetes Sister     Diabetes Maternal Grandmother     High Blood Pressure Maternal Grandmother     Heart Failure Maternal Grandfather     Heart Attack Paternal Grandmother      Current Outpatient Medications   Medication Sig Dispense Refill    atorvastatin (LIPITOR) 10 MG tablet Take 1 tablet by mouth daily 90 tablet 2    Insulin Degludec 100 UNIT/ML SOPN Inject 15 Units into the skin daily 5 pen 2    insulin lispro, 1 Unit Dial, 100 UNIT/ML SOPN Inject 15 Units into the skin 3 times daily (before meals) 5 pen 3    pregabalin (LYRICA) 75 MG capsule Take 1 capsule by mouth 2 times daily for 90 days. 180 capsule 2    hydroCHLOROthiazide (HYDRODIURIL) 25 MG tablet Take 1 tablet by mouth daily Take with Potassium supplement 30 tablet 3    potassium chloride (KLOR-CON) 10 MEQ extended release tablet Take 1 tablet by mouth daily Take with HCTZ 30 tablet 3    Lancets MISC Dx: DM-2. Use 2-3 times daily 100 each 3    blood glucose monitor strips Test 2-3 times a day & as needed for symptoms of irregular blood glucose. BRAND OF CHOICE INSURANCE ALLOWS. 100 strip 11    Alcohol Swabs (ALCOHOL PREP) PADS Use as directed 100 each 11    vitamin D (CHOLECALCIFEROL) 25 MCG (1000 UT) TABS tablet Take 1 tablet by mouth daily 30 tablet 3    zoster recombinant adjuvanted vaccine (SHINGRIX) 50 MCG/0.5ML SUSR injection Inject 0.5 mLs into the muscle See Admin Instructions 1 dose now and repeat in 2-6 months 0.5 mL 0    lisinopril (PRINIVIL;ZESTRIL) 20 MG tablet Take 1 tablet by mouth daily 90 tablet 0    Blood Glucose Monitoring Suppl (BLOOD GLUCOSE METER) KIT Dx: DM-2.  Use 2-3 times daily 1 kit 0     No current facility-administered medications for this visit. Allergies   Allergen Reactions    Pcn [Penicillins] Hives and Other (See Comments)     Lose motor function of legs and collaps    Demerol Hcl [Meperidine] Other (See Comments)     Panic attack    Metformin And Related      Abdominal pain    Tape Ty Flakes Tape] Other (See Comments)     Blister      Morphine Nausea And Vomiting and Other (See Comments)     Stomach pain        Prior to Visit Medications    Medication Sig Taking? Authorizing Provider   atorvastatin (LIPITOR) 10 MG tablet Take 1 tablet by mouth daily Yes KIMANI Haas CNP   Insulin Degludec 100 UNIT/ML SOPN Inject 15 Units into the skin daily Yes KIMANI Haas CNP   insulin lispro, 1 Unit Dial, 100 UNIT/ML SOPN Inject 15 Units into the skin 3 times daily (before meals) Yes KIMANI Haas CNP   pregabalin (LYRICA) 75 MG capsule Take 1 capsule by mouth 2 times daily for 90 days. Yes KIMANI Haas CNP   hydroCHLOROthiazide (HYDRODIURIL) 25 MG tablet Take 1 tablet by mouth daily Take with Potassium supplement Yes KIMANI Haas CNP   potassium chloride (KLOR-CON) 10 MEQ extended release tablet Take 1 tablet by mouth daily Take with HCTZ Yes KIMANI Haas CNP   Lancets MISC Dx: DM-2. Use 2-3 times daily Yes KIMANI Haas CNP   blood glucose monitor strips Test 2-3 times a day & as needed for symptoms of irregular blood glucose. BRAND OF CHOICE INSURANCE ALLOWS.  Yes KIMANI Haas CNP   Alcohol Swabs (ALCOHOL PREP) PADS Use as directed Yes KIMANI Haas CNP   vitamin D (CHOLECALCIFEROL) 25 MCG (1000 UT) TABS tablet Take 1 tablet by mouth daily Yes KIMANI Haas CNP   zoster recombinant adjuvanted vaccine (SHINGRIX) 50 MCG/0.5ML SUSR injection Inject 0.5 mLs into the muscle See Admin Instructions 1 dose now and repeat in 2-6 months Yes Katina Clemons observable physical exam findings- Elated mood  Lab Results   Component Value Date    WBC 4.7 08/29/2020    HGB 14.1 08/29/2020    HCT 42.2 08/29/2020    MCV 80.4 08/29/2020     08/29/2020     Lab Results   Component Value Date     08/29/2020    K 4.6 08/29/2020     08/29/2020    CO2 30 08/29/2020    BUN 8 08/29/2020    CREATININE 0.74 08/29/2020    GLUCOSE 164 12/02/2019    CALCIUM 9.8 08/29/2020      Lab Results   Component Value Date    ALT 20 08/29/2020    AST 17 08/29/2020    ALKPHOS 142 (H) 08/29/2020    BILITOT 0.32 08/29/2020     Lab Results   Component Value Date    TSH 0.97 08/29/2020     Lab Results   Component Value Date    CHOL 223 (H) 12/02/2019    CHOL 200 (H) 06/29/2018    CHOL 181 03/19/2015     Lab Results   Component Value Date    TRIG 150 (H) 12/02/2019    TRIG 194 (H) 06/29/2018    TRIG 107 03/19/2015     Lab Results   Component Value Date    HDL 56 08/29/2020    HDL 54 12/02/2019    HDL 48 06/29/2018     Lab Results   Component Value Date    LDLCALC 110 03/19/2015    LDLCHOLESTEROL 77 08/29/2020    LDLCHOLESTEROL 139 (H) 12/02/2019    LDLCHOLESTEROL 113 06/29/2018     Lab Results   Component Value Date    VLDL NOT REPORTED (H) 08/29/2020    VLDL NOT REPORTED 12/02/2019    VLDL NOT REPORTED 06/29/2018     Lab Results   Component Value Date    CHOLHDLRATIO 3.0 08/29/2020    CHOLHDLRATIO 4.1 12/02/2019    CHOLHDLRATIO 4.2 06/29/2018     Lab Results   Component Value Date    LABA1C 11.2 (H) 08/29/2020     Lab Results   Component Value Date    ZMXOSVMA60 396 09/19/2019     Lab Results   Component Value Date    FOLATE 8.1 09/19/2019     No results found for: IRON, TIBC, FERRITIN  Lab Results   Component Value Date    VITD25 37.4 08/29/2020      Due to this being a TeleHealth encounter, evaluation of the following organ systems is limited: Vitals/Constitutional/EENT/Resp/CV/GI//MS/Neuro/Skin/Heme-Lymph-Imm. ASSESSMENT/PLAN:  1.  Essential hypertension  Worsening  Added HCTZ and Tara  Re evaluate renal function    - CBC Auto Differential; Future  - Comprehensive Metabolic Panel, Fasting; Future  - Urinalysis Reflex to Culture; Future  - hydroCHLOROthiazide (HYDRODIURIL) 25 MG tablet; Take 1 tablet by mouth daily Take with Potassium supplement  Dispense: 30 tablet; Refill: 3  - potassium chloride (KLOR-CON) 10 MEQ extended release tablet; Take 1 tablet by mouth daily Take with HCTZ  Dispense: 30 tablet; Refill: 3    2. Type 2 diabetes mellitus with diabetic polyneuropathy, with long-term current use of insulin (HCC)  Worsening  Added Glipizide as a bridge until she gets an insurance  Continue NPH dose for now. WOrk on getting an insurance    - Hemoglobin A1C; Future  - Microalbumin, Ur; Future  - glipiZIDE (GLUCOTROL) 5 MG tablet; Take 1 tablet by mouth daily With biggest meal  Dispense: 90 tablet; Refill: 1    3. Pure hypercholesterolemia (ASCVD score 21.7% July 2020)  Stable  Continue therapy Lipitor  Advised to decrease the consumption of red meats, fried foods, trans fats, sweets, sugary beverages. Advised to increase fish, vegetables, and fruits consumption. Advised to add fiber or OTC supplements in diet. Discussed weight loss which will result in improvement of lipids levels. Advised to increase daily physical activities and add regular exercises. - Lipid, Fasting; Future  - atorvastatin (LIPITOR) 10 MG tablet; Take 1 tablet by mouth daily  Dispense: 90 tablet; Refill: 2    4. Fatigue, unspecified type  Ongoing  Chronic  Evaluate for vit def and endocrine disorders  - TSH without Reflex; Future  - Vitamin D 25 Hydroxy; Future    5. Breast cancer screening by mammogram  Recommended  - NAT DIGITAL SCREEN W CAD BILATERAL; Future    Controlled Substance Monitoring:  Acute and Chronic Pain Monitoring:   RX Monitoring 8/31/2020   Attestation The Prescription Monitoring Report for this patient was reviewed today.    Periodic Controlled Substance Monitoring No signs of potential drug abuse or diversion identified. ;Assessed functional status. Orders Placed This Encounter   Procedures   Ansley Fitzpatrick MD, Endocrinology, [place]     Referral Priority:   Routine     Referral Type:   Eval and Treat     Referral Reason:   Specialty Services Required     Referred to Provider:   Ender Ruff MD     Requested Specialty:   Endocrinology     Number of Visits Requested:   [address], [place], General Surgery, [facility]     Referral Priority:   Routine     Referral Type:   Eval and Treat     Referral Reason:   Specialty Services Required     Referred to Provider:   Elder Hasy DO     Requested Specialty:   General Surgery     Number of Visits Requested:   1      Orders Placed This Encounter   Medications    atorvastatin (LIPITOR) 10 MG tablet     Sig: Take 1 tablet by mouth daily     Dispense:  90 tablet     Refill:  2    Insulin Degludec 100 UNIT/ML SOPN     Sig: Inject 15 Units into the skin daily     Dispense:  5 pen     Refill:  2    insulin lispro, 1 Unit Dial, 100 UNIT/ML SOPN     Sig: Inject 15 Units into the skin 3 times daily (before meals)     Dispense:  5 pen     Refill:  3    pregabalin (LYRICA) 75 MG capsule     Sig: Take 1 capsule by mouth 2 times daily for 90 days. Dispense:  180 capsule     Refill:  2    hydroCHLOROthiazide (HYDRODIURIL) 25 MG tablet     Sig: Take 1 tablet by mouth daily Take with Potassium supplement     Dispense:  30 tablet     Refill:  3    potassium chloride (KLOR-CON) 10 MEQ extended release tablet     Sig: Take 1 tablet by mouth daily Take with HCTZ     Dispense:  30 tablet     Refill:  3    Lancets MISC     Sig: Dx: DM-2. Use 2-3 times daily     Dispense:  100 each     Refill:  3    blood glucose monitor strips     Sig: Test 2-3 times a day & as needed for symptoms of irregular blood glucose. BRAND OF CHOICE INSURANCE ALLOWS.      Dispense:  100 strip     Refill:  11    Alcohol Swabs (ALCOHOL PREP) PADS     Sig: Use as directed     Dispense:  100 each     Refill:  11    vitamin D (CHOLECALCIFEROL) 25 MCG (1000 UT) TABS tablet     Sig: Take 1 tablet by mouth daily     Dispense:  30 tablet     Refill:  3    zoster recombinant adjuvanted vaccine (SHINGRIX) 50 MCG/0.5ML SUSR injection     Sig: Inject 0.5 mLs into the muscle See Admin Instructions 1 dose now and repeat in 2-6 months     Dispense:  0.5 mL     Refill:  0      Medications Discontinued During This Encounter   Medication Reason    acetaminophen (TYLENOL) 325 MG tablet LIST CLEANUP    hydroCHLOROthiazide (HYDRODIURIL) 25 MG tablet LIST CLEANUP    ibuprofen (IBU) 400 MG tablet LIST CLEANUP    insulin NPH (HUMULIN N;NOVOLIN N) 100 UNIT/ML injection vial Therapy completed    gabapentin (NEURONTIN) 300 MG capsule Therapy completed    glipiZIDE (GLUCOTROL) 5 MG tablet Therapy completed    atorvastatin (LIPITOR) 10 MG tablet REORDER    potassium chloride (KLOR-CON) 10 MEQ extended release tablet REORDER    blood glucose monitor strips LIST CLEANUP    Lancets MISC REORDER    VITAMIN D PO Therapy completed      Maryana Brown received counseling on the following healthy behaviors: nutrition, exercise and medication adherence  Reviewed prior labs and health maintenance. Continue current medications, diet and exercise. Discussed use, benefit, and side effects of prescribed medications. Barriers to medication compliance addressed. Patient given educational materials - see patient instructions. All patient questions answered. Patient voiced understanding. Return for DM tresiba and Humolog, tdap, flu vacc, F2F, 30 mins. Colletta Hopkins is a 50 y.o. female being evaluated by a Virtual Visit (video visit) encounter to address concerns as mentioned above. Due to this being a TeleHealth encounter ([facility]), evaluation of the following organ systems was limited:Vitals/Constitutional/EENT/Resp/CV/GI//MS/Neuro/Skin/Heme-Lymph-Imm. Services were provided through a video synchronous discussion virtually to substitute for in-person clinic visit. This is a telehealth visit that was performed with the originating site at Patient Location: home and provider Location of [place], [place]. Verbal consent to participate in video visit was obtained. Patient ID verified by me prior to start of this visit  I discussed with the patient the nature of our telehealth visits via interactive/real-time audio/video that:  - I would evaluate the patient and recommend diagnostics and treatments based on my assessment  - Our sessions are not being recorded and that personal health information is protected  - Our team would provide follow up care in person if/when the patient needs it. Pursuant to the emergency declaration under the [address] [place] [address] authority and the [employer] Act, this Virtual Visit was conducted with patient's (and/or legal guardian's) consent, to reduce the patient's risk of exposure to COVID-19 and provide necessary medical care. The patient (and/or legal guardian) has also been advised to contact this office for worsening conditions or problems, and seek emergency medical treatment and/or call 911 if deemed necessary. This note was completed by using the assistance of a speech-recognition program. However, inadvertent computerized transcription errors may be present. Although every effort was made to ensure accuracy, no guarantees can be provided that every mistake has been identified and corrected by editing.   Electronically signed by KIMANI Salgado CNP on 7/9/20 at 10:28 AM EDT

## 2020-09-01 ENCOUNTER — TELEMEDICINE (OUTPATIENT)
Dept: FAMILY MEDICINE CLINIC | Age: 55
End: 2020-09-01
Payer: COMMERCIAL

## 2020-09-01 ENCOUNTER — OFFICE VISIT (OUTPATIENT)
Dept: PODIATRY | Age: 55
End: 2020-09-01
Payer: COMMERCIAL

## 2020-09-01 VITALS — DIASTOLIC BLOOD PRESSURE: 83 MMHG | HEART RATE: 89 BPM | SYSTOLIC BLOOD PRESSURE: 144 MMHG

## 2020-09-01 VITALS — WEIGHT: 249 LBS | HEIGHT: 66 IN | BODY MASS INDEX: 40.02 KG/M2

## 2020-09-01 PROBLEM — N62 LARGE BREASTS: Status: ACTIVE | Noted: 2020-09-01

## 2020-09-01 PROBLEM — R79.89 ELEVATED LFTS: Status: ACTIVE | Noted: 2020-09-01

## 2020-09-01 PROCEDURE — 3046F HEMOGLOBIN A1C LEVEL >9.0%: CPT | Performed by: PODIATRIST

## 2020-09-01 PROCEDURE — 2022F DILAT RTA XM EVC RTNOPTHY: CPT | Performed by: FAMILY MEDICINE

## 2020-09-01 PROCEDURE — 2022F DILAT RTA XM EVC RTNOPTHY: CPT | Performed by: PODIATRIST

## 2020-09-01 PROCEDURE — G8427 DOCREV CUR MEDS BY ELIG CLIN: HCPCS | Performed by: PODIATRIST

## 2020-09-01 PROCEDURE — G8427 DOCREV CUR MEDS BY ELIG CLIN: HCPCS | Performed by: FAMILY MEDICINE

## 2020-09-01 PROCEDURE — 3017F COLORECTAL CA SCREEN DOC REV: CPT | Performed by: FAMILY MEDICINE

## 2020-09-01 PROCEDURE — 1036F TOBACCO NON-USER: CPT | Performed by: PODIATRIST

## 2020-09-01 PROCEDURE — G8417 CALC BMI ABV UP PARAM F/U: HCPCS | Performed by: PODIATRIST

## 2020-09-01 PROCEDURE — 99203 OFFICE O/P NEW LOW 30 MIN: CPT | Performed by: PODIATRIST

## 2020-09-01 PROCEDURE — 99214 OFFICE O/P EST MOD 30 MIN: CPT | Performed by: FAMILY MEDICINE

## 2020-09-01 PROCEDURE — 3017F COLORECTAL CA SCREEN DOC REV: CPT | Performed by: PODIATRIST

## 2020-09-01 PROCEDURE — 3046F HEMOGLOBIN A1C LEVEL >9.0%: CPT | Performed by: FAMILY MEDICINE

## 2020-09-01 RX ORDER — HYDROCHLOROTHIAZIDE 25 MG/1
25 TABLET ORAL DAILY
Qty: 30 TABLET | Refills: 3 | Status: SHIPPED | OUTPATIENT
Start: 2020-09-01 | Stop reason: SDUPTHER

## 2020-09-01 RX ORDER — ATORVASTATIN CALCIUM 10 MG/1
10 TABLET, FILM COATED ORAL DAILY
Qty: 90 TABLET | Refills: 2 | Status: SHIPPED | OUTPATIENT
Start: 2020-09-01 | End: 2021-05-25

## 2020-09-01 RX ORDER — PEN NEEDLE, DIABETIC 31 GX5/16"
NEEDLE, DISPOSABLE MISCELLANEOUS
Qty: 100 EACH | Refills: 11 | Status: SHIPPED | OUTPATIENT
Start: 2020-09-01 | End: 2021-09-14

## 2020-09-01 RX ORDER — INSULIN LISPRO 100 [IU]/ML
15 INJECTION, SOLUTION INTRAVENOUS; SUBCUTANEOUS
Qty: 5 PEN | Refills: 3 | Status: SHIPPED | OUTPATIENT
Start: 2020-09-01 | End: 2020-09-28 | Stop reason: ALTCHOICE

## 2020-09-01 RX ORDER — PREGABALIN 75 MG/1
75 CAPSULE ORAL 2 TIMES DAILY
Qty: 180 CAPSULE | Refills: 2 | Status: SHIPPED | OUTPATIENT
Start: 2020-09-01 | End: 2020-09-28 | Stop reason: SDUPTHER

## 2020-09-01 RX ORDER — POTASSIUM CHLORIDE 750 MG/1
10 TABLET, FILM COATED, EXTENDED RELEASE ORAL DAILY
Qty: 30 TABLET | Refills: 3 | Status: SHIPPED | OUTPATIENT
Start: 2020-09-01 | End: 2020-12-29

## 2020-09-01 RX ORDER — LANCETS 30 GAUGE
EACH MISCELLANEOUS
Qty: 100 EACH | Refills: 3 | Status: SHIPPED | OUTPATIENT
Start: 2020-09-01 | Stop reason: SDUPTHER

## 2020-09-01 RX ORDER — GLUCOSAMINE HCL/CHONDROITIN SU 500-400 MG
CAPSULE ORAL
Qty: 100 STRIP | Refills: 11 | Status: SHIPPED | OUTPATIENT
Start: 2020-09-01 | End: 2020-09-04 | Stop reason: SDUPTHER

## 2020-09-01 RX ORDER — ZOSTER VACCINE RECOMBINANT, ADJUVANTED 50 MCG/0.5
0.5 KIT INTRAMUSCULAR SEE ADMIN INSTRUCTIONS
Qty: 0.5 ML | Refills: 0 | Status: SHIPPED | OUTPATIENT
Start: 2020-09-01 | End: 2020-09-28 | Stop reason: ALTCHOICE

## 2020-09-01 SDOH — HEALTH STABILITY: MENTAL HEALTH: HOW MANY STANDARD DRINKS CONTAINING ALCOHOL DO YOU HAVE ON A TYPICAL DAY?: 1 OR 2

## 2020-09-01 SDOH — HEALTH STABILITY: MENTAL HEALTH: HOW OFTEN DO YOU HAVE A DRINK CONTAINING ALCOHOL?: MONTHLY OR LESS

## 2020-09-01 ASSESSMENT — ENCOUNTER SYMPTOMS
SHORTNESS OF BREATH: 0
NAUSEA: 0
DIARRHEA: 0
BACK PAIN: 1
BACK PAIN: 0
COLOR CHANGE: 0

## 2020-09-01 NOTE — PROGRESS NOTES
Alina Arthur is a 50 y.o. female who presents to the office today with chief complaint of request for a diabetic foot exam.  Chief Complaint   Patient presents with    [facility]    Diabetes     DM FOOT CHECK CHECK/BS: 179/CONCERNS WITH NEUROPATHY    Nail Problem     B/L NAIL TRIM    Other     NO MEDICARE   Symptoms began about 15 year(s) ago. Patient denies injury to the feet. Patient states that she experiences burning, numbness, tingling, and pain to bot feet. Pain is rated 9 out of 10 at it's worst and is described as intermittent. Patient states that her pain is greatest at night. Treatments prior to today's visit include: None. Allergies   Allergen Reactions    Pcn [Penicillins] Hives and Other (See Comments)     Lose motor function of legs and collaps    Demerol Hcl [Meperidine] Other (See Comments)     Panic attack    Metformin And Related      Abdominal pain    Tape Verline Mortimer Tape] Other (See Comments)     Blister      Morphine Nausea And Vomiting and Other (See Comments)     Stomach pain       Past Medical History:   Diagnosis Date    Allergic rhinitis 10/21/2014    Anxiety     Arthritis     Bleeds easily ([address]     per pt, has never had a work up   [facility] Complete rotator cuff tear of left shoulder 2018    Diabetes mellitus ([address]     On Insulin, metformin, Glipizide    Diabetic polyneuropathy associated with type 2 diabetes mellitus ([address] 3/26/2018    Fibromyalgia     GERD (gastroesophageal reflux disease) 10/21/2014    H/O transfusion     History of blood transfusion     Obesity 10/21/2014    Pure hypercholesterolemia 2/23/2016    Tobacco abuse     Type II or unspecified type diabetes mellitus without mention of complication, not stated as uncontrolled     Unspecified sleep apnea     CPAP machine is broken    Wears glasses        Prior to Admission medications    Medication Sig Start Date End Date Taking?  Authorizing Provider   atorvastatin (LIPITOR) 10 MG tablet cold intolerance, heat intolerance and polyuria. Musculoskeletal: Positive for arthralgias. Negative for back pain, gait problem, joint swelling and myalgias. Skin: Negative for color change, pallor, rash and wound. Allergic/Immunologic: Negative for environmental allergies and food allergies. Neurological: Negative for dizziness, weakness, light-headedness and numbness. Hematological: Does not bruise/bleed easily. Psychiatric/Behavioral: Negative for behavioral problems, confusion and self-injury. The patient is not nervous/anxious. Vitals: There were no vitals filed for this visit. General: AAO x 3 in NAD. Integument: There are no rashes, ulcers, or breaks in the skin noted to the bilateral lower extremities. There is no induration, subcutaneous nodules, or tightening of the skin noted to the bilateral.     Toenails 1-5 of the right foot do present with thickness, discoloration, brittleness, subungual debris. Toenails 1-5 of the left foot do present with thickness, discoloration, brittleness, subungual debris. There is pain with palpation of toenails 1-5 of the right foot and 1-5 of the left foot. Interdigital maceration absent to web spaces 1-4, Bilateral.     There are no preulcerative lesions noted to the right foot. There are no preulcerative lesions noted to the left foot. The skin to the bilateral feet is not thin and shiny. The skin to the bilateral feet is  warm, supple, and dry. Vascular: DP pulse of the right foot is not palpable. DP pulse of the left foot is  palpable. PT pulse of the right foot is not palpable. PT pulse of the left foot is  palpable. CFT is less than 3 secs to the digits of the right foot. CFT is less than 3 secs to the digits of the left foot. There is no edema noted to the bilateral foot or ankle. There is hair growth noted to the digits of the bilateral feet.      There are no varicosities noted to toes of both feet     4. Foot deformity, bilateral  Amb External Referral For Orthotics   5. Type 2 diabetes mellitus with peripheral vascular disease ([address]     6. Type 2 diabetes mellitus with diabetic polyneuropathy, with long-term current use of insulin ([address]  Amb External Referral For Orthotics       Plan:  1. Clinical evaluation of the patient. 2. Patient educated on proper diabetic foot care. Patient advised to return to the office in 9 weeks for at risk diabetic foot care. Patient informed that she meets the criteria for diabetic shoes and was given an order for these today. 3. Contact office with any questions/problems/concerns. Return in about 9 weeks (around 11/3/2020) for At risk diabetic foot care.    9/1/2020      Sophia Croft DPM

## 2020-09-01 NOTE — PATIENT INSTRUCTIONS
good, quick way to make sure that you have a balanced meal. It also helps you spread carbs throughout the day. ? Divide your plate by types of foods. Put non-starchy vegetables on half the plate, meat or other protein food on one-quarter of the plate, and a grain or starchy vegetable in the final quarter of the plate. To this you can add a small piece of fruit and 1 cup of milk or yogurt, depending on how many carbs you are supposed to eat at a meal.  · Try to eat about the same amount of carbs at each meal. Do not \"save up\" your daily allowance of carbs to eat at one meal.  · Proteins have very little or no carbs per serving. Examples of proteins are beef, chicken, turkey, fish, eggs, tofu, cheese, cottage cheese, and peanut butter. A serving size of meat is 3 ounces, which is about the size of a deck of cards. Examples of meat substitute serving sizes (equal to 1 ounce of meat) are 1/4 cup of cottage cheese, 1 egg, 1 tablespoon of peanut butter, and ½ cup of tofu. How can you eat out and still eat healthy? · Learn to estimate the serving sizes of foods that have carbohydrate. If you measure food at home, it will be easier to estimate the amount in a serving of restaurant food. · If the meal you order has too much carbohydrate (such as potatoes, corn, or baked beans), ask to have a low-carbohydrate food instead. Ask for a salad or green vegetables. · If you use insulin, check your blood sugar before and after eating out to help you plan how much to eat in the future. · If you eat more carbohydrate at a meal than you had planned, take a walk or do other exercise. This will help lower your blood sugar. What else should you know? · Limit saturated fat, such as the fat from meat and dairy products. This is a healthy choice because people who have diabetes are at higher risk of heart disease. So choose lean cuts of meat and nonfat or low-fat dairy products.  Use olive or canola oil instead of butter or shortening when cooking. · Don't skip meals. Your blood sugar may drop too low if you skip meals and take insulin or certain medicines for diabetes. · Check with your doctor before you drink alcohol. Alcohol can cause your blood sugar to drop too low. Alcohol can also cause a bad reaction if you take certain diabetes medicines. Follow-up care is a key part of your treatment and safety. Be sure to make and go to all appointments, and call your doctor if you are having problems. It's also a good idea to know your test results and keep a list of the medicines you take. Where can you learn more? Go to https://Dealdrivepepiceweb.[email or web address]. org and sign in to your [employer] account. Enter U618 in the [employer] box to learn more about \"Learning About Diabetes Food Guidelines. \"     If you do not have an account, please click on the \"Sign Up Now\" link. Current as of: December 20, 2019               Content Version: 12.5  © 3464-9874 [employer], Incorporated. Care instructions adapted under license by [facility] ([facility]). If you have questions about a medical condition or this instruction, always ask your healthcare professional. [place] [address] any warranty or liability for your use of this information.

## 2020-09-04 ENCOUNTER — TELEPHONE (OUTPATIENT)
Dept: FAMILY MEDICINE CLINIC | Age: 55
End: 2020-09-04

## 2020-09-04 RX ORDER — GLUCOSAMINE HCL/CHONDROITIN SU 500-400 MG
CAPSULE ORAL
Qty: 100 STRIP | Refills: 11 | Status: SHIPPED | OUTPATIENT
Start: 2020-09-04 | End: 2020-09-04 | Stop reason: SDUPTHER

## 2020-09-04 RX ORDER — GLUCOSAMINE HCL/CHONDROITIN SU 500-400 MG
CAPSULE ORAL
Qty: 100 STRIP | Refills: 11 | Status: SHIPPED | OUTPATIENT
Start: 2020-09-04 | End: 2021-12-02 | Stop reason: SDUPTHER

## 2020-09-04 NOTE — TELEPHONE ENCOUNTER
Please Approve or Refuse.   Send to Pharmacy per Pt's Request:first script was not received      Next Visit Date:  9/30/2020   Last Visit Date: 9/1/2020    Hemoglobin A1C (%)   Date Value   08/29/2020 11.2 (H)   03/10/2020 11.1   12/10/2019 9.2             ( goal A1C is < 7)   BP Readings from Last 3 Encounters:   09/01/20 (!) 144/83   07/23/20 (!) 167/84   07/09/20 (!) 150/90          (goal 120/80)  BUN   Date Value Ref Range Status   08/29/2020 8 6 - 20 mg/dL Final     CREATININE   Date Value Ref Range Status   08/29/2020 0.74 0.50 - 0.90 mg/dL Final     Potassium   Date Value Ref Range Status   08/29/2020 4.6 3.7 - 5.3 mmol/L Final

## 2020-09-08 RX ORDER — PEN NEEDLE, DIABETIC 30 GX5/16"
1 NEEDLE, DISPOSABLE MISCELLANEOUS DAILY
Qty: 100 EACH | Refills: 3 | Status: SHIPPED | OUTPATIENT
Start: 2020-09-08 | End: 2020-09-21 | Stop reason: SDUPTHER

## 2020-09-08 NOTE — TELEPHONE ENCOUNTER
Please Approve or Refuse.   Send to Pharmacy per Pt's Request:      Next Visit Date:  9/30/2020   Last Visit Date: 9/1/2020    Hemoglobin A1C (%)   Date Value   08/29/2020 11.2 (H)   03/10/2020 11.1   12/10/2019 9.2             ( goal A1C is < 7)   BP Readings from Last 3 Encounters:   09/01/20 (!) 144/83   07/23/20 (!) 167/84   07/09/20 (!) 150/90          (goal 120/80)  BUN   Date Value Ref Range Status   08/29/2020 8 6 - 20 mg/dL Final     CREATININE   Date Value Ref Range Status   08/29/2020 0.74 0.50 - 0.90 mg/dL Final     Potassium   Date Value Ref Range Status   08/29/2020 4.6 3.7 - 5.3 mmol/L Final

## 2020-09-15 NOTE — TELEPHONE ENCOUNTER
Please Approve or Refuse. Send to Pharmacy per Pt's Request: Patient states she was never taken off of this medication. Please advise.       Next Visit Date:  9/30/2020   Last Visit Date: 9/1/2020    Hemoglobin A1C (%)   Date Value   08/29/2020 11.2 (H)   03/10/2020 11.1   12/10/2019 9.2             ( goal A1C is < 7)   BP Readings from Last 3 Encounters:   09/01/20 (!) 144/83   07/23/20 (!) 167/84   07/09/20 (!) 150/90          (goal 120/80)  BUN   Date Value Ref Range Status   08/29/2020 8 6 - 20 mg/dL Final     CREATININE   Date Value Ref Range Status   08/29/2020 0.74 0.50 - 0.90 mg/dL Final     Potassium   Date Value Ref Range Status   08/29/2020 4.6 3.7 - 5.3 mmol/L Final

## 2020-09-16 ENCOUNTER — OFFICE VISIT (OUTPATIENT)
Dept: SURGERY | Age: 55
End: 2020-09-16
Payer: COMMERCIAL

## 2020-09-16 VITALS
DIASTOLIC BLOOD PRESSURE: 79 MMHG | WEIGHT: 252 LBS | HEART RATE: 91 BPM | BODY MASS INDEX: 40.67 KG/M2 | SYSTOLIC BLOOD PRESSURE: 147 MMHG

## 2020-09-16 PROCEDURE — 1036F TOBACCO NON-USER: CPT | Performed by: STUDENT IN AN ORGANIZED HEALTH CARE EDUCATION/TRAINING PROGRAM

## 2020-09-16 PROCEDURE — 99202 OFFICE O/P NEW SF 15 MIN: CPT | Performed by: STUDENT IN AN ORGANIZED HEALTH CARE EDUCATION/TRAINING PROGRAM

## 2020-09-16 PROCEDURE — G8427 DOCREV CUR MEDS BY ELIG CLIN: HCPCS | Performed by: STUDENT IN AN ORGANIZED HEALTH CARE EDUCATION/TRAINING PROGRAM

## 2020-09-16 PROCEDURE — 3017F COLORECTAL CA SCREEN DOC REV: CPT | Performed by: STUDENT IN AN ORGANIZED HEALTH CARE EDUCATION/TRAINING PROGRAM

## 2020-09-16 PROCEDURE — G8417 CALC BMI ABV UP PARAM F/U: HCPCS | Performed by: STUDENT IN AN ORGANIZED HEALTH CARE EDUCATION/TRAINING PROGRAM

## 2020-09-16 NOTE — PROGRESS NOTES
History and Physical  [facility]    Patient's Name/Date of Birth: Neva Sagastume / 1965 (82 y.o.)    Date: September 16, 2020     HPI: Pt is a 50 y.o. female with history of diabetes, Raynaud's, and hypertension who presents to [facility] for evaluation of oversized bilateral breast. Pt has large breasts that have been causing significant amounts of back and shoulder pain. Patient states that she must wear sports bras and that any other brought takes into her shoulders causing pain and numbness down her arms. Patient's last mammogram was in 2018 with a score of BI-RADS 1. Patient has had no breast pain, masses, or lesions. Family history includes breast cancer in a maternal aunt and great aunt. Patient has uncontrolled hypertension and diabetes. Blood sugars run between 185 and 289. Most recent HbA1c was 11.2. Previous surgical history includes an open cholecystectomy, open gastric bypass, and ovarian cyst removal in 1988.      Past Medical History:   Diagnosis Date    Allergic rhinitis 10/21/2014    Anxiety     Arthritis     Bleeds easily ([address]     per pt, has never had a work up   [facility] Complete rotator cuff tear of left shoulder 2018    Diabetes mellitus ([address]     On Insulin, metformin, Glipizide    Diabetic polyneuropathy associated with type 2 diabetes mellitus ([address] 3/26/2018    Fibromyalgia     GERD (gastroesophageal reflux disease) 10/21/2014    H/O transfusion     History of blood transfusion     Obesity 10/21/2014    Pure hypercholesterolemia 2/23/2016    Tobacco abuse     Type II or unspecified type diabetes mellitus without mention of complication, not stated as uncontrolled     Unspecified sleep apnea     CPAP machine is broken    Wears glasses        Past Surgical History:   Procedure Laterality Date    BRONCHOSCOPY  9/27/2017    BRONCHOSCOPY BRUSHINGS performed by Markos Ellison MD at [facility] 2002    release tablet Take 1 tablet by mouth daily Take with HCTZ 30 tablet 3    Lancets MISC Dx: DM-2. Use 2-3 times daily 100 each 3    Alcohol Swabs (ALCOHOL PREP) PADS Use as directed 100 each 11    vitamin D (CHOLECALCIFEROL) 25 MCG (1000 UT) TABS tablet Take 1 tablet by mouth daily 30 tablet 3    zoster recombinant adjuvanted vaccine (SHINGRIX) 50 MCG/0.5ML SUSR injection Inject 0.5 mLs into the muscle See Admin Instructions 1 dose now and repeat in 2-6 months 0.5 mL 0    lisinopril (PRINIVIL;ZESTRIL) 20 MG tablet Take 1 tablet by mouth daily 90 tablet 0    Blood Glucose Monitoring Suppl (BLOOD GLUCOSE METER) KIT Dx: DM-2. Use 2-3 times daily 1 kit 0     No current facility-administered medications for this visit.         Allergies   Allergen Reactions    Pcn [Penicillins] Hives and Other (See Comments)     Lose motor function of legs and collaps    Demerol Hcl [Meperidine] Other (See Comments)     Panic attack    Metformin And Related      Abdominal pain    Tape Domo Dye Tape] Other (See Comments)     Blister      Morphine Nausea And Vomiting and Other (See Comments)     Stomach pain       Family History   Problem Relation Age of Onset    Diabetes Mother     Heart Disease Mother         murmur    Heart Disease Father         MVP    Diabetes Sister     Diabetes Maternal Grandmother     High Blood Pressure Maternal Grandmother     Heart Failure Maternal Grandfather     Heart Attack Paternal Grandmother        Social History     Socioeconomic History    Marital status: Legally      Spouse name: Not on file    Number of children: 3    Years of education: Not on file    Highest education level: Not on file   Occupational History    Occupation:    Social Needs    Financial resource strain: Not on file    Food insecurity     Worry: Not on file     Inability: Not on file    Transportation needs     Medical: Not on file     Non-medical: Not on file   Tobacco Use    Smoking

## 2020-09-17 ENCOUNTER — OFFICE VISIT (OUTPATIENT)
Dept: ORTHOPEDIC SURGERY | Age: 55
End: 2020-09-17
Payer: COMMERCIAL

## 2020-09-17 VITALS — WEIGHT: 252 LBS | HEIGHT: 66 IN | BODY MASS INDEX: 40.5 KG/M2

## 2020-09-17 PROCEDURE — 99213 OFFICE O/P EST LOW 20 MIN: CPT | Performed by: STUDENT IN AN ORGANIZED HEALTH CARE EDUCATION/TRAINING PROGRAM

## 2020-09-17 NOTE — PROGRESS NOTES
[facility]  [facility]  [id number] [id number]  Dept: [phone]  Dept Fax: [phone]        Orthopaedic Clinic Follow Up      Subjective:     Marichuy Blake is a 50y.o. year old right hand dominant female who presents to the clinic today for routine followup regarding her right shoulder pain. She states that she has had right shoulder pain for about 2 months now, which has been getting worse over time. Pain is aggravated by movements, especially internal rotation. She states that the pain feels similar to when she tore her rotator cuff on the left side, which required surgical repair. She has noticed that she has difficulty lifting and picking up things with the right arm secondary to both pain and weakness. She has been trying rest, heat, and home exercises to try to help, but has not noticed any improvement. Aleve provides minimal relief. She presents today hoping for a corticosteroid injection for pain relief. She has a past medical history significant for poorly controlled diabetes, with her last A1c being 11.2.     ROS:  MSK: right shoulder pain   Neuro: denies numbness and tingling     Objective :   Physical exam  Gen: AAOx3, no acute distress  Cardiovascular: regular rate  Respiratory: symmetrical chest expansion, normal effort  MSK: Right upper extremity: No ecchymoses, abrasions, deformity, or lacerations. Skin intact. Mild tenderness to palpation. Compartments soft and compressible. Able to actively abduct the arm from 0-130, forward flex from 0-170, externally rotate 45 degrees, and internally rotate to T12. These motions do produce pain. Weakness with empty can test on the right side compared to the left. Hand warm and perfused w/ BCR; 2+ radial and ulnar pulses. Median/Radial/Ulnar/AIN/PIN gross motor intact. Median/Radial/Ulnar nerve SILT.       Radiology:  History: Right shoulder pain     Comparison: 8/7/2020    Findings: 4 views of the right shoulder showing no acute fractures or dislocations. Glenohumeral alignment appears normal. No lytic or blastic lesions present. Degenerative changes in the [facility] joint with osteophytic changes and joint space narrowing. Degenerative changes in glenohumeral joint. Irregularities and degenerative changes as well as bone spurs along greater tuberosity suggestive of rotator cuff arthropathy. Films consistent with prior imaging taken on 8/7/19. Impression: Mild arthritic changes in the right shoulder with no obvious acute abnormalities     Assessment:   50y.o. year old female with right shoulder pain  Plan:      Patient seen and examined today. Discussed etiology of shoulder pain with patient. Given x-ray findings of degenerative changes along the greater tuberosity, there is likely some rotator cuff arthropathy in the right shoulder. We will provide the patient with a prescription for physical therapy, as strengthening of the shoulder may assist with her symptoms. Also recommended that the patient try topical NSAIDs provided by the prescription pharmacy, as this may alleviate some of her pain. We discussed steroid injection today in office. However, the patient's most recent A1c was 11.2. Given the effect steroid injections have on glucose levels, we recommended deferring this for now. Strongly encouraged the patient to be cognizant of her glucose levels over the next several weeks so that we are able to proceed with injection. We will follow up with the patient in 6 weeks for re-evaluation. We will plan on an injection at that time assuming the patient's glucose levels are down. Follow up:Return in about 6 weeks (around 10/29/2020). No orders of the defined types were placed in this encounter.          Orders Placed This Encounter   Procedures    XR SHOULDER RIGHT (MIN 2 VIEWS)     Standing Status:   Future     Number of Occurrences:   1     Standing Expiration Date:   9/17/2021     Order Specific Question:   Reason for exam:     Answer:   right shoulder pain   [address]     Referral Priority:   Routine     Referral Type:   Eval and Treat     Referral Reason:   Specialty Services Required     Requested Specialty:   Physical Therapy     Number of Visits Requested:   1       Electronically signed by Tuyet Allen DO on 9/17/2020 at 4:03 PM

## 2020-09-21 RX ORDER — PEN NEEDLE, DIABETIC 30 GX5/16"
1 NEEDLE, DISPOSABLE MISCELLANEOUS DAILY
Qty: 100 EACH | Refills: 3 | Status: SHIPPED | OUTPATIENT
Start: 2020-09-21 | End: 2022-01-10

## 2020-09-21 RX ORDER — BLOOD-GLUCOSE METER
EACH MISCELLANEOUS
Qty: 1 DEVICE | Refills: 0 | Status: SHIPPED | OUTPATIENT
Start: 2020-09-21

## 2020-09-21 RX ORDER — INSULIN LISPRO 100 [IU]/ML
15 INJECTION, SOLUTION INTRAVENOUS; SUBCUTANEOUS
Qty: 5 PEN | Refills: 1 | Status: SHIPPED | OUTPATIENT
Start: 2020-09-21 | End: 2021-12-02 | Stop reason: SDUPTHER

## 2020-09-21 NOTE — TELEPHONE ENCOUNTER
Please Approve or Refuse.   Send to Pharmacy per Pt's Request: patient said she is aslo taking Glipizide, Novolon N 3  u QD and Tresiba       Next Visit Date:  9/28/2020   Last Visit Date: 9/1/2020    Hemoglobin A1C (%)   Date Value   08/29/2020 11.2 (H)   03/10/2020 11.1   12/10/2019 9.2             ( goal A1C is < 7)   BP Readings from Last 3 Encounters:   09/16/20 (!) 147/79   09/01/20 (!) 144/83   07/23/20 (!) 167/84          (goal 120/80)  BUN   Date Value Ref Range Status   08/29/2020 8 6 - 20 mg/dL Final     CREATININE   Date Value Ref Range Status   08/29/2020 0.74 0.50 - 0.90 mg/dL Final     Potassium   Date Value Ref Range Status   08/29/2020 4.6 3.7 - 5.3 mmol/L Final

## 2020-09-21 NOTE — TELEPHONE ENCOUNTER
I do not know how many times I have sent the same in the past week. pls verify with pharmacy. Thanks.

## 2020-09-24 ENCOUNTER — HOSPITAL ENCOUNTER (OUTPATIENT)
Dept: PHYSICAL THERAPY | Age: 55
Setting detail: THERAPIES SERIES
Discharge: HOME | End: 2020-09-24
Payer: COMMERCIAL

## 2020-09-24 PROCEDURE — 97161 PT EVAL LOW COMPLEX 20 MIN: CPT

## 2020-09-24 PROCEDURE — 97110 THERAPEUTIC EXERCISES: CPT

## 2020-09-24 PROCEDURE — 97112 NEUROMUSCULAR REEDUCATION: CPT

## 2020-09-24 ASSESSMENT — PAIN DESCRIPTION - ORIENTATION: ORIENTATION: RIGHT

## 2020-09-24 ASSESSMENT — PAIN - FUNCTIONAL ASSESSMENT: PAIN_FUNCTIONAL_ASSESSMENT: PREVENTS OR INTERFERES SOME ACTIVE ACTIVITIES AND ADLS

## 2020-09-24 ASSESSMENT — PAIN DESCRIPTION - DESCRIPTORS: DESCRIPTORS: NUMBNESS

## 2020-09-24 ASSESSMENT — PAIN SCALES - GENERAL: PAINLEVEL_OUTOF10: 7

## 2020-09-24 ASSESSMENT — PAIN DESCRIPTION - ONSET: ONSET: PROGRESSIVE

## 2020-09-24 ASSESSMENT — PAIN DESCRIPTION - PAIN TYPE: TYPE: CHRONIC PAIN

## 2020-09-24 ASSESSMENT — PAIN DESCRIPTION - PROGRESSION: CLINICAL_PROGRESSION: GRADUALLY WORSENING

## 2020-09-24 ASSESSMENT — PAIN DESCRIPTION - LOCATION: LOCATION: ARM

## 2020-09-24 ASSESSMENT — PAIN DESCRIPTION - FREQUENCY: FREQUENCY: CONTINUOUS

## 2020-09-25 NOTE — PROGRESS NOTES
P PHYSICIANS  [facility] FAMILY PHYSIC[facility]  [facility] [address]  [address] [place] [facility] [address]  Dept: [phone]     2020   Chief Complaint   Patient presents with    Diabetes     HPI  Rafael Santos (:  1965) is a 50 y.o. female is an established patient. Patient has a history of Hypertension, DM, Hypercholesterolemia, Lumbar radiculopathy, polyneuropathy, Fibromyalgia, arthritis, GERD, VItamin d Def, Obesity    HYPERTENSION  Rafael Santos has a well controlled hypertension. she is currently on Lisinopril, HCTZ. Patient's most recent BP in the office was stable. she reports stable BP readings at home. Patient denies any adverse reaction to this therapy. she denies any CP, SOB, HA, or palpitations. DIABETES MELLITUS  Patient has a  unstable Diabetes Mellitus. Under 200 . Patient's recent   Lab Results   Component Value Date    LABA1C 11.2 (H) 2020   . Current therapy includes Algis Cuna, Humolog,. Patient is responding poorly with this therapy. Patient reports home glucose monitoring as Unstable readings. Patient denieshypoglycemia episodes such as{symptoms. Patient admits to neuropathy. Will increase Tresiba dose and refer to endocrinology. Eye Exam: recent    Foot Exam:recent/podiatry- will get DM shoes. Janie German is currently on atorvastatin (Lipitor). Patient denies adverse reaction to this medication. Compliance with treatment thus far has been good. The patient is not known to have coexisting coronary artery disease.  The 10-year CVD risk score (D'Agostino, et al., 2008) is: 10.9%    Values used to calculate the score:      Age: 50 years      Sex: Female      Diabetic: Yes      Tobacco smoker: No      Systolic Blood Pressure: 586 mmHg      Is BP treated: Yes      HDL Cholesterol: 56 mg/dL      Total Cholesterol: 166 mg/dL  Lab Results   Component Value Date/Time    CHOL 223 (H) 2019 08:25 AM    CHOL 181 2015 08:25  insulin lispro, 1 Unit Dial, (HUMALOG KWIKPEN) 100 UNIT/ML SOPN Inject 15 Units into the skin 3 times daily (before meals) 5 pen 1    Insulin Syringes, Disposable, U-100 1 ML MISC 1 each by Does not apply route daily 100 each 3    blood glucose monitor strips Test 2-3 times a day & as needed for symptoms of irregular blood glucose. BRAND OF CHOICE INSURANCE ALLOWS. 100 strip 11    atorvastatin (LIPITOR) 10 MG tablet Take 1 tablet by mouth daily 90 tablet 2    hydroCHLOROthiazide (HYDRODIURIL) 25 MG tablet Take 1 tablet by mouth daily Take with Potassium supplement 30 tablet 3    potassium chloride (KLOR-CON) 10 MEQ extended release tablet Take 1 tablet by mouth daily Take with HCTZ 30 tablet 3    Lancets MISC Dx: DM-2. Use 2-3 times daily 100 each 3    Alcohol Swabs (ALCOHOL PREP) PADS Use as directed 100 each 11    vitamin D (CHOLECALCIFEROL) 25 MCG (1000 UT) TABS tablet Take 1 tablet by mouth daily 30 tablet 3    lisinopril (PRINIVIL;ZESTRIL) 20 MG tablet Take 1 tablet by mouth daily 90 tablet 0     No current facility-administered medications for this visit. Review of Systems   Constitutional: Positive for activity change. Negative for chills, fatigue and fever. HENT: Negative. Negative for congestion and hearing loss. Eyes: Negative for visual disturbance. Respiratory: Negative. Negative for cough and shortness of breath. Cardiovascular: Negative. Negative for chest pain and palpitations. Elevated BP   Gastrointestinal: Negative for abdominal pain, constipation, diarrhea and nausea. Endocrine: Negative for cold intolerance. Genitourinary: Negative for dysuria and enuresis. Musculoskeletal: Positive for arthralgias, back pain, joint swelling and myalgias. Back pain, shoulder pains, large breasts G size   Skin: Negative for rash. Neurological: Positive for numbness (both feet). Negative for weakness, light-headedness and headaches.    Hematological: Does not bruise/bleed easily. Psychiatric/Behavioral: Positive for sleep disturbance. The patient is nervous/anxious. Prior to Visit Medications    Medication Sig Taking? Authorizing Provider   cetirizine (ZYRTEC) 10 MG tablet Take 10 mg by mouth daily Yes Historical Provider, MD   Insulin Degludec 100 UNIT/ML SOPN Inject 40 Units into the skin daily Yes KIMANI Haas CNP   pregabalin (LYRICA) 100 MG capsule Take 1 capsule by mouth 2 times daily for 90 days. Yes KIMANI Haas CNP   meloxicam (MOBIC) 7.5 MG tablet Take 1 tablet by mouth daily Yes KIMANI Haas CNP   Blood Glucose Monitoring Suppl (TRUE METRIX METER) MANOLO Testing BID Yes KIMANI Haas CNP   Insulin Pen Needle (PEN NEEDLES 3/16\") 31G X 5 MM MISC 1 each by Does not apply route daily Yes KIMANI Haas CNP   insulin lispro, 1 Unit Dial, (HUMALOG KWIKPEN) 100 UNIT/ML SOPN Inject 15 Units into the skin 3 times daily (before meals) Yes KIMANI Haas CNP   Insulin Syringes, Disposable, U-100 1 ML MISC 1 each by Does not apply route daily Yes KIMANI Haas CNP   blood glucose monitor strips Test 2-3 times a day & as needed for symptoms of irregular blood glucose. BRAND OF CHOICE INSURANCE ALLOWS. Yes Myna Frankel, MD   atorvastatin (LIPITOR) 10 MG tablet Take 1 tablet by mouth daily Yes KIMANI Haas CNP   hydroCHLOROthiazide (HYDRODIURIL) 25 MG tablet Take 1 tablet by mouth daily Take with Potassium supplement Yes KIMANI Haas CNP   potassium chloride (KLOR-CON) 10 MEQ extended release tablet Take 1 tablet by mouth daily Take with HCTZ Yes KIMANI Haas CNP   Lancets MISC Dx: DM-2.  Use 2-3 times daily Yes KIMANI Haas CNP   Alcohol Swabs (ALCOHOL PREP) PADS Use as directed Yes KIMANI Haas CNP   vitamin D (CHOLECALCIFEROL) 25 MCG (1000 UT) TABS tablet Take 1 tablet by mouth daily Yes Katina Clemons, APRN - CNP   lisinopril (PRINIVIL;ZESTRIL) 20 MG tablet Take 1 tablet by mouth daily Yes KIMANI Haas - CNP      Social History     Tobacco Use    Smoking status: Former Smoker     Packs/day: 2.00     Years: 34.00     Pack years: 68.00     Types: Cigarettes     Start date: 1983     Last attempt to quit: 9/28/2017     Years since quitting: 3.0    Smokeless tobacco: Never Used   Substance Use Topics    Alcohol use: Yes     Alcohol/week: 0.0 standard drinks     Frequency: Monthly or less     Drinks per session: 1 or 2     Comment: rare      Vitals:    09/28/20 1137   BP: 130/80   Pulse: 85   SpO2: 94%   Weight: 256 lb (116.1 kg)   Height: 5' 6\" (1.676 m)     Estimated body mass index is 41.32 kg/m² as calculated from the following:    Height as of this encounter: 5' 6\" (1.676 m). Weight as of this encounter: 256 lb (116.1 kg). Physical Exam  Vitals signs and nursing note reviewed. Constitutional:       Appearance: She is well-developed. She is morbidly obese. HENT:      Head: Normocephalic. Right Ear: Tympanic membrane and external ear normal.      Left Ear: Tympanic membrane and external ear normal.      Nose: Nose normal.      Mouth/Throat:      Mouth: Mucous membranes are moist.   Eyes:      Pupils: Pupils are equal, round, and reactive to light. Neck:      Musculoskeletal: Normal range of motion and neck supple. Thyroid: No thyromegaly. Vascular: No JVD. Cardiovascular:      Rate and Rhythm: Normal rate and regular rhythm. Pulses: Normal pulses. Heart sounds: Normal heart sounds. No murmur. Pulmonary:      Effort: Pulmonary effort is normal. No respiratory distress. Breath sounds: Normal breath sounds. Abdominal:      General: Abdomen is protuberant. Bowel sounds are normal. There is no distension. Palpations: Abdomen is soft. Tenderness: There is no abdominal tenderness. Comments: obese   Musculoskeletal: Normal range of motion.    Lymphadenopathy: Cervical: No cervical adenopathy. Skin:     General: Skin is warm and dry. Capillary Refill: Capillary refill takes less than 2 seconds. Neurological:      Mental Status: She is alert and oriented to person, place, and time. Sensory: Sensory deficit (both feet) present. Psychiatric:         Mood and Affect: Mood is anxious. Speech: Speech is rapid and pressured. Behavior: Behavior normal.       Most recent labs reviewed with patient, and all questions answered.   Lab Results   Component Value Date    WBC 4.7 08/29/2020    HGB 14.1 08/29/2020    HCT 42.2 08/29/2020    MCV 80.4 08/29/2020     08/29/2020     Lab Results   Component Value Date     08/29/2020    K 4.6 08/29/2020     08/29/2020    CO2 30 08/29/2020    BUN 8 08/29/2020    CREATININE 0.74 08/29/2020    GLUCOSE 164 12/02/2019    CALCIUM 9.8 08/29/2020      Lab Results   Component Value Date    ALT 20 08/29/2020    AST 17 08/29/2020    ALKPHOS 142 (H) 08/29/2020    BILITOT 0.32 08/29/2020     Lab Results   Component Value Date    TSH 0.97 08/29/2020     Lab Results   Component Value Date    CHOL 223 (H) 12/02/2019    CHOL 200 (H) 06/29/2018    CHOL 181 03/19/2015     Lab Results   Component Value Date    TRIG 150 (H) 12/02/2019    TRIG 194 (H) 06/29/2018    TRIG 107 03/19/2015     Lab Results   Component Value Date    HDL 56 08/29/2020    HDL 54 12/02/2019    HDL 48 06/29/2018     Lab Results   Component Value Date    LDLCALC 110 03/19/2015    LDLCHOLESTEROL 77 08/29/2020    LDLCHOLESTEROL 139 (H) 12/02/2019    LDLCHOLESTEROL 113 06/29/2018     Lab Results   Component Value Date    CHOLHDLRATIO 3.0 08/29/2020    CHOLHDLRATIO 4.1 12/02/2019    CHOLHDLRATIO 4.2 06/29/2018     Lab Results   Component Value Date    LABA1C 11.2 (H) 08/29/2020      Lab Results   Component Value Date    UFRQXNCC10 396 09/19/2019     Lab Results   Component Value Date    FOLATE 8.1 09/19/2019     Lab Results   Component Value Date    VITD25 37.4 08/29/2020     ASSESSMENT/PLAN:  1. Essential hypertension  Stable  Continue current therapy. DISCUSSED AND ADVISED TO:  Cut down on your salt intake. Cut down on caffeinated drinks, sports drinks. Instructed to check BP at home regularly. Report for any chest pains, shortness of breath, headaches, and lightheadedness. Call the office if your blood pressure continue to be higher than 140/90 or 90/50.        2. Type 2 diabetes mellitus with diabetic polyneuropathy, with long-term current use of insulin (HCC)  Unstable  Tresiba dose increased  See endo  We will continue to monitor Hemoglobin A1c   DISCUSSED and ADVISED TO:  Continue to check Glucose levels at home. Report increased and low levels as discussed. Decrease carbohydrates, sugary drinks, desserts in your diet. Exercise regularly, as tolerated. Try to lose weight. - Insulin Degludec 100 UNIT/ML SOPN; Inject 40 Units into the skin daily  Dispense: 3 pen; Refill: 2    3. Pure hypercholesterolemia (ASCVD score 21.7% July 2020)  Stable  Continue therapy. Advised to decrease the consumption of red meats, fried foods, trans fats, sweets, sugary beverages. Advised to increase fish, vegetables, and fruits consumption. Advised to add fiber or OTC supplements in diet. Discussed weight loss which will result in improvement of lipids levels. Advised to increase daily physical activities and add regular exercises. 4. Lumbar radiculopathy  Failure to Improve  Evaluate stenosis  - MRI LUMBAR SPINE W CONTRAST; Future  - meloxicam (MOBIC) 7.5 MG tablet; Take 1 tablet by mouth daily  Dispense: 90 tablet; Refill: 1    5. Diabetic polyneuropathy associated with type 2 diabetes mellitus (HCC)  Failure to Improve  Dose increased    - pregabalin (LYRICA) 100 MG capsule; Take 1 capsule by mouth 2 times daily for 90 days. Dispense: 180 capsule; Refill: 2    6.  Fibromyalgia  Failure to Improve  Mobic started  Diclofenac stopped by patient    - meloxicam (MOBIC) 7.5 MG tablet; Take 1 tablet by mouth daily  Dispense: 90 tablet; Refill: 1    7. Arthritis involving multiple sites  Failure to Improve  Continue current therapy. DISCUSSED and ADVISED TO:  Stay at a healthy weight. Continue exercises/PT  Stretch to help prevent stiffness and to prevent injury before exercise. Gentle forms of yoga help keep joints and muscles flexible. Walk instead of jog, ride a bike, swim, and water exercise. Lift weights as tolerated. strong muscles help reduce stress on joints. Take pain medicines exactly as directed and only as needed. - meloxicam (MOBIC) 7.5 MG tablet; Take 1 tablet by mouth daily  Dispense: 90 tablet; Refill: 1    8. Gastroesophageal reflux disease with esophagitis  Stable  Continue therapy  DISCUSSED AND ADVISED TO:  Avoid food triggers. Stop eating large meals close to bedtime  Don't eat meals too close to bedtime  Avoid ASA, NSAID's, caffeine, peppermints, alcohol and tobacco.  Report for worsening symptoms. 9. Vitamin D deficiency  Continue Vitamin D supplementation  DISCUSSED AND ADVISED TO:  Foods that contain a lot of vitamin D includes [place], tuna, and mackerel. Cheese, egg yolks, and beef liver have small amounts of vit D.  Milk, soy drinks, orange juice, yogurt, margarine, and some kinds of cereal have vitamin D added to them. Continue to use sunblock when out in the sun to prevent skin cancer. 10. Morbid obesity with BMI of 40.0-44.9, adult ([address]  BMI increasing  DISCUSSED AND ADVISED TO:  Eat a low-fat and low carbohydrates diet. Avoid fried foods especially fast food. Choose healthier options for snacks. Have 5-6 servings of fruits and vegetables per day. Cut down on eating processed food. Add 30 minutes to 1 hour aerobic exercise for 3-4 days a week. 11. Need for Tdap vaccination  Recommended by CDC. No current infection. Denies previous adverse reaction to vaccination.       - Tetanus-Diphth-Acell Pertussis (BOOSTRIX) injection 0.5 mL    12. Need for hepatitis B vaccination  Recommended by CDC. No current infection. Denies previous adverse reaction to vaccination.      - Hep B Vaccine Adult (ENGERIX B)     Controlled Substance Monitoring:  Acute and Chronic Pain Monitoring:   RX Monitoring 8/31/2020   Attestation The Prescription Monitoring Report for this patient was reviewed today. Periodic Controlled Substance Monitoring No signs of potential drug abuse or diversion identified. ;Assessed functional status. Orders Placed This Encounter   Procedures    MRI LUMBAR SPINE W CONTRAST     Standing Status:   Future     Standing Expiration Date:   9/28/2021     Order Specific Question:   Reason for exam:     Answer:   neuropathy    Hep B Vaccine Adult (ENGERIX B)     Orders Placed This Encounter   Medications    Tetanus-Diphth-Acell Pertussis (BOOSTRIX) injection 0.5 mL    Insulin Degludec 100 UNIT/ML SOPN     Sig: Inject 40 Units into the skin daily     Dispense:  3 pen     Refill:  2    pregabalin (LYRICA) 100 MG capsule     Sig: Take 1 capsule by mouth 2 times daily for 90 days. Dispense:  180 capsule     Refill:  2    meloxicam (MOBIC) 7.5 MG tablet     Sig: Take 1 tablet by mouth daily     Dispense:  90 tablet     Refill:  1      Medications Discontinued During This Encounter   Medication Reason    insulin lispro, 1 Unit Dial, 100 UNIT/ML SOPN Therapy completed    zoster recombinant adjuvanted vaccine [facility]) 50 MCG/0.5ML SUSR injection Therapy completed    Blood Glucose Monitoring Suppl (BLOOD GLUCOSE METER) KIT Therapy completed    Insulin Degludec 100 UNIT/ML SOPN REORDER    pregabalin (LYRICA) 75 MG capsule REORDER      Health Maintenance Due   Topic Date Due    Cervical cancer screen  10/11/2016    Breast cancer screen  04/11/2020    Diabetic foot exam  07/26/2020      Return in about 3 months (around 12/28/2020) for Chronic conditions, 30mins.   Melissa Juanos received counseling on the following healthy behaviors: nutrition, exercise and medication adherence  Reviewed prior labs and health maintenance  Continue current medications, diet and exercise. Discussed use, benefit, and side effects of prescribed medications. Barriers to medication compliance addressed. Patient given educational materials - see patient instructions  Was a self-tracking handout given in paper form or via [facility]? Yes    Requested Prescriptions     Signed Prescriptions Disp Refills    Insulin Degludec 100 UNIT/ML SOPN 3 pen 2     Sig: Inject 40 Units into the skin daily    pregabalin (LYRICA) 100 MG capsule 180 capsule 2     Sig: Take 1 capsule by mouth 2 times daily for 90 days.  meloxicam (MOBIC) 7.5 MG tablet 90 tablet 1     Sig: Take 1 tablet by mouth daily       All patient questions answered. Patient voiced understanding. Quality Measures    Body mass index is 41.32 kg/m². Elevated. Weight control planned discussed Healthy diet and regular exercise. BP: 130/80 Blood pressure is normal. Treatment plan consists of No treatment change needed. Lab Results   Component Value Date    LDLCALC 110 03/19/2015    LDLCHOLESTEROL 77 08/29/2020    (goal LDL reduction with dx if diabetes is 50% LDL reduction)      PHQ Scores 2/13/2020 4/16/2019 10/3/2018 11/10/2017 2/23/2016 11/10/2015 10/6/2015   PHQ2 Score 0 0 6 4 0 0 0   PHQ9 Score 0 0 25 18 0 0 0     Interpretation of Total Score Depression Severity: 1-4 = Minimal depression, 5-9 = Mild depression, 10-14 = Moderate depression, 15-19 = Moderately severe depression, 20-27 = Severe depression   This note was completed by using the assistance of a speech-recognition program. However, inadvertent computerized transcription errors may be present.  Although every effort was made to ensure accuracy, no guarantees can be provided that every mistake has been identified and corrected by editing   An electronic signature was used to authenticate this note.   --Karlene Munoz, KIMANI - CNP on 9/28/2020 at 8:20 PM

## 2020-09-28 ENCOUNTER — OFFICE VISIT (OUTPATIENT)
Dept: FAMILY MEDICINE CLINIC | Age: 55
End: 2020-09-28
Payer: COMMERCIAL

## 2020-09-28 VITALS
BODY MASS INDEX: 41.14 KG/M2 | WEIGHT: 256 LBS | DIASTOLIC BLOOD PRESSURE: 80 MMHG | HEART RATE: 85 BPM | SYSTOLIC BLOOD PRESSURE: 130 MMHG | OXYGEN SATURATION: 94 % | HEIGHT: 66 IN

## 2020-09-28 PROBLEM — E66.01 MORBID OBESITY WITH BMI OF 40.0-44.9, ADULT (HCC): Status: ACTIVE | Noted: 2020-09-28

## 2020-09-28 PROBLEM — M54.16 LUMBAR RADICULOPATHY: Status: ACTIVE | Noted: 2020-09-28

## 2020-09-28 PROBLEM — M12.9 ARTHRITIS INVOLVING MULTIPLE SITES: Status: ACTIVE | Noted: 2020-09-28

## 2020-09-28 PROCEDURE — 2022F DILAT RTA XM EVC RTNOPTHY: CPT | Performed by: FAMILY MEDICINE

## 2020-09-28 PROCEDURE — G8417 CALC BMI ABV UP PARAM F/U: HCPCS | Performed by: FAMILY MEDICINE

## 2020-09-28 PROCEDURE — 90471 IMMUNIZATION ADMIN: CPT | Performed by: FAMILY MEDICINE

## 2020-09-28 PROCEDURE — 1036F TOBACCO NON-USER: CPT | Performed by: FAMILY MEDICINE

## 2020-09-28 PROCEDURE — 90746 HEPB VACCINE 3 DOSE ADULT IM: CPT | Performed by: FAMILY MEDICINE

## 2020-09-28 PROCEDURE — 3046F HEMOGLOBIN A1C LEVEL >9.0%: CPT | Performed by: FAMILY MEDICINE

## 2020-09-28 PROCEDURE — G8427 DOCREV CUR MEDS BY ELIG CLIN: HCPCS | Performed by: FAMILY MEDICINE

## 2020-09-28 PROCEDURE — 99214 OFFICE O/P EST MOD 30 MIN: CPT | Performed by: FAMILY MEDICINE

## 2020-09-28 PROCEDURE — 3017F COLORECTAL CA SCREEN DOC REV: CPT | Performed by: FAMILY MEDICINE

## 2020-09-28 RX ORDER — MELOXICAM 7.5 MG/1
7.5 TABLET ORAL DAILY
Qty: 90 TABLET | Refills: 1 | Status: SHIPPED | OUTPATIENT
Start: 2020-09-28 | End: 2020-11-10 | Stop reason: SDUPTHER

## 2020-09-28 RX ORDER — PREGABALIN 100 MG/1
100 CAPSULE ORAL 2 TIMES DAILY
Qty: 180 CAPSULE | Refills: 2 | Status: SHIPPED | OUTPATIENT
Start: 2020-09-28 | End: 2021-03-29 | Stop reason: SDUPTHER

## 2020-09-28 RX ORDER — CETIRIZINE HYDROCHLORIDE 10 MG/1
10 TABLET ORAL DAILY
COMMUNITY
End: 2021-03-16 | Stop reason: ALTCHOICE

## 2020-09-28 ASSESSMENT — ENCOUNTER SYMPTOMS
SHORTNESS OF BREATH: 0
COUGH: 0
DIARRHEA: 0
NAUSEA: 0
ABDOMINAL PAIN: 0
CONSTIPATION: 0
BACK PAIN: 1
RESPIRATORY NEGATIVE: 1

## 2020-09-28 NOTE — LETTER
CONTROLLED SUBSTANCE MEDICATION AGREEMENT     Patient Name: Bob Mcburney  Patient YOB: 1965   I understand, that controlled substance medications may be used to help better manage my symptoms and to improve my ability to function at home, work and in social settings. However, I also understand that these medications do have risks, which have been discussed with me, including possible development of physical or psychological dependence. I understand that successful treatment requires mutual trust and honesty between me and my provider. I understand and agree that following this Medication Agreement is necessary in continuing my provider-patient relationship and the success of my treatment plan. Explanation from my Provider: Benefits and Goals of Controlled Substance Medications: There are two potential goals for your treatment: (1) decreased pain and suffering (2) improved daily life functions. There are many possible treatments for your chronic condition(s). Alternatives such as physical therapy, yoga, massage, home daily exercise, meditation, relaxation techniques, injections, chiropractic manipulations, surgery, cognitive therapy, hypnosis and many medications that are not habit-forming may be used. Use of controlled substance medications may be helpful, but they are unlikely to resolve all symptoms or restore all function. Explanation from my Provider: Risks of Controlled Substance Medications:  Opioid pain medications: These medications can lead to problems such as addiction/dependence, sedation, lightheadedness/dizziness, memory issues, falls, constipation, nausea, or vomiting. They may also impair the ability to drive or operate machinery. Additionally, these medications may lower testosterone levels, leading to loss of bone strength, stamina and sex drive.   They may cause problems with breathing, sleep apnea and reduced coughing, which is especially dangerous for patients with lung disease. Overdose or dangerous interactions with alcohol and other medications may occur, leading to death. Hyperalgesia may develop, which means patients receiving opioids for the treatment of pain may become more sensitive to certain painful stimuli, and in some cases, experience pain from ordinarily non-painful stimuli. Women between the ages of 14-53 who could become pregnant should carefully weigh the risks and benefits of opioids with their physicians, as these medications increase the risk of pregnancy complications, including miscarriage,  delivery and stillbirth. It is also possible for babies to be born addicted to opioids. Opioid dependence withdrawal symptoms may include; feelings of uneasiness, increased pain, irritability, belly pain, diarrhea, sweats and goose-flesh. Benzodiazepines and non-benzodiazepine sleep medications: These medications can lead to problems such as addiction/dependence, sedation, fatigue, lightheadedness, dizziness, incoordination, falls, depression, hallucinations, and impaired judgment, memory and concentration. The ability to drive and operate machinery may also be affected. Abnormal sleep-related behaviors have been reported, including sleepwalking, driving, making telephone calls, eating, or having sex while not fully awake. These medications can suppress breathing and worsen sleep apnea, particularly when combined with alcohol or other sedating medications, potentially leading to death. Dependence withdrawal symptoms may include tremors, anxiety, hallucinations and seizures.   Stimulants:  Common adverse effects include addiction/dependence, increased blood  pressure and heart rate, decreased appetite, nausea, involuntary weight loss, insomnia,                                                                                                                     Initials:_______ irritability, and headaches. These risks may increase when these medications are combined with other stimulants, such as caffeine pills or energy drinks, certain weight loss supplements and oral decongestants. Dependence withdrawal symptoms may include depressed mood, loss of interest, suicidal thoughts, anxiety, fatigue, appetite changes and agitation. Testosterone replacement therapy:  Potential side effects include increased risk of stroke and heart attack, blood clots, increased blood pressure, increased cholesterol, enlarged prostate, sleep apnea, irritability/aggression and other mood disorders, and decreased fertility. I agree and understand that I and my prescriber have the following rights and responsibilities regarding my treatment plan:     1. MY RIGHTS:  To be informed of my treatment and medication plan. To be an active participant in my health and wellbeing. 2. MY RESPONSIBILITY AND UNDERSTANDING FOR USE OF MEDICATIONS  ? I will take medications at the dose and frequency as directed. For my safety, I will not increase or change how I take my medications without the recommendation of my healthcare provider. ? I will actively participate in any program recommended by my provider which may improve function, including social, physical, psychological programs. ? I will not take my medications with alcohol or other drugs not prescribed to me. I understand that drinking alcohol with my medications increases the chances of side effects, including reduced breathing rate and could lead to personal injury when operating machinery. ? I understand that if I have a history of substance use disorders, including alcohol or other illicit drugs, that I may be at increased risk of addiction to my medications. ? I agree to notify my provider immediately if I should become pregnant so that my treatment plan can be adjusted.   ? I agree and understand that I shall only receive controlled substance medications from the prescriber that signed this agreement unless there is written agreement among other prescribers of controlled substances outlining the responsibility of the medications being prescribed. ? I understand that the if the controlled medication is not helping to achieve goals, the dosage may be tapered and no longer prescribed. 3. MY RESPONSIBILITY FOR COMMUNICATION / PRESCRIPTION RENEWALS  ? I agree that all controlled substance medications that I take will be prescribed only by my provider. If another healthcare provider prescribes me medication in an emergency, I will notify my provider within seventy-two (72) hours. ? I will arrange for refills at the prescribed interval ONLY during regular office hours. I will not ask for refills earlier than agreed, after-hours, on holidays or weekends. Refills may take up to 72 hours for processing and prescriptions to reach the pharmacy. ? I will inform my other health care providers that I am taking these medications and of the existence of this [place] [id number]. In the event of an emergency, I will provide the same information to the emergency department prescribers. ? I will keep my provider updated on the pharmacy I am using for controlled medication prescription filling. Initials:_______  4. MY RESPONSIBILITY FOR PROTECTING MEDICATIONS  ? I will protect my prescriptions and medications. I understand that lost or misplaced prescriptions will not be replaced. ? I will keep medications only for my own use and will not share them with others. I will keep all medications away from children. ? I agree that if my medications are adjusted or discontinued, I will properly dispose of any remaining medications. I understand that I will be required to dispose of any remaining controlled medications as, directed by my prescriber, prior to being provided with any prescriptions for other controlled medications.   Medication drop box locations can be found at: [employer]    5. MY RESPONSIBILITY WITH ILLEGAL DRUGS   ? I will not use illegal or street drugs or another person's prescription medications not prescribed to me.   ? If there are identified addiction type symptoms, then referral to a program may be provided by my provider and I agree to follow through with this recommendation. 6. MY RESPONSIBILITY FOR COOPERATION WITH INVESTIGATIONS  ? I understand that my provider will comply with any applicable law and may discuss my use and/or possible misuse/abuse of controlled substances and alcohol, as appropriate, with any health care provider involved in my care, pharmacist, or legal authority. ? I authorize my provider and pharmacy to cooperate fully with law enforcement agencies (as permitted by law) in the investigation of any possible misuse, sale, or other diversion of my controlled substances. ? I agree to waive any applicable privilege or right of privacy or confidentiality with respect to these authorizations. 7. PROVIDERS RIGHT TO MONITOR FOR SAFETY: PRESCRIPTION MONITORING / DRUG TESTING  ? I consent to drug/toxicology screening and will submit to a drug screen upon my providers request to assure I am only taking the prescribed drugs for my safety monitoring. I understand that a drug screen is a laboratory test in which a sample of my urine, blood or saliva is checked to see what drugs I have been taking. This may entail an observed urine specimen, which means that a nurse or other health care provider may watch me provide urine, and I will cooperate if I am asked to provide an observed specimen. ? I understand that my provider will check a copy of my State Prescription Monitoring Program () Report in order to safely prescribe medications. ? Pill Counts: I consent to pill counts when requested.   I may be asked to I further agree to allow this office to contact my HIPAA contact if there are concerns about my safety and use of the controlled medications. I have agreed to use the prescribed controlled substance medications to me as instructed by my provider and as stated in this Medication Agreement. My initial on each page and my signature below shows that I have read each page and I have had the opportunity to ask questions with answers provided by my provider.     Patient Name (Printed): _____________________________________  Patient Signature:  ______________________   Date: _____________    Prescriber Name (Printed): ___________________________________  Prescriber Signature: _____________________  Date: _____________

## 2020-09-28 NOTE — PATIENT INSTRUCTIONS
Patient Education        Learning About Diabetes Food Guidelines  Your Care Instructions     Meal planning is important to manage diabetes. It helps keep your blood sugar at a target level (which you set with your doctor). You don't have to eat special foods. You can eat what your family eats, including sweets once in a while. But you do have to pay attention to how often you eat and how much you eat of certain foods. You may want to work with a dietitian or a certified diabetes educator (CDE) to help you plan meals and snacks. A dietitian or CDE can also help you lose weight if that is one of your goals. What should you know about eating carbs? Managing the amount of carbohydrate (carbs) you eat is an important part of healthy meals when you have diabetes. Carbohydrate is found in many foods. · Learn which foods have carbs. And learn the amounts of carbs in different foods. ? Bread, cereal, pasta, and rice have about 15 grams of carbs in a serving. A serving is 1 slice of bread (1 ounce), ½ cup of cooked cereal, or 1/3 cup of cooked pasta or rice. ? Fruits have 15 grams of carbs in a serving. A serving is 1 small fresh fruit, such as an apple or orange; ½ of a banana; ½ cup of cooked or canned fruit; ½ cup of fruit juice; 1 cup of melon or raspberries; or 2 tablespoons of dried fruit. ? Milk and no-sugar-added yogurt have 15 grams of carbs in a serving. A serving is 1 cup of milk or 2/3 cup of no-sugar-added yogurt. ? Starchy vegetables have 15 grams of carbs in a serving. A serving is ½ cup of mashed potatoes or sweet potato; 1 cup winter squash; ½ of a small baked potato; ½ cup of cooked beans; or ½ cup cooked corn or green peas. · Learn how much carbs to eat each day and at each meal. A dietitian or CDE can teach you how to keep track of the amount of carbs you eat. This is called carbohydrate counting. · If you are not sure how to count carbohydrate grams, use the Plate Method to plan meals.  It is a good, quick way to make sure that you have a balanced meal. It also helps you spread carbs throughout the day. ? Divide your plate by types of foods. Put non-starchy vegetables on half the plate, meat or other protein food on one-quarter of the plate, and a grain or starchy vegetable in the final quarter of the plate. To this you can add a small piece of fruit and 1 cup of milk or yogurt, depending on how many carbs you are supposed to eat at a meal.  · Try to eat about the same amount of carbs at each meal. Do not \"save up\" your daily allowance of carbs to eat at one meal.  · Proteins have very little or no carbs per serving. Examples of proteins are beef, chicken, turkey, fish, eggs, tofu, cheese, cottage cheese, and peanut butter. A serving size of meat is 3 ounces, which is about the size of a deck of cards. Examples of meat substitute serving sizes (equal to 1 ounce of meat) are 1/4 cup of cottage cheese, 1 egg, 1 tablespoon of peanut butter, and ½ cup of tofu. How can you eat out and still eat healthy? · Learn to estimate the serving sizes of foods that have carbohydrate. If you measure food at home, it will be easier to estimate the amount in a serving of restaurant food. · If the meal you order has too much carbohydrate (such as potatoes, corn, or baked beans), ask to have a low-carbohydrate food instead. Ask for a salad or green vegetables. · If you use insulin, check your blood sugar before and after eating out to help you plan how much to eat in the future. · If you eat more carbohydrate at a meal than you had planned, take a walk or do other exercise. This will help lower your blood sugar. What else should you know? · Limit saturated fat, such as the fat from meat and dairy products. This is a healthy choice because people who have diabetes are at higher risk of heart disease. So choose lean cuts of meat and nonfat or low-fat dairy products.  Use olive or canola oil instead of butter or shortening when cooking. · Don't skip meals. Your blood sugar may drop too low if you skip meals and take insulin or certain medicines for diabetes. · Check with your doctor before you drink alcohol. Alcohol can cause your blood sugar to drop too low. Alcohol can also cause a bad reaction if you take certain diabetes medicines. Follow-up care is a key part of your treatment and safety. Be sure to make and go to all appointments, and call your doctor if you are having problems. It's also a good idea to know your test results and keep a list of the medicines you take. Where can you learn more? Go to https://[email or web address][employer] Medicalpepiceweb.[email or web address]. org and sign in to your [employer] account. Enter E345 in the [employer] box to learn more about \"Learning About Diabetes Food Guidelines. \"     If you do not have an account, please click on the \"Sign Up Now\" link. Current as of: December 20, 2019               Content Version: 12.5  © 1408-3721 [employer]. Care instructions adapted under license by [facility] ([facility]). If you have questions about a medical condition or this instruction, always ask your healthcare professional. [place] [address] any warranty or liability for your use of this information.

## 2020-09-30 ENCOUNTER — HOSPITAL ENCOUNTER (OUTPATIENT)
Dept: PHYSICAL THERAPY | Age: 55
Setting detail: THERAPIES SERIES
Discharge: HOME | End: 2020-09-30
Payer: COMMERCIAL

## 2020-09-30 PROCEDURE — 97140 MANUAL THERAPY 1/> REGIONS: CPT

## 2020-09-30 PROCEDURE — 97110 THERAPEUTIC EXERCISES: CPT

## 2020-09-30 ASSESSMENT — PAIN - FUNCTIONAL ASSESSMENT: PAIN_FUNCTIONAL_ASSESSMENT: PREVENTS OR INTERFERES SOME ACTIVE ACTIVITIES AND ADLS

## 2020-09-30 ASSESSMENT — PAIN DESCRIPTION - DIRECTION: RADIATING_TOWARDS: RIGHT SHOULDER

## 2020-09-30 ASSESSMENT — PAIN DESCRIPTION - DESCRIPTORS: DESCRIPTORS: NUMBNESS

## 2020-09-30 ASSESSMENT — PAIN DESCRIPTION - PAIN TYPE: TYPE: CHRONIC PAIN

## 2020-09-30 ASSESSMENT — PAIN DESCRIPTION - PROGRESSION: CLINICAL_PROGRESSION: GRADUALLY WORSENING

## 2020-09-30 ASSESSMENT — PAIN DESCRIPTION - LOCATION: LOCATION: ARM

## 2020-09-30 ASSESSMENT — PAIN DESCRIPTION - FREQUENCY: FREQUENCY: CONTINUOUS

## 2020-09-30 ASSESSMENT — PAIN DESCRIPTION - ORIENTATION: ORIENTATION: RIGHT

## 2020-09-30 ASSESSMENT — PAIN SCALES - GENERAL: PAINLEVEL_OUTOF10: 7

## 2020-09-30 ASSESSMENT — PAIN DESCRIPTION - ONSET: ONSET: PROGRESSIVE

## 2020-10-01 ASSESSMENT — PAIN - FUNCTIONAL ASSESSMENT: PAIN_FUNCTIONAL_ASSESSMENT: PREVENTS OR INTERFERES SOME ACTIVE ACTIVITIES AND ADLS

## 2020-10-01 ASSESSMENT — PAIN DESCRIPTION - PAIN TYPE: TYPE: CHRONIC PAIN

## 2020-10-01 ASSESSMENT — PAIN DESCRIPTION - DESCRIPTORS: DESCRIPTORS: NUMBNESS

## 2020-10-01 ASSESSMENT — PAIN SCALES - GENERAL: PAINLEVEL_OUTOF10: 7

## 2020-10-01 ASSESSMENT — PAIN DESCRIPTION - ONSET: ONSET: PROGRESSIVE

## 2020-10-01 ASSESSMENT — PAIN DESCRIPTION - PROGRESSION: CLINICAL_PROGRESSION: GRADUALLY WORSENING

## 2020-10-01 ASSESSMENT — PAIN DESCRIPTION - FREQUENCY: FREQUENCY: CONTINUOUS

## 2020-10-01 ASSESSMENT — PAIN DESCRIPTION - LOCATION: LOCATION: ARM

## 2020-10-01 ASSESSMENT — PAIN DESCRIPTION - ORIENTATION: ORIENTATION: RIGHT

## 2020-10-02 ENCOUNTER — HOSPITAL ENCOUNTER (OUTPATIENT)
Dept: PHYSICAL THERAPY | Age: 55
Setting detail: THERAPIES SERIES
Discharge: HOME | End: 2020-10-02
Payer: COMMERCIAL

## 2020-10-02 PROCEDURE — 97110 THERAPEUTIC EXERCISES: CPT

## 2020-10-02 PROCEDURE — G0283 ELEC STIM OTHER THAN WOUND: HCPCS

## 2020-10-02 ASSESSMENT — PAIN DESCRIPTION - LOCATION
LOCATION: ARM

## 2020-10-02 ASSESSMENT — PAIN DESCRIPTION - ONSET
ONSET: ON-GOING
ONSET: PROGRESSIVE
ONSET: ON-GOING

## 2020-10-02 ASSESSMENT — PAIN DESCRIPTION - PROGRESSION
CLINICAL_PROGRESSION: GRADUALLY WORSENING
CLINICAL_PROGRESSION: NOT CHANGED
CLINICAL_PROGRESSION: GRADUALLY WORSENING

## 2020-10-02 ASSESSMENT — PAIN DESCRIPTION - PAIN TYPE
TYPE: CHRONIC PAIN

## 2020-10-02 ASSESSMENT — PAIN DESCRIPTION - FREQUENCY
FREQUENCY: CONTINUOUS

## 2020-10-02 ASSESSMENT — PAIN DESCRIPTION - ORIENTATION
ORIENTATION: RIGHT

## 2020-10-02 ASSESSMENT — PAIN SCALES - GENERAL
PAINLEVEL_OUTOF10: 6
PAINLEVEL_OUTOF10: 7
PAINLEVEL_OUTOF10: 7

## 2020-10-02 ASSESSMENT — PAIN DESCRIPTION - DESCRIPTORS
DESCRIPTORS: NUMBNESS

## 2020-10-02 ASSESSMENT — PAIN DESCRIPTION - DIRECTION: RADIATING_TOWARDS: RIGHT SHOULDER

## 2020-10-02 ASSESSMENT — PAIN - FUNCTIONAL ASSESSMENT
PAIN_FUNCTIONAL_ASSESSMENT: PREVENTS OR INTERFERES SOME ACTIVE ACTIVITIES AND ADLS

## 2020-10-02 NOTE — PROGRESS NOTES
Physical Therapy  Daily Treatment Note  Date: 10/2/2020  Patient Name: Albertina Soto  MRN: [id number]     :   1965    Subjective:   General  Additional Pertinent Hx: left rotator cuff repaint   Referring Practitioner: Vernon Cervantes DO   PT Visit Information  Onset Date: 20  PT Insurance Information: [employer]   Total # of Visits Approved: 12  Total # of Visits to Date: 2  Plan of Care/Certification Expiration Date: 20  No Show: 0  Progress Note Due Date: 10/16/20  Canceled Appointment: 0  Progress Note Counter: 2/   Subjective  Subjective: The pain stated the pain in her arm is about the same. The sitting posture did not help change the right arm pain very much. The sleeping with the pillow did make the neck feel better at the beginning of the night, but that's all. Pain Screening  Patient Currently in Pain: Yes  Pain Assessment  Pain Assessment: 0-10  Pain Level: 7  Patient's Stated Pain Goal: No pain  Pain Type: Chronic pain  Pain Location: Arm  Pain Orientation: Right  Pain Radiating Towards: right should and hand   Pain Descriptors: Numbness  Pain Frequency: Continuous  Pain Onset: On-going  Clinical Progression: Gradually worsening  Functional Pain Assessment: Prevents or interferes some active activities and ADLs  Non-Pharmaceutical Pain Intervention(s): Ambulation/Increased Activity; Heat applied  Response to Pain Intervention: None  Vital Signs  Patient Currently in Pain: Yes  Patient Observation  Observations: limited right arm movements        Treatment Activities:             Balance  Posture: Poor  Sitting - Static: Good  Sitting - Dynamic: Good  PROM RUE (degrees)  RUE PROM: WFL  RUE General PROM: painful   AROM RUE (degrees)  RUE AROM : Exceptions  RUE General AROM: painful with flexion and abduction   R Shoulder Flexion 0-180: 0-90  R Shoulder Extension 0-45: 0-35  R Shoulder ABduction 0-180: 0-70  R Shoulder Int Rotation  0-70: 0-30  R Shoulder Ext Rotation 0-90: 0-35  PROM LUE (degrees)  LUE PROM: WFL  AROM LUE (degrees)  LUE AROM : WFL  Spine  Cervical: Limited AROM in all directions by 50 %   Strength RUE  Strength RUE: Exception  Comment: painful   R Shoulder Flexion: 2/5  R Shoulder ABduction: 2/5  R Shoulder Internal Rotation: 2/5  R Shoulder External Rotation: 2/5  R Elbow Flexion: 5/5  Strength LUE  Strength LUE: WFL  Strength Other  Other: Cervical spine 2/5  all directions - painful     Exercises  Exercise 1: Change -- HEP - sitting with cervical spine retraction ed  and a \"V\" pillow behind T-spine-- sitting retractions 10-15 reps x 1-3 sets -4-6 x daily   Exercise 2: Review -- continue -- HEP sleeping with towel roll inside top pillow +  2-3 pillows to maintain head and neck neutral spine  Exercise 3: Review -- Continue -- HEP - Avoid over flexion and extension of head and neck   Exercise 4: Sitting with \"V\" pillow 2- min   Exercise 5: Retraction 10 x 12 sets - lessoned right UE symptoms   Exercise 6: Sitting extension with towel hold 30 sec x 3   Exercise 7: Add heat + IFC as needed for pain. Ortho Screen  Posture: Sever forward head - loss of cervical curve             Assessment:   Conditions Requiring Skilled Therapeutic Intervention  Body structures, Functions, Activity limitations: Decreased functional mobility ; Decreased ADL status; Decreased ROM; Decreased strength;Decreased balance;Decreased high-level IADLs; Increased pain;Decreased posture  Assessment: The patient did not lesson her symptoms with sitting position only the added retraction did not make a significant change in her pain. Continue to work with the cervical spine to treat the right UE symptoms.    Treatment Diagnosis: Right arm pain   Prognosis: Good  Decision Making: Low Complexity  Barriers to Learning: none  REQUIRES PT FOLLOW UP: Yes  Treatment Initiated : HEP see above   Discharge Recommendations: Continue to assess pending progress  Activity Tolerance  Activity Tolerance: Patient Tolerated treatment well      Goals:  Short term goals  Time Frame for Short term goals: 6 Visits  Short term goal 1: OPTIMAL score of 13/3 initial , patient goal is 9/3. Short term goal 2: Independent with HEP. Long term goals  Time Frame for Long term goals : 12 visits  Long term goal 1: OPTIMAL score at discharge will be 6/3.    Patient Goals   Patient goals : Use right arm without pain     Plan:     Continue  Timed Code Treatment Minutes: 30 Minutes  Total Treatment Time: 30  Frequency and duration of TX  Days: 3  Weeks: 4     Therapy Time   Individual Concurrent Group Co-treatment   Time In  1030         Time Out  1100         Minutes  30 min          Timed Code Treatment Minutes: 30 Minutes     Treatment Charges: Minutes Units   []  Ultrasound     []  Electrical-Stim     []  Iontophoresis     []  Traction     []  Massage       []  Eval     []  Gait     [x]  Ther Exercise 15  1    []  Manual Therapy       []  Ther Activities       []  Aquatics     []  Vasopneumatic Device     [x]  Neuro Re-Ed 15   1   [x]  Other        Total Treatment Time: 27 2          Yakelin Reagan PT

## 2020-10-02 NOTE — PROGRESS NOTES
Physical Therapy  Daily Treatment Note  Date: 10/2/2020  Patient Name: Lynne Vyas  MRN: [id number]     :   1965    Subjective:   General  Additional Pertinent Hx: left rotator cuff repair  Referring Practitioner: Jeanmarie Gibbs DO   PT Visit Information  Onset Date: 20  PT Insurance Information: Gay [employer]   Total # of Visits Approved: 12  Total # of Visits to Date: 3  Plan of Care/Certification Expiration Date: 20  Progress Note Due Date: 10/16/20  Progress Note Counter: 3/6   Subjective  Subjective: I woke up at 4 this morning with L arm tingling, took til almost 5 for it to go away. Have pain in my L shoulder today.    Pain Screening  Patient Currently in Pain: Yes  Pain Assessment  Pain Assessment: 0-10  Pain Level: 6  Patient's Stated Pain Goal: No pain  Pain Type: Chronic pain  Pain Location: Arm  Pain Orientation: Right  Pain Radiating Towards: R shoulder and hand  Pain Descriptors: Numbness  Pain Frequency: Continuous  Pain Onset: On-going  Clinical Progression: Not changed  Functional Pain Assessment: Prevents or interferes some active activities and ADLs  Response to Pain Intervention: None  Vital Signs  Patient Currently in Pain: Yes  Patient Observation  Observations: limited right arm movments        Treatment Activities:    Balance  Posture: Poor  Sitting - Static: Good  Sitting - Dynamic: Good     Strength LUE  Strength LUE: WFL  Strength Other  Other: Cervical spine 2/5  all directions - painful     Exercises  Exercise 1: HEP - sitting with cervical spine retractioned  and a \"V\" pilow behind T-spine-- sitting retractions 10-15 reps x 1-3 sets -4-6 x daily   Exercise 2: Review -- continue -- HEP sleeping with towel roll inside top pillow +  2-3 pillows to maintain head and neck neutral spine  Exercise 3: Review -- Continue -- HEP - Avoid over flexion and extension of head and neck   Exercise 4: Sitting with \"V\" pillow 2- min   Exercise 5: Retraction 10 x 2 sets - decreased right UE symptoms   Exercise 6: Sitting extension with towel hold 30 sec x 3   Exercise 7: Added heat + IFC as needed for chronic pain. Propped forearms on small table with two pillows for improved posture while on phone. Assessment:   Conditions Requiring Skilled Therapeutic Intervention  Body structures, Functions, Activity limitations: Decreased functional mobility ; Decreased ADL status; Decreased ROM; Decreased strength;Decreased balance;Decreased high-level IADLs; Increased pain;Decreased posture  Assessment: The patinet did not lesson her symptoms with sitting postion only the added retraction did n=make a siggifcat chnage in her pain. Continue to work with the cervical spine to treat the rign UE symptoms. Treatment Diagnosis: Right arm pain   Prognosis: Good  Decision Making: Low Complexity  Barriers to Learning: none  REQUIRES PT FOLLOW UP: Yes  Discharge Recommendations: Continue to assess pending progress  Activity Tolerance  Activity Tolerance: Patient Tolerated treatment well  Comments: dec pain after stim      Goals:  Short term goals  Time Frame for Short term goals: 6 Visits  Short term goal 1: OPTIMAL score of 13/3 inital , patient goal is 9/3. Short term goal 2: Independent with HEP. Long term goals  Time Frame for Long term goals : 12 visits  Long term goal 1: OPTIMAL score at discharge will be 6/3.    Patient Goals   Patient goals : Use right arm without pain     Plan:       Timed Code Treatment Minutes: 32 Minutes  Total Treatment Time: 32  Frequency and duration of tx  Days: 3  Weeks: 4   Treatment Charges: Minutes Units   []  Ultrasound     [x]  Electrical-Stim w heat 20 1   []  Iontophoresis     []  Traction     []  Massage       []  Eval     []  Gait     [x]  Ther Exercise 12  1   []  Manual Therapy       []  Ther Activities       []  Aquatics     []  Vasopneumatic Device     []  Neuro Re-Ed       []  Other       Total Treatment Time: 32 2       Therapy Time   Individual Concurrent Group Co-treatment   Time In 0907         Time Out 0939         Minutes 32         Timed Code Treatment Minutes: Doug 40 Dottie Martinez, PT

## 2020-10-05 ENCOUNTER — HOSPITAL ENCOUNTER (OUTPATIENT)
Age: 55
Setting detail: SPECIMEN
Discharge: HOME | End: 2020-10-05
Payer: COMMERCIAL

## 2020-10-05 ENCOUNTER — OFFICE VISIT (OUTPATIENT)
Dept: OBGYN CLINIC | Age: 55
End: 2020-10-05
Payer: COMMERCIAL

## 2020-10-05 ENCOUNTER — HOSPITAL ENCOUNTER (OUTPATIENT)
Dept: PHYSICAL THERAPY | Age: 55
Setting detail: THERAPIES SERIES
Discharge: HOME | End: 2020-10-05
Payer: COMMERCIAL

## 2020-10-05 VITALS
HEART RATE: 93 BPM | HEIGHT: 67 IN | SYSTOLIC BLOOD PRESSURE: 138 MMHG | BODY MASS INDEX: 40.34 KG/M2 | TEMPERATURE: 97.8 F | DIASTOLIC BLOOD PRESSURE: 89 MMHG | WEIGHT: 257 LBS

## 2020-10-05 LAB
ABSOLUTE BANDS #: 0.04 K/UL (ref 0–1)
ABSOLUTE EOS #: 0.16 K/UL (ref 0–0.4)
ABSOLUTE IMMATURE GRANULOCYTE: ABNORMAL K/UL (ref 0–0.3)
ABSOLUTE LYMPH #: 1.81 K/UL (ref 1–4.8)
ABSOLUTE MONO #: 0.7 K/UL (ref 0.1–1.3)
BANDS: 1 % (ref 0–10)
BASOPHILS # BLD: 0 % (ref 0–2)
BASOPHILS ABSOLUTE: 0 K/UL (ref 0–0.2)
DIFFERENTIAL TYPE: ABNORMAL
EOSINOPHILS RELATIVE PERCENT: 4 % (ref 0–4)
ESTRADIOL LEVEL: 25 PG/ML (ref 27–314)
FOLLICLE STIMULATING HORMONE: 45.6 U/L (ref 1.7–21.5)
HCG QUANTITATIVE: <1 IU/L
HCT VFR BLD CALC: 37.7 % (ref 36–46)
HEMOGLOBIN: 12.3 G/DL (ref 12–16)
IMMATURE GRANULOCYTES: ABNORMAL %
LH: 26.6 U/L (ref 1–95.6)
LYMPHOCYTES # BLD: 44 % (ref 24–44)
MCH RBC QN AUTO: 26.5 PG (ref 26–34)
MCHC RBC AUTO-ENTMCNC: 32.6 G/DL (ref 31–37)
MCV RBC AUTO: 81.3 FL (ref 80–100)
MONOCYTES # BLD: 17 % (ref 1–7)
MORPHOLOGY: ABNORMAL
NRBC AUTOMATED: ABNORMAL PER 100 WBC
PDW BLD-RTO: 13.7 % (ref 11.5–14.9)
PLATELET # BLD: 187 K/UL (ref 150–450)
PLATELET ESTIMATE: ABNORMAL
PMV BLD AUTO: 9.1 FL (ref 6–12)
RBC # BLD: 4.64 M/UL (ref 4–5.2)
RBC # BLD: ABNORMAL 10*6/UL
SEG NEUTROPHILS: 34 % (ref 36–66)
SEGMENTED NEUTROPHILS ABSOLUTE COUNT: 1.39 K/UL (ref 1.3–9.1)
TSH SERPL DL<=0.05 MIU/L-ACNC: 0.55 MIU/L (ref 0.3–5)
WBC # BLD: 4.1 K/UL (ref 3.5–11)
WBC # BLD: ABNORMAL 10*3/UL

## 2020-10-05 PROCEDURE — 83001 ASSAY OF GONADOTROPIN (FSH): CPT

## 2020-10-05 PROCEDURE — 36415 COLL VENOUS BLD VENIPUNCTURE: CPT

## 2020-10-05 PROCEDURE — G8482 FLU IMMUNIZE ORDER/ADMIN: HCPCS | Performed by: NURSE PRACTITIONER

## 2020-10-05 PROCEDURE — 99214 OFFICE O/P EST MOD 30 MIN: CPT | Performed by: NURSE PRACTITIONER

## 2020-10-05 PROCEDURE — 85025 COMPLETE CBC W/AUTO DIFF WBC: CPT

## 2020-10-05 PROCEDURE — G0145 SCR C/V CYTO,THINLAYER,RESCR: HCPCS

## 2020-10-05 PROCEDURE — 87624 HPV HI-RISK TYP POOLED RSLT: CPT

## 2020-10-05 PROCEDURE — 84443 ASSAY THYROID STIM HORMONE: CPT

## 2020-10-05 PROCEDURE — 83036 HEMOGLOBIN GLYCOSYLATED A1C: CPT

## 2020-10-05 PROCEDURE — 97110 THERAPEUTIC EXERCISES: CPT

## 2020-10-05 PROCEDURE — G0283 ELEC STIM OTHER THAN WOUND: HCPCS

## 2020-10-05 PROCEDURE — 83002 ASSAY OF GONADOTROPIN (LH): CPT

## 2020-10-05 PROCEDURE — 86038 ANTINUCLEAR ANTIBODIES: CPT

## 2020-10-05 PROCEDURE — 82670 ASSAY OF TOTAL ESTRADIOL: CPT

## 2020-10-05 PROCEDURE — 84702 CHORIONIC GONADOTROPIN TEST: CPT

## 2020-10-05 ASSESSMENT — PAIN DESCRIPTION - DESCRIPTORS: DESCRIPTORS: NUMBNESS;CONSTANT

## 2020-10-05 ASSESSMENT — PAIN DESCRIPTION - LOCATION: LOCATION: ARM

## 2020-10-05 ASSESSMENT — PAIN DESCRIPTION - ONSET: ONSET: ON-GOING

## 2020-10-05 ASSESSMENT — PAIN DESCRIPTION - PAIN TYPE: TYPE: CHRONIC PAIN

## 2020-10-05 ASSESSMENT — PAIN SCALES - GENERAL: PAINLEVEL_OUTOF10: 6

## 2020-10-05 ASSESSMENT — PAIN DESCRIPTION - PROGRESSION: CLINICAL_PROGRESSION: GRADUALLY WORSENING

## 2020-10-05 ASSESSMENT — PAIN DESCRIPTION - FREQUENCY: FREQUENCY: CONTINUOUS

## 2020-10-05 ASSESSMENT — PATIENT HEALTH QUESTIONNAIRE - PHQ9
2. FEELING DOWN, DEPRESSED OR HOPELESS: 0
1. LITTLE INTEREST OR PLEASURE IN DOING THINGS: 0
SUM OF ALL RESPONSES TO PHQ9 QUESTIONS 1 & 2: 0
SUM OF ALL RESPONSES TO PHQ QUESTIONS 1-9: 0
SUM OF ALL RESPONSES TO PHQ QUESTIONS 1-9: 0

## 2020-10-05 ASSESSMENT — PAIN DESCRIPTION - ORIENTATION: ORIENTATION: RIGHT

## 2020-10-05 ASSESSMENT — PAIN - FUNCTIONAL ASSESSMENT: PAIN_FUNCTIONAL_ASSESSMENT: PREVENTS OR INTERFERES SOME ACTIVE ACTIVITIES AND ADLS

## 2020-10-05 ASSESSMENT — PAIN DESCRIPTION - DIRECTION: RADIATING_TOWARDS: R SHOULDER

## 2020-10-05 NOTE — PROGRESS NOTES
0-35  PROM LUE (degrees)  LUE PROM: WFL  AROM LUE (degrees)  LUE AROM : WFL  Spine  Cervical: Limited AROM in all directions by 50 %   Strength RUE  Strength RUE: Exception  Comment: painful   R Shoulder Flexion: 2/5  R Shoulder ABduction: 2/5  R Shoulder Internal Rotation: 2/5  R Shoulder External Rotation: 2/5  R Elbow Flexion: 5/5  Strength LUE  Strength LUE: WFL  Strength Other  Other: Cervical spine 2/5  all directions - painful     Exercises  Exercise 1: Change -- HEP - sitting with cervical spine retraction  and a \"V\" pillow behind T-spine-- sitting retractions 10-15 reps x 1-3 sets -4-6 x daily   Exercise 2: Review -- continue -- HEP sleeping with towel roll inside top pillow +  2-3 pillows to maintain head and neck neutral spine  Exercise 3: Review -- Continue -- HEP - Avoid over flexion and extension of head and neck   Exercise 4: Sitting with \"V\" pillow 2- min   Exercise 5: Retraction 10 x 12 sets - lessoned right UE symptoms   Exercise 6: Sitting extension with towel hold 30 sec x 3   Exercise 7: Add heat + IFC as needed for pain. Ortho Screen  Posture: Sever forward head - loss of cervical curve        Assessment:   Conditions Requiring Skilled Therapeutic Intervention  Body structures, Functions, Activity limitations: Decreased functional mobility ; Decreased ADL status; Decreased ROM; Decreased strength;Decreased balance;Decreased high-level IADLs; Increased pain;Decreased posture  Assessment: The patient was able to lesson her symptoms with sitting retractions today. The patient was able to use retractions to change in her pain for the better. Continue to work with the cervical spine to treat the right UE symptoms.    Treatment Diagnosis: Right arm pain   Prognosis: Good  Decision Making: Low Complexity  Barriers to Learning: none  REQUIRES PT FOLLOW UP: Yes  Treatment Initiated : HEP see above   Discharge Recommendations: Continue to assess pending progress  Activity Tolerance  Activity Tolerance:

## 2020-10-05 NOTE — PROGRESS NOTES
History and Physical  [address], [address] [facility] [address] [facility] [address] [facility] [phone]   Fax [phone]  Jovany Fernández  10/5/2020              50 y.o. Chief Complaint   Patient presents with    New Patient       No LMP recorded. Patient is perimenopausal.             Primary Care Physician: KIMANI Mcwilliams - CNP    The patient was seen and examined. [facility] to [facility] [place] [facility] care. She is here for her annual exam. States no GYN care for 12 + years. Hx of being on depo for 17 years. Came off depo 6 years ago and never had a period. States she did not have periods while on depo. Denies any s/sx of menopause. Hx bilateral ovarian cyst removal in [place] in her 19's. Still has all GYN organs. Her bowels are regular. There are no voiding complaints. She denies any bloating. She denies vaginal discharge and was counseled on STD's and the need for barrier contraception. HPI : Jovany Fernández is a 50 y.o. female No obstetric history on file. Annual exam  No GYN care for 12+ years  Amenorrhea   ________________________________________________________________________  OB History   No obstetric history on file.      Past Medical History:   Diagnosis Date    Allergic rhinitis 10/21/2014    Anxiety     Arthritis     Bleeds easily ([address]     per pt, has never had a work up   Yue Cabrera Complete rotator cuff tear of left shoulder 2018    Diabetes mellitus ([address]     On Insulin, metformin, Glipizide    Diabetic polyneuropathy associated with type 2 diabetes mellitus ([address] 3/26/2018    Fibromyalgia     GERD (gastroesophageal reflux disease) 10/21/2014    H/O transfusion     History of blood transfusion     Obesity 10/21/2014    Pure hypercholesterolemia 2/23/2016    Tobacco abuse     Type II or unspecified type diabetes mellitus without mention of complication, not stated as uncontrolled     Unspecified sleep apnea     CPAP machine is broken    Wears Use    Smoking status: Former Smoker     Packs/day: 2.00     Years: 34.00     Pack years: 68.00     Types: Cigarettes     Start date: 1983     Last attempt to quit: 9/28/2017     Years since quitting: 3.0    Smokeless tobacco: Never Used   Substance and Sexual Activity    Alcohol use: Yes     Alcohol/week: 0.0 standard drinks     Frequency: Monthly or less     Drinks per session: 1 or 2     Comment: rare    Drug use: No    Sexual activity: Never   Lifestyle    Physical activity     Days per week: Not on file     Minutes per session: Not on file    Stress: Not on file   Relationships    Social connections     Talks on phone: Not on file     Gets together: Not on file     Attends Sikhism service: Not on file     Active member of club or organization: Not on file     Attends meetings of clubs or organizations: Not on file     Relationship status: Not on file    Intimate partner violence     Fear of current or ex partner: Not on file     Emotionally abused: Not on file     Physically abused: Not on file     Forced sexual activity: Not on file   Other Topics Concern    Not on file   Social History Narrative    Not on file       MEDICATIONS:  Current Outpatient Medications   Medication Sig Dispense Refill    cetirizine (ZYRTEC) 10 MG tablet Take 10 mg by mouth daily      Insulin Degludec 100 UNIT/ML SOPN Inject 40 Units into the skin daily 3 pen 2    pregabalin (LYRICA) 100 MG capsule Take 1 capsule by mouth 2 times daily for 90 days.  180 capsule 2    meloxicam (MOBIC) 7.5 MG tablet Take 1 tablet by mouth daily 90 tablet 1    Blood Glucose Monitoring Suppl (TRUE METRIX METER) MANOLO Testing BID 1 Device 0    Insulin Pen Needle (PEN NEEDLES 3/16\") 31G X 5 MM MISC 1 each by Does not apply route daily 100 each 3    insulin lispro, 1 Unit Dial, (HUMALOG KWIKPEN) 100 UNIT/ML SOPN Inject 15 Units into the skin 3 times daily (before meals) 5 pen 1    Insulin Syringes, Disposable, U-100 1 ML MISC 1 each by Does not apply route daily 100 each 3    blood glucose monitor strips Test 2-3 times a day & as needed for symptoms of irregular blood glucose. BRAND OF CHOICE INSURANCE ALLOWS. 100 strip 11    atorvastatin (LIPITOR) 10 MG tablet Take 1 tablet by mouth daily 90 tablet 2    hydroCHLOROthiazide (HYDRODIURIL) 25 MG tablet Take 1 tablet by mouth daily Take with Potassium supplement 30 tablet 3    potassium chloride (KLOR-CON) 10 MEQ extended release tablet Take 1 tablet by mouth daily Take with HCTZ 30 tablet 3    Lancets MISC Dx: DM-2. Use 2-3 times daily 100 each 3    Alcohol Swabs (ALCOHOL PREP) PADS Use as directed 100 each 11    vitamin D (CHOLECALCIFEROL) 25 MCG (1000 UT) TABS tablet Take 1 tablet by mouth daily 30 tablet 3    lisinopril (PRINIVIL;ZESTRIL) 20 MG tablet Take 1 tablet by mouth daily 90 tablet 0     No current facility-administered medications for this visit. ALLERGIES:  Allergies as of 10/05/2020 - Review Complete 09/28/2020   Allergen Reaction Noted    Pcn [penicillins] Hives and Other (See Comments) 08/04/2014    Demerol hcl [meperidine] Other (See Comments) 08/04/2014    Metformin and related  02/13/2020    Tape Tom Inge tape] Other (See Comments) 06/11/2018    Morphine Nausea And Vomiting and Other (See Comments) 08/04/2014       Symptoms of decreased mood absent  Symptoms of anhedonia absent    **If either question is answered in a  positive fashion then complete the PHQ9 Scoring Evaluation and make the appropriate referral**      Immunization status: stated as current, but no records available. Gynecologic History:  Menarche: 9 yo  Menopause at ? Yo on Depo up until 6 years ago- no periods since being off      No LMP recorded.  Patient is perimenopausal.    Sexually Active: Yes    STD History: No     Permanent Sterilization: No   Reversible Birth Control: No        Hormone Replacement Exposure: No      Genetic Qualified Family History of Breast, Ovarian , Colon or PHYSICAL Exam:     Constitutional:  There were no vitals filed for this visit. General Appearance: This  is a well Developed, well Nourished, well groomed female. Her BMI was reviewed. Nutritional decision making was discussed. Skin:  There was a Normal Inspection of the skin without rashes or lesions. There were no rashes. (Papular, Maculopapular, Hives, Pustular, Macular)     There were no lesions (Ulcers, Erythema, Abn. Appearing Nevi)            Lymphatic:  No Lymph Nodes were Palpable in the neck , axilla or groin.  0 # Of Lymph Nodes; Location ; Character [Normal]  [Shotty] [Tender] [Enlarged]     Neck and EENT:  The neck was supple. There were no masses   The thyroid was not enlarged and had no masses. Perrla, EOMI B/L, TMI B/L No Abnormalities. Throat inspected-No exudates or Masses, Nares Patent No Masses        Respiratory: The lungs were auscultated and found to be clear. There were no rales, rhonchi or wheezes. There was a good respiratory effort. Cardiovascular: The heart was in a regular rate and rhythm. . No S3 or S4. There was no murmur appreciated. Location, grade, and radiation are not applicable. Extremities: The patients extremities were without calf tenderness, edema, or varicosities. There was full range of motion in all four extremities. Pulses in all four extremities were appreciated and are 2/4. Abdomen: The abdomen was soft and non-tender. There were good bowel sounds in all quadrants and there was no guarding, rebound or rigidity. On evaluation there was no evidence of hepatosplenomegaly and there was no costal vertebral bettina tenderness bilaterally. No hernias were appreciated. Abdominal Scars: c/w previous surgeries     Psych:   The patient had a normal Orientation to: Time, Place, Person, and Situation  There is no Mood / Affect changes    Breast:  (Chest)  normal appearance, no masses or Chronic left shoulder pain 03/26/2018    Gastroesophageal reflux disease with esophagitis 10/16/2017    Chronic cholecystitis 09/28/2017    Pure hypercholesterolemia 02/23/2016    Allergic rhinitis 10/21/2014    Fibromyalgia 10/21/2014          Hereditary Breast, Ovarian, Colon and Uterine Cancer screening Done. Tobacco & Secondary smoke risks reviewed; instructed on cessation and avoidance      Counseling Completed:  Preventative Health Recommendations and Follow up. The patient was informed of the recommended preventative health recommendations. 1. Annuals every year; Cytology collections per prevailing guidelines. 2. Mammograms begin every year at 35 yo if no abnormalities are found and no family history. 3. Bone density studies every 2-3 years. Begin at 73 yo. If no fracture history or osteoporosis family history. (significant). 4. Colonoscopy begin at 40 yo. Repeat every ten years if negative and no family history. 5. Calcium of 3730-8014 mg/day in split dosing  6. Vitamin D 400-800 IU/day  7. All other preventative health recommendations will be managed by the patients Primary care physician. PLAN:  Return in about 1 year (around 10/5/2021) for annual.   Pap smear collected  Lab slip given  Pelvic u/s ordered   Colonoscopy ordered  Repeat Annual every 1 year  Cervical Cytology Evaluation begins at 24years old. If Negative Cytology, Follow-up screening per current guidelines. Mammograms every 1 year. If 35 yo and last mammogram was negative. Calcium and Vitamin D dosing reviewed. Colonoscopy screening reviewed as well as onset for bone density testing. Birth control and barrier recommendations discussed. STD counseling and prevention reviewed. Gardisil counseling completed for all patients 10-35 yo. Routine health maintenance per patients PCP. No orders of the defined types were placed in this encounter.

## 2020-10-06 ENCOUNTER — NURSE TRIAGE (OUTPATIENT)
Dept: OTHER | Facility: CLINIC | Age: 55
End: 2020-10-06

## 2020-10-06 ENCOUNTER — TELEPHONE (OUTPATIENT)
Dept: OBGYN CLINIC | Age: 55
End: 2020-10-06

## 2020-10-06 LAB
ANTI-NUCLEAR ANTIBODY (ANA): NEGATIVE
ESTIMATED AVERAGE GLUCOSE: 272 MG/DL
HBA1C MFR BLD: 11.1 % (ref 4–6)

## 2020-10-06 NOTE — TELEPHONE ENCOUNTER
Reason for Disposition   Followed an injury to shoulder   Can't move injured shoulder normally (e.g., full range of motion, able to touch top of head)    Answer Assessment - Initial Assessment Questions  1. ONSET: \"When did the pain start? \"     Early Monday morning. 2. LOCATION: \"Where is the pain located? \"      Right shoulder, across collar bone and up to jaw. Movement makes it worse. 3. PAIN: \"How bad is the pain? \" (Scale 1-10; or mild, moderate, severe)    - MILD (1-3): doesn't interfere with normal activities    - MODERATE (4-7): interferes with normal activities (e.g., work or school) or awakens from sleep    - SEVERE (8-10): excruciating pain, unable to do any normal activities, unable to move arm at all due to pain     7/10    4. WORK OR EXERCISE: \"Has there been any recent work or exercise that involved this part of the body? \"      No     5. CAUSE: \"What do you think is causing the shoulder pain? \"      *No Answer*  6. OTHER SYMPTOMS: \"Do you have any other symptoms? \" (e.g., neck pain, swelling, rash, fever, numbness, weakness)      Collar bone and jaw pain, numbness in both hands. 7. PREGNANCY: \"Is there any chance you are pregnant? \" \"When was your last menstrual period? \"      No    Protocols used: SHOULDER PAIN-ADULT-[place], SHOULDER INJURY-ADULT-[place]    Thank you for allowing me to participate in the care of your patient. The patient was connected to triage in response to information provided to the [facility]. Please do not respond through this encounter as the response is not directed to a shared pool.

## 2020-10-06 NOTE — TELEPHONE ENCOUNTER
Per Danii Summers CNP, patient advised of labs indicating postmenopausal. Patient voiced an understanding of results and plan of care.

## 2020-10-07 ENCOUNTER — HOSPITAL ENCOUNTER (OUTPATIENT)
Age: 55
Setting detail: SPECIMEN
Discharge: HOME | End: 2020-10-07
Payer: COMMERCIAL

## 2020-10-07 ENCOUNTER — TELEPHONE (OUTPATIENT)
Dept: OBGYN CLINIC | Age: 55
End: 2020-10-07

## 2020-10-07 ENCOUNTER — HOSPITAL ENCOUNTER (OUTPATIENT)
Dept: GENERAL RADIOLOGY | Facility: CLINIC | Age: 55
Discharge: HOME | End: 2020-10-09
Payer: COMMERCIAL

## 2020-10-07 ENCOUNTER — HOSPITAL ENCOUNTER (OUTPATIENT)
Facility: CLINIC | Age: 55
Discharge: HOME | End: 2020-10-09
Payer: COMMERCIAL

## 2020-10-07 ENCOUNTER — OFFICE VISIT (OUTPATIENT)
Dept: FAMILY MEDICINE CLINIC | Age: 55
End: 2020-10-07
Payer: COMMERCIAL

## 2020-10-07 VITALS
DIASTOLIC BLOOD PRESSURE: 86 MMHG | TEMPERATURE: 98 F | SYSTOLIC BLOOD PRESSURE: 132 MMHG | HEIGHT: 66 IN | HEART RATE: 91 BPM | OXYGEN SATURATION: 96 % | BODY MASS INDEX: 42.27 KG/M2 | WEIGHT: 263 LBS

## 2020-10-07 PROBLEM — M25.512 ACUTE PAIN OF BOTH SHOULDERS: Status: ACTIVE | Noted: 2020-10-07

## 2020-10-07 PROBLEM — R20.0 NUMBNESS AND TINGLING IN BOTH HANDS: Status: ACTIVE | Noted: 2020-10-07

## 2020-10-07 PROBLEM — G89.29 CHRONIC BILATERAL LOW BACK PAIN WITHOUT SCIATICA: Status: ACTIVE | Noted: 2020-10-07

## 2020-10-07 PROBLEM — M54.50 CHRONIC BILATERAL LOW BACK PAIN WITHOUT SCIATICA: Status: ACTIVE | Noted: 2020-10-07

## 2020-10-07 PROBLEM — R20.2 NUMBNESS AND TINGLING IN BOTH HANDS: Status: ACTIVE | Noted: 2020-10-07

## 2020-10-07 PROBLEM — M25.511 ACUTE PAIN OF BOTH SHOULDERS: Status: ACTIVE | Noted: 2020-10-07

## 2020-10-07 LAB
CHOLESTEROL/HDL RATIO: 2.7
CHOLESTEROL: 159 MG/DL
HDLC SERPL-MCNC: 58 MG/DL
LDL CHOLESTEROL: 74 MG/DL (ref 0–130)
TRIGL SERPL-MCNC: 134 MG/DL
VLDLC SERPL CALC-MCNC: NORMAL MG/DL (ref 1–30)

## 2020-10-07 PROCEDURE — G8427 DOCREV CUR MEDS BY ELIG CLIN: HCPCS | Performed by: FAMILY MEDICINE

## 2020-10-07 PROCEDURE — G8417 CALC BMI ABV UP PARAM F/U: HCPCS | Performed by: FAMILY MEDICINE

## 2020-10-07 PROCEDURE — G8482 FLU IMMUNIZE ORDER/ADMIN: HCPCS | Performed by: FAMILY MEDICINE

## 2020-10-07 PROCEDURE — 99213 OFFICE O/P EST LOW 20 MIN: CPT | Performed by: FAMILY MEDICINE

## 2020-10-07 PROCEDURE — 1036F TOBACCO NON-USER: CPT | Performed by: FAMILY MEDICINE

## 2020-10-07 PROCEDURE — 72100 X-RAY EXAM L-S SPINE 2/3 VWS: CPT

## 2020-10-07 PROCEDURE — 3017F COLORECTAL CA SCREEN DOC REV: CPT | Performed by: FAMILY MEDICINE

## 2020-10-07 PROCEDURE — 80061 LIPID PANEL: CPT

## 2020-10-07 PROCEDURE — 72040 X-RAY EXAM NECK SPINE 2-3 VW: CPT

## 2020-10-07 PROCEDURE — 36415 COLL VENOUS BLD VENIPUNCTURE: CPT

## 2020-10-07 RX ORDER — TRAMADOL HYDROCHLORIDE 50 MG/1
50 TABLET ORAL EVERY 8 HOURS PRN
Qty: 15 TABLET | Refills: 0 | Status: SHIPPED | OUTPATIENT
Start: 2020-10-07 | End: 2020-10-12

## 2020-10-07 RX ORDER — TIZANIDINE 4 MG/1
4 TABLET ORAL EVERY 8 HOURS PRN
Qty: 90 TABLET | Refills: 0 | Status: SHIPPED | OUTPATIENT
Start: 2020-10-07 | End: 2020-10-28 | Stop reason: SDUPTHER

## 2020-10-07 ASSESSMENT — ENCOUNTER SYMPTOMS: BACK PAIN: 1

## 2020-10-07 NOTE — PROGRESS NOTES
Chief Complaint   Patient presents with    Shoulder Pain     no known injury, since 3am Monday, shooting pain into jaw monday and tuesday     Lower Back Pain    Other     Needs referral for breast reduction         HPI    Patient comes today due to acute illness:    Patient reports recent flareup of shoulder pains bilaterally. Initially it started on October 2, on  FRIday night, with left shoulder pain which woke her up at 3 am and she could not sleep the whole night. Then on Monday night, which was on October 5th, the right shoulder started to hurt. Patient reports having history of left rotator cuff. Used TENS unit which helped a little bit  Right shoulder pain radiates to the jaw and neck and into the deltoid, but it is easing up a little bit, throbbing constantly, patient says the whole shoulder hurts. Left shoulder hurts anteriorly and posteriorly. Intensity of pain is 8/10  Cannot lift up and cannot put on the back right lower extremity  Left upper extremity she is able to move it around better. Has tried creams with does not help    Has chronic low back pain for years , worse after just 5 minutes of standing up when doing dishes. The pain is located midline and radiates into the buttocks and thighs  Intensity of pain is 8/10, down to 5/10 when sitting  Doing PT and TENS unit. Taking tizanidine at nighttime helps a little  Her PCP wants her to have breast reduction, and she needs the referral      Patient reports fingertips numb bilaterally and pain posterior shoulders. The chiropractor and physical therapist wants her to get x-rays of the neck and lower back    Gabapentin didn't help so she was changed to Lyrica 100 mg twice a day by her PCP. I suggested her to increase the dosage to 3 times a day, but she says it does not help her at all. Cannot take tizanidine more than once a day at nighttime because it is sedating her.   Taking meloxicam but does not help  Has tried Tylenol Extra Strength but does not help with pain. Cannot sleep and has been \"cranky\", and making her sugars high she says    Will see Dr. Faith Savage in 10/12, uncontrolled diabetes. She has not been taking the short-acting 15 units pre-meals, discussed to start taking it  Lab Results   Component Value Date    LABA1C 11.1 (H) 10/05/2020    LABA1C 11.2 (H) 08/29/2020    LABA1C 11.1 03/10/2020                       The patient's past medical, surgical, social, and family history as well as her current medications and allergies were reviewed as documented in today's encounter. Rest of complaints with corresponding details per ROS. Review of Systems   Constitutional: Positive for fatigue. Negative for activity change, appetite change, chills, diaphoresis and fever. Musculoskeletal: Positive for arthralgias, back pain and neck pain. Neurological: Positive for numbness (hands). Psychiatric/Behavioral: Positive for sleep disturbance. Negative for self-injury and suicidal ideas. The patient is nervous/anxious.          -vital signs stable and within normal limits except morbid obesity per BMI    /86   Pulse 91   Temp 98 °F (36.7 °C) (Temporal)   Ht 5' 6\" (1.676 m)   Wt 263 lb (119.3 kg)   LMP  (LMP Unknown)   SpO2 96%   BMI 42.45 kg/m²        Physical Exam  Vitals signs and nursing note reviewed. Constitutional:       General: She is not in acute distress. Appearance: She is well-developed. She is obese. She is not diaphoretic. HENT:      Head: Normocephalic and atraumatic. Eyes:      General:         Right eye: No discharge. Left eye: No discharge. Conjunctiva/sclera: Conjunctivae normal.   Neck:      Musculoskeletal: Normal range of motion and neck supple. Cardiovascular:      Rate and Rhythm: Normal rate and regular rhythm. Pulmonary:      Effort: Pulmonary effort is normal. No respiratory distress. Breath sounds: Normal breath sounds.    Chest:      Comments: Very large breasts bilaterally hanging down to the lower abdomen  Musculoskeletal:         General: Tenderness present. Right shoulder: She exhibits decreased range of motion, tenderness, spasm and decreased strength. Left shoulder: She exhibits decreased range of motion, tenderness, spasm and decreased strength. Cervical back: She exhibits tenderness, pain and spasm. Lumbar back: She exhibits decreased range of motion, tenderness, bony tenderness, pain and spasm. Back:       Comments: Significant decreased range of motion right upper extremity, unable to lift up the arm above the head or to place it on the back  Left upper extremity range of motion very mildly decreased antalgic  Antalgic walking due to back pain, antalgic changing position   Skin:     General: Skin is warm and dry. Neurological:      Mental Status: She is alert and oriented to person, place, and time. Psychiatric:         Behavior: Behavior normal.         Thought Content: Thought content normal.         Judgment: Judgment normal.         ASSESSMENT AND PLAN      1. Acute pain of both shoulders  Failing to change as expected. Continue TENS unit, meloxicam, physical therapy and chiropractor  - tiZANidine (ZANAFLEX) 4 MG tablet; Take 1 tablet by mouth every 8 hours as needed (neck pain, back pain) Causes sedation, do not drive while taking this medication  Dispense: 90 tablet; Refill: 0  - XR CERVICAL SPINE (2-3 VIEWS); Future  - [facility]  - traMADol (ULTRAM) 50 MG tablet; Take 1 tablet by mouth every 8 hours as needed for Pain for up to 5 days. Dispense: 15 tablet; Refill: 0  Tylenol does not help, topical creams (tried Voltaren) does not help  Will give short-term tramadol    2. Chronic bilateral low back pain without sciatica  Failing to change as expected. She would benefit from breast reduction to improve her back pain and neck pain and possible shoulder primary  - tiZANidine (ZANAFLEX) 4 MG tablet;  Take nutrition, exercise and medication adherence  Reviewed prior labs and health maintenance. Continue current medications, diet and exercise. Discussed use, benefit, and side effects of prescribed medications. Barriers to medication compliance addressed. Patient given educational materials - see patient instructions. All patient questions answered. Patient voiced understanding. Thepatient's past medical, surgical, social, and family history as well as her   current medications and allergies were reviewed as documented in today's encounter. Medications, labs, diagnostic studies,consultations and follow-up as documented in this encounter. Return for KEEP APPT. Patient was seen with total face to face time of  15 minutes. More than 50% of this visit was counseling and education. Future Appointments   Date Time Provider Shana Redding   10/8/2020 10:15 AM Kelly Portillo, PT [facility] PT [place]   10/8/2020  2:50 PM SCHEDULE, P [facility] [place]   10/8/2020  6:00 PM STC DIAG MAMMO RM [facility] M[facility]MO [facility]   10/9/2020  9:00 AM SCHEDULE, ULTRASOUND [facility] [address] [facility]   10/29/2020 10:55 AM SCHEDULE, [facility] ORTHO [place]   12/28/2020  1:00 PM KIMANI Haas - CNP [facility]     This note was completed by using the assistance of a speech-recognition program. However, inadvertent computerized transcription errors may be present. Although every effort was made to ensure accuracy, no guarantees can be provided that every mistake has been identified and corrected by editing.     Electronically signed by Tamy Watson MD on 10/7/2020 at 1:41 PM

## 2020-10-07 NOTE — TELEPHONE ENCOUNTER
Per CNP, patient advised of elevated hgba1c. Patient is a known Type II diabetic, insulin dependent. Patient had follow up with PCP on 9/28/20 for diabetes. Patient is going for additional testing today, as ordered by PCP for diabetes. Patient states she has a follow up with PCP today.

## 2020-10-07 NOTE — Clinical Note
Patient requested referral to neurosurgeon, I suggested her to first do the x-rays and depending on the findings if it is appropriate, then consider referral.  She declined increasing Lyrica, I reprinted the referral to surgeon for breast reduction, referred to pain management today. Thank you!

## 2020-10-07 NOTE — RESULT ENCOUNTER NOTE
ABNORMAL.   Mild degenerative disc disease  She needed the Xrays faxed to chiropractor     Future Appointments  10/8/2020  10:15 AM   Mars Hernandez, PT      [facility]    [facility]  10/8/2020  2:50 PM    SCHEDULE, P ORTHO SPECI* ORTHO SPECIA   [facility]  10/8/2020  6:00 PM    STC DIAG MAMMO RM          [facility] MAMMO     [facility] Radiolog  10/9/2020  9:00 AM    SCHEDULE, ULTRASOUND [facility]   [facility]  10/29/2020 10:55 AM   SCHEDULE, P ORTHO SPECI* ORTHO SPECIA   [facility]  12/28/2020 1:00 PM    Katina Clemons, APRN -* [facility]

## 2020-10-07 NOTE — RESULT ENCOUNTER NOTE
ABNORMAL. Please notify patient. Mild to moderate degenerative disc disease   Continue current treatment.     Future Appointments  10/8/2020  10:15 AM   Holli Varghese, PT      [facility] PT    [facility]  10/8/2020  2:50 PM    SCHEDULE, P ORTHO SPECI* ORTHO SPECIA   [facility]  10/8/2020  6:00 PM    STC DIAG MAMMO RM          [facility] MAMMO     [facility]iolog  10/9/2020  9:00 AM    SCHEDULE, ULTRASOUND [facility]   [facility]  10/29/2020 10:55 AM   SCHEDULE, P ORTHO SPECI* ORTHO SPECIA   [facility]  12/28/2020 1:00 PM    [facility]

## 2020-10-07 NOTE — PROGRESS NOTES
Visit Information    Have you changed or started any medications since your last visit including any over-the-counter medicines, vitamins, or herbal medicines? no   Are you having any side effects from any of your medications? -  no  Have you stopped taking any of your medications? Is so, why? -  no    Have you seen any other physician or provider since your last visit? Yes - Records Obtained  Have you had any other diagnostic tests since your last visit? Yes - Records Obtained  Have you been seen in the emergency room and/or had an admission to a hospital since we last saw you? No  Have you had your routine dental cleaning in the past 6 months? no    Have you activated your [employer] account? If not, what are your barriers?  Yes     Patient Care Team:  KIMANI Snyder CNP as PCP - General (Family Medicine)  KIMANI Snyder CNP as PCP [facility]  Clelia Soulier, DO as Consulting Physician (General Surgery)    Medical History Review  Past Medical, Family, and Social History reviewed and does contribute to the patient presenting condition    Health Maintenance   Topic Date Due    Cervical cancer screen  10/11/2016    Breast cancer screen  04/11/2020    Diabetic foot exam  07/26/2020    Hepatitis B vaccine (2 of 3 - Risk 3-dose series) 10/26/2020    Shingles Vaccine (2 of 2) 10/28/2020    A1C test (Diabetic or Prediabetic)  01/05/2021    Colon cancer screen colonoscopy  02/21/2021    Diabetic microalbuminuria test  08/29/2021    Lipid screen  08/29/2021    Potassium monitoring  08/29/2021    Creatinine monitoring  08/29/2021    Diabetic retinal exam  09/15/2021    DTaP/Tdap/Td vaccine (2 - Td) 09/28/2030    Flu vaccine  Completed    Pneumococcal 0-64 years Vaccine  Completed    HIV screen  Completed    Hepatitis A vaccine  Aged Out    Hib vaccine  Aged Out    Meningococcal (ACWY) vaccine  Aged Out

## 2020-10-07 NOTE — TELEPHONE ENCOUNTER
----- Message from KIMANI Gu CNP sent at 10/6/2020  5:19 PM EDT -----  Hgb a1c elevated. Patient is know diabetic. Please refer patient back to her PCP or endocrinologist for glucose regulation.

## 2020-10-08 ENCOUNTER — HOSPITAL ENCOUNTER (OUTPATIENT)
Dept: WOMENS IMAGING | Age: 55
Discharge: HOME | End: 2020-10-10
Payer: COMMERCIAL

## 2020-10-08 ENCOUNTER — OFFICE VISIT (OUTPATIENT)
Dept: ORTHOPEDIC SURGERY | Age: 55
End: 2020-10-08
Payer: COMMERCIAL

## 2020-10-08 ENCOUNTER — HOSPITAL ENCOUNTER (OUTPATIENT)
Dept: PHYSICAL THERAPY | Age: 55
Setting detail: THERAPIES SERIES
Discharge: HOME | End: 2020-10-08
Payer: COMMERCIAL

## 2020-10-08 VITALS — HEIGHT: 66 IN | BODY MASS INDEX: 42.27 KG/M2 | WEIGHT: 263 LBS

## 2020-10-08 PROCEDURE — 20610 DRAIN/INJ JOINT/BURSA W/O US: CPT | Performed by: STUDENT IN AN ORGANIZED HEALTH CARE EDUCATION/TRAINING PROGRAM

## 2020-10-08 PROCEDURE — 97110 THERAPEUTIC EXERCISES: CPT

## 2020-10-08 PROCEDURE — G8482 FLU IMMUNIZE ORDER/ADMIN: HCPCS | Performed by: STUDENT IN AN ORGANIZED HEALTH CARE EDUCATION/TRAINING PROGRAM

## 2020-10-08 PROCEDURE — G8427 DOCREV CUR MEDS BY ELIG CLIN: HCPCS | Performed by: STUDENT IN AN ORGANIZED HEALTH CARE EDUCATION/TRAINING PROGRAM

## 2020-10-08 PROCEDURE — 1036F TOBACCO NON-USER: CPT | Performed by: STUDENT IN AN ORGANIZED HEALTH CARE EDUCATION/TRAINING PROGRAM

## 2020-10-08 PROCEDURE — G0283 ELEC STIM OTHER THAN WOUND: HCPCS

## 2020-10-08 PROCEDURE — G8417 CALC BMI ABV UP PARAM F/U: HCPCS | Performed by: STUDENT IN AN ORGANIZED HEALTH CARE EDUCATION/TRAINING PROGRAM

## 2020-10-08 PROCEDURE — 99214 OFFICE O/P EST MOD 30 MIN: CPT | Performed by: STUDENT IN AN ORGANIZED HEALTH CARE EDUCATION/TRAINING PROGRAM

## 2020-10-08 PROCEDURE — 3017F COLORECTAL CA SCREEN DOC REV: CPT | Performed by: STUDENT IN AN ORGANIZED HEALTH CARE EDUCATION/TRAINING PROGRAM

## 2020-10-08 PROCEDURE — 77063 BREAST TOMOSYNTHESIS BI: CPT

## 2020-10-08 RX ORDER — BUPIVACAINE HYDROCHLORIDE 2.5 MG/ML
2 INJECTION, SOLUTION INFILTRATION; PERINEURAL ONCE
Status: DISCONTINUED | OUTPATIENT
Start: 2020-10-08 | End: 2021-08-19

## 2020-10-08 RX ORDER — METHYLPREDNISOLONE ACETATE 80 MG/ML
80 INJECTION, SUSPENSION INTRA-ARTICULAR; INTRALESIONAL; INTRAMUSCULAR; SOFT TISSUE ONCE
Status: DISCONTINUED | OUTPATIENT
Start: 2020-10-08 | End: 2021-08-19

## 2020-10-08 ASSESSMENT — PAIN - FUNCTIONAL ASSESSMENT: PAIN_FUNCTIONAL_ASSESSMENT: PREVENTS OR INTERFERES SOME ACTIVE ACTIVITIES AND ADLS

## 2020-10-08 ASSESSMENT — PAIN DESCRIPTION - PAIN TYPE: TYPE: CHRONIC PAIN

## 2020-10-08 ASSESSMENT — PAIN DESCRIPTION - ONSET: ONSET: ON-GOING

## 2020-10-08 ASSESSMENT — PAIN DESCRIPTION - DIRECTION: RADIATING_TOWARDS: R SHOULDER

## 2020-10-08 ASSESSMENT — PAIN SCALES - GENERAL: PAINLEVEL_OUTOF10: 6

## 2020-10-08 ASSESSMENT — PAIN DESCRIPTION - LOCATION: LOCATION: ARM

## 2020-10-08 ASSESSMENT — PAIN DESCRIPTION - ORIENTATION: ORIENTATION: RIGHT

## 2020-10-08 ASSESSMENT — PAIN DESCRIPTION - FREQUENCY: FREQUENCY: CONTINUOUS

## 2020-10-08 ASSESSMENT — PAIN DESCRIPTION - PROGRESSION: CLINICAL_PROGRESSION: GRADUALLY WORSENING

## 2020-10-08 ASSESSMENT — PAIN DESCRIPTION - DESCRIPTORS: DESCRIPTORS: NUMBNESS

## 2020-10-08 NOTE — PROGRESS NOTES
Physical Therapy  Daily Treatment Note  Date: 10/8/2020  Patient Name: Mickey Rios  MRN: [id number]     :   1965    Subjective:   General  Additional Pertinent Hx: left rotator cuff repair   Referring Practitioner: Alondra Bagley DO   PT Visit Information  Onset Date: 20  PT Insurance Information: [employer]   Total # of Visits Approved: 12  Total # of Visits to Date: 5  Plan of Care/Certification Expiration Date: 20  No Show: 0  Progress Note Due Date: 10/16/20  Canceled Appointment: 0  Progress Note Counter: 5/6   Subjective  Subjective: The patient stated the pain in her arm is a little better today. The postural changes has made a small change in her pain levels. She further noted, her arm pain better. Sleeping with the pillow continues to help lesson her overall symptoms.   Pain Screening  Patient Currently in Pain: Yes  Pain Assessment  Pain Assessment: 0-10  Pain Level: 6  Patient's Stated Pain Goal: No pain  Pain Type: Chronic pain  Pain Location: Arm  Pain Orientation: Right  Pain Radiating Towards: R shoulder  Pain Descriptors: Numbness  Pain Frequency: Continuous  Pain Onset: On-going  Clinical Progression: Gradually worsening  Functional Pain Assessment: Prevents or interferes some active activities and ADLs  Non-Pharmaceutical Pain Intervention(s): Ambulation/Increased Activity  Response to Pain Intervention: None  Vital Signs  Patient Currently in Pain: Yes  Patient Observation  Observations: limited right arm movements        Treatment Activities:       Balance  Posture: Poor  Sitting - Static: Good  Sitting - Dynamic: Good  PROM RUE (degrees)  RUE PROM: WFL  RUE General PROM: painful   AROM RUE (degrees)  RUE AROM : Exceptions  RUE General AROM: painful with flexion and abduction   R Shoulder Flexion 0-180: 0-90  R Shoulder Extension 0-45: 0-35  R Shoulder ABduction 0-180: 0-70  R Shoulder Int Rotation  0-70: 0-30  R Shoulder Ext Rotation 0-90: 0-35  PROM LUE (degrees)  LUE PROM: WFL  AROM LUE (degrees)  LUE AROM : WFL  Spine  Cervical: Limited AROM in all directions by 50 %   Strength RUE  Strength RUE: Exception  Comment: painful   R Shoulder Flexion: 2/5  R Shoulder ABduction: 2/5  R Shoulder Internal Rotation: 2/5  R Shoulder External Rotation: 2/5  R Elbow Flexion: 5/5  Strength LUE  Strength LUE: WFL  Strength Other  Other: Cervical spine 2/5  all directions - painful     Exercises  Exercise 1: Change -- HEP - sitting with cervical spine retraction  and a \"V\" pillow behind T-spine-- sitting retractions 10-15 reps x 1-3 sets -4-6 x daily   Exercise 2: Review -- continue -- HEP sleeping with towel roll inside top pillow +  2-3 pillows to maintain head and neck neutral spine  Exercise 3: Review -- Continue -- HEP - Avoid over flexion and extension of head and neck   Exercise 4: Sitting with \"V\" pillow 2- min   Exercise 5: Retraction 10 x 12 sets - lessoned right UE symptoms   Exercise 6: Sitting extension with towel hold 30 sec x 3   Exercise 7: Rows and ER rows in sitting 15 x 1   Exercise 8: Add heat + IFC as needed for pain. Ortho Screen  Posture: Sever forward head - loss of cervical curve      Assessment:   Conditions Requiring Skilled Therapeutic Intervention  Body structures, Functions, Activity limitations: Decreased functional mobility ; Decreased ADL status; Decreased ROM; Decreased strength;Decreased balance;Decreased high-level IADLs; Increased pain;Decreased posture  Assessment: The patient's symptoms with sitting with  retraction does continue to lesson her pain. The right UE symptoms seam to connected to her cervical spine.  .   Treatment Diagnosis: Right arm pain   Prognosis: Good  Decision Making: Low Complexity  Barriers to Learning: none  REQUIRES PT FOLLOW UP: Yes  Treatment Initiated : HEP see above   Discharge Recommendations: Continue to assess pending progress  Activity Tolerance  Activity Tolerance: Patient Tolerated treatment well Goals:  Short term goals  Time Frame for Short term goals: 6 Visits  Short term goal 1: OPTIMAL score of 13/3 initial , patient goal is 9/3. Short term goal 2: Independent with HEP. Long term goals  Time Frame for Long term goals : 12 visits  Long term goal 1: OPTIMAL score at discharge will be 6/3.    Patient Goals   Patient goals : Use right arm without pain     Plan:     Continue  Timed Code Treatment Minutes: 30 Minutes  Total Treatment Time: 30  Frequency and duration of TX  Days: 3  Weeks: 4     Therapy Time   Individual Concurrent Group Co-treatment   Time In  1015         Time Out  1100         Minutes  45 min          Timed Code Treatment Minutes: 30 Minutes      Treatment Charges: Minutes Units   []  Ultrasound     [x]  Electrical-Stim 15 1   []  Iontophoresis     []  Traction     []  Massage       []  Eval     []  Gait     [x]  Ther Exercise 30  2    []  Manual Therapy       []  Ther Activities       []  Aquatics     []  Vasopneumatic Device     []  Neuro Re-Ed       []  Other       Total Treatment Time: [address] Jodie Plunkett, PT

## 2020-10-09 ENCOUNTER — OFFICE VISIT (OUTPATIENT)
Dept: OBGYN CLINIC | Age: 55
End: 2020-10-09
Payer: COMMERCIAL

## 2020-10-09 ENCOUNTER — TELEPHONE (OUTPATIENT)
Dept: PAIN MANAGEMENT | Age: 55
End: 2020-10-09

## 2020-10-09 LAB
HPV SOURCE: NORMAL
HPV, GENOTYPE 16: NOT DETECTED
HPV, GENOTYPE 18: NOT DETECTED
HPV, HIGH RISK OTHER: NOT DETECTED

## 2020-10-09 PROCEDURE — 76830 TRANSVAGINAL US NON-OB: CPT | Performed by: OBSTETRICS & GYNECOLOGY

## 2020-10-09 PROCEDURE — 76856 US EXAM PELVIC COMPLETE: CPT | Performed by: OBSTETRICS & GYNECOLOGY

## 2020-10-12 NOTE — PROGRESS NOTES
[address] [place] [address] [address] [facility]  Dept: [phone]  Dept Fax: [phone]        Orthopaedic Clinic Follow Up      Subjective:     Patient presents today for follow up of her right shoulder. Her blood sugar this morning was 94. At her last visit the plan was to follow up for right shoulder injection pending sugar control. She denies any new injuries at this time. Patient has had EMG for left carpal tunnel. But also states she has had a burning sensation in bilateral shoulders recently that has been affecting her sleep which radiates into the left arm. She did switch pillows in the past month. Does admit to some balance issues. Denies bladder or bowel changes. She also admits to low back pain. Prior HPI: Abdulkadir Terry is a 50y.o. year old right hand dominant female who presents to the clinic today for routine followup regarding her right shoulder pain. She states that she has had right shoulder pain for about 2 months now, which has been getting worse over time. Pain is aggravated by movements, especially internal rotation. She states that the pain feels similar to when she tore her rotator cuff on the left side, which required surgical repair. She has noticed that she has difficulty lifting and picking up things with the right arm secondary to both pain and weakness. She has been trying rest, heat, and home exercises to try to help, but has not noticed any improvement. Aleve provides minimal relief. She presents today hoping for a corticosteroid injection for pain relief.  She has a past medical history significant for poorly controlled diabetes, with her last A1c being 11.2.     ROS:  MSK: right shoulder pain   Neuro: denies numbness and tingling     Objective :   Physical exam  Gen: AAOx3, no acute distress  Cardiovascular: regular rate  Respiratory: symmetrical chest expansion, normal effort    MSK: Right upper extremity: No ecchymoses, abrasions, deformity, or lacerations. Skin intact. Mild tenderness to palpation. Compartments soft and compressible. Able to actively abduct the arm from 0-130, forward flex from 0-170, externally rotate 45 degrees, and internally rotate to T12. These motions do produce pain. Weakness with empty can test on the right side compared to the left. Hand warm and perfused w/ BCR; 2+ radial and ulnar pulses. Median/Radial/Ulnar/AIN/PIN gross motor intact. Median/Radial/Ulnar nerve SILT. Neck positive spur lings to left with left sided dysesthesias into hand. Positive phalen's and tinels to left   Dyesthesias to left hand compared to right. Low back tenderness, pain with forward bending    Compartments soft and compressible. 5/5 Hamstrings/quads/abductors/TA/EHL/FHL/GS motor intact. Deep and Superficial Peroneal/Saphenous/Sural SILT. 2+ DP pulse. Radiology:  History: Right shoulder pain     Comparison: 8/7/2020    Findings: 4 views of the right shoulder showing no acute fractures or dislocations. Glenohumeral alignment appears normal. No lytic or blastic lesions present. Degenerative changes in the [facility] joint with osteophytic changes and joint space narrowing. Degenerative changes in glenohumeral joint. Irregularities and degenerative changes as well as bone spurs along greater tuberosity suggestive of rotator cuff arthropathy. Films consistent with prior imaging taken on 8/7/19. Impression: Mild arthritic changes in the right shoulder with no obvious acute abnormalities    Injection procedure note  The alternatives, benefits, and risks were discussed with the patient. After answering all questions to the patient's satisfaction, the patient agreed to proceed forward with injection and gave verbal consent for the procedure. With the patient's permission, appropriate anatomic landmarks were identified and the right shoulder was prepped in a sterile fashion using alcohol and/or betadine.  A 21 gauge needle was

## 2020-10-13 ENCOUNTER — HOSPITAL ENCOUNTER (OUTPATIENT)
Dept: PHYSICAL THERAPY | Age: 55
Setting detail: THERAPIES SERIES
Discharge: HOME | End: 2020-10-13
Payer: COMMERCIAL

## 2020-10-13 LAB — CYTOLOGY REPORT: NORMAL

## 2020-10-13 PROCEDURE — G0283 ELEC STIM OTHER THAN WOUND: HCPCS

## 2020-10-13 PROCEDURE — 97110 THERAPEUTIC EXERCISES: CPT

## 2020-10-13 ASSESSMENT — PAIN SCALES - GENERAL: PAINLEVEL_OUTOF10: 4

## 2020-10-13 ASSESSMENT — PAIN DESCRIPTION - LOCATION: LOCATION: SHOULDER

## 2020-10-13 ASSESSMENT — PAIN DESCRIPTION - PAIN TYPE: TYPE: CHRONIC PAIN

## 2020-10-13 ASSESSMENT — PAIN DESCRIPTION - ORIENTATION: ORIENTATION: RIGHT;LEFT

## 2020-10-13 NOTE — PROGRESS NOTES
I performed a history and physical examination of the patient and discussed management with the resident. I reviewed the physician assistant/resident physician note and agree with the documented findings and plan of care. Any areas of disagreement are noted on the chart. I have personally evaluated this patient and have completed at least one if not all key elements of the E/M (history, physical exam, and MDM). Additional findings are as noted. I agree with the chief complaint, past medical history, past surgical history, allergies, medications, social and family history as documented unless otherwise noted below.      Electronically signed by Randall Joe DO on 10/13/2020 at 2:00 PM

## 2020-10-13 NOTE — PROGRESS NOTES
[facility]   Outpatient Physical Therapy  [address] [place] [address]. [address]  Phone: [phone]  Fax: [phone]  Daily Progress Note    Date: 10/13/20    Patient Name: Chichi Gonzalez        MRN: [id number]  Account: [de-identified] : 1965      General Information:  Additional Pertinent Hx: left rotator cuff repain   Referring Practitioner: Jono Nava DO   Referral Date : 20  Diagnosis: Right shoulder pain (M25.511)  Other (Comment): To see Dr at end of October  Onset Date: 20  PT Insurance Information: Halima Massey   Total # of Visits Approved: 12  Total # of Visits to Date: 6  Plan of Care/Certification Expiration Date: 20  Progress Note Counter:     Subjective:  Subjective: Patient reports pain increased after last visit- states R shoulder was unbearable- she saw the Dr and had a cortisone injection- feels it has decreased the pain by 75% in R UE however still continues with B shoulder deep throbb, intermittent N/T in B hands. Patient reports overall pain is less intense and less frequent shooting pains down R arml has a constant dull headache daily. Complains during treatment of intermitten right sided jaw pain- denies cardiac issues, chest pain and states BP has been good . Patient reports she can not sleep on R shoulder due to throbbing and numbness in R hand and states side lying Left causes shooting pain in Right shoulder     Pain:  Patient Currently in Pain: Yes  Pain Assessment: 0-10  Pain Level: 4  Pain Type: Chronic pain  Pain Location: Shoulder  Pain Orientation: Right;Left  Pain Radiating Towards: (B) Upper trap/ upper arms; N/T to fingers on L hand this date; no symptoms down R UE; Constant headache rated at 1/10     Objective:  Exercise 1: PREP/HEP- Sitting with V-Pillow- Cervical Retractions 2-3 setx of 10 reps every 1-2 hours  Exercise 2: Reviewed/educated on sleeping positions: Supine with towel toll in pillow and IF tolerated sidelying maintaining a  neutral spine position with body pillows and continued towel roll in pillow at head. Exercise 3: Reviewed/educated on seated postures; avoid forward head/rounded shoulders; use of firm hard surfaces with support for sitting  Exercise 6: Seated cervical extension with use of towel 3 reps x 30 seconds  Exercise 7: HP/IFC seated to Cervical spine 15'     Comment:  Comments: Saw  last week; has a new order in [facility] for lower back- patient inquired on aquatic therapy    Assessment: Body structures, Functions, Activity limitations: Decreased functional mobility ; Decreased ADL status; Decreased ROM; Decreased strength;Decreased balance;Decreased high-level IADLs; Increased pain;Decreased posture  Treatment Diagnosis: Right arm pain   Prognosis: Good  REQUIRES PT FOLLOW UP: Yes  Activity Tolerance: Patient Tolerated treatment well  Comments: Reviewed HEP/PREP for pain control of R UE symptoms. Patient demonstrates good understanding of program- encouraged to increase participation with HEP. Continued IFC with heat for pain control post treatment. Patient noted headache & Hand N/T abolished however (B) Shoulder pain remained 4/10    Plan:  Plan: Continue with current plan  Comment: PT re-assess next visit; patient has lumbar spine orders.  Progress with pain control as able    Therapy Time:  Time In: 0925  Time Out: 1009  Minutes: 44     Treatment Charges: Minutes Units   []  Ultrasound     [x]  Electrical-Stim/HP 15 1   []  Iontophoresis     []  Traction     []  Massage       []  Eval     []  Gait     []  Vasopneumatic Device     [x]  Ther Exercise  23 2   []  Manual Therapy       []  Ther Activities       []  Aquatics     []  Neuro Re-Ed       []  Other       Total Treatment Time: 45 [id number]

## 2020-10-15 ENCOUNTER — HOSPITAL ENCOUNTER (OUTPATIENT)
Dept: PHYSICAL THERAPY | Age: 55
Setting detail: THERAPIES SERIES
Discharge: HOME | End: 2020-10-15
Payer: COMMERCIAL

## 2020-10-15 PROCEDURE — 97110 THERAPEUTIC EXERCISES: CPT

## 2020-10-15 ASSESSMENT — PAIN DESCRIPTION - LOCATION
LOCATION: SHOULDER
LOCATION_2: BACK

## 2020-10-15 ASSESSMENT — PAIN DESCRIPTION - DESCRIPTORS
DESCRIPTORS: NUMBNESS
DESCRIPTORS_2: ACHING;NAGGING

## 2020-10-15 ASSESSMENT — PAIN DESCRIPTION - ORIENTATION
ORIENTATION_2: LOWER;MID
ORIENTATION: RIGHT;ANTERIOR

## 2020-10-15 ASSESSMENT — PAIN - FUNCTIONAL ASSESSMENT: PAIN_FUNCTIONAL_ASSESSMENT: PREVENTS OR INTERFERES SOME ACTIVE ACTIVITIES AND ADLS

## 2020-10-15 ASSESSMENT — PAIN DESCRIPTION - FREQUENCY: FREQUENCY: CONTINUOUS

## 2020-10-15 ASSESSMENT — PAIN DESCRIPTION - PROGRESSION
CLINICAL_PROGRESSION: GRADUALLY WORSENING
CLINICAL_PROGRESSION_2: GRADUALLY WORSENING

## 2020-10-15 ASSESSMENT — PAIN SCALES - GENERAL: PAINLEVEL_OUTOF10: 5

## 2020-10-15 ASSESSMENT — PAIN DESCRIPTION - PAIN TYPE
TYPE_2: CHRONIC PAIN
TYPE: CHRONIC PAIN

## 2020-10-15 ASSESSMENT — PAIN DESCRIPTION - DURATION: DURATION_2: CONTINUOUS

## 2020-10-15 ASSESSMENT — PAIN DESCRIPTION - ONSET
ONSET: ON-GOING
ONSET_2: PROGRESSIVE

## 2020-10-15 ASSESSMENT — PAIN DESCRIPTION - INTENSITY: RATING_2: 7

## 2020-10-19 ENCOUNTER — APPOINTMENT (OUTPATIENT)
Dept: PHYSICAL THERAPY | Age: 55
End: 2020-10-19
Payer: COMMERCIAL

## 2020-10-19 ASSESSMENT — PAIN DESCRIPTION - ORIENTATION
ORIENTATION: RIGHT;ANTERIOR
ORIENTATION_2: LOWER;MID

## 2020-10-19 ASSESSMENT — PAIN - FUNCTIONAL ASSESSMENT: PAIN_FUNCTIONAL_ASSESSMENT: PREVENTS OR INTERFERES SOME ACTIVE ACTIVITIES AND ADLS

## 2020-10-19 ASSESSMENT — PAIN DESCRIPTION - LOCATION
LOCATION_2: BACK
LOCATION: SHOULDER

## 2020-10-19 ASSESSMENT — PAIN DESCRIPTION - PROGRESSION
CLINICAL_PROGRESSION: GRADUALLY WORSENING
CLINICAL_PROGRESSION_2: GRADUALLY WORSENING

## 2020-10-19 ASSESSMENT — PAIN DESCRIPTION - ONSET
ONSET: ON-GOING
ONSET_2: PROGRESSIVE

## 2020-10-19 ASSESSMENT — PAIN DESCRIPTION - DESCRIPTORS
DESCRIPTORS: NUMBNESS
DESCRIPTORS_2: ACHING;NUMBNESS

## 2020-10-19 ASSESSMENT — PAIN DESCRIPTION - DURATION: DURATION_2: CONTINUOUS

## 2020-10-19 ASSESSMENT — PAIN DESCRIPTION - FREQUENCY: FREQUENCY: CONTINUOUS

## 2020-10-19 ASSESSMENT — PAIN SCALES - GENERAL: PAINLEVEL_OUTOF10: 5

## 2020-10-19 ASSESSMENT — PAIN DESCRIPTION - PAIN TYPE
TYPE: CHRONIC PAIN
TYPE_2: CHRONIC PAIN

## 2020-10-19 ASSESSMENT — PAIN DESCRIPTION - INTENSITY: RATING_2: 7

## 2020-10-19 NOTE — PROGRESS NOTES
[facility] Services Exercise Log  Protocol Fibromyalgia    Date of Eval: 10-                               Primary PT: Mervyn Peabody  Diagnosis: Right shoulder pain and low back pain   Things to Focus On (goals): lesson pain, restore function  Surgical Precautions:  Medical Precautions:DM, Diabetic Polyneuropathy  [] C-9 dates  [] Occ Med   [] Medicare       Date        Visit #        Walk F/L/R        LE Exercise        Marching        Squats        Step-Ups F/L        Heel-toe raises        SLR F/L/R        HS Curl        Lunges        Knee flex/ext        UE exercises        Shoulder Rolls        Shoulder shrugs        Horiz abd/add        Fwd flex        Abd/add        PNF        Bicep Curls        IR/ER        Wall Push-Ups                Kickboard Ex. ROM w/ kickboard        Iso Abd.         Push-pull        Paddling        Breast-stroke        Rope pull                UE Stretches        Corner stretch        Post. Capsule stretch        LE Stretches        Hamstring        Achilles        Quad        C-Spine Stretches        Upper trap        Chin tucks        Cerv ROM        Deep H2O        Cycling        Jacks        Cross-country        Hang                                                        Pain Rating          Electronically signed by: Joey Hallman PT

## 2020-10-19 NOTE — PROGRESS NOTES
surfaces with support for sitting  Exercise 4: Sitting with \"V\" pillow 2- min   Exercise 5: Retraction 10 x 12 sets - lessoned right UE symptoms   Exercise 6: Seated cervical extension with use of towel 3 reps x 30 seconds  Exercise 7: Hold -- HP/IFC seated to Cervical spine 15'  Exercise 8: Lumbar Spine HEP - no exercise given at this time      Modalities  Moist heat: Hold -- 15 min right shoulder sitting with pillow under right elbow   E-Stim (parameters): Hold -- IFC 15 min to   Manual therapy  Soft Tissue Mobalization: Cervical spine retractions 10 x 3   Ortho Screen  Posture: Sever forward head - loss of cervical curve       Assessment:   Conditions Requiring Skilled Therapeutic Intervention  Body structures, Functions, Activity limitations: Decreased functional mobility ; Decreased ADL status; Decreased ROM; Decreased strength;Decreased balance;Decreased high-level IADLs; Increased pain;Decreased posture  Assessment: The patient did not lesson her symptoms with sitting position only. The addition of retractions did make a significant change in her right arm pain. Screen of her lumbar spine due to pain and limited walking. Continue with treatment for both lumbar and cervical spine pain. The patient's treatment plan will be altered, to include only the aquatic program for treating both conditions. Treatment Diagnosis: Right arm pain/   Prognosis: Good  Decision Making: Low Complexity  Barriers to Learning: none  REQUIRES PT FOLLOW UP: Yes  Treatment Initiated : HEP see above   Discharge Recommendations: Continue to assess pending progress  Activity Tolerance  Activity Tolerance: Patient Tolerated treatment well       OutComes Score  OPTIMAL score (lumbar spine)  9/3. Initial     Goals:  Short term goals  Time Frame for Short term goals: 6 Visits  Short term goal 1: OPTIMAL score of 13/3 initial, the patient's goal is 9/3. (Neck)  Short term goal 2: OPTIMAL score 9/3 initial, the patient's goal is 6/3.  (Low Back)  Short term goal 3: Independent with home program (HEP). (Neck/Low back )  Long term goals  Time Frame for Long term goals : 12 visits  Long term goal 1: OPTIMAL score at discharge will be 6/3. (Neck)  Long term goal 2: OPTIMAL score 3/3 at discharge (Low Back)  Patient Goals   Patient goals : 1. Use right arm without pain (2.  Walk without lower back pain. )    Plan:     Continue with aquatic program for both lumbar spine and left arm pain.    Timed Code Treatment Minutes: 15 Minutes  Total Treatment Time: 30  Frequency and duration of TX  Days: 3  Weeks: 4     Therapy Time   Individual Concurrent Group Co-treatment   Time In  1645         Time Out  1715         Minutes  30 min          Timed Code Treatment Minutes: 15 Minutes     Treatment Charges: Minutes Units   []  Ultrasound     []  Electrical-Stim     []  Iontophoresis     []  Traction     []  Massage       []  Eval     []  Gait     [x]  Ther Exercise 15  1    []  Manual Therapy       []  Ther Activities       []  Aquatics     []  Vasopneumatic Device     []  Neuro Re-Ed       [x]  Other RVD - lumbar spine   15 0    Total Treatment Time: 30  1       Rehab Potential:  [] Good  [x] Fair  [] Poor   Suggested Professional Referral:  [x] No  [] Yes:  Barriers to Goal Achievement:  [x] No  [] Yes:  Domestic Concerns:  [x] No  [] Yes:    Treatment Plan:  [x] Therapeutic Exercise   95130  [] Iontophoresis: 4 mg/mL Dexamethasone Sodium Phosphate  mAmin  82517   [] Therapeutic Activity  27421 [] Vasopneumatic cold with compression  47679    [] Gait Training   19352 [] Ultrasound   [id number]   [x] Neuromuscular Re-education  15036 [x] Electrical Stimulation Unattended  28153   [] Manual Therapy  93029 [] Electrical Stimulation Attended  [id number]   [x] Instruction in HEP  [] Lumbar/Cervical Traction  58270   [x] Aquatic Therapy   04705 [x] Cold/hot pack    [] Massage   84607      [] Dry Needling, 1 or 2 muscles  26736   [] Biofeedback, first 15 minutes   62263  [] Biofeedback, additional 15 minutes   89580 [] Dry Needling, 3 or more muscles  63073      Frequency:     3     X/wk x   4       wk's      [x] Plans/Goals, Risk/Benefits discussed with pt/family  Comprehension of Education [x] yes  [] Needs Review  Pt/Family Education: [x] Verbal  [x] Demo  [] Written    More objective information is available upon request.  Thank you for this referral.        Medicare/Regulatory Requirements:  I have reviewed this plan of care and certify a need for   Medically necessary rehabilitation services.   [] Physician Signature    Date:     Electronically signed by: Minda Patel, [address] [facility] [address] [facility]  [address] [place] [address], [id number] [place] [address]  Phone [phone]  Fax [phone]

## 2020-10-20 ENCOUNTER — HOSPITAL ENCOUNTER (OUTPATIENT)
Dept: PHYSICAL THERAPY | Age: 55
Setting detail: THERAPIES SERIES
Discharge: HOME | End: 2020-10-20
Payer: COMMERCIAL

## 2020-10-20 PROCEDURE — 97113 AQUATIC THERAPY/EXERCISES: CPT

## 2020-10-20 ASSESSMENT — PAIN DESCRIPTION - ORIENTATION
ORIENTATION_2: LOWER;MID
ORIENTATION: RIGHT;ANTERIOR

## 2020-10-20 ASSESSMENT — PAIN DESCRIPTION - LOCATION
LOCATION: SHOULDER
LOCATION_2: BACK

## 2020-10-20 ASSESSMENT — PAIN DESCRIPTION - FREQUENCY: FREQUENCY: CONTINUOUS

## 2020-10-20 ASSESSMENT — PAIN DESCRIPTION - PAIN TYPE
TYPE: CHRONIC PAIN
TYPE_2: CHRONIC PAIN

## 2020-10-20 ASSESSMENT — PAIN DESCRIPTION - DURATION: DURATION_2: CONTINUOUS

## 2020-10-20 ASSESSMENT — PAIN DESCRIPTION - DESCRIPTORS
DESCRIPTORS: NUMBNESS
DESCRIPTORS_2: ACHING;NUMBNESS

## 2020-10-20 ASSESSMENT — PAIN DESCRIPTION - INTENSITY: RATING_2: 7

## 2020-10-20 ASSESSMENT — PAIN SCALES - GENERAL: PAINLEVEL_OUTOF10: 6

## 2020-10-20 NOTE — FLOWSHEET NOTE
SLR F/L/R  10x           HS Curl             Lunges             Knee flex/ext  10x           UE exercises             Shoulder Rolls             Shoulder shrugs  Retro 10x           Horiz abd/add  10x           Fwd flex  10x           Abd/add  10x           PNF             Bicep Curls  10x           IR/ER  10x           Wall Push-Ups                           Kickboard Ex.  Med           ROM w/ kickboard  3-ways   3x10\"           Iso Abd.             Push-pull  10x           Paddling  10x           Breast-stroke  2 Laps           Rope pull                           UE Stretches             Corner stretch             Post. Capsule stretch             LE Stretches             Hamstring  2x20\"           Achilles  2x20\"           Quad             C-Spine Stretches             Upper trap             Chin tucks             Cerv ROM             Deep H2O             Cycling             Jacks             Cross-country             Hang                                                                      Cool Down                          Pain Rating  7             Comment:  Comments: initial aquatic therapy visit.  educated on postural awareness, core stability and working in pain free ranges. Assessment: Body structures, Functions, Activity limitations: Decreased functional mobility ; Decreased ADL status; Decreased ROM; Decreased strength;Decreased balance;Decreased high-level IADLs; Increased pain;Decreased posture  Treatment Diagnosis: Right arm pain/   Prognosis: Good  REQUIRES PT FOLLOW UP: Yes  Activity Tolerance: Patient Tolerated treatment well    Plan:  Plan: Continue with current plan    Therapy Time:  Time In: 1130  Time Out: 1200  Minutes: 30  Timed Code Treatment Minutes: 25 Minutes    Treatment Charges: Minutes Units   []  Ultrasound     []  Electrical-Stim     []  Iontophoresis     []  Traction     []  Massage       []  Eval     []  Gait     []  Vasopneumatic Device     []  Ther Exercise       []  Manual Therapy       []  Ther Activities       [x]  Aquatics 25 2   []  Neuro Re-Ed       []  Other       Total Treatment Time: 25 2        Membreno Fontan, PTA

## 2020-10-21 ENCOUNTER — APPOINTMENT (OUTPATIENT)
Dept: PHYSICAL THERAPY | Age: 55
End: 2020-10-21
Payer: COMMERCIAL

## 2020-10-21 ENCOUNTER — HOSPITAL ENCOUNTER (OUTPATIENT)
Dept: PHYSICAL THERAPY | Age: 55
Setting detail: THERAPIES SERIES
Discharge: HOME | End: 2020-10-21
Payer: COMMERCIAL

## 2020-10-21 PROCEDURE — 97113 AQUATIC THERAPY/EXERCISES: CPT

## 2020-10-21 ASSESSMENT — PAIN DESCRIPTION - FREQUENCY: FREQUENCY: CONTINUOUS

## 2020-10-21 ASSESSMENT — PAIN DESCRIPTION - ORIENTATION: ORIENTATION: RIGHT;ANTERIOR

## 2020-10-21 ASSESSMENT — PAIN DESCRIPTION - LOCATION: LOCATION: SHOULDER

## 2020-10-21 ASSESSMENT — PAIN SCALES - GENERAL: PAINLEVEL_OUTOF10: 7

## 2020-10-21 ASSESSMENT — PAIN DESCRIPTION - PAIN TYPE: TYPE: CHRONIC PAIN

## 2020-10-21 NOTE — FLOWSHEET NOTE
[address]   Outpatient Physical Therapy  [address] [facility]. [address]  Phone: [phone]  Fax: [phone]  Daily Progress Note    Date: 10/21/20    Patient Name: Alvin Schilling        MRN: [id number]  Account: [de-identified] : 1965      General Information:  Chart Reviewed: Yes  Patient assessed for rehabilitation services?: Yes  Additional Pertinent Hx: left rotator cuff repair   Referring Practitioner: Cam Tim DO   Referral Date : 20  Diagnosis: Right shoulder pain (M25.511)  Follows Commands: Within Functional Limits  Other (Comment): To see Dr at end of October  Onset Date: 20  PT Insurance Information: Osiris Moreno   Total # of Visits Approved: 18  Total # of Visits to Date: 9  Plan of Care/Certification Expiration Date: 20  No Show: 0  Canceled Appointment: 0  Progress Note Counter:     Subjective:  Subjective: Pt reports Right shoulder and (B) LE sore after initial aquatic therapy visit. States shoulder hurting all night and this morning. Pain:  Patient Currently in Pain: Yes  Pain Assessment: 0-10  Pain Level: 7  Patient's Stated Pain Goal: No pain  Pain Type: Chronic pain  Pain Location: Shoulder  Pain Orientation: Right; Anterior  Pain Descriptors: Numbness; Aching;Constant;Dull  Pain Frequency: Continuous       Objective:    [address] Services Exercise Log  Protocol Fibromyalgia     Date of Eval: 10-                               Primary PT: Lucio  Diagnosis: Right shoulder pain and low back pain   Things to Focus On (goals): lesson pain, restore function  Surgical Precautions:  Medical Precautions:DM, Diabetic Polyneuropathy  []? ? C-9 dates  []? ? Occ Med   []? ? Medicare         Date  10/20/20 10/21/20         Visit #          Walk F/L/R 2 Laps W UE ROM 2 Laps W/ UE ROM         LE Exercise             Marching  10x 10x         Squats  10x5\" 10x5\"         Step-Ups F/L             Heel-toe raises  10x 10x         SLR F/L/R  10x 10x         HS Curl   10x          Lunges             Knee flex/ext  10x 2 Laps         UE exercises             Shoulder Rolls             Shoulder shrugs  Retro 10x  Retro 10x         Horiz abd/add  10x 10x         Fwd flex  10x 10x         Abd/add  10x 10x         PNF   10x         Bicep Curls  10x 10x         IR/ER  10x 10x         Wall Push-Ups                           Kickboard Ex.  Med Med         ROM w/ kickboard  3-ways   3x10\" 3-ways  3x10\"         Iso Abd.             Push-pull  10x 10x         Paddling  10x 10x         Breast-stroke  2 Laps 2 Laps         Rope pull                           UE Stretches             Corner stretch             Post. Capsule stretch             LE Stretches             Hamstring  2x20\" 2x20\"          Achilles  2x20\" 2x20\"         Quad             C-Spine Stretches             Upper trap             Chin tucks             Cerv ROM             Deep H2O   1 Noodle          Cycling     Add        Jacks             Cross-country             Hang   5'                                                                  Cool Down                          Pain Rating  7  7            Comment:  Comments: reviewed postural awareness, importance of core stability and working in pain free ranges. Assessment: Body structures, Functions, Activity limitations: Decreased functional mobility ; Decreased ADL status; Decreased ROM; Decreased strength;Decreased balance;Decreased high-level IADLs; Increased pain;Decreased posture  Treatment Diagnosis: Right arm pain/   Prognosis: Good  REQUIRES PT FOLLOW UP: Yes  Activity Tolerance: Patient Tolerated treatment well    Plan:  Plan: Continue with current plan    Therapy Time:  Time In: 1024  Time Out: 1105  Minutes: 41  Timed Code Treatment Minutes: 35 Minutes    Treatment Charges: Minutes Units   []  Ultrasound     []  Electrical-Stim     []  Iontophoresis     []  Traction     []  Massage       []  Eval     []  Gait []  Vasopneumatic Device     []  Ther Exercise       []  Manual Therapy       []  Ther Activities       [x]  Aquatics 35 2   []  Neuro Re-Ed       []  Other       Total Treatment Time: 28  2     Mike Keane PTA

## 2020-10-22 ENCOUNTER — HOSPITAL ENCOUNTER (OUTPATIENT)
Dept: MRI IMAGING | Facility: CLINIC | Age: 55
Discharge: HOME | End: 2020-10-24
Payer: COMMERCIAL

## 2020-10-22 PROCEDURE — 72148 MRI LUMBAR SPINE W/O DYE: CPT

## 2020-10-22 NOTE — RESULT ENCOUNTER NOTE
Please let the patient know that the preliminary results on her lumbar MRI came back today. The radiologist suggested that she does have a multilevel degenerative changes. Between her L2-L3, L3-L4, L4-L5, and L5-S1. There are mild bilateral foraminal stenosis. No mention of any fractures, lesions, or bone marrow abnormalities. Since she is established with an orthopedic specialist she can start discussing this issue with them. Thank you.

## 2020-10-26 ENCOUNTER — HOSPITAL ENCOUNTER (OUTPATIENT)
Dept: PHYSICAL THERAPY | Age: 55
Setting detail: THERAPIES SERIES
Discharge: HOME | End: 2020-10-26
Payer: COMMERCIAL

## 2020-10-26 PROCEDURE — 97113 AQUATIC THERAPY/EXERCISES: CPT

## 2020-10-26 RX ORDER — LISINOPRIL 20 MG/1
TABLET ORAL
Qty: 90 TABLET | Refills: 0 | Status: SHIPPED | OUTPATIENT
Start: 2020-10-26 | End: 2021-01-25

## 2020-10-26 ASSESSMENT — PAIN DESCRIPTION - PAIN TYPE
TYPE: CHRONIC PAIN
TYPE_2: CHRONIC PAIN

## 2020-10-26 ASSESSMENT — PAIN DESCRIPTION - DESCRIPTORS: DESCRIPTORS_2: ACHING;NUMBNESS

## 2020-10-26 ASSESSMENT — PAIN DESCRIPTION - LOCATION
LOCATION_2: BACK
LOCATION: SHOULDER

## 2020-10-26 ASSESSMENT — PAIN DESCRIPTION - DURATION: DURATION_2: CONTINUOUS

## 2020-10-26 ASSESSMENT — PAIN DESCRIPTION - INTENSITY: RATING_2: 4

## 2020-10-26 ASSESSMENT — PAIN SCALES - GENERAL: PAINLEVEL_OUTOF10: 2

## 2020-10-26 ASSESSMENT — PAIN DESCRIPTION - ORIENTATION
ORIENTATION: RIGHT;ANTERIOR
ORIENTATION_2: LOWER;MID

## 2020-10-26 ASSESSMENT — PAIN DESCRIPTION - FREQUENCY: FREQUENCY: CONTINUOUS

## 2020-10-26 NOTE — TELEPHONE ENCOUNTER
Please Approve or Refuse.   Send to Pharmacy per Pt's Request:      Next Visit Date:  12/28/2020   Last Visit Date: 10/7/2020    Hemoglobin A1C (%)   Date Value   10/05/2020 11.1 (H)   08/29/2020 11.2 (H)   03/10/2020 11.1             ( goal A1C is < 7)   BP Readings from Last 3 Encounters:   10/07/20 132/86   10/05/20 138/89   09/28/20 130/80          (goal 120/80)  BUN   Date Value Ref Range Status   08/29/2020 8 6 - 20 mg/dL Final     CREATININE   Date Value Ref Range Status   08/29/2020 0.74 0.50 - 0.90 mg/dL Final     Potassium   Date Value Ref Range Status   08/29/2020 4.6 3.7 - 5.3 mmol/L Final

## 2020-10-26 NOTE — FLOWSHEET NOTE
[address] [facility]   Outpatient Physical Therapy  [address] [place] [address]. [address]  Phone: [phone]  Fax: [phone]  Daily Progress Note    Date: 10/26/20    Patient Name: Gabino Baires        MRN: [id number]  Account: [de-identified] : 1965      General Information:  Chart Reviewed: Yes  Patient assessed for rehabilitation services?: Yes  Additional Pertinent Hx: left rotator cuff repair   Referring Practitioner: Jessica Alonzo DO   Referral Date : 20  Diagnosis: Right shoulder pain (M25.511)  Follows Commands: Within Functional Limits  Other (Comment): To see Dr at end of October  Onset Date: 20  PT Insurance Information: Prietodeannmellissa Sharpe   Total # of Visits Approved: 18  Total # of Visits to Date: 10  Plan of Care/Certification Expiration Date: 20  No Show: 0  Canceled Appointment: 0  Progress Note Counter: 10/12    Subjective:  Subjective: Pt notes some increased soreness in back and shoulder yesterday due to rainy weather. denies any issues after last therapy session. Pain:  Patient Currently in Pain: Yes  Pain Assessment: 0-10  Pain Level: 2  Patient's Stated Pain Goal: No pain  Pain Type: Chronic pain  Pain Location: Shoulder  Pain Orientation: Right; Anterior  Pain Descriptors: Aching;Constant;Numbness;Dull  Pain Frequency: Continuous  Pain Rating 2: 4  Pain Type 2: Chronic Pain  Pain Location 2: Back  Pain Orientation 2: Lower;Mid  Pain Descriptors 2: Aching;Numbness  Pain Duration 2: Continuous    Objective:    [facility] Exercise Log  Protocol Fibromyalgia     Date of Eval: 10-                               Primary PT: Lucio  Diagnosis: Right shoulder pain and low back pain   Things to Focus On (goals): lesson pain, restore function  Surgical Precautions:  Medical Precautions:DM, Diabetic Polyneuropathy  []? ?? C-9 dates  []??? Occ Med   []? ?? Medicare         Date  10/20/20 10/21/20 10/26/20       Visit # 8/18 9/18  10/18       Walk F/L/R 2 Laps W UE ROM 2 Laps W/ UE ROM  2 Laps W/ UE ROM       LE Exercise             Marching  10x 10x  2 Laps       Squats  10x5\" 10x5\"  15x       Step-Ups F/L             Heel-toe raises  10x 10x 15x       SLR F/L/R  10x 10x  15x       HS Curl   10x   15x       Lunges             Knee flex/ext  10x 2 Laps  2 Laps       UE exercises             Shoulder Rolls             Shoulder shrugs  Retro 10x  Retro 10x  Retro 15x       Horiz abd/add  10x 10x  15x       Fwd flex  10x 10x  15x       Abd/add  10x 10x  15x       PNF   10x  15x       Bicep Curls  10x 10x  15x       IR/ER  10x 10x  15x       Wall Push-Ups                           Kickboard Ex.  Med Med Med       ROM w/ kickboard  3-ways   3x10\" 3-ways  3x10\"  3-ways  3x10\"       Iso Abd.             Push-pull  10x 10x  15x       Paddling  10x 10x  15x       Breast-stroke  2 Laps 2 Laps 2 Laps       Rope pull                           UE Stretches             Corner stretch             Post. Capsule stretch             LE Stretches             Hamstring  2x20\" 2x20\"   2x20\"       Achilles  2x20\" 2x20\"  2x20\"       Quad             C-Spine Stretches             Upper trap             Chin tucks             Cerv ROM             Deep H2O   1 Noodle   1 Noodle       Cycling     2'       Jacks             Cross-country             Hang   5'  5'                                                                Cool Down                           Pain Rating  7  7  4          Assessment: Body structures, Functions, Activity limitations: Decreased functional mobility ; Decreased ADL status; Decreased ROM; Decreased strength;Decreased balance;Decreased high-level IADLs; Increased pain;Decreased posture  Treatment Diagnosis: Right arm pain/   Prognosis: Good  REQUIRES PT FOLLOW UP: Yes  Activity Tolerance: Patient Tolerated treatment well    Plan:  Plan: Continue with current plan    Therapy Time:  Time In: 1055  Time Out: 1140  Minutes: 45  Timed Code Treatment Minutes: 35 Minutes    Treatment Charges: Minutes Units   []  Ultrasound     []  Electrical-Stim     []  Iontophoresis     []  Traction     []  Massage       []  Eval     []  Gait     []  Vasopneumatic Device     []  Ther Exercise       []  Manual Therapy       []  Ther Activities       [x]  Aquatics 35 2   []  Neuro Re-Ed       []  Other       Total Treatment Time: 35 2      Camila Keane, PTA

## 2020-10-27 VITALS — WEIGHT: 263 LBS | BODY MASS INDEX: 42.27 KG/M2 | HEIGHT: 66 IN

## 2020-10-27 ASSESSMENT — PAIN DESCRIPTION - FREQUENCY: FREQUENCY: CONTINUOUS

## 2020-10-27 ASSESSMENT — PAIN DESCRIPTION - ORIENTATION: ORIENTATION: RIGHT;LEFT

## 2020-10-27 ASSESSMENT — PAIN SCALES - GENERAL: PAINLEVEL_OUTOF10: 7

## 2020-10-27 ASSESSMENT — ENCOUNTER SYMPTOMS
EYES NEGATIVE: 1
RESPIRATORY NEGATIVE: 1
ALLERGIC/IMMUNOLOGIC NEGATIVE: 1

## 2020-10-27 ASSESSMENT — PAIN DESCRIPTION - PAIN TYPE: TYPE: CHRONIC PAIN

## 2020-10-27 ASSESSMENT — PAIN DESCRIPTION - PROGRESSION: CLINICAL_PROGRESSION: GRADUALLY WORSENING

## 2020-10-27 ASSESSMENT — PAIN - FUNCTIONAL ASSESSMENT: PAIN_FUNCTIONAL_ASSESSMENT: PREVENTS OR INTERFERES SOME ACTIVE ACTIVITIES AND ADLS

## 2020-10-27 ASSESSMENT — PAIN DESCRIPTION - ONSET: ONSET: GRADUAL

## 2020-10-27 NOTE — PROGRESS NOTES
[facility]  Patient Pain Assessment  Consultation - Dr. Shadi Palma    Primary Care Physician: KIMANI Hammond - CNP    Chief complaint:   Chief Complaint   Patient presents with    Pain   . Mickey Rios is a 50 y.o. female evaluated on 11/3/2020. Patient is referred by Delia Sesay MD   Modality of virtual service provided -via Video+audio   Consent:  Patient and/or health care decision maker is aware that that patient may receive a bill for this telephone service, depending on one's insurance coverage, and has provided verbal consent to proceed: Yes    Patient identification was verified at the start of the visit: No    Chief complaint: Mickey Rios is 50 y.o., [place] American female, with  Pain  . HISTORY OF PRESENT ILLNESS:  Mickey Rios is 50 y.o. female with    Referral Diagnosis   M54.5, G89.29 (ICD-10-CM) - Chronic bilateral low back pain without sciatica   M25.511, M25.512 (ICD-10-CM) - Acute pain of both shoulders     Patient is a 53-year-old female referred to the pain clinic in consultation for back pain of longstanding duration. Patient's pain was gradually getting worse and had an MRI done and she reports that she was diagnosed with herniated disks. She was given arthritis medication as well as muscle relaxers and was referred to physical therapy and chiropractic treatment  without much improvement. Patient reports she cannot stand more than 6 minutes due to the pain. Patient's pain is mostly in the lower back worse on the left side compared to the right. The pain radiates posteriorly and the posterior aspect of the thigh to the knees bilaterally. Patient also relates that she has seen neuropathy with tingling and numbness involving the toes and in the fingers. Patient's pain is decreased by lying or sitting. Patient reports she also has right shoulder pain since end of July. She denies any injury. Descriptors: Aching, Constant, Dull, Numbness  Pain Frequency: Continuous  Pain Onset: Gradual  Clinical Progression: Gradually worsening  Functional Pain Assessment: Prevents or interferes some active activities and ADLs  Non-Pharmaceutical Pain Intervention(s): Ambulation/Increased Activity, Rest, Repositioned                    ADVERSE MEDICATION EFFECTS:   Constipation: yes  Bowel Regimen: Yes  Diet: common adult  Appetite:  ok  Sedation:  no  Urinary Retention: no    FOCUSED PAIN SCALE:  Highest : 8  Lowest :4  Average: Range-6  When and What  was your last procedure:   Right shoulder injection by Dr. Kolb Asp 10/7/2020  Was your procedureeffective:  Yes, 80%    ACTIVITY/SOCIAL/EMOTIONAL:  Sleep Pattern: 5 hours per night. difficulty falling asleep, nightime awakenings and difficulty falling back asleep if awakened  Energy Level: Normal  Currently attending Physical Therapy:  Yes  Home Exercises: daily stretching at home  Mobility: painful to walk at times  Do you use assistive devices?  No  Have you fallen in the last 30 days?:  No  Currently seeing a Psychiatristor Psychologist:  No  Emotional Issues: normal   Mood: appropriate     ABERRANT BEHAVIORS SINCE LAST VISIT:  Have you ever been treated in another Pain Clinic no  Refills for prescriptions appropriate: not applicable  Lost rx/pills: not applicable  Taking more medication than prescribed:  not applicable  Are you receiving PAIN medications from  other doctors: no  Last Urine/Serum Drug Screen : unable to obtain UDS d/t COVID-19 virtual visit   Was Serum/UDS as anticipated?  not applicable  Brought pill bottles in :na   Was Pill count appropriate? :not applicable   Are currently pregnant? not applicable  Recent ER visits: No             Past Medical History      Diagnosis Date    Allergic rhinitis 10/21/2014    Anxiety     Arthritis     Bleeds easily ([address]     per pt, has never had a work up   Omar Loser Complete rotator cuff tear of left shoulder 2018    Diabetes mellitus ([facility]     On Insulin, metformin, Glipizide    Diabetic polyneuropathy associated with type 2 diabetes mellitus ([facility] 3/26/2018    Fibromyalgia     GERD (gastroesophageal reflux disease) 10/21/2014    H/O transfusion     History of blood transfusion     Hypertension     Obesity 10/21/2014    Pure hypercholesterolemia 2/23/2016    Tobacco abuse     Type II or unspecified type diabetes mellitus without mention of complication, not stated as uncontrolled     Unspecified sleep apnea     CPAP machine is broken    Wears glasses        Surgical History  Past Surgical History:   Procedure Laterality Date    BRONCHOSCOPY  9/27/2017    BRONCHOSCOPY BRUSHINGS performed by Jocelyn Camp MD at [facility] 2002    CHOLECYSTECTOMY N/A 9/29/2017    CHOLECYSTECTOMY OPEN performed by Madina Michael DO at [facility] N/A 2/21/2018    COLONOSCOPY POLYPECTOMY SNARE/COLD BIOPSY performed by Yashira Sharp MD at [facility]    with revision x1    LARYNGOSCOPY N/A 9/29/2017    DIRECT MICRO LARYNGOSCOPY WITH EXCISION VOCAL CORD LESION  performed by Alba Hanna MD at [address] [place] [address] Right 1988    VT EGD TRANSORAL BIOPSY SINGLE/MULTIPLE N/A 9/27/2017    EGD BIOPSY performed by Yashira Sharp MD at [address] [place] [address] ARTHROSCOP,SURG,W/ROTAT CUFF REPR Left 6/19/2018    SHOULDER ARTHROSCOPY WITH ROTATOR CUFF REPAIR, SUBACROMIAL DECOMPRESSION performed by Bettye Oro MD at [address] [place] [address] Left     SHOULDER ARTHROSCOPY WITH ROTATOR CUFF REPAIR, SUBACROMIAL DECOMPRESSION (Left        Medications  Current Outpatient Medications   Medication Sig Dispense Refill    tiZANidine (ZANAFLEX) 4 MG tablet TAKE ONE TABLET BY MOUTH EVERY 8 HOURS AS NEEDED FOR NECK AND BACK PAIN. **CAUSES SEDATION AND DO NOT DRIVE WHILE TAKING THIS MEDICATION 90 tablet 0    lisinopril (PRINIVIL;ZESTRIL) 20 MG tablet TAKE ONE TABLET BY MOUTH DAILY 90 tablet 0    cetirizine (ZYRTEC) 10 MG tablet Take 10 mg by mouth daily      pregabalin (LYRICA) 100 MG capsule Take 1 capsule by mouth 2 times daily for 90 days. 180 capsule 2    meloxicam (MOBIC) 7.5 MG tablet Take 1 tablet by mouth daily 90 tablet 1    Blood Glucose Monitoring Suppl (TRUE METRIX METER) MANOLO Testing BID 1 Device 0    Insulin Pen Needle (PEN NEEDLES 3/16\") 31G X 5 MM MISC 1 each by Does not apply route daily 100 each 3    insulin lispro, 1 Unit Dial, (HUMALOG KWIKPEN) 100 UNIT/ML SOPN Inject 15 Units into the skin 3 times daily (before meals) 5 pen 1    Insulin Syringes, Disposable, U-100 1 ML MISC 1 each by Does not apply route daily 100 each 3    blood glucose monitor strips Test 2-3 times a day & as needed for symptoms of irregular blood glucose. BRAND OF CHOICE INSURANCE ALLOWS. 100 strip 11    atorvastatin (LIPITOR) 10 MG tablet Take 1 tablet by mouth daily 90 tablet 2    hydroCHLOROthiazide (HYDRODIURIL) 25 MG tablet Take 1 tablet by mouth daily Take with Potassium supplement 30 tablet 3    potassium chloride (KLOR-CON) 10 MEQ extended release tablet Take 1 tablet by mouth daily Take with HCTZ 30 tablet 3    Lancets MISC Dx: DM-2. Use 2-3 times daily 100 each 3    Alcohol Swabs (ALCOHOL PREP) PADS Use as directed 100 each 11    vitamin D (CHOLECALCIFEROL) 25 MCG (1000 UT) TABS tablet Take 1 tablet by mouth daily 30 tablet 3     Current Facility-Administered Medications   Medication Dose Route Frequency Provider Last Rate Last Dose    methylPREDNISolone acetate (DEPO-MEDROL) injection 80 mg  80 mg Intra-articular Once Hannibal Crofts, DO        bupivacaine (MARCAINE) 0.25 % injection 5 mg  2 mL Intra-articular Once Antony Crofts, DO           Allergies  Pcn [penicillins]; Demerol hcl [meperidine]; Metformin and related;  Tape [adhesive tape]; and Morphine    Family History  family history includes Diabetes in her maternal grandmother, mother, and sister; Heart Attack in her paternal grandmother; Heart Disease in her father and mother; Heart Failure in her maternal grandfather; High Blood Pressure in her maternal grandmother. Social History  Social History     Socioeconomic History    Marital status: Legally      Spouse name: None    Number of children: 3    Years of education: None    Highest education level: None   Occupational History    Occupation:    Social Needs    Financial resource strain: None    Food insecurity     Worry: None     Inability: None    Transportation needs     Medical: None     Non-medical: None   Tobacco Use    Smoking status: Former Smoker     Packs/day: 2.00     Years: 34.00     Pack years: 68.00     Types: Cigarettes     Start date: 1983     Last attempt to quit: 9/28/2017     Years since quitting: 3.1    Smokeless tobacco: Never Used   Substance and Sexual Activity    Alcohol use: Yes     Alcohol/week: 0.0 standard drinks     Frequency: Monthly or less     Drinks per session: 1 or 2     Comment: rare    Drug use: No    Sexual activity: Never   Lifestyle    Physical activity     Days per week: None     Minutes per session: None    Stress: None   Relationships    Social connections     Talks on phone: None     Gets together: None     Attends [employer] service: None     Active member of club or organization: None     Attends meetings of clubs or organizations: None     Relationship status: None    Intimate partner violence     Fear of current or ex partner: None     Emotionally abused: None     Physically abused: None     Forced sexual activity: None   Other Topics Concern    None   Social History Narrative    None      reports no history of drug use. REVIEW OF SYSTEMS:    Review of Systems   Constitutional: Negative. Negative for activity change, appetite change, fatigue, fever and unexpected weight change. HENT: Negative.   Negative for congestion, hearing loss, rhinorrhea and sinus pressure. Eyes: Negative. Negative for photophobia, pain and visual disturbance. Respiratory: Negative. Negative for cough, shortness of breath and wheezing. Cardiovascular: Negative. Negative for chest pain. Gastrointestinal: Negative for abdominal pain, blood in stool, constipation, nausea and vomiting. Gastric bypass sx. Endocrine: Negative for cold intolerance and polyuria. Diabetic      Genitourinary: Negative. Negative for dysuria and hematuria. Musculoskeletal: Positive for arthralgias and back pain. Skin: Negative. Negative for color change and wound. Allergic/Immunologic: Negative. Neurological: Positive for weakness. Negative for tingling and numbness. Hematological: Negative. Does not bruise/bleed easily. Psychiatric/Behavioral: Negative. Negative for self-injury and sleep disturbance. The patient is not nervous/anxious. GENERAL PHYSICAL EXAM:  Vitals: Ht 5' 6\" (1.676 m)   Wt 263 lb (119.3 kg)   LMP  (LMP Unknown)   BMI 42.45 kg/m² , Body mass index is 42.45 kg/m². Physical Exam  Skin:         Neurological:      Mental Status: She is alert and oriented to person, place, and time. Psychiatric:         Mood and Affect: Mood normal.      Ortho Exam       Nurses Notes and Vital Signs reviewed.     DATA  Labs:     8/29/2020  8:31 AM - Polo, Zak Incoming Lab Results From [employer]     Component  Value  Ref Range & Units  Status  Collected  Lab    Glucose, Fasting  312High    70 - 99 mg/dL  Final  08/29/2020  7:30 AM  [facility] [address] [facility]    BUN  8  6 - 20 mg/dL  Final  08/29/2020  7:30 AM  [facility] [address] [facility]    CREATININE  0.74  0.50 - 0.90 mg/dL  Final  08/29/2020  7:30 AM  [facility] [address] [facility]    Bun/Cre Ratio  NOT REPORTED  9 - 20  Final  08/29/2020  7:30 AM  [facility] [address] [facility]    Calcium  9.8  8.6 - 10.4 mg/dL  Final  08/29/2020  7:30 AM  [facility] [address] [facility]    Sodium  140  135 - 144 mmol/L  Final  08/29/2020  7:30 AM  [facility]    Potassium  4.6  3.7 - 5.3 mmol/L  Final  08/29/2020  7:30 AM  - [facility]    Chloride  103  98 - 107 mmol/L  Final  08/29/2020  7:30 AM  - [facility]    CO2  30  20 - 31 mmol/L  Final  08/29/2020  7:30 AM  - [facility]    Anion Gap  7Low    9 - 17 mmol/L  Final  08/29/2020  7:30 AM  - [facility]    Alkaline Phosphatase  142High    35 - 104 U/L  Final  08/29/2020  7:30 AM  - [facility]    ALT  20  5 - 33 U/L  Final  08/29/2020  7:30 AM  - [facility]    AST  17  <32 U/L  Final  08/29/2020  7:30 AM  - [facility]    Total Bilirubin  0.32  0.3 - 1.2 mg/dL  Final  08/29/2020  7:30 AM  - [facility]    Total Protein  7.1  6.4 - 8.3 g/dL  Final  08/29/2020  7:30 AM  - [facility]    Alb  4.2  3.5 - 5.2 g/dL  Final  08/29/2020  7:30 AM  - [facility]    Albumin/Globulin Ratio  NOT REPORTED  1.0 - 2.5  Final  08/29/2020  7:30 AM  - [facility]    GFR Non-African American  >60  >60 mL/min  Final  08/29/2020  7:30 AM  - [facility]    GFR African American  >60  >60 mL/min  Final  08/29/2020  7:30 AM  - [facility]    GFR Comment         Final          Imaging:  Radiology Images and Reports reviewed where indicated and necessary  History: 21-year-old female with chronic right shoulder pain. Findings: Views: 3V  Right Shoulder Weight bearing: No Findings: No acute fractures or dislocations appreciated. Glenohumeral alignment appears normal.  No lytic or blastic lesion are present in all visualized osseous structures. Degenerative changes noted in the [facility] joint with osteophytic changes and joint space narrowing. Mild degenerative changes noted in the glenohumeral joint with joint space narrowing and irregularities of the humeral head.   Small inferior osteophyte noted. Previous comparison films not available   Impression: No acute osseous abnormality          THREE XRAY VIEWS OF THE LUMBAR SPINE        10/7/2020 2:13 pm        COMPARISON:    12/28/2018, CT 02/23/2018        HISTORY:    ORDERING SYSTEM PROVIDED HISTORY: Chronic bilateral low back pain without    sciatica    TECHNOLOGIST PROVIDED HISTORY:    Reason for Exam: pt stated low back pain difficult to stand x 3 wks , bilat    shoulder pain    Acuity: Acute    Type of Exam: Initial    Additional signs and symptoms: pt stated low back pain difficult to stand x 3    wks , bilat shoulder pain        FINDINGS:    Multiple clips in the abdomen and partially visualized curvilinear density    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 Mild fecal stasis in the colon with a nonspecific nonobstructive    bowel gas pattern. Mild osteopenia. Vertebral body heights are maintained. Pedicles are intact. There is no convincing evidence of acute fracture. SI    joints show mild degenerative changes. Multilevel mild loss of disc space    height and osteophytes due to disc degenerative disease. Moderate facet    arthropathy mid and lower lumbar spine with mild grade 1 anterolisthesis    L4-L5 and slight anterolisthesis L5-S1, increased since the prior studies. Impression    Increasing mild-to-moderate lumbar spondylotic changes with grade 1    anterolisthesis L4-L5. XRAY VIEWS OF THE CERVICAL SPINE        10/7/2020 2:13 pm        COMPARISON:    None.         HISTORY:    ORDERING SYSTEM PROVIDED HISTORY: Acute pain of both shoulders    TECHNOLOGIST PROVIDED HISTORY:    Reason for Exam: pt stated low back pain difficult to stand x 3 wks , bilat    shoulder pain    Acuity: Acute    Type of Exam: Initial    Additional signs and symptoms: pt stated low back pain difficult to stand x 3    wks , bilat shoulder pain        FINDINGS:    There is mild C3-4, canal stenosis. Changes combine to create mild    bilateral foraminal stenosis. Impression    Overall mild multilevel degenerative changes. Patient Active Problem List   Diagnosis    Allergic rhinitis    Fibromyalgia    Pure hypercholesterolemia    Chronic cholecystitis    Gastroesophageal reflux disease with esophagitis    Type 2 diabetes mellitus with diabetic polyneuropathy, with long-term current use of insulin (HCC)    Chronic left shoulder pain    Essential hypertension    Vitamin D deficiency    Fatigue    Diabetic polyneuropathy associated with type 2 diabetes mellitus (HCC)    Large breasts    Elevated LFTs    Lumbar radiculopathy    Arthritis involving multiple sites    Morbid obesity with BMI of 40.0-44.9, adult (HCC)    Numbness and tingling in both hands    Chronic bilateral low back pain without sciatica    Acute pain of both shoulders        ASSESSMENT    Berto Sr is a 50 y.o. female with     1. Lumbar radiculopathy    2. DDD (degenerative disc disease), lumbar    3. Lumbosacral spondylosis without myelopathy    4. Arthropathy of lumbar facet joint    5. Degenerative lumbar spinal stenosis    6. Impingement syndrome of right shoulder    7. Status post right rotator cuff repair           PLAN  Patient's   [] x-ray    [] CT scan    [x] MRI  Were/was  Reviewed. These findings are consistent with the patient's symptoms and physical examination. [] Patient's findings on the x-ray were explained to the patient using a bone modal.    Other reports reviewed include    [] Bone scan   [] EMG and nerve conduction studies   [] Referral reports-  I also discussed with him the following treatment options Including advantages and disadvantages of each:    [] Physical therapy    [] Interventional pain treatment    [] Medication management    [] Surgical options    Patient's OARRS were reviewed.  It is acceptable and appears patient is not receiving prescriptions from multiple prescribers. Patient is  forthcoming regarding prescriptions for pain medication in the past  Controlled Substances Monitoring: Periodic Controlled Substance Monitoring: No signs of potential drug abuse or diversion identified. , Assessed functional status. (Lizbeth Cox MD)  Counselling/Preventive measures for pain  Control:    [x]  Spine strengthening exercises are discussed with patient in detail. The following treatment plan was developed after discussion with patient:    We discussed Lumbar Epidural steroid Injections x 1  at L3 - L4./L4-5  Patient tried and failed NSAIDS,Home exercises, Physical Therapy, Chiropractic manipulations without relief. Patient exhibited signs of radiculopathy with positive straight leg raising test on bilaterally    Patient has not had prior Lumbar Surgery. We will see the patient in 2 weeks after the procedures and re-evaluate symptoms. The procedure risks as well as alternate therapies were discussed at length with the patient and patient agreed to undergo the procedure. She will be scheduled for 1 in the near future. Orders Placed This Encounter   Procedures    Lumbar Epidural Steroid Injection/Caudal     Standing Status:   Future     Standing Expiration Date:   11/3/2021    FLUORO FOR SURGICAL PROCEDURES     Standing Status:   Future     Standing Expiration Date:   11/3/2021    Saline lock IV     Standing Status:   Future     Standing Expiration Date:   5/3/2022       Decision Making Process : Patient's health history and referral records thoroughly reviewed before focused physical examination and discussion with patient. Over 50% of today's visit is spent on examining the patient and counseling. Level of complexity of date to be reviewed is Moderate. The chart date reviewed include the following: Imaging Reports. Summary of Care.      Time spent reviewing with patient the below reports:   Medication safety, Treatment options. Level of diagnosis and management options of this case is multiple: involving the following management options: Interventions as needed, medication management as appropriate, future visits, activity modification, heat/ice as needed, Urine drug screen as required. [x]The patient's questions were answered to the best of my abilit  This note was created using voice recognition software. There may be inaccuracies of transcription  that are inadvertently overlooked prior to the signature. There is any questions about the transcription please contact me. Due to the COVID-19 pandemic and the appropriate interventions by [facility], our non-urgent pain management patients will not be seen in the office at this time for their protection and the protection of our staff. To offer continuity of care, their prescriptions will be escribed this month after a careful chart review and review of their OARRS report  Pursuant to the emergency declaration under the [employer] and [facility], 1135 waiver authority and the [employer] and [employer] Act, this Virtual Visit was conducted, with patient's consent, to reduce the patient's risk of exposure to COVID-19 and provide continuity of care for an established patient. Services were provided through a video synchronous discussion virtually to substitute for in-person appointment. \"  Documentation:  I communicated with the patient and/or health care decision maker about plan of care  Details of this discussion including any medical advice provided: Total Time: 45-55 minutes    I affirm this is a Patient Initiated Episode with an Established Patient who has not had a related appointment within my department in the past 7 days or scheduled within the next 24 hours. This note was created using voice recognition software.  There may be inaccuracies of transcription  that are inadvertently overlooked prior to the signature. There is any questions about the transcription please contact me.     Electronically signed by Marlo Singh MD on 11/3/2020 at 5:43 PM

## 2020-10-28 ENCOUNTER — HOSPITAL ENCOUNTER (OUTPATIENT)
Dept: PHYSICAL THERAPY | Age: 55
Setting detail: THERAPIES SERIES
Discharge: HOME | End: 2020-10-28
Payer: COMMERCIAL

## 2020-10-28 PROCEDURE — 97113 AQUATIC THERAPY/EXERCISES: CPT

## 2020-10-28 RX ORDER — TIZANIDINE 4 MG/1
TABLET ORAL
Qty: 180 TABLET | Refills: 0 | Status: SHIPPED | OUTPATIENT
Start: 2020-10-28 | End: 2020-11-03

## 2020-10-28 ASSESSMENT — PAIN DESCRIPTION - DURATION: DURATION_2: CONTINUOUS

## 2020-10-28 ASSESSMENT — PAIN DESCRIPTION - LOCATION
LOCATION: SHOULDER
LOCATION_2: BACK

## 2020-10-28 ASSESSMENT — PAIN DESCRIPTION - DESCRIPTORS
DESCRIPTORS: CONSTANT;ACHING
DESCRIPTORS_2: ACHING;CONSTANT;NUMBNESS

## 2020-10-28 ASSESSMENT — PAIN DESCRIPTION - INTENSITY: RATING_2: 5

## 2020-10-28 ASSESSMENT — PAIN SCALES - GENERAL: PAINLEVEL_OUTOF10: 4

## 2020-10-28 ASSESSMENT — PAIN DESCRIPTION - ORIENTATION
ORIENTATION_2: LOWER;MID
ORIENTATION: RIGHT;ANTERIOR

## 2020-10-28 ASSESSMENT — PAIN DESCRIPTION - PAIN TYPE
TYPE_2: CHRONIC PAIN
TYPE: CHRONIC PAIN

## 2020-10-28 ASSESSMENT — PAIN DESCRIPTION - FREQUENCY: FREQUENCY: CONTINUOUS

## 2020-10-28 NOTE — FLOWSHEET NOTE
F/L/R 2 Laps W UE ROM 2 Laps W/ UE ROM  2 Laps W/ UE ROM 2 Laps w/ UE ROM      LE Exercise             Marching  10x 10x  2 Laps  2 Laps     Squats  10x5\" 10x5\"  15x  15x5\"     Step-Ups F/L             Heel-toe raises  10x 10x 15x  15x     SLR F/L/R  10x 10x  15x  15x     HS Curl   10x   15x  15x     Lunges             Knee flex/ext  10x 2 Laps  2 Laps  2 Laps     UE exercises             Shoulder Rolls       2 Sm balls      Shoulder shrugs  Retro 10x  Retro 10x  Retro 15x  Retro 15x     Horiz abd/add  10x 10x  15x  15x     Fwd flex  10x 10x  15x  15x     Abd/add  10x 10x  15x  15x     PNF   10x  15x  15x     Bicep Curls  10x 10x  15x  15x     IR/ER  10x 10x  15x  15x     Wall Push-Ups                           Kickboard Ex.  Med Med Med Med     ROM w/ kickboard  3-ways   3x10\" 3-ways  3x10\"  3-ways  3x10\" 3-ways  3x10\"      Iso Abd.             Push-pull  10x 10x  15x 15x     Paddling  10x 10x  15x 15x     Breast-stroke  2 Laps 2 Laps 2 Laps  2 Laps     Rope pull                           UE Stretches             Corner stretch             Post. Capsule stretch             LE Stretches             Hamstring  2x20\" 2x20\"   2x20\"  2x20\"     Achilles  2x20\" 2x20\"  2x20\"  2x20\"     Quad             C-Spine Stretches             Upper trap             Chin tucks             Cerv ROM             Deep H2O   1 Noodle   1 Noodle  1 Noodle     Cycling     2'  2'     Jacks             Cross-country             Harry Fail   5'  5'  5'                                                              Cool Down                           Pain Rating  7  7  4  5        Assessment: Body structures, Functions, Activity limitations: Decreased functional mobility ; Decreased ADL status; Decreased ROM; Decreased strength;Decreased balance;Decreased high-level IADLs; Increased pain;Decreased posture  Treatment Diagnosis: Right arm pain/   Prognosis: Good  REQUIRES PT FOLLOW UP: Yes  Activity Tolerance: Patient Tolerated treatment well    Plan:  Plan:

## 2020-10-28 NOTE — TELEPHONE ENCOUNTER
Please Approve or Refuse.   Send to Pharmacy per Pt's Request:      Next Visit Date:  11/10/2020   Last Visit Date: 10/7/2020    Hemoglobin A1C (%)   Date Value   10/05/2020 11.1 (H)   08/29/2020 11.2 (H)   03/10/2020 11.1             ( goal A1C is < 7)   BP Readings from Last 3 Encounters:   10/07/20 132/86   10/05/20 138/89   09/28/20 130/80          (goal 120/80)  BUN   Date Value Ref Range Status   08/29/2020 8 6 - 20 mg/dL Final     CREATININE   Date Value Ref Range Status   08/29/2020 0.74 0.50 - 0.90 mg/dL Final     Potassium   Date Value Ref Range Status   08/29/2020 4.6 3.7 - 5.3 mmol/L Final

## 2020-10-29 ENCOUNTER — OFFICE VISIT (OUTPATIENT)
Dept: ORTHOPEDIC SURGERY | Age: 55
End: 2020-10-29
Payer: COMMERCIAL

## 2020-10-29 VITALS — HEIGHT: 66 IN | BODY MASS INDEX: 42.27 KG/M2 | WEIGHT: 263 LBS

## 2020-10-29 PROCEDURE — G8482 FLU IMMUNIZE ORDER/ADMIN: HCPCS | Performed by: ORTHOPAEDIC SURGERY

## 2020-10-29 PROCEDURE — 99213 OFFICE O/P EST LOW 20 MIN: CPT | Performed by: ORTHOPAEDIC SURGERY

## 2020-10-29 PROCEDURE — 3017F COLORECTAL CA SCREEN DOC REV: CPT | Performed by: ORTHOPAEDIC SURGERY

## 2020-10-29 PROCEDURE — G8417 CALC BMI ABV UP PARAM F/U: HCPCS | Performed by: ORTHOPAEDIC SURGERY

## 2020-10-29 PROCEDURE — G8427 DOCREV CUR MEDS BY ELIG CLIN: HCPCS | Performed by: ORTHOPAEDIC SURGERY

## 2020-10-29 PROCEDURE — 1036F TOBACCO NON-USER: CPT | Performed by: ORTHOPAEDIC SURGERY

## 2020-10-29 NOTE — PROGRESS NOTES
I performed a history and physical examination of the patient and discussed management with the resident. I reviewed the physician assistant/resident physician note and agree with the documented findings and plan of care. Any areas of disagreement are noted on the chart. I have personally evaluated this patient and have completed at least one if not all key elements of the E/M (history, physical exam, and MDM). Additional findings are as noted. I agree with the chief complaint, past medical history, past surgical history, allergies, medications, social and family history as documented unless otherwise noted below.      Electronically signed by Yaz Armenta DO on 10/29/2020 at 1:12 PM

## 2020-10-29 NOTE — PROGRESS NOTES
[facility]  [facility]  [address] [address]  Dept: [phone]  Dept Fax: [phone]        Orthopaedic Clinic Follow Up    Subjective:     Adrienne Holguin is a 50y.o. year old female who presents to the clinic today for that is a follow-up coming here for a new problem. Patient does complain of left carpal tunnel syndrome that he had previously seen in March but patient reports was unable to address secondary to the Covid crisis. She reports that her symptoms have not improved but also have not gotten worse. It is manageable at this time. EMG obtained at that time demonstrates mild to moderate carpal tunnel syndrome. Patient is primarily here for her back complaint. She states that she has axial back pain with radiation from the buttocks to the back of her thigh bilaterally. Pain used to only worsen with ambulation but now patient reports that it is also severe while sitting down. Reports numbness and tingling to the left hand and bilateral feet. Physical therapy was prescribed at last visit for the back which patient reports she has been approximately on for 1 month. She reports minimal improvement at this time. Review of Systems  Gen: no fever, chills, malaise  CV: no chest pain or palpitations  Resp: no cough or shortness of breath  GI: no nausea, vomiting, diarrhea, or constipation  Neuro: Numbness and tingling to the left hand and bilateral feet  Msk: Myalgias to the back  10 remaining systems reviewed and negative    Objective : There were no vitals filed for this visit. Body mass index is 42.45 kg/m². General: No acute distress, resting comfortably in the clinic  Neuro: alert. oriented  Eyes: Extra-ocular muscles intact  Pulm: Respirations unlabored and regular. Skin: warm, well perfused  Psych:   Patient has good fund of knowledge and displays understanding of exam, diagnosis, and plan.     MSK: Back pain:  Tenderness palpation appreciated at the lumbosacral junction and bilateral buttocks. Decreased sensation to the L5 distribution of the left foot compared to the right. Otherwise sensation intact light touch L2-S1. Motor function 5/5 strength L2-S1. Skin is warm and well-perfused    Radiology:  None taken at clinic     Assessment:   50y.o. year old female with disc bulge at L3-4, L4-5, and L5-S1. Plan:      Patient is here today for follow-up of her back pain. Also when in discussion with her EMG confirmed left carpal tunnel syndrome, patient would like to pursue treatment of her back before considering surgical intervention of the left carpal tunnel which she would eventually like in the future. Regards to her back, physical therapy has been of minimal help. We discussed the possible steroid therapy but patient is diabetic and would like to refrain from increasing her sugar levels. In light of these findings, I recommended the patient to be referred to our spine specialist Dr. Kasey Cochran for another opinion. I discussed in length with the patient that she should continue physical therapy as the benefits from it will take some time. In the meantime, I recommend the patient continue NSAID therapy as needed. Patient is amenable to all recommendations. She is encouraged to call the office with any questions. She may follow-up with us as needed when she is ready to pursue with her left carpal tunnel surgery. Follow up:Return if symptoms worsen or fail to improve. No orders of the defined types were placed in this encounter.        Orders Placed This Encounter   Procedures   Gutierrez Quevedo MD, Orthopedic Surgery, [place]     Referral Priority:   Routine     Referral Type:   Eval and Treat     Referral Reason:   Specialty Services Required     Referred to Provider:   Inderjit Mendosa MD     Requested Specialty:   Orthopedic Surgery     Number of Visits Requested:   1       Electronically signed by Ben Arana

## 2020-11-03 ENCOUNTER — APPOINTMENT (OUTPATIENT)
Dept: PHYSICAL THERAPY | Age: 55
End: 2020-11-03
Payer: COMMERCIAL

## 2020-11-03 ENCOUNTER — HOSPITAL ENCOUNTER (OUTPATIENT)
Dept: PAIN MANAGEMENT | Age: 55
Discharge: HOME | End: 2020-11-03
Payer: COMMERCIAL

## 2020-11-03 PROCEDURE — 99244 OFF/OP CNSLTJ NEW/EST MOD 40: CPT | Performed by: PAIN MEDICINE

## 2020-11-03 PROCEDURE — 99203 OFFICE O/P NEW LOW 30 MIN: CPT

## 2020-11-03 RX ORDER — TIZANIDINE 4 MG/1
TABLET ORAL
Qty: 90 TABLET | Refills: 0 | Status: SHIPPED | OUTPATIENT
Start: 2020-11-03 | End: 2021-06-09 | Stop reason: SDUPTHER

## 2020-11-03 ASSESSMENT — ENCOUNTER SYMPTOMS
SHORTNESS OF BREATH: 0
EYE PAIN: 0
RHINORRHEA: 0
ABDOMINAL PAIN: 0
WHEEZING: 0
BLOOD IN STOOL: 0
CONSTIPATION: 0
VOMITING: 0
COLOR CHANGE: 0
COUGH: 0
PHOTOPHOBIA: 0
SINUS PRESSURE: 0
BACK PAIN: 1
NAUSEA: 0

## 2020-11-05 ENCOUNTER — TELEPHONE (OUTPATIENT)
Dept: PAIN MANAGEMENT | Age: 55
End: 2020-11-05

## 2020-11-05 ENCOUNTER — HOSPITAL ENCOUNTER (OUTPATIENT)
Dept: PHYSICAL THERAPY | Age: 55
Setting detail: THERAPIES SERIES
Discharge: HOME | End: 2020-11-05
Payer: COMMERCIAL

## 2020-11-05 PROCEDURE — 97113 AQUATIC THERAPY/EXERCISES: CPT

## 2020-11-05 ASSESSMENT — PAIN DESCRIPTION - ORIENTATION: ORIENTATION: RIGHT;ANTERIOR

## 2020-11-05 ASSESSMENT — PAIN DESCRIPTION - DESCRIPTORS: DESCRIPTORS: CONSTANT;ACHING

## 2020-11-05 ASSESSMENT — PAIN DESCRIPTION - PAIN TYPE: TYPE: CHRONIC PAIN

## 2020-11-05 ASSESSMENT — PAIN DESCRIPTION - FREQUENCY: FREQUENCY: CONTINUOUS

## 2020-11-05 ASSESSMENT — PAIN DESCRIPTION - LOCATION: LOCATION: SHOULDER

## 2020-11-05 ASSESSMENT — PAIN SCALES - GENERAL: PAINLEVEL_OUTOF10: 3

## 2020-11-05 NOTE — FLOWSHEET NOTE
[address]   Outpatient Physical Therapy  [address] [facility]. [address]  Phone: [phone]  Fax: [phone]  Daily Progress Note    Date: 20    Patient Name: Mickey Rios        MRN: [id number]  Account: [de-identified] : 1965      General Information:  Chart Reviewed: Yes  Patient assessed for rehabilitation services?: Yes  Additional Pertinent Hx: left rotator cuff repair   Referring Practitioner: Alondra Bgaley DO   Referral Date : 20  Diagnosis: Right shoulder pain (M25.511)  Follows Commands: Within Functional Limits  Other (Comment): To see Dr at end of October  Onset Date: 20  PT Insurance Information: Linda Nogueira   Total # of Visits Approved: 18  Total # of Visits to Date: 12  No Show: 0  Canceled Appointment: 0    Subjective:  Subjective: Pt reports shoulder feeling alright at this time. States was sore after last therapy session. states going to see an ortho doctor for her back. Pain:  Patient Currently in Pain: Yes  Pain Assessment: 0-10  Pain Level: 3  Patient's Stated Pain Goal: No pain  Pain Type: Chronic pain  Pain Location: Shoulder  Pain Orientation: Right; Anterior  Pain Descriptors: Constant; Aching  Pain Frequency: Continuous       Objective:      [address] Services Exercise Log  Protocol Fibromyalgia     Date of Eval: 10-                               Primary PT: Lucio  Diagnosis: Right shoulder pain and low back pain   Things to Focus On (goals): lesson pain, restore function  Surgical Precautions:  Medical Precautions:DM, Diabetic Polyneuropathy  []????? C-9 dates  []????? Occ Med   []????? Medicare         Date  10/20/20 10/21/20 10/26/20  10/28/20  11/5/20   Visit # 8/18 9/18  10/18  11/18  12/18   Walk F/L/R 2 Laps W UE ROM 2 Laps W/ UE ROM  2 Laps W/ UE ROM 2 Laps w/ UE ROM   2 Laps w/ UE ROM   LE Exercise             Marching  10x 10x  2 Laps  2 Laps  2 Laps   Squats  10x5\" 10x5\"  15x  15x5\" 15x5\" Step-Ups F/L             Heel-toe raises  10x 10x 15x  15x 15x   SLR F/L/R  10x 10x  15x  15x 15x   HS Curl   10x   15x  15x 15x   Lunges             Knee flex/ext  10x 2 Laps  2 Laps  2 Laps 2 Laps   UE exercises             Shoulder Rolls       2 Sm balls  2 Sm Balls   Shoulder shrugs  Retro 10x  Retro 10x  Retro 15x  Retro 15x Retro 15x   Horiz abd/add  10x 10x  15x  15x 15x   Fwd flex  10x 10x  15x  15x 15x   Abd/add  10x 10x  15x  15x 15x   PNF   10x  15x  15x 15x   Bicep Curls  10x 10x  15x  15x 15x   IR/ER  10x 10x  15x  15x 15x   Wall Push-Ups                           Kickboard Ex.  Med Med Med Med  Med   ROM w/ kickboard  3-ways   3x10\" 3-ways  3x10\"  3-ways  3x10\" 3-ways  3x10\"  3-ways   3x10\"   Iso Abd.             Push-pull  10x 10x  15x 15x 15x   Paddling  10x 10x  15x 15x 15x   Breast-stroke  2 Laps 2 Laps 2 Laps  2 Laps 2 Laps   Rope pull                           UE Stretches             Corner stretch             Post. Capsule stretch             LE Stretches             Hamstring  2x20\" 2x20\"   2x20\"  2x20\"  2x20\"   Achilles  2x20\" 2x20\"  2x20\"  2x20\"  2x20\"   Quad             C-Spine Stretches             Upper trap             Chin tucks             Cerv ROM             Deep H2O   1 Noodle   1 Noodle  1 Noodle 1 Noodle    Cycling     2'  2' 2'    Mariana Bores         2'    Cross-country             Ora Gentry   5'  5'  5'  5'                                                            Cool Down                           Pain Rating  7  7  4  5 4      Assessment: Body structures, Functions, Activity limitations: Decreased functional mobility ; Decreased ADL status; Decreased ROM; Decreased strength;Decreased balance;Decreased high-level IADLs; Increased pain;Decreased posture  Treatment Diagnosis: Right arm pain/   Prognosis: Good  REQUIRES PT FOLLOW UP: Yes  Activity Tolerance: Patient Tolerated treatment well    Plan:  Plan: Continue with current plan    Therapy Time:  Time In: 1055  Time Out: 1140  Minutes:

## 2020-11-09 ENCOUNTER — HOSPITAL ENCOUNTER (OUTPATIENT)
Dept: PHYSICAL THERAPY | Age: 55
Setting detail: THERAPIES SERIES
Discharge: HOME | End: 2020-11-09
Payer: COMMERCIAL

## 2020-11-09 ENCOUNTER — HOSPITAL ENCOUNTER (OUTPATIENT)
Dept: DIABETES SERVICES | Age: 55
Setting detail: THERAPIES SERIES
Discharge: HOME | End: 2020-11-09
Payer: COMMERCIAL

## 2020-11-09 PROCEDURE — 97113 AQUATIC THERAPY/EXERCISES: CPT

## 2020-11-09 PROCEDURE — G0108 DIAB MANAGE TRN  PER INDIV: HCPCS

## 2020-11-09 RX ORDER — INSULIN DEGLUDEC INJECTION 100 U/ML
45 INJECTION, SOLUTION SUBCUTANEOUS 2 TIMES DAILY
COMMUNITY
End: 2021-08-19 | Stop reason: SDUPTHER

## 2020-11-09 RX ORDER — GLIPIZIDE 5 MG/1
5 TABLET ORAL
COMMUNITY
End: 2021-01-25

## 2020-11-09 ASSESSMENT — PAIN DESCRIPTION - FREQUENCY: FREQUENCY: CONTINUOUS

## 2020-11-09 ASSESSMENT — PAIN SCALES - GENERAL: PAINLEVEL_OUTOF10: 5

## 2020-11-09 ASSESSMENT — PAIN DESCRIPTION - ORIENTATION
ORIENTATION_2: LOWER;MID
ORIENTATION: RIGHT;ANTERIOR

## 2020-11-09 ASSESSMENT — PAIN DESCRIPTION - PAIN TYPE
TYPE_2: CHRONIC PAIN
TYPE: CHRONIC PAIN

## 2020-11-09 ASSESSMENT — PAIN DESCRIPTION - LOCATION
LOCATION: SHOULDER
LOCATION_2: BACK

## 2020-11-09 ASSESSMENT — PAIN DESCRIPTION - DESCRIPTORS
DESCRIPTORS: CONSTANT;ACHING
DESCRIPTORS_2: ACHING;CONSTANT

## 2020-11-09 ASSESSMENT — PAIN DESCRIPTION - INTENSITY: RATING_2: 5

## 2020-11-09 ASSESSMENT — PAIN DESCRIPTION - DURATION: DURATION_2: CONTINUOUS

## 2020-11-09 NOTE — FLOWSHEET NOTE
[facility]   Outpatient Physical Therapy  [address] [place] [address]. [address]  Phone: [phone]  Fax: [phone]  Daily Progress Note    Date: 20    Patient Name: Gabino Baires        MRN: [id number]  Account: [de-identified] : 1965      General Information:  Chart Reviewed: Yes  Patient assessed for rehabilitation services?: Yes  Additional Pertinent Hx: left rotator cuff repair   Referring Practitioner: Jessica Alonzo DO   Referral Date : 20  Diagnosis: Right shoulder pain (M25.511)  Follows Commands: Within Functional Limits  Other (Comment): To see Dr at end of October  Onset Date: 20  PT Insurance Information: Niki Sharpe   Total # of Visits Approved: 18  Total # of Visits to Date: 13  No Show: 0  Canceled Appointment: 0    Subjective:  Subjective: Pt reports feeling good after last visit. denies any issues after last visit. Pain:  Patient Currently in Pain: Yes  Pain Assessment: 0-10  Pain Level: 5  Patient's Stated Pain Goal: No pain  Pain Type: Chronic pain  Pain Location: Shoulder  Pain Orientation: Right; Anterior  Pain Descriptors: Constant; Aching  Pain Frequency: Continuous  Pain Rating 2: 5  Pain Type 2: Chronic Pain  Pain Location 2: Back  Pain Orientation 2: Lower;Mid  Pain Descriptors 2: Aching;Constant  Pain Duration 2: Continuous    Objective:     [facility]   Rehabilitation Services Exercise Log  Protocol Fibromyalgia     Date of Eval: 10-                               Primary PT: Lucio  Diagnosis: Right shoulder pain and low back pain   Things to Focus On (goals): lesson pain, restore function  Surgical Precautions:  Medical Precautions:DM, Diabetic Polyneuropathy  []?????? C-9 dates  []?????? Occ Med   []?????? Medicare         Date 10/26/20  10/28/20  11/5/20 11/9/20   Visit #  10/18  11/18  12/18 13/18   Walk F/L/R  2 Laps W/ UE ROM 2 Laps w/ UE ROM   2 Laps w/ UE ROM 3 Laps w/ UE ROM   LE Exercise          Marching  2 Laps  2 Iontophoresis     []  Traction     []  Massage       []  Eval     []  Gait     []  Vasopneumatic Device     []  Ther Exercise       []  Manual Therapy       []  Ther Activities       [x]  Aquatics 38 3   []  Neuro Re-Ed       []  Other       Total Treatment Time: 45  3     Mike Keane, PTA

## 2020-11-09 NOTE — LETTER
[facility]  [address] [facility]  Phone: [phone]         November 9, 2020    To Rafael Joseph, [address] [facility]  [address] [facility]  [address]    From: Henry Acosta RN    Patient: Berto Sr   YOB: 1965   Date of Visit: 11/9/20     An initial assessment to determine diabetes education needs was completed on 11/9/20  . Education included:referred to Diabetes Educator, interpretation of lab results, blood sugar goals, complications of diabetes mellitus, hypoglycemia prevention and treatment, exercise, illness management, self-monitoring of blood glucose skills, nutrition, carbohydrate counting, site rotation, use of insulin pen and insulin adjustments. An ongoing plan was created to include: follow up on 11/11/20@ 11:30a    Thank you for the opportunity to provide Diabetes Self Management Education to your patient.

## 2020-11-09 NOTE — PROGRESS NOTES
Diabetes Self- Management Education Program Assessment -   Also see Diabetic Screening  Patient, Hamida Bhat,  here for diabetes self-management education  visit/ assessment. Today's visit was in an individual setting. MEDICAL HISTORY:  Past Medical History:   Diagnosis Date    Allergic rhinitis 10/21/2014    Anxiety     Arthritis     Bleeds easily ([address]     per pt, has never had a work up   Gloriajean Seeds Complete rotator cuff tear of left shoulder 2018    Diabetes mellitus ([address]     On Insulin, metformin, Glipizide    Diabetic polyneuropathy associated with type 2 diabetes mellitus ([address] 3/26/2018    Fibromyalgia     GERD (gastroesophageal reflux disease) 10/21/2014    H/O transfusion     History of blood transfusion     Hypertension     Obesity 10/21/2014    Pure hypercholesterolemia 2/23/2016    Tobacco abuse     Type II or unspecified type diabetes mellitus without mention of complication, not stated as uncontrolled     Unspecified sleep apnea     CPAP machine is broken    Wears glasses      Family History   Problem Relation Age of Onset    Diabetes Mother     Heart Disease Mother         murmur    Heart Disease Father         MVP    Diabetes Sister     Diabetes Maternal Grandmother     High Blood Pressure Maternal Grandmother     Heart Failure Maternal Grandfather     Heart Attack Paternal Grandmother      Pcn [penicillins]; Demerol hcl [meperidine]; Metformin and related;  Tape [adhesive tape]; and Morphine   Immunization History   Administered Date(s) Administered    Hepatitis B Adult (Engerix-B) 09/28/2020    Influenza Virus Vaccine 10/06/2015, 09/02/2020    Influenza, Quadv, IM, (6 mo and older Fluzone, Flulaval, Fluarix and 3 yrs and older Afluria) 09/27/2017    Influenza, Quadv, IM, PF (6 mo and older Fluzone, Flulaval, Fluarix, and 3 yrs and older Afluria) 10/01/2017, 10/03/2018, 11/26/2019    Influenza, [facility], Recombinant, IM PF (Flublok 18 yrs and older) 09/02/2020    Pneumococcal Polysaccharide ([id number]) 10/06/2015    Tdap (Boostrix, Adacel) 09/28/2020    Zoster Recombinant (Shingrix) 09/02/2020, 10/30/2020     Current Medications  Current Outpatient Medications   Medication Sig Dispense Refill    Insulin Degludec (TRESIBA FLEXTOUCH) 100 UNIT/ML SOPN Inject 45 Units into the skin 2 times daily      glipiZIDE (GLUCOTROL) 5 MG tablet Take 5 mg by mouth 2 times daily (before meals)      tiZANidine (ZANAFLEX) 4 MG tablet TAKE ONE TABLET BY MOUTH EVERY 8 HOURS AS NEEDED FOR NECK AND BACK PAIN. **CAUSES SEDATION AND DO NOT DRIVE WHILE TAKING THIS MEDICATION 90 tablet 0    lisinopril (PRINIVIL;ZESTRIL) 20 MG tablet TAKE ONE TABLET BY MOUTH DAILY 90 tablet 0    cetirizine (ZYRTEC) 10 MG tablet Take 10 mg by mouth daily      pregabalin (LYRICA) 100 MG capsule Take 1 capsule by mouth 2 times daily for 90 days. 180 capsule 2    meloxicam (MOBIC) 7.5 MG tablet Take 1 tablet by mouth daily 90 tablet 1    Blood Glucose Monitoring Suppl (TRUE METRIX METER) MANOLO Testing BID 1 Device 0    Insulin Pen Needle (PEN NEEDLES 3/16\") 31G X 5 MM MISC 1 each by Does not apply route daily 100 each 3    insulin lispro, 1 Unit Dial, (HUMALOG KWIKPEN) 100 UNIT/ML SOPN Inject 15 Units into the skin 3 times daily (before meals) 5 pen 1    blood glucose monitor strips Test 2-3 times a day & as needed for symptoms of irregular blood glucose. BRAND OF CHOICE INSURANCE ALLOWS. 100 strip 11    atorvastatin (LIPITOR) 10 MG tablet Take 1 tablet by mouth daily 90 tablet 2    Lancets MISC Dx: DM-2.  Use 2-3 times daily 100 each 3    Alcohol Swabs (ALCOHOL PREP) PADS Use as directed 100 each 11    Insulin Syringes, Disposable, U-100 1 ML MISC 1 each by Does not apply route daily 100 each 3    hydroCHLOROthiazide (HYDRODIURIL) 25 MG tablet Take 1 tablet by mouth daily Take with Potassium supplement 30 tablet 3    potassium chloride (KLOR-CON) 10 MEQ extended release tablet Take 1 tablet by mouth daily Take with HCTZ 30 tablet 3    vitamin D (CHOLECALCIFEROL) 25 MCG (1000 UT) TABS tablet Take 1 tablet by mouth daily 30 tablet 3     Current Facility-Administered Medications   Medication Dose Route Frequency Provider Last Rate Last Dose    methylPREDNISolone acetate (DEPO-MEDROL) injection 80 mg  80 mg Intra-articular Once Oj Amble, DO        bupivacaine (MARCAINE) 0.25 % injection 5 mg  2 mL Intra-articular Once Oj Amble, DO       :     Comments:  Allergies: Allergies   Allergen Reactions    Pcn [Penicillins] Hives and Other (See Comments)     Lose motor function of legs and collaps    Demerol Hcl [Meperidine] Other (See Comments)     Panic attack    Metformin And Related      Abdominal pain    Tape Ressie Men Tape] Other (See Comments)     Blister      Morphine Nausea And Vomiting and Other (See Comments)     Stomach pain         A1C blood level - at goal < 7%   Lab Results   Component Value Date    LABA1C 11.1 (H) 10/05/2020    LABA1C 11.2 (H) 08/29/2020    LABA1C 11.1 03/10/2020     Lab Results   Component Value Date    GLUF 312 (H) 08/29/2020    LABMICR CANNOT BE CALCULATED 08/29/2020    LDLCALC 110 03/19/2015    CREATININE 0.74 08/29/2020       Blood pressure ( 130/ 80)  Or less  BP Readings from Last 3 Encounters:   10/07/20 132/86   10/05/20 138/89   09/28/20 130/80        Cholesterol ( LDL under  100)   Lab Results   Component Value Date    LDLCALC 110 03/19/2015    LDLCHOLESTEROL 74 10/07/2020       Diabetes Self- Management Education Record    Participant Name: Jovany Fernández  Referring Provider: KIMANI Mcwilliams CNP   Assessment/Evaluation Ratings:  1=Needs Instruction   4=Demonstrates Understanding/Competency  2=Needs Review   NC=Not Covered    3=Comprehends Key Points  N/A=Not Applicable  Topics/Learning Objectives Pre-session Assess Date:  11/9/20CB Instr.  Date Reinforce Date Post- session Eval Comments   Diabetes disease process & Treatment process: Define guidelines. 1    Tresiba 45 units am and HS  Humalog Kwikpen 20 units 3x/d at meals11/9/20CB   Monitoring blood glucose, interpreting and using results:  Identify recommended & personal blood glucose targets; importance of testing; testing supplies; HgbA1C target levels; Factors affecting blood glucose; Importance of logging blood glucose levels for pattern recognition; ketone testing; safe lancet disposal.   1    Checking BG 3x/d 123 before meals, 200's 2hpp. A1C 11.1 on 10/6/20  11/9/20CB   Prevention, detection & treatment of acute complications:  Identify symptoms of hyper & hypoglycemia, and prevention & treatment strategies. 1       Describe sick day guidelines & indications for  physician notification. Identify short term consequences of poor control. Disaster preparedness strategies    1       Prevention, detection & treatment of chronic complications:  Define the natural course of diabetes & describe the relationship of blood glucose levels to long term complications of diabetes. Identify preventative measures & standards of care. 1    No dentist, no teeth, recent eye and foot exam in Sept11/9/20CB   Developing strategies to address psychosocial issues:  Describe feelings about living with diabetes; Describe how stress, depression & anxiety affect blood glucose; Identify coping strategies; Identify support needed & support network available. 1    Concerned about BG since daughter in law told her she did not want to lose her. Wants to be healthy for grandkids as she likes to be active and do things with them. Mother very supportive and encouraging. 11/9/20CB   Developing strategies to promote health/change behavior: Identify 7 self-care behaviors; Personal health risk factors; Benefits, challenges & strategies for behavioral change;    1         Individualized goal selection. My goal , to help me improve my health, I will:   1.try to stop reg pop      2.        Plan  Follow-up Appointments planned in individual setting. Next Appointment on 11/11/20 @ 11:30    Instruction Method: [x]Lecture/Discussion  []Power Point Presentation  [x]Handouts  []Return Demonstration      Education Materials/Equipment Provided:    [x]Self-Management - Initial assessment - Enrolment in to ADA  Where do I Begin, Living with Type 2 diabetes ADA home support program and handout for no concentrated sweets and diet meal planning basics, handout on diabetes education classes. 11/9/20CB      []Self-Management  Class 1 - \"Diabetes - your take control guide\" - ADA booklet and Healthy i On the Road to better managing your diabetes map handouts    [] Self-Management  Class 2 - Meal Plan and handout for serving sizes, smarter snacking, Ready Set Carb Counting / Plate Method, Nutrient Conversion and International [address] Eating for People with Diabetes and Nutrition in the [employer] - fast facts about fast food    [] Self-Management  Class 3 -  Diabetes ID card,  foot care tips sheet, Healthy I  Continuing Your Journey with Diabetes map handout, Individualized Diabetes report card    [] Self-Management Class 4 - BD Booklet  Sick Day Rules and  [address] , recipe hand outs and tips, diabetes Cookbooks  ( when available)     []Self-Management -  Self-Management - 3 month follow -  AADE7 Self care behaviors work sheets, 2020 [employer],  Online resource list - March 2020      []Self-Management  Gestational - RN class -Gestational Diabetes Mellitus ( GDM) toolkit form [place] gestational diabetes postpartum care learning collaborative 2018.    Gestational diabetes handout from [facility] jan 2016  \"Simple Guidelines for meal planning with gestational diabetes\" handout 3 meals and 3 snacks  SMBG sheets to fax back to M weekly  BD  healthy injection site selection and rotation with 6 mm insulin syringe and 4 mm pen needle  Did you have gestational diabetes when [email or web address] [email or web address]-programs for more details   Check web site for updated times/ dates       S[] dameon Senior [address]  Free class   [facility]  [address] [facility], [address] [facility] Tuesday and Thursday -   9 :30 am- 10:30am - ongoing   [] [address]  [facility], [address] [address] [facility] Thursday 11:00am - 11:45am     [] Third Wednesday Cooking  Class  Free  Registration is required     [address] [facility]. [address] [facility], [address] [facility] [phone] or   Email [email or web address]   Wednesdays-5:00- 6:00 pm       [] Eat Smart  Be Active & Learn How - Free   Families & grandparents with children in home 0- 25 & pregnant moms          Various sites in community - call to find next session near you. [facility] extension office for future sessions - Spenser Meier  Email : [email or web address]. [facility]  Phone: [phone]     [] (SNAP-Ed)   - free nutrition education   - adults and youth, who are eligible for the Supplemental Nutrition Assistance Program    Various sites in community - call to find next session near you. [facility] SNAP-Ed  contact Lilly Churchill, Email:[email or web address]           Post Education Referrals:      [] [place] Tobacco Quit information sheet and [address]       [] Dental care - [facility] care of [facility]     [] [facility] ([facility] and [facility]) link  phone number - for information and referral to [address]  Clinically  [address] Guerin, WEIGHT MANAGEMENT        []Other  Zaire Calixto RN

## 2020-11-10 ENCOUNTER — APPOINTMENT (OUTPATIENT)
Dept: PHYSICAL THERAPY | Age: 55
End: 2020-11-10
Payer: COMMERCIAL

## 2020-11-10 ENCOUNTER — OFFICE VISIT (OUTPATIENT)
Dept: FAMILY MEDICINE CLINIC | Age: 55
End: 2020-11-10
Payer: COMMERCIAL

## 2020-11-10 VITALS
DIASTOLIC BLOOD PRESSURE: 63 MMHG | BODY MASS INDEX: 44.29 KG/M2 | SYSTOLIC BLOOD PRESSURE: 111 MMHG | WEIGHT: 275.6 LBS | OXYGEN SATURATION: 97 % | TEMPERATURE: 97.3 F | HEIGHT: 66 IN | HEART RATE: 94 BPM

## 2020-11-10 PROBLEM — G47.33 OBSTRUCTIVE SLEEP APNEA SYNDROME: Status: ACTIVE | Noted: 2020-11-10

## 2020-11-10 PROCEDURE — 3046F HEMOGLOBIN A1C LEVEL >9.0%: CPT | Performed by: FAMILY MEDICINE

## 2020-11-10 PROCEDURE — 90746 HEPB VACCINE 3 DOSE ADULT IM: CPT | Performed by: FAMILY MEDICINE

## 2020-11-10 PROCEDURE — G8482 FLU IMMUNIZE ORDER/ADMIN: HCPCS | Performed by: FAMILY MEDICINE

## 2020-11-10 PROCEDURE — G8427 DOCREV CUR MEDS BY ELIG CLIN: HCPCS | Performed by: FAMILY MEDICINE

## 2020-11-10 PROCEDURE — G8417 CALC BMI ABV UP PARAM F/U: HCPCS | Performed by: FAMILY MEDICINE

## 2020-11-10 PROCEDURE — 2022F DILAT RTA XM EVC RTNOPTHY: CPT | Performed by: FAMILY MEDICINE

## 2020-11-10 PROCEDURE — 99214 OFFICE O/P EST MOD 30 MIN: CPT | Performed by: FAMILY MEDICINE

## 2020-11-10 PROCEDURE — 1036F TOBACCO NON-USER: CPT | Performed by: FAMILY MEDICINE

## 2020-11-10 PROCEDURE — 3017F COLORECTAL CA SCREEN DOC REV: CPT | Performed by: FAMILY MEDICINE

## 2020-11-10 RX ORDER — MELOXICAM 15 MG/1
15 TABLET ORAL DAILY
Qty: 90 TABLET | Refills: 2 | Status: SHIPPED | OUTPATIENT
Start: 2020-11-10 | End: 2021-06-10

## 2020-11-10 ASSESSMENT — ENCOUNTER SYMPTOMS
ABDOMINAL PAIN: 0
CONSTIPATION: 0
SHORTNESS OF BREATH: 0
COUGH: 0
NAUSEA: 0
BACK PAIN: 1
DIARRHEA: 0
RESPIRATORY NEGATIVE: 1

## 2020-11-10 ASSESSMENT — PATIENT HEALTH QUESTIONNAIRE - PHQ9
2. FEELING DOWN, DEPRESSED OR HOPELESS: 0
SUM OF ALL RESPONSES TO PHQ QUESTIONS 1-9: 0
SUM OF ALL RESPONSES TO PHQ QUESTIONS 1-9: 0
SUM OF ALL RESPONSES TO PHQ9 QUESTIONS 1 & 2: 0
1. LITTLE INTEREST OR PLEASURE IN DOING THINGS: 0
SUM OF ALL RESPONSES TO PHQ QUESTIONS 1-9: 0

## 2020-11-10 NOTE — PROGRESS NOTES
Visit Information    Have you changed or started any medications since your last visit including any over-the-counter medicines, vitamins, or herbal medicines? no   Have you stopped taking any of your medications? Is so, why? -  no  Are you having any side effects from any of your medications? - no    Have you seen any other physician or provider since your last visit? yes - DR Bishop Salazar   Have you had any other diagnostic tests since your last visit?  no   Have you been seen in the emergency room and/or had an admission in a hospital since we last saw you?  no   Have you had your routine dental cleaning in the past 6 months?  no     Do you have an active MyChart account? If no, what is the barrier?   Yes    Patient Care Team:  KIMANI Ramírez CNP as PCP - General (Family Medicine)  KIMANI Ramírez CNP as PCP - [facility]  Tc Kaufman DO as Consulting Physician (General Surgery)    Medical History Review  Past Medical, Family, and Social History reviewed and does contribute to the patient presenting condition    Health Maintenance   Topic Date Due    Diabetic foot exam  07/26/2020    Hepatitis B vaccine (2 of 3 - Risk 3-dose series) 10/26/2020    A1C test (Diabetic or Prediabetic)  01/05/2021    Colon cancer screen colonoscopy  02/21/2021    Diabetic microalbuminuria test  08/29/2021    Potassium monitoring  08/29/2021    Creatinine monitoring  08/29/2021    Diabetic retinal exam  09/15/2021    Lipid screen  10/07/2021    Breast cancer screen  10/08/2022    Cervical cancer screen  10/05/2025    DTaP/Tdap/Td vaccine (2 - Td) 09/28/2030    Flu vaccine  Completed    Shingles Vaccine  Completed    Pneumococcal 0-64 years Vaccine  Completed    HIV screen  Completed    Hepatitis A vaccine  Aged Out    Hib vaccine  Aged Out    Meningococcal (ACWY) vaccine  Aged Out

## 2020-11-10 NOTE — PATIENT INSTRUCTIONS
Patient Education        Learning About Diabetes Food Guidelines  Your Care Instructions     Meal planning is important to manage diabetes. It helps keep your blood sugar at a target level (which you set with your doctor). You don't have to eat special foods. You can eat what your family eats, including sweets once in a while. But you do have to pay attention to how often you eat and how much you eat of certain foods. You may want to work with a dietitian or a certified diabetes educator (CDE) to help you plan meals and snacks. A dietitian or CDE can also help you lose weight if that is one of your goals. What should you know about eating carbs? Managing the amount of carbohydrate (carbs) you eat is an important part of healthy meals when you have diabetes. Carbohydrate is found in many foods. · Learn which foods have carbs. And learn the amounts of carbs in different foods. ? Bread, cereal, pasta, and rice have about 15 grams of carbs in a serving. A serving is 1 slice of bread (1 ounce), ½ cup of cooked cereal, or 1/3 cup of cooked pasta or rice. ? Fruits have 15 grams of carbs in a serving. A serving is 1 small fresh fruit, such as an apple or orange; ½ of a banana; ½ cup of cooked or canned fruit; ½ cup of fruit juice; 1 cup of melon or raspberries; or 2 tablespoons of dried fruit. ? Milk and no-sugar-added yogurt have 15 grams of carbs in a serving. A serving is 1 cup of milk or 2/3 cup of no-sugar-added yogurt. ? Starchy vegetables have 15 grams of carbs in a serving. A serving is ½ cup of mashed potatoes or sweet potato; 1 cup winter squash; ½ of a small baked potato; ½ cup of cooked beans; or ½ cup cooked corn or green peas. · Learn how much carbs to eat each day and at each meal. A dietitian or CDE can teach you how to keep track of the amount of carbs you eat. This is called carbohydrate counting. · If you are not sure how to count carbohydrate grams, use the Plate Method to plan meals.  It is a good, quick way to make sure that you have a balanced meal. It also helps you spread carbs throughout the day. ? Divide your plate by types of foods. Put non-starchy vegetables on half the plate, meat or other protein food on one-quarter of the plate, and a grain or starchy vegetable in the final quarter of the plate. To this you can add a small piece of fruit and 1 cup of milk or yogurt, depending on how many carbs you are supposed to eat at a meal.  · Try to eat about the same amount of carbs at each meal. Do not \"save up\" your daily allowance of carbs to eat at one meal.  · Proteins have very little or no carbs per serving. Examples of proteins are beef, chicken, turkey, fish, eggs, tofu, cheese, cottage cheese, and peanut butter. A serving size of meat is 3 ounces, which is about the size of a deck of cards. Examples of meat substitute serving sizes (equal to 1 ounce of meat) are 1/4 cup of cottage cheese, 1 egg, 1 tablespoon of peanut butter, and ½ cup of tofu. How can you eat out and still eat healthy? · Learn to estimate the serving sizes of foods that have carbohydrate. If you measure food at home, it will be easier to estimate the amount in a serving of restaurant food. · If the meal you order has too much carbohydrate (such as potatoes, corn, or baked beans), ask to have a low-carbohydrate food instead. Ask for a salad or green vegetables. · If you use insulin, check your blood sugar before and after eating out to help you plan how much to eat in the future. · If you eat more carbohydrate at a meal than you had planned, take a walk or do other exercise. This will help lower your blood sugar. What else should you know? · Limit saturated fat, such as the fat from meat and dairy products. This is a healthy choice because people who have diabetes are at higher risk of heart disease. So choose lean cuts of meat and nonfat or low-fat dairy products.  Use olive or canola oil instead of butter or shortening when cooking. · Don't skip meals. Your blood sugar may drop too low if you skip meals and take insulin or certain medicines for diabetes. · Check with your doctor before you drink alcohol. Alcohol can cause your blood sugar to drop too low. Alcohol can also cause a bad reaction if you take certain diabetes medicines. Follow-up care is a key part of your treatment and safety. Be sure to make and go to all appointments, and call your doctor if you are having problems. It's also a good idea to know your test results and keep a list of the medicines you take. Where can you learn more? Go to https://[employer]pepiceweb.[employer]. org and sign in to your [employer] account. Enter S587 in the [employer] box to learn more about \"Learning About Diabetes Food Guidelines. \"     If you do not have an account, please click on the \"Sign Up Now\" link. Current as of: December 20, 2019               Content Version: 12.6  © 7573-2028 [employer]. Care instructions adapted under license by [facility] ([facility]). If you have questions about a medical condition or this instruction, always ask your healthcare professional. [place] [address] any warranty or liability for your use of this information.

## 2020-11-10 NOTE — PROGRESS NOTES
P PHYSICIANS  [facility]  [address]  [address] [address]  Dept: [phone]     11/10/2020   Chief Complaint   Patient presents with    Annual Exam    Other     CHRONIC CONDITIONS/ NEEDS REFERRAL FOR SLEEP STUDY     HPI  Jossue Lea (:  1965) is a 50 y.o. female is an established patient. Patient has a history of Hypertension, DM, Hypercholesterolemia, Lumbar radiculopathy, polyneuropathy, Fibromyalgia, arthritis, GERD, VItamin d Def, Obesity    HYPERTENSION  Jossue Lea has a well controlled hypertension. she is currently on Lisinopril, HCTZ. Patient's most recent BP in the office was stable. she reports stable BP readings at home. Patient denies any adverse reaction to this therapy. she denies any CP, SOB, HA, or palpitations. DIABETES MELLITUS  Patient has a  unstable Diabetes Mellitus. Under 200 . Patient's recent   Lab Results   Component Value Date    LABA1C 11.1 (H) 10/05/2020   . Current therapy includes Shermon Pat, Humolog,. Patient is responding poorly with this therapy. Patient reports home glucose monitoring as Unstable readings. Patient denieshypoglycemia episodes such as{symptoms. Patient admits to neuropathy. Patient was sent to Dr. Kassie Franco. Patient was kept on the same tresiba and Humolog on sliding scale. Eye Exam: recent    Foot Exam:recent/podiatry- will get DM shoes. Kassidy Milian is currently on atorvastatin (Lipitor). Patient denies adverse reaction to this medication. Compliance with treatment thus far has been good. The patient is not known to have coexisting coronary artery disease.  The 10-year CVD risk score (D'Agostino, et al., 2008) is: 6.6%    Values used to calculate the score:      Age: 50 years      Sex: Female      Diabetic: Yes      Tobacco smoker: No      Systolic Blood Pressure: 976 mmHg      Is BP treated: Yes      HDL Cholesterol: 58 mg/dL      Total Cholesterol: 159 mg/dL  Lab Results   Component Value Date/Time    CHOL 159 10/07/2020 09:05 AM    CHOL 181 03/19/2015 08:25 AM    HDL 58 10/07/2020 09:05 AM    LDLCHOLESTEROL 74 10/07/2020 09:05 AM    TRIG 134 10/07/2020 09:05 AM    CHOLHDLRATIO 2.7 10/07/2020 09:05 AM    VLDL NOT REPORTED 10/07/2020 09:05 AM     THIAGO  Patient has a known THIAGO, She was using her CPAP machine which was stolen. Sleep study was over 15 years ago. She c/o hypersomnia. Since she is also obese, she finds it harder to sleep and always tired. MULTIPLE ARTHRITIS/LUBAR RADICULOPATHY/FIBROMYALGIA  Patient has chronic low back pain for years , worse after just 5 minutes of standing up when doing dishes. The pain is located midline and radiates into the buttocks and thighs  Intensity of pain is 8/10, down to 5/10 when sitting  Doing PT and TENS unit. Taking tizanidine at nighttime helps a little. She is also on Meloxicam, Meloxicam,   Patient was referred to ortho and pain management. She started seeing Dr. Davonte Macario. SHe will be scheduled to have an epidural injection. Patient is concerned about steroid raising her glucose levels. Patient also had shoulder injections by Dr. Darryl Wilson. Patient met with the surgeon to discuss brease reduction due to her low back pain and shoulder pain.  Will schedule.       Patient Active Problem List   Diagnosis    Allergic rhinitis    Fibromyalgia    Pure hypercholesterolemia    Chronic cholecystitis    Gastroesophageal reflux disease with esophagitis    Type 2 diabetes mellitus with diabetic polyneuropathy, with long-term current use of insulin (HCC)    Chronic left shoulder pain    Essential hypertension    Vitamin D deficiency    Fatigue    Diabetic polyneuropathy associated with type 2 diabetes mellitus (HCC)    Large breasts    Elevated LFTs    Lumbar radiculopathy    Arthritis involving multiple sites    Morbid obesity with BMI of 40.0-44.9, adult (HCC)    Numbness and tingling in both hands DECOMPRESSION (Left      Family History   Problem Relation Age of Onset    Diabetes Mother     Heart Disease Mother         murmur    Heart Disease Father         MVP    Diabetes Sister     Diabetes Maternal Grandmother     High Blood Pressure Maternal Grandmother     Heart Failure Maternal Grandfather     Heart Attack Paternal Grandmother      Current Outpatient Medications   Medication Sig Dispense Refill    meloxicam (MOBIC) 15 MG tablet Take 1 tablet by mouth daily 90 tablet 2    Insulin Degludec (TRESIBA FLEXTOUCH) 100 UNIT/ML SOPN Inject 45 Units into the skin 2 times daily      glipiZIDE (GLUCOTROL) 5 MG tablet Take 5 mg by mouth 2 times daily (before meals)      tiZANidine (ZANAFLEX) 4 MG tablet TAKE ONE TABLET BY MOUTH EVERY 8 HOURS AS NEEDED FOR NECK AND BACK PAIN. **CAUSES SEDATION AND DO NOT DRIVE WHILE TAKING THIS MEDICATION 90 tablet 0    lisinopril (PRINIVIL;ZESTRIL) 20 MG tablet TAKE ONE TABLET BY MOUTH DAILY 90 tablet 0    cetirizine (ZYRTEC) 10 MG tablet Take 10 mg by mouth daily      pregabalin (LYRICA) 100 MG capsule Take 1 capsule by mouth 2 times daily for 90 days. 180 capsule 2    Blood Glucose Monitoring Suppl (TRUE METRIX METER) MANOLO Testing BID 1 Device 0    Insulin Pen Needle (PEN NEEDLES 3/16\") 31G X 5 MM MISC 1 each by Does not apply route daily 100 each 3    insulin lispro, 1 Unit Dial, (HUMALOG KWIKPEN) 100 UNIT/ML SOPN Inject 15 Units into the skin 3 times daily (before meals) 5 pen 1    Insulin Syringes, Disposable, U-100 1 ML MISC 1 each by Does not apply route daily 100 each 3    blood glucose monitor strips Test 2-3 times a day & as needed for symptoms of irregular blood glucose.   BRAND OF CHOICE INSURANCE ALLOWS. 100 strip 11    atorvastatin (LIPITOR) 10 MG tablet Take 1 tablet by mouth daily 90 tablet 2    hydroCHLOROthiazide (HYDRODIURIL) 25 MG tablet Take 1 tablet by mouth daily Take with Potassium supplement 30 tablet 3    potassium chloride (KLOR-CON) 10 MEQ extended release tablet Take 1 tablet by mouth daily Take with HCTZ 30 tablet 3    Lancets MISC Dx: DM-2. Use 2-3 times daily 100 each 3    Alcohol Swabs (ALCOHOL PREP) PADS Use as directed 100 each 11    vitamin D (CHOLECALCIFEROL) 25 MCG (1000 UT) TABS tablet Take 1 tablet by mouth daily 30 tablet 3     Current Facility-Administered Medications   Medication Dose Route Frequency Provider Last Rate Last Dose    methylPREDNISolone acetate (DEPO-MEDROL) injection 80 mg  80 mg Intra-articular Once Ethelene Lance, DO        bupivacaine (MARCAINE) 0.25 % injection 5 mg  2 mL Intra-articular Once Ethelene Lance, DO         Review of Systems   Constitutional: Positive for activity change. Negative for chills, fatigue and fever. HENT: Negative. Negative for congestion and hearing loss. Eyes: Negative for visual disturbance. Respiratory: Negative. Negative for cough and shortness of breath. Cardiovascular: Negative. Negative for chest pain and palpitations. Elevated BP   Gastrointestinal: Negative for abdominal pain, constipation, diarrhea and nausea. Endocrine: Negative for cold intolerance. Genitourinary: Negative for dysuria and enuresis. Musculoskeletal: Positive for arthralgias, back pain, joint swelling and myalgias. Back pain, shoulder pains, large breasts G size   Skin: Negative for rash. Neurological: Positive for numbness (both feet). Negative for weakness, light-headedness and headaches. Hematological: Does not bruise/bleed easily. Psychiatric/Behavioral: Positive for sleep disturbance. The patient is nervous/anxious. Prior to Visit Medications    Medication Sig Taking?  Authorizing Provider   meloxicam (MOBIC) 15 MG tablet Take 1 tablet by mouth daily Yes Katina Clemons, APRN - CNP   Insulin Degludec (TRESIBA FLEXTOUCH) 100 UNIT/ML SOPN Inject 45 Units into the skin 2 times daily Yes Historical Provider, MD   glipiZIDE (GLUCOTROL) 5 MG tablet Take 5 mg by mouth 2 times daily (before meals) Yes Historical Provider, MD   tiZANidine (ZANAFLEX) 4 MG tablet TAKE ONE TABLET BY MOUTH EVERY 8 HOURS AS NEEDED FOR NECK AND BACK PAIN. **CAUSES SEDATION AND DO NOT DRIVE WHILE TAKING THIS MEDICATION Yes KIMANI Haas CNP   lisinopril (PRINIVIL;ZESTRIL) 20 MG tablet TAKE ONE TABLET BY MOUTH DAILY Yes KIMANI Haas CNP   cetirizine (ZYRTEC) 10 MG tablet Take 10 mg by mouth daily Yes Historical Provider, MD   pregabalin (LYRICA) 100 MG capsule Take 1 capsule by mouth 2 times daily for 90 days. Yes KIMANI Haas CNP   Blood Glucose Monitoring Suppl (TRUE METRIX METER) MANOLO Testing BID Yes KIMANI Haas CNP   Insulin Pen Needle (PEN NEEDLES 3/16\") 31G X 5 MM MISC 1 each by Does not apply route daily Yes KIMANI Haas CNP   insulin lispro, 1 Unit Dial, (HUMALOG KWIKPEN) 100 UNIT/ML SOPN Inject 15 Units into the skin 3 times daily (before meals) Yes KIMANI Haas CNP   Insulin Syringes, Disposable, U-100 1 ML MISC 1 each by Does not apply route daily Yes KIMANI Haas CNP   blood glucose monitor strips Test 2-3 times a day & as needed for symptoms of irregular blood glucose. BRAND OF CHOICE INSURANCE ALLOWS. Yes Shantel Rodriguez MD   atorvastatin (LIPITOR) 10 MG tablet Take 1 tablet by mouth daily Yes KIMANI Haas CNP   hydroCHLOROthiazide (HYDRODIURIL) 25 MG tablet Take 1 tablet by mouth daily Take with Potassium supplement Yes KIMANI Haas CNP   potassium chloride (KLOR-CON) 10 MEQ extended release tablet Take 1 tablet by mouth daily Take with HCTZ Yes KIMANI Haas CNP   Lancets MISC Dx: DM-2.  Use 2-3 times daily Yes KIMANI Haas CNP   Alcohol Swabs (ALCOHOL PREP) PADS Use as directed Yes KIMANI Haas CNP   vitamin D (CHOLECALCIFEROL) 25 MCG (1000 UT) TABS tablet Take 1 tablet by mouth daily Yes Katina Clemosn, APRN - CNP [id number] 37.4 08/29/2020     MRI LUMBAR SPINE WO CONTRAST  Narrative: EXAMINATION:  MRI OF THE LUMBAR SPINE WITHOUT CONTRAST, 10/22/2020 11:38 am    TECHNIQUE:  Multiplanar multisequence MRI of the lumbar spine was performed without the  administration of intravenous contrast.    COMPARISON:  Radiographs October 7, 2020    MRI July 11, 2006    HISTORY:  ORDERING SYSTEM PROVIDED HISTORY: Lumbar radiculopathy  TECHNOLOGIST PROVIDED HISTORY:  neuropathy  Is the patient pregnant?->No  Reason for Exam: pt stated back pain radiates down lt leg  Acuity: Chronic  Type of Exam: Initial  Additional signs and symptoms: pt stated back pain radiates down lt leg    FINDINGS:  BONES/ALIGNMENT: No acute fracture. No subluxation. No suspicious bone  marrow replacing lesion. SPINAL CORD: Normal signal within the conus which terminates at T12-L1. SOFT TISSUES: No paraspinal abnormality. L1-L2: No significant central canal or foraminal stenosis. L2-L3: Mild broad-based disc bulge and facet degenerative changes combine to  create mild central canal stenosis. Changes combine to create minimal  bilateral foraminal stenosis. L3-L4: Broad-based disc bulge and facet degenerative changes combine to  create mild central canal stenosis. Changes combine to create mild bilateral  foraminal stenosis. L4-L5: Broad-based disc bulge and facet degenerative changes combine to  create mild central canal stenosis. Changes combine to create mild-moderate  bilateral foraminal stenosis. L5-S1: Mild broad-based disc bulge and facet degenerative changes do not  cause significant central canal stenosis. Changes combine to create mild  bilateral foraminal stenosis. Impression: Overall mild multilevel degenerative changes. ASSESSMENT/PLAN:  1. Essential hypertension  Stable  Continue current therapy. DISCUSSED AND ADVISED TO:  Cut down on your salt intake. Cut down on caffeinated drinks, sports drinks.    Instructed to check BP at home regularly. Report for any chest pains, shortness of breath, headaches, and lightheadedness. Call the office if your blood pressure continue to be higher than 140/90 or 90/50.          2. Type 2 diabetes mellitus with diabetic polyneuropathy, with long-term current use of insulin ([address]  Improving  Continue therapy. We will continue to monitor Hemoglobin A1c   DISCUSSED and ADVISED TO:  Continue to check Glucose levels at home. Report increased and low levels as discussed. Decrease carbohydrates, sugary drinks, desserts in your diet. Exercise regularly, as tolerated. Try to lose weight. 3. Lumbar radiculopathy  Failure to Improve  Continue current therapy. DISCUSSED AND ADVISED TO:  Use heat packs 15 to 20 mins every 2-3 hours. Do some back stretches as tolerated. Refer to hand out for instructions. Call for worsening, numbness, weakness.      - meloxicam (MOBIC) 15 MG tablet; Take 1 tablet by mouth daily  Dispense: 90 tablet; Refill: 2    4. Fibromyalgia  Failure to Improve  DISCUSSED AND ADVISED TO:  Exercise often. Get a good night's sleep. Make healthy changes to diet. Try to reduce stress  Carefully take medications as discussed  Report for worsening symptoms        - meloxicam (MOBIC) 15 MG tablet; Take 1 tablet by mouth daily  Dispense: 90 tablet; Refill: 2    5. Obstructive sleep apnea syndrome  Worsening  DISCUSSED AND ADVISED TO:  Weight loss with diet exercise,   decrease caffeine intake. Especially in the evening. Healthy sleep hygiene measures,  Effective positional therapy,  Avoid sleep deprivation  Continue CPAP use nightly as discussed. - Baseline Diagnostic Sleep Study; Future    6. Arthritis involving multiple sites  Failure to Improve  Continue current therapy. DISCUSSED and ADVISED TO:  Stay at a healthy weight. Continue exercises/PT  Stretch to help prevent stiffness and to prevent injury before exercise.    Gentle forms of yoga help keep joints and muscles flexible. Walk instead of jog, ride a bike, swim, and water exercise. Lift weights as tolerated. strong muscles help reduce stress on joints. Take pain medicines exactly as directed and only as needed. - meloxicam (MOBIC) 15 MG tablet; Take 1 tablet by mouth daily  Dispense: 90 tablet; Refill: 2    7. Morbid obesity with BMI of 40.0-44.9, adult ([address]  BMI increasing  DISCUSSED AND ADVISED TO:  Eat a low-fat and low carbohydrates diet. Avoid fried foods especially fast food. Choose healthier options for snacks. Have 5-6 servings of fruits and vegetables per day. Cut down on eating processed food. Add 30 minutes to 1 hour aerobic exercise for 3-4 days a week. 8. Need for hepatitis B vaccination  Recommended by CDC. No current infection. Denies previous adverse reaction to vaccination.      - Hep B Vaccine Adult (ENGERIX B)        Controlled Substance Monitoring:  Acute and Chronic Pain Monitoring:   RX Monitoring 11/3/2020   Attestation -   Periodic Controlled Substance Monitoring No signs of potential drug abuse or diversion identified. ;Assessed functional status. Orders Placed This Encounter   Procedures    Hep B Vaccine Adult (ENGERIX B)    Baseline Diagnostic Sleep Study     Standing Status:   Future     Standing Expiration Date:   5/10/2022     Order Specific Question:   Adult or Pediatric     Answer:   Adult Study (>7 Years)     Order Specific Question:   Location For Sleep Study     Answer: [facility] Specific Question:   Select Sleep Lab Location     Answer:   [facility]     Order Specific Question:   Pre-Study Patient Questions: Answer:   Complains of daytime sleepiness     Order Specific Question:   Pre-Study Patient Questions: Answer:   Snores loudly during sleep     Order Specific Question:   Pre-Study Patient Questions: Answer:    Others have witnessed episodes of apnea while the patient sleeps     Order Specific Question:   Pre-Study Patient Questions: Answer: Tosses and Turns all night or describes their sleep as restless     Order Specific Question:   Pre-Study Patient Questions: Answer:   Complains of morning headaches     Orders Placed This Encounter   Medications    meloxicam (MOBIC) 15 MG tablet     Sig: Take 1 tablet by mouth daily     Dispense:  90 tablet     Refill:  2      Medications Discontinued During This Encounter   Medication Reason    meloxicam (MOBIC) 7.5 MG tablet REORDER      There are no preventive care reminders to display for this patient. Return in about 4 months (around 3/10/2021) for Chronic conditions, 30mins. Rita Yates received counseling on the following healthy behaviors: nutrition, exercise and medication adherence  Reviewed prior labs and health maintenance  Continue current medications, diet and exercise. Discussed use, benefit, and side effects of prescribed medications. Barriers to medication compliance addressed. Patient given educational materials - see patient instructions  Was a self-tracking handout given in paper form or via [employer]? Yes    Requested Prescriptions     Signed Prescriptions Disp Refills    meloxicam (MOBIC) 15 MG tablet 90 tablet 2     Sig: Take 1 tablet by mouth daily       All patient questions answered. Patient voiced understanding. Quality Measures    Body mass index is 44.48 kg/m². Elevated. Weight control planned discussed Healthy diet and regular exercise. BP: 111/63 Blood pressure is normal. Treatment plan consists of No treatment change needed.     Lab Results   Component Value Date    [address] 110 03/19/2015    LDLCHOLESTEROL 74 10/07/2020    (goal LDL reduction with dx if diabetes is 50% LDL reduction)      PHQ Scores 11/10/2020 10/5/2020 2/13/2020 4/16/2019 10/3/2018 11/10/2017 2/23/2016   PHQ2 Score 0 0 0 0 6 4 0   PHQ9 Score 0 0 0 0 25 18 0     Interpretation of Total Score Depression Severity: 1-4 = Minimal depression, 5-9 = Mild depression, 10-14 = Moderate depression, 15-19 = Moderately severe depression, 20-27 = Severe depression   This note was completed by using the assistance of a speech-recognition program. However, inadvertent computerized transcription errors may be present. Although every effort was made to ensure accuracy, no guarantees can be provided that every mistake has been identified and corrected by editing   An electronic signature was used to authenticate this note.   --KIMANI Saleh - CNP on 11/10/2020 at 9:40 PM

## 2020-11-11 ENCOUNTER — HOSPITAL ENCOUNTER (OUTPATIENT)
Dept: DIABETES SERVICES | Age: 55
Setting detail: THERAPIES SERIES
Discharge: HOME | End: 2020-11-11
Payer: COMMERCIAL

## 2020-11-11 PROCEDURE — G0108 DIAB MANAGE TRN  PER INDIV: HCPCS

## 2020-11-11 NOTE — PROGRESS NOTES
Diabetes Self- Management Education Program Assessment -   Also see Diabetic Screening  Patient, Alley Garza,  here for diabetes self-management education  visit/ assessment. Today's visit was in an individual setting. MEDICAL HISTORY:  Past Medical History:   Diagnosis Date    Allergic rhinitis 10/21/2014    Anxiety     Arthritis     Bleeds easily ([address]     per pt, has never had a work up   [place] Favorite Complete rotator cuff tear of left shoulder 2018    Diabetes mellitus ([address]     On Insulin, metformin, Glipizide    Diabetic polyneuropathy associated with type 2 diabetes mellitus ([address] 3/26/2018    Fibromyalgia     GERD (gastroesophageal reflux disease) 10/21/2014    H/O transfusion     History of blood transfusion     Hypertension     Obesity 10/21/2014    Pure hypercholesterolemia 2/23/2016    Tobacco abuse     Type II or unspecified type diabetes mellitus without mention of complication, not stated as uncontrolled     Unspecified sleep apnea     CPAP machine is broken    Wears glasses      Family History   Problem Relation Age of Onset    Diabetes Mother     Heart Disease Mother         murmur    Heart Disease Father         MVP    Diabetes Sister     Diabetes Maternal Grandmother     High Blood Pressure Maternal Grandmother     Heart Failure Maternal Grandfather     Heart Attack Paternal Grandmother      Pcn [penicillins]; Demerol hcl [meperidine]; Metformin and related;  Tape Coralee Razia tape]; and Morphine   Immunization History   Administered Date(s) Administered    Hepatitis B Adult (Engerix-B) 09/28/2020, 11/10/2020    Influenza Virus Vaccine 10/06/2015, 09/02/2020    Influenza, Quadv, IM, (6 mo and older Fluzone, Flulaval, Fluarix and 3 yrs and older Afluria) 09/27/2017    Influenza, Quadv, IM, PF (6 mo and older Fluzone, Flulaval, Fluarix, and 3 yrs and older Afluria) 10/01/2017, 10/03/2018, 11/26/2019    Influenza, Quadv, Recombinant, IM PF (Flublok 18 yrs and older) 09/02/2020    Pneumococcal Polysaccharide ([id number]) 10/06/2015    Tdap (Boostrix, Adacel) 09/28/2020    Zoster Recombinant (Shingrix) 09/02/2020, 10/30/2020     Current Medications  Current Outpatient Medications   Medication Sig Dispense Refill    meloxicam (MOBIC) 15 MG tablet Take 1 tablet by mouth daily 90 tablet 2    Insulin Degludec (TRESIBA FLEXTOUCH) 100 UNIT/ML SOPN Inject 45 Units into the skin 2 times daily      glipiZIDE (GLUCOTROL) 5 MG tablet Take 5 mg by mouth 2 times daily (before meals)      tiZANidine (ZANAFLEX) 4 MG tablet TAKE ONE TABLET BY MOUTH EVERY 8 HOURS AS NEEDED FOR NECK AND BACK PAIN. **CAUSES SEDATION AND DO NOT DRIVE WHILE TAKING THIS MEDICATION 90 tablet 0    lisinopril (PRINIVIL;ZESTRIL) 20 MG tablet TAKE ONE TABLET BY MOUTH DAILY 90 tablet 0    cetirizine (ZYRTEC) 10 MG tablet Take 10 mg by mouth daily      pregabalin (LYRICA) 100 MG capsule Take 1 capsule by mouth 2 times daily for 90 days. 180 capsule 2    Blood Glucose Monitoring Suppl (TRUE METRIX METER) MANOLO Testing BID 1 Device 0    Insulin Pen Needle (PEN NEEDLES 3/16\") 31G X 5 MM MISC 1 each by Does not apply route daily 100 each 3    insulin lispro, 1 Unit Dial, (HUMALOG KWIKPEN) 100 UNIT/ML SOPN Inject 15 Units into the skin 3 times daily (before meals) 5 pen 1    Insulin Syringes, Disposable, U-100 1 ML MISC 1 each by Does not apply route daily 100 each 3    blood glucose monitor strips Test 2-3 times a day & as needed for symptoms of irregular blood glucose. BRAND OF CHOICE INSURANCE ALLOWS. 100 strip 11    atorvastatin (LIPITOR) 10 MG tablet Take 1 tablet by mouth daily 90 tablet 2    hydroCHLOROthiazide (HYDRODIURIL) 25 MG tablet Take 1 tablet by mouth daily Take with Potassium supplement 30 tablet 3    potassium chloride (KLOR-CON) 10 MEQ extended release tablet Take 1 tablet by mouth daily Take with HCTZ 30 tablet 3    Lancets MISC Dx: DM-2.  Use 2-3 times daily 100 each 3    Alcohol Swabs (ALCOHOL PREP) PADS Use as directed 100 each 11    vitamin D (CHOLECALCIFEROL) 25 MCG (1000 UT) TABS tablet Take 1 tablet by mouth daily 30 tablet 3     Current Facility-Administered Medications   Medication Dose Route Frequency Provider Last Rate Last Dose    methylPREDNISolone acetate (DEPO-MEDROL) injection 80 mg  80 mg Intra-articular Once Meldon Elio, DO        bupivacaine (MARCAINE) 0.25 % injection 5 mg  2 mL Intra-articular Once Meldon Elio, DO       :     Comments:  Allergies: Allergies   Allergen Reactions    Pcn [Penicillins] Hives and Other (See Comments)     Lose motor function of legs and collaps    Demerol Hcl [Meperidine] Other (See Comments)     Panic attack    Metformin And Related      Abdominal pain    Tape Beatrice Schilling Tape] Other (See Comments)     Blister      Morphine Nausea And Vomiting and Other (See Comments)     Stomach pain         A1C blood level - at goal < 7%   Lab Results   Component Value Date    LABA1C 11.1 (H) 10/05/2020    LABA1C 11.2 (H) 08/29/2020    LABA1C 11.1 03/10/2020     Lab Results   Component Value Date    GLUF 312 (H) 08/29/2020    LABMICR CANNOT BE CALCULATED 08/29/2020    LDLCALC 110 03/19/2015    CREATININE 0.74 08/29/2020       Blood pressure ( 130/ 80)  Or less  BP Readings from Last 3 Encounters:   11/10/20 111/63   10/07/20 132/86   10/05/20 138/89        Cholesterol ( LDL under  100)   Lab Results   Component Value Date    LDLCALC 110 03/19/2015    LDLCHOLESTEROL 74 10/07/2020       Diabetes Self- Management Education Record    Participant Name: Marquis Cuadra  Referring Provider: Sherren Farm, APRN - CNP   Assessment/Evaluation Ratings:  1=Needs Instruction   4=Demonstrates Understanding/Competency  2=Needs Review   NC=Not Covered    3=Comprehends Key Points  N/A=Not Applicable  Topics/Learning Objectives Pre-session Assess Date:  11/9/20CB Instr.  Date Reinforce Date Post- session Eval Comments   Diabetes disease process & Treatment process: Define diabetes & pre-diabetes; Identify own type of diabetes; role of the pancreas; signs/symptoms; diagnostic criteria; prevention & treatment options; contributing factors. 1 11/11/20CB   Dx about 2004, no classes but has some knowledge. Also had gastric bypass years ago. Pt has family Hx. Admits to not taking care of self until recently daughter in law lost dad to DM and told her she did not want to lose her and she needs to take care of self. Mom with her today as she is up visiting from [facility]. 11/9/20CB    Pt states Grandmother had T1DM,Dx early adult and on insulin. Viewed [employer] resource \"Understanding T2DM\" Also discussed production of ketones. 11/11/20CB   Incorporating nutritional management into lifestyle: Describe effect of type, amount & timing of food on blood glucose; Describe basic meal planning techniques & current nutrition guideline   Eats breakfst, but snacks all day on sweets, Mt Dew, Sweet tea  States she is \"sugar junky\"   Knows she has stretched stomach out since surgery ( gastric bypass)11/9/20CB    Pt asking about EnyvQepl98/11/20CB      What to eat - Food groups, When to eat - timing of meals and snacks, and How much to eat - portions control. calories/ day   CHO choices/ meal   CHO choices/  day   grams of protein /day   gram of fat /day     Correctly read food labels & demonstrate CHO counting & portion control with personalized meal plan. Identify dining out strategies, & dietary sick day guidelines. 1       Incorporating physical activity into lifestyle:   Verbalize effect of exercise on blood glucose levels; benefits of regular exercise; safety considerations; contraindications; maintenance of activity. 1    Likes to be active but having back pain and shoulder pain, getting steroid injections, going to water PT. So limited but wants to walk longer. Also gained 30 # since all the pain. 11/9/20CB   Using medications safely:  Identify effects of diabetes medicines on blood glucose levels; List diabetes medication taken, action & side effects;    1    No oral meds11/9/20CB   Insulin / Injectable - Appropriate injection sites; proper storage; supplies needed; proper technique; safe needle disposal guidelines. 1    Tresiba 45 units am and HS  Humalog Kwikpen 20 units 3x/d at meals11/9/20CB   Monitoring blood glucose, interpreting and using results:  Identify recommended & personal blood glucose targets; importance of testing; testing supplies; HgbA1C target levels; Factors affecting blood glucose; Importance of logging blood glucose levels for pattern recognition; ketone testing; safe lancet disposal.   1 11/11/20CB   Checking BG 3x/d 123 before meals, 200's 2hpp. A1C 11.1 on 10/6/20  11/9/20CB    Pt did ask Dr. Debora Light at [facility] yesterday about CGMS and he will submit but she has to check BG 4x/d for documentation to submit. Pt struggling with checking that much.  11/11/20CB   Prevention, detection & treatment of acute complications:  Identify symptoms of hyper & hypoglycemia, and prevention & treatment strategies. 1 11/11/20CB   States feels low and ate peppermint but did not have meter with her but also cutting back on sugar in tea and drinking [address] which she has in am  Follow up with michaelle and PB. Pt will check BG out in car before driving after class. 11/11/20CB   Describe sick day guidelines & indications for  physician notification. Identify short term consequences of poor control. Disaster preparedness strategies    1       Prevention, detection & treatment of chronic complications:  Define the natural course of diabetes & describe the relationship of blood glucose levels to long term complications of diabetes. Identify preventative measures & standards of care. 1    No dentist, no teeth, recent eye and foot exam in Sept11/9/20CB   Developing strategies to address psychosocial issues:  Describe feelings about living with diabetes;  Describe how stress, depression & anxiety affect blood glucose; Identify coping strategies; Identify support needed & support network available. 1 11/11/20CB   Concerned about BG since daughter in law told her she did not want to lose her. Wants to be healthy for grandkids as she likes to be active and do things with them. Mother very supportive and encouraging. 11/9/20CB    Mom having back issues so did not come today. Pt expressed concerns for grandkids and future DM and wants to eat healthy but admits to being emotional eater and more junk food. 11/11/20CB   Developing strategies to promote health/change behavior: Identify 7 self-care behaviors; Personal health risk factors; Benefits, challenges & strategies for behavioral change;    1         Individualized goal selection. My goal , to help me improve my health, I will:   1.try to stop reg pop      2. Plan  Follow-up Appointments planned in individual setting. Next Appointment on 11/11/20 @ 11:30    Instruction Method: [x]Lecture/Discussion  []Power Point Presentation  [x]Handouts  []Return Demonstration      Education Materials/Equipment Provided:    [x]Self-Management - Initial assessment - Enrolment in to ADA  Where do I Begin, Living with Type 2 diabetes ADA home support program and handout for no concentrated sweets and diet meal planning basics, handout on diabetes education classes.  11/9/20CB      [x]Self-Management  Class 1 - \"Diabetes - your take control guide\" - ADA booklet and Healthy i On the Road to better managing your diabetes map wusylwnv55/11/20CB    [] Self-Management  Class 2 - Meal Plan and handout for serving sizes, smarter snacking, Ready Set Carb Counting / Plate Method, Nutrient Conversion and International [address] Eating for People with Diabetes and Nutrition in the [employer] Resources - fast facts about fast food    [] Self-Management  Class 3 -  Diabetes ID card,  foot care tips sheet, Healthy I  Continuing Your Journey with REGISTRATION IS REQUIRED - [facility] [phone] call for dates    []  Diabetes Group at  [facility] 79 6 week diabetes education support   classes - contact : [address] [facility] [phone]  for dates and locations      []ADA  Where do I Begin, Living with Type 2 diabetes ADA home support program  Web site: diabetes. org/living    Call: 1800 DIABETES  e-mail: [email or web address]     []  Internet web sites - ADAWeb site: diabetes. org/living   D- life   [] [facility] opens at 8 30 am daily for walkers, shops open at 8 am   [address] [facility], [address] [facility]   [phone] Daily - 8:30am - 10am    3rd Tuesday of every month [facility] expert speakers visit from 9 - 10am        [] [facility], [facility], [facility], [place], [facility] (site rotate)  Call [phone] or visit   website: [email or web address] for more details   Check web site for updated times/ dates       S[] avvy [employer] [address]  Free class   [facility]  [address] [facility], [address] [address] Tuesday and Thursday -   9 :30 am- 10:30am - ongoing   [] [address]  [address] [facility], [address] [address] [facility] Thursday 11:00am - 11:45am     [] Third Wednesday Cooking  Class  Free  Registration is required     [address] [facility]. [address] [facility], [address] [facility] [phone] or   Email [email or web address]   Wednesdays-5:00- 6:00 pm       [] Eat Smart  Be Active & Learn How - Free   Families & grandparents with children in home 0- 25 & pregnant moms          Various sites in community - call to find next session near you. [facility] extension office for future sessions - Tad Daley  Email : [email or web address]  Phone: [phone]     []

## 2020-11-12 ENCOUNTER — HOSPITAL ENCOUNTER (OUTPATIENT)
Dept: PHYSICAL THERAPY | Age: 55
Setting detail: THERAPIES SERIES
Discharge: HOME | End: 2020-11-12
Payer: COMMERCIAL

## 2020-11-12 ENCOUNTER — TELEPHONE (OUTPATIENT)
Dept: FAMILY MEDICINE CLINIC | Age: 55
End: 2020-11-12

## 2020-11-12 PROCEDURE — 97113 AQUATIC THERAPY/EXERCISES: CPT

## 2020-11-12 ASSESSMENT — PAIN DESCRIPTION - DESCRIPTORS
DESCRIPTORS: ACHING;CONSTANT
DESCRIPTORS_2: ACHING;CONSTANT

## 2020-11-12 ASSESSMENT — PAIN SCALES - GENERAL: PAINLEVEL_OUTOF10: 6

## 2020-11-12 ASSESSMENT — PAIN DESCRIPTION - INTENSITY: RATING_2: 7

## 2020-11-12 ASSESSMENT — PAIN DESCRIPTION - LOCATION
LOCATION: SHOULDER
LOCATION_2: BACK

## 2020-11-12 ASSESSMENT — PAIN DESCRIPTION - PAIN TYPE
TYPE_2: CHRONIC PAIN
TYPE: CHRONIC PAIN

## 2020-11-12 ASSESSMENT — PAIN DESCRIPTION - FREQUENCY: FREQUENCY: CONTINUOUS

## 2020-11-12 ASSESSMENT — PAIN DESCRIPTION - ORIENTATION
ORIENTATION_2: LOWER;MID
ORIENTATION: RIGHT;ANTERIOR

## 2020-11-12 ASSESSMENT — PAIN DESCRIPTION - DURATION: DURATION_2: CONTINUOUS

## 2020-11-12 NOTE — TELEPHONE ENCOUNTER
Pt informed.  [facility] so she will call then to schedule FCE and if a new referral needs made she will let us know

## 2020-11-12 NOTE — FLOWSHEET NOTE
[address] [facility]   Outpatient Physical Therapy  [address] [place] [address]. [address]  Phone: [phone]  Fax: [phone]  Daily Progress Note    Date: 20    Patient Name: Jovanna Martinez        MRN: [id number]  Account: [de-identified] : 1965      General Information:  Chart Reviewed: Yes  Patient assessed for rehabilitation services?: Yes  Additional Pertinent Hx: left rotator cuff repair   Referring Practitioner: Jorge A Fuller DO   Referral Date : 20  Diagnosis: Right shoulder pain (M25.511)  Follows Commands: Within Functional Limits  Other (Comment): To see Dr at end of October  Onset Date: 20  PT Insurance Information: Tad Leigh   Total # of Visits Approved: 18  Total # of Visits to Date: 14  No Show: 0  Canceled Appointment: 0    Subjective:  Subjective: Pt reports increased soreness in shoulder and low back this morning. Pt reports not doing anything to cause an increase in pain- thinks possibly weather related. States no increased pain or soreness after last therapy session. Pain:  Patient Currently in Pain: Yes  Pain Assessment: 0-10  Pain Level: 6  Patient's Stated Pain Goal: No pain  Pain Type: Chronic pain  Pain Location: Shoulder  Pain Orientation: Right; Anterior  Pain Descriptors: Aching;Constant  Pain Frequency: Continuous  Pain Rating 2: 7  Pain Type 2: Chronic Pain  Pain Location 2: Back  Pain Orientation 2: Lower;Mid  Pain Descriptors 2: Aching;Constant  Pain Duration 2: Continuous    Objective:       [facility]   Rehabilitation Services Exercise Log  Protocol Fibromyalgia     Date of Eval: 10-                               Primary PT: Lucio  Diagnosis: Right shoulder pain and low back pain   Things to Focus On (goals): lesson pain, restore function  Surgical Precautions:  Medical Precautions:DM, Diabetic Polyneuropathy  []??????? C-9 dates  []??????? Occ Med   []??????? Medicare         Date 10/26/20  10/28/20  11/5/20 11/9/20 11/12/20 Visit #  37/63  47/36  79/12 13/18 14/18   Walk F/L/R  2 Laps W/ UE ROM 2 Laps w/ UE ROM   2 Laps w/ UE ROM 3 Laps w/ UE ROM 3 Laps w/ UE ROM   LE Exercise            Marching  2 Laps  2 Laps  2 Laps 2 Laps 2 Laps   Squats  15x  15x5\" 15x5\" 15x5\" 15x5\"   Step-Ups F/L            Heel-toe raises 15x  15x 15x 15x 15x   SLR F/L/R  15x  15x 15x 15x 15x   HS Curl  15x  15x 15x 15x 15x   Lunges            Knee flex/ext  2 Laps  2 Laps 2 Laps 2 Laps 2 Laps   UE exercises            Shoulder Rolls   2 Sm balls  2 Sm Balls Lvl 1 paddes Lvl 1 paddles   Shoulder shrugs  Retro 15x  Retro 15x Retro 15x Retro 15x Retro 15x   Horiz abd/add  15x  15x 15x 15x 15x   Fwd flex  15x  15x 15x 15x 15x   Abd/add  15x  15x 15x 15x 15x   PNF  15x  15x 15x 15x 15x   Bicep Curls  15x  15x 15x 15x 15x   IR/ER  15x  15x 15x 15x 15x   Wall Push-Ups             Tband rows/pull downs         Green x20   Kickboard Ex. Med Med  Med Med Med   ROM w/ kickboard  3-ways  3x10\" 3-ways  3x10\"  3-ways   3x10\" 3-ways  3x10\" 3-ways  3x10\"   Iso Abd.            Push-pull  15x 15x 15x 15x 15x   Paddling  15x 15x 15x 15x 15x   Breast-stroke 2 Laps  2 Laps 2 Laps 2 Laps 2 Laps   Rope pull             Shapes w/ ball         Lg 5x ea dir  cw/ccw   UE Stretches            Corner stretch            Post. Capsule stretch            LE Stretches            Hamstring  2x20\"  2x20\"  2x20\" 2x20\" 2x20\"   Achilles  2x20\"  2x20\"  2x20\" 2x20\" 2x20\"   Quad            C-Spine Stretches            Upper trap            Chin tucks            Cerv ROM            Deep H2O  1 Noodle  1 Noodle 1 Noodle  1 Noodle 1 Noodle   Cycling 2'  2' 2'  2' 2'   Jacks     2'  2' 2'   Cross-country            Laura Cortez  5'  5'  5' 5' 5'                                                        Cool Down                         Pain Rating  4  5 4 5 6 L shldr  7 LB        Assessment: Body structures, Functions, Activity limitations: Decreased functional mobility ; Decreased ADL status; Decreased ROM;Decreased strength;Decreased balance;Decreased high-level IADLs; Increased pain;Decreased posture  Treatment Diagnosis: Right arm pain/   Prognosis: Good  REQUIRES PT FOLLOW UP: Yes  Activity Tolerance: Patient Tolerated treatment well    Plan:  Plan: Continue with current plan    Therapy Time:  Time In: 1058  Time Out: 1150  Minutes: 52  Timed Code Treatment Minutes: 38 Minutes    Treatment Charges: Minutes Units   []  Ultrasound     []  Electrical-Stim     []  Iontophoresis     []  Traction     []  Massage       []  Eval     []  Gait     []  Vasopneumatic Device     []  Ther Exercise       []  Manual Therapy       []  Ther Activities       [x]  Aquatics 38 3   []  Neuro Re-Ed       []  Other       Total Treatment Time: 38 3      Viktoriya Keane, PTA

## 2020-11-12 NOTE — TELEPHONE ENCOUNTER
The form that was dropped off a few days ago requires some functional capacity testing. I sent an order to PT to get this done. Please share the contact info. Thanks.

## 2020-11-16 ENCOUNTER — APPOINTMENT (OUTPATIENT)
Dept: PHYSICAL THERAPY | Age: 55
End: 2020-11-16
Payer: COMMERCIAL

## 2020-11-16 ENCOUNTER — HOSPITAL ENCOUNTER (OUTPATIENT)
Dept: OCCUPATIONAL THERAPY | Age: 55
Setting detail: THERAPIES SERIES
Discharge: HOME | End: 2020-11-16
Payer: COMMERCIAL

## 2020-11-16 PROCEDURE — 97750 PHYSICAL PERFORMANCE TEST: CPT

## 2020-11-17 ENCOUNTER — APPOINTMENT (OUTPATIENT)
Dept: PHYSICAL THERAPY | Age: 55
End: 2020-11-17
Payer: COMMERCIAL

## 2020-11-18 ENCOUNTER — HOSPITAL ENCOUNTER (OUTPATIENT)
Dept: PHYSICAL THERAPY | Age: 55
Setting detail: THERAPIES SERIES
Discharge: HOME | End: 2020-11-18
Payer: COMMERCIAL

## 2020-11-18 PROCEDURE — 97113 AQUATIC THERAPY/EXERCISES: CPT

## 2020-11-18 PROCEDURE — 97110 THERAPEUTIC EXERCISES: CPT

## 2020-11-18 ASSESSMENT — PAIN DESCRIPTION - ORIENTATION
ORIENTATION: RIGHT;LOWER
ORIENTATION: RIGHT;LOWER

## 2020-11-18 ASSESSMENT — PAIN DESCRIPTION - FREQUENCY: FREQUENCY: CONTINUOUS

## 2020-11-18 ASSESSMENT — PAIN DESCRIPTION - DESCRIPTORS: DESCRIPTORS: ACHING;CONSTANT

## 2020-11-18 ASSESSMENT — PAIN SCALES - GENERAL
PAINLEVEL_OUTOF10: 7
PAINLEVEL_OUTOF10: 7

## 2020-11-18 ASSESSMENT — PAIN DESCRIPTION - LOCATION
LOCATION: BACK;SHOULDER
LOCATION: BACK;SHOULDER

## 2020-11-18 ASSESSMENT — PAIN DESCRIPTION - PROGRESSION: CLINICAL_PROGRESSION: GRADUALLY IMPROVING

## 2020-11-18 ASSESSMENT — PAIN DESCRIPTION - PAIN TYPE
TYPE: CHRONIC PAIN
TYPE: CHRONIC PAIN

## 2020-11-18 NOTE — PROGRESS NOTES
Physical Therapy  Progress Note  Date: 2020  Patient Name: Chriss Randolph  MRN: [id number]     :   1965    Subjective:   General  Chart Reviewed: Yes  Referring Practitioner: Rosa Martin DO   PT Visit Information  Onset Date: 20  PT Insurance Information: Breana King   Total # of Visits Approved: 18  Total # of Visits to Date: 15  No Show: 0  Canceled Appointment: 0  Subjective  Subjective: Patient reports low back is bothering her still, unable to stand long to load ; R shoulder still hurts but states Aquatics seem to help in improving her overall mobility  Pain Screening  Patient Currently in Pain: Yes  Pain Assessment  Pain Assessment: 0-10  Pain Level: 7(7/10 - low back; 5/10 - R shoulder)  Pain Type: Chronic pain  Pain Location: Back; Shoulder  Pain Orientation: Right; Lower  Clinical Progression: Gradually improving  Vital Signs  Patient Currently in Pain: Yes       Treatment Activities:    Ambulation 1  Surface: level tile  Device: No Device  Assistance: Independent  Gait Deviations: Slow Ana Rosa;Decreased head and trunk rotation   AROM RUE (degrees)  R Shoulder Flexion 0-180: 0-90 (  )  R Shoulder Extension 0-45: 0-35 ( NOW 45 )  R Shoulder ABduction 0-180: 0-70  ( NOW 85 with pain )  R Shoulder Int Rotation  0-70: 0-30 ( NOW 40 )  R Shoulder Ext Rotation 0-90: 0-35 ( NOW 50 )  Spine  Lumbar: Standing flexion 25 % loss, back bending, side bending and rotation are limited by 50% loss. ( NOW 60 flex; 30 ext; 30@ sidebending)  Strength RLE  Strength RLE: WFL  Strength LLE  Comment: Grossly 4/5 L hip flex/ quads/ hams  Strength RUE  Comment: painful   R Shoulder Flexion: 3/5  R Shoulder ABduction: 3/5  R Shoulder Internal Rotation: 3/5  R Shoulder External Rotation: 3/5  Assessment:   Conditions Requiring Skilled Therapeutic Intervention  Body structures, Functions, Activity limitations: Decreased functional mobility ; Decreased ADL status; Decreased ROM; Decreased strength;Decreased balance;Decreased high-level IADLs;Decreased posture; Increased pain  Assessment: Patient reporting relief from Aquatics, would like to continue to complete the remaining 3 visits in Aquatics. Will benefit from continued therapy but patient is leaving for out of state for a month. Discharge from therapy and advised to see doctor upon return if she needs further therapy. Reviewed HEP, precautions. Treatment Diagnosis: Right arm pain/ low back pain  REQUIRES PT FOLLOW UP: Yes    Goals:  Short term goals  Time Frame for Short term goals: 6 Visits  Short term goal 1: OPTIMAL score of 13/3 inital, the patient's goal is 9/3.- ongoing ( NOW 12/3 - REACHING, LIFTING, PUSHING/ PULLING )  Short term goal 2: OPTIMAL score 9/3 inital, the patient's goal is 6/3.- ongoing  Short term goal 3: Independent with home program (HEP). Long term goals  Time Frame for Long term goals : 12 visits  Long term goal 1: OPTIMAL score at discharge will be 6/3. - ONGOING  Long term goal 2: OPTIMAL score 3/3 at discharge  - ONGOING  Patient Goals   Patient goals : 1. Use right arm without pain / be able to walk without back pain    Plan:    Plan  Times per week: 2x/wk for 18 visits  Current Treatment Recommendations: Strengthening, Home Exercise Program, ROM, Aquatics, Balance Training, Patient/Caregiver Education & Training  Plan Comment: To complete 3 remaining visits, plan for discharge after as patient is going out of town for a month.   Timed Code Treatment Minutes: 20 Minutes     Therapy Time   Individual Concurrent Group Co-treatment   Time In 1030         Time Out 1050         Minutes 20         Timed Code Treatment Minutes: 20 Minutes     Treatment Charges: Minutes Units   []  Ultrasound     []  Electrical-Stim     []  Iontophoresis     []  Traction     []  Massage       []  Eval     []  Gait     [x]  Ther Exercise 20  1    []  Manual Therapy       []  Ther Activities       []  Aquatics     []  Vasopneumatic Device

## 2020-11-18 NOTE — FLOWSHEET NOTE
[address] [facility]   Outpatient Physical Therapy  [facility]. [address]  Phone: [phone]  Fax: [phone]  Daily Progress Note    Date: 20    Patient Name: Rickard Severance        MRN: [id number]  Account: [de-identified] : 1965      General Information:  Chart Reviewed: Yes  Patient assessed for rehabilitation services?: Yes  Referring Practitioner: Cecily Gerber DO   Referral Date : 20  Diagnosis: Right shoulder pain (M25.511)/ back pain  Follows Commands: Within Functional Limits  Onset Date: 20  PT Insurance Information: Bowie Tracie   Total # of Visits Approved: 18  Total # of Visits to Date: 15  No Show: 0  Canceled Appointment: 0    Subjective:  Subjective: Pt reports some increased pain in lumbar back this morning.  had difficulty sleeping last night. Pain:  Patient Currently in Pain: Yes  Pain Assessment: 0-10  Pain Level: 7  Patient's Stated Pain Goal: No pain  Pain Type: Chronic pain  Pain Location: Back; Shoulder  Pain Orientation: Right; Lower  Pain Descriptors: Aching;Constant  Pain Frequency: Continuous       Objective:    [facility] Exercise Log  Protocol Fibromyalgia     Date of Eval: 10-                               Primary PT: Lucio  Diagnosis: Right shoulder pain and low back pain   Things to Focus On (goals): lesson pain, restore function  Surgical Precautions:  Medical Precautions:DM, Diabetic Polyneuropathy  []???????? C-9 dates  []???????? Occ Med   []???????? Medicare         Date  20   Visit #  63/58 13/18 14/18 15/18   Walk F/L/R  2 Laps w/ UE ROM 3 Laps w/ UE ROM 3 Laps w/ UE ROM 2 Laps w/ UE ROM   LE Exercise          Marching  2 Laps 2 Laps 2 Laps 2 Laps   Squats 15x5\" 15x5\" 15x5\" 15x5\"   Step-Ups F/L          Heel-toe raises 15x 15x 15x 15x   SLR F/L/R 15x 15x 15x 15x   HS Curl 15x 15x 15x 15x   Lunges          Knee flex/ext 2 Laps 2 Laps 2 Laps 15x   UE exercises          Shoulder Rolls 2 Sm Balls Lvl 1 paddes Lvl 1 paddles Lvl 1 Paddles   Shoulder shrugs Retro 15x Retro 15x Retro 15x Retro 15x   Horiz abd/add 15x 15x 15x 15x   Fwd flex 15x 15x 15x 15x   Abd/add 15x 15x 15x 15x   PNF 15x 15x 15x 15x   Bicep Curls 15x 15x 15x 15x   IR/ER 15x 15x 15x 15x   Wall Push-Ups           Tband rows/pull downs     Green x20 Green 20x   Kickboard Ex.  Med Med Med Med   ROM w/ kickboard 3-ways   3x10\" 3-ways  3x10\" 3-ways  3x10\" 3-ways  3x10\"   Iso Abd.          Push-pull 15x 15x 15x 15x   Paddling 15x 15x 15x 15x   Breast-stroke 2 Laps 2 Laps 2 Laps 2 Laps   Rope pull           Shapes w/ ball    Lg 5x ea dir  cw/ccw Lg 5x ea dir  cw/ccw Lg 5x ea dir  cw/ccw   UE Stretches          Corner stretch          Post. Capsule stretch          LE Stretches          Hamstring  2x20\" 2x20\" 2x20\" 2x20\"   Achilles  2x20\" 2x20\" 2x20\" 2x20\"   Quad          C-Spine Stretches          Upper trap          Chin tucks          Cerv ROM          Deep H2O 1 Noodle  1 Noodle 1 Noodle 1 Noodle   Cycling 2'  2' 2' 2'   Jacks 2'  2' 2' 2'   Cross-country          Laureen Chess' 5' 5' 5'                                                Cool Down                     Pain Rating 4 5 6 L shldr  7 LB 7 L shldr  7 LB      Assessment: Body structures, Functions, Activity limitations: Decreased functional mobility ; Decreased ADL status; Decreased ROM; Decreased strength;Decreased balance;Decreased high-level IADLs;Decreased posture; Increased pain  Treatment Diagnosis: Right arm pain/ low back pain  Prognosis: Good  REQUIRES PT FOLLOW UP: Yes  Activity Tolerance: Patient Tolerated treatment well    Plan:  Plan: Continue with current plan    Therapy Time:  Time In: 1100  Time Out: 3536  Minutes: 45  Timed Code Treatment Minutes: 35 Minutes    Treatment Charges: Minutes Units   []  Ultrasound     []  Electrical-Stim     []  Iontophoresis     []  Traction     []  Massage       []  Eval     []  Gait     []  Vasopneumatic Device     [x]  Ther Exercise 20  1    []  Manual Therapy       []  Ther Activities       [x]  Aquatics 35 2   []  Neuro Re-Ed       []  Other       Total Treatment Time: 55 3     Physical therapy treatment completed today in part by physical therapist assistant (PTA). Treatment times reflect total treatment time combined by both PT and PTA for today's service.     Kassidy Narvaez PTA

## 2020-11-20 ENCOUNTER — HOSPITAL ENCOUNTER (OUTPATIENT)
Dept: PHYSICAL THERAPY | Age: 55
Setting detail: THERAPIES SERIES
Discharge: HOME | End: 2020-11-20
Payer: COMMERCIAL

## 2020-11-20 PROCEDURE — 97113 AQUATIC THERAPY/EXERCISES: CPT

## 2020-11-20 ASSESSMENT — PAIN DESCRIPTION - PAIN TYPE: TYPE: CHRONIC PAIN

## 2020-11-20 ASSESSMENT — PAIN DESCRIPTION - LOCATION: LOCATION: BACK;SHOULDER

## 2020-11-20 ASSESSMENT — PAIN DESCRIPTION - FREQUENCY: FREQUENCY: CONTINUOUS

## 2020-11-20 ASSESSMENT — PAIN DESCRIPTION - ORIENTATION: ORIENTATION: RIGHT;LOWER

## 2020-11-20 ASSESSMENT — PAIN DESCRIPTION - DESCRIPTORS: DESCRIPTORS: ACHING;CONSTANT

## 2020-11-20 ASSESSMENT — PAIN SCALES - GENERAL: PAINLEVEL_OUTOF10: 7

## 2020-11-20 NOTE — FLOWSHEET NOTE
[address]   Outpatient Physical Therapy  [address] [facility]. [address]  Phone: [phone]  Fax: [phone]  Daily Progress Note    Date: 20    Patient Name: Karla Parker        MRN: [id number]  Account: [de-identified] : 1965      General Information:  Chart Reviewed: Yes  Patient assessed for rehabilitation services?: Yes  Referring Practitioner: Camilo Leblanc DO   Referral Date : 20  Diagnosis: Right shoulder pain (M25.511)/ back pain  Follows Commands: Within Functional Limits  Onset Date: 20  PT Insurance Information: Emily Rom   Total # of Visits Approved: 18  Total # of Visits to Date: 16  No Show: 0  Canceled Appointment: 0    Subjective:  Subjective: Pt reports some increased discomfort in Right shoulder today. came in with Kinesio tape on right shoulder. ststes back was feeling better today but shoulder aching more. Pain:  Patient Currently in Pain: Yes  Pain Assessment: 0-10  Pain Level: 7  Patient's Stated Pain Goal: No pain  Pain Type: Chronic pain  Pain Location: Back; Shoulder  Pain Orientation: Right; Lower  Pain Descriptors: Aching;Constant  Pain Frequency: Continuous       Objective:       [address] [facility] Exercise Log  Protocol Fibromyalgia     Date of Eval: 10-                               Primary PT: Lucio  Diagnosis: Right shoulder pain and low back pain   Things to Focus On (goals): lesson pain, restore function  Surgical Precautions:  Medical Precautions:DM, Diabetic Polyneuropathy  []????????? C-9 dates  []????????? Occ Med   []????????? Medicare         Date  20   Visit #  20/09 13/18 14/18 15/18 16/18   Walk F/L/R  2 Laps w/ UE ROM 3 Laps w/ UE ROM 3 Laps w/ UE ROM 2 Laps w/ UE ROM 2 Laps w/ UE ROM   LE Exercise            Marching  2 Laps 2 Laps 2 Laps 2 Laps 2 Laps   Squats 15x5\" 15x5\" 15x5\" 15x5\" 15x5\"   Step-Ups F/L            Heel-toe raises 15x 15x 15x 15x 15x   SLR F/L/R 15x 15x 15x 15x 15x   HS Curl 15x 15x 15x 15x 15x   Lunges            Knee flex/ext 2 Laps 2 Laps 2 Laps 15x 15x   UE exercises            Shoulder Rolls 2 Sm Balls Lvl 1 paddes Lvl 1 paddles Lvl 1 Paddles Lvl 3  Paddles   DB rows     Blue DB  15x   Tricep press     Blue DB   15x   Shoulder shrugs Retro 15x Retro 15x Retro 15x Retro 15x Retro 15x   Horiz abd/add 15x 15x 15x 15x 15x   Fwd flex 15x 15x 15x 15x 15x   Abd/add 15x 15x 15x 15x 15x   PNF 15x 15x 15x 15x 15x   Bicep Curls 15x 15x 15x 15x 15x   IR/ER 15x 15x 15x 15x 15x   Wall Push-Ups             Tband rows/pull downs     Green x20 Green 20x Green 20x   Kickboard Ex.  Med Med Med Med Med   ROM w/ kickboard 3-ways   3x10\" 3-ways  3x10\" 3-ways  3x10\" 3-ways  3x10\" 3-ways   3x10\"   Iso Abd.            Push-pull 15x 15x 15x 15x 15x   Paddling 15x 15x 15x 15x 15x   Breast-stroke 2 Laps 2 Laps 2 Laps 2 Laps 2 Laps   Rope pull             Shapes w/ ball    Lg 5x ea dir  cw/ccw Lg 5x ea dir  cw/ccw Lg 5x ea dir  cw/ccw Lg 5x ea dir  cw/ccw   UE Stretches            Corner stretch            Post. Capsule stretch            LE Stretches            Hamstring  2x20\" 2x20\" 2x20\" 2x20\" 2x20\"   Achilles  2x20\" 2x20\" 2x20\" 2x20\" 2x20\"   Quad            C-Spine Stretches            Upper trap            Chin tucks            Cerv ROM            Deep H2O 1 Noodle  1 Noodle 1 Noodle 1 Noodle 1 Noodle   Cycling 2'  2' 2' 2' 2'   Jacks 2'  2' 2' 2' 2'   Cross-country            Anshu Arvizu' 5' 5' 5' 5'                                                        Cool Down                         Pain Rating 4 5 6 L shldr  7 LB 7 L shldr  7 LB 5 LB  7 L shldr        Assessment: Body structures, Functions, Activity limitations: Decreased functional mobility ; Decreased ADL status; Decreased ROM; Decreased strength;Decreased balance;Decreased high-level IADLs;Decreased posture; Increased pain  Treatment Diagnosis: Right arm pain/ low back pain  Prognosis: Good  REQUIRES PT FOLLOW UP: Yes  Activity Tolerance: Patient Tolerated treatment well  Comments: added DB UE exercises to patient's tolerance.     Plan:  Plan: Continue with current plan    Therapy Time:  Time In: 1000  Time Out: 1050  Minutes: 50       Treatment Charges: Minutes Units   []  Ultrasound     []  Electrical-Stim     []  Iontophoresis     []  Traction     []  Massage       []  Eval     []  Gait     []  Vasopneumatic Device     []  Ther Exercise       []  Manual Therapy       []  Ther Activities       [x]  Aquatics 40 3   []  Neuro Re-Ed       []  Other       Total Treatment Time: 40  3     Mike Keane, PTA

## 2020-11-23 ENCOUNTER — HOSPITAL ENCOUNTER (OUTPATIENT)
Dept: PHYSICAL THERAPY | Age: 55
Setting detail: THERAPIES SERIES
Discharge: HOME | End: 2020-11-23
Payer: COMMERCIAL

## 2020-11-23 PROCEDURE — 97113 AQUATIC THERAPY/EXERCISES: CPT

## 2020-11-23 ASSESSMENT — PAIN DESCRIPTION - ORIENTATION: ORIENTATION: RIGHT;LOWER

## 2020-11-23 ASSESSMENT — PAIN DESCRIPTION - LOCATION: LOCATION: BACK;SHOULDER

## 2020-11-23 ASSESSMENT — PAIN DESCRIPTION - FREQUENCY: FREQUENCY: CONTINUOUS

## 2020-11-23 ASSESSMENT — PAIN SCALES - GENERAL: PAINLEVEL_OUTOF10: 5

## 2020-11-23 ASSESSMENT — PAIN DESCRIPTION - DESCRIPTORS: DESCRIPTORS: ACHING;CONSTANT

## 2020-11-23 NOTE — FLOWSHEET NOTE
[address] [facility]   Outpatient Physical Therapy  [address] [place] [address]. [address]  Phone: [phone]  Fax: [phone]  Daily Progress Note    Date: 20    Patient Name: Jina Ayala        MRN: [id number]  Account: [de-identified] : 1965      General Information:  Chart Reviewed: Yes  Patient assessed for rehabilitation services?: Yes  Referring Practitioner: Maryjane Hernandez DO   Referral Date : 20  Diagnosis: Right shoulder pain (M25.511)/ back pain  Follows Commands: Within Functional Limits  Onset Date: 20  PT Insurance Information: Jayson Yang   Total # of Visits Approved: 18  Total # of Visits to Date: 17  No Show: 0  Canceled Appointment: 0    Subjective:  Subjective: Pt reports decreased pain after last appointment. notes moving a \"bath tub\" before last therapy session and that is why her shoulder hurt more. Pain:  Patient Currently in Pain: Yes  Pain Assessment: 0-10  Pain Level: 5  Patient's Stated Pain Goal: No pain  Pain Location: Back; Shoulder  Pain Orientation: Right; Lower  Pain Descriptors: Aching;Constant  Pain Frequency: Continuous       Objective:      [facility] Exercise Log  Protocol Fibromyalgia     Date of Eval: 10-                               Primary PT: Lucio  Diagnosis: Right shoulder pain and low back pain   Things to Focus On (goals): lesson pain, restore function  Surgical Precautions:  Medical Precautions:DM, Diabetic Polyneuropathy  []?????????? C-9 dates  []?????????? Occ Med   []?????????? Medicare         Date 20   Visit # 1318 18 15/18 16/18 17/18   Walk F/L/R 3 Laps w/ UE ROM 3 Laps w/ UE ROM 2 Laps w/ UE ROM 2 Laps w/ UE ROM 2 Laps w/ UE ROM   LE Exercise            Marching 2 Laps 2 Laps 2 Laps 2 Laps 2 Laps   Squats 15x5\" 15x5\" 15x5\" 15x5\" 15x5\"   Step-Ups F/L            Heel-toe raises 15x 15x 15x 15x 15x   SLR F/L/R 15x 15x 15x 15x 15x   HS Curl 15x 15x 15x 15x 15x   Lunges            Knee flex/ext 2 Laps 2 Laps 15x 15x 15x   UE exercises            Shoulder Rolls Lvl 1 paddes Lvl 1 paddles Lvl 1 Paddles Lvl 3  Paddles Lvl 3 Paddles   DB rows       Blue DB  15x Blue DB  15x   Tricep press       Blue DB   15x Blue DB  15x   Shoulder shrugs Retro 15x Retro 15x Retro 15x Retro 15x Retro 15x   Horiz abd/add 15x 15x 15x 15x 15x   Fwd flex 15x 15x 15x 15x 15x   Abd/add 15x 15x 15x 15x 15x   PNF 15x 15x 15x 15x 15x   Bicep Curls 15x 15x 15x 15x 15x   IR/ER 15x 15x 15x 15x 15x   Wall Push-Ups             Tband rows/pull downs   Green x20 Green 20x Green 20x Green 20x   Kickboard Ex. Med Med Med Med Med   ROM w/ kickboard 3-ways  3x10\" 3-ways  3x10\" 3-ways  3x10\" 3-ways   3x10\" 3-ways  3x10\"   Iso Abd.            Push-pull 15x 15x 15x 15x 15x   Paddling 15x 15x 15x 15x 15x   Breast-stroke 2 Laps 2 Laps 2 Laps 2 Laps 2 Laps   Rope pull             Shapes w/ ball  Lg 5x ea dir  cw/ccw Lg 5x ea dir  cw/ccw Lg 5x ea dir  cw/ccw Lg 5x ea dir  cw/ccw Lg 5x ea dir  cw/ccw   UE Stretches            Corner stretch            Post. Capsule stretch            LE Stretches            Hamstring 2x20\" 2x20\" 2x20\" 2x20\" 2x20\"   Achilles 2x20\" 2x20\" 2x20\" 2x20\" 2x20\"   Quad            C-Spine Stretches            Upper trap            Chin tucks            Cerv ROM            Deep H2O 1 Noodle 1 Noodle 1 Noodle 1 Noodle 1 Noodle   Cycling 2' 2' 2' 2' 2'   Jacks 2' 2' 2' 2' 2'   Cross-country            Hang 5' 5' 5' 5' 5'                                                        Cool Down                         Pain Rating 5 6 L shldr  7 LB 7 L shldr  7 LB 5 LB  7 L shldr 6 LB  5 L shoulder          Assessment: Body structures, Functions, Activity limitations: Decreased functional mobility ; Decreased ADL status; Decreased ROM; Decreased strength;Decreased balance;Decreased high-level IADLs;Decreased posture; Increased pain  Treatment Diagnosis: Right arm pain/ low back pain  Prognosis:

## 2020-11-24 ENCOUNTER — HOSPITAL ENCOUNTER (OUTPATIENT)
Dept: DIABETES SERVICES | Age: 55
Setting detail: THERAPIES SERIES
Discharge: HOME | End: 2020-11-24
Payer: COMMERCIAL

## 2020-11-24 PROCEDURE — G0108 DIAB MANAGE TRN  PER INDIV: HCPCS

## 2020-11-24 NOTE — PROGRESS NOTES
Diabetes Self- Management Education Program Assessment -   Also see Diabetic Screening  Patient, Alvin Tonie,  here for diabetes self-management education  visit/ assessment. Today's visit was in an individual setting. MEDICAL HISTORY:  Past Medical History:   Diagnosis Date    Allergic rhinitis 10/21/2014    Anxiety     Arthritis     Bleeds easily ([address]     per pt, has never had a work up   Dossie Madison Complete rotator cuff tear of left shoulder 2018    Diabetes mellitus ([address]     On Insulin, metformin, Glipizide    Diabetic polyneuropathy associated with type 2 diabetes mellitus ([address] 3/26/2018    Fibromyalgia     GERD (gastroesophageal reflux disease) 10/21/2014    H/O transfusion     History of blood transfusion     Hypertension     Obesity 10/21/2014    Pure hypercholesterolemia 2/23/2016    Tobacco abuse     Type II or unspecified type diabetes mellitus without mention of complication, not stated as uncontrolled     Unspecified sleep apnea     CPAP machine is broken    Wears glasses      Family History   Problem Relation Age of Onset    Diabetes Mother     Heart Disease Mother         murmur    Heart Disease Father         MVP    Diabetes Sister     Diabetes Maternal Grandmother     High Blood Pressure Maternal Grandmother     Heart Failure Maternal Grandfather     Heart Attack Paternal Grandmother      Pcn [penicillins]; Demerol hcl [meperidine]; Metformin and related;  Tape Judy Jaime tape]; and Morphine   Immunization History   Administered Date(s) Administered    Hepatitis B Adult (Engerix-B) 09/28/2020, 11/10/2020    Influenza Virus Vaccine 10/06/2015, 09/02/2020    Influenza, Quadv, IM, (6 mo and older Fluzone, Flulaval, Fluarix and 3 yrs and older Afluria) 09/27/2017    Influenza, Quadv, IM, PF (6 mo and older Fluzone, Flulaval, Fluarix, and 3 yrs and older Afluria) 10/01/2017, 10/03/2018, 11/26/2019    Influenza, Quadv, Recombinant, IM PF (Flublok 18 yrs and older) 09/02/2020    Pneumococcal Polysaccharide ([id number]) 10/06/2015    Tdap (Boostrix, Adacel) 09/28/2020    Zoster Recombinant (Shingrix) 09/02/2020, 10/30/2020     Current Medications  Current Outpatient Medications   Medication Sig Dispense Refill    meloxicam (MOBIC) 15 MG tablet Take 1 tablet by mouth daily 90 tablet 2    Insulin Degludec (TRESIBA FLEXTOUCH) 100 UNIT/ML SOPN Inject 45 Units into the skin 2 times daily      glipiZIDE (GLUCOTROL) 5 MG tablet Take 5 mg by mouth 2 times daily (before meals)      tiZANidine (ZANAFLEX) 4 MG tablet TAKE ONE TABLET BY MOUTH EVERY 8 HOURS AS NEEDED FOR NECK AND BACK PAIN. **CAUSES SEDATION AND DO NOT DRIVE WHILE TAKING THIS MEDICATION 90 tablet 0    lisinopril (PRINIVIL;ZESTRIL) 20 MG tablet TAKE ONE TABLET BY MOUTH DAILY 90 tablet 0    cetirizine (ZYRTEC) 10 MG tablet Take 10 mg by mouth daily      pregabalin (LYRICA) 100 MG capsule Take 1 capsule by mouth 2 times daily for 90 days. 180 capsule 2    Blood Glucose Monitoring Suppl (TRUE METRIX METER) MANOLO Testing BID 1 Device 0    Insulin Pen Needle (PEN NEEDLES 3/16\") 31G X 5 MM MISC 1 each by Does not apply route daily 100 each 3    insulin lispro, 1 Unit Dial, (HUMALOG KWIKPEN) 100 UNIT/ML SOPN Inject 15 Units into the skin 3 times daily (before meals) 5 pen 1    Insulin Syringes, Disposable, U-100 1 ML MISC 1 each by Does not apply route daily 100 each 3    blood glucose monitor strips Test 2-3 times a day & as needed for symptoms of irregular blood glucose. BRAND OF CHOICE INSURANCE ALLOWS. 100 strip 11    atorvastatin (LIPITOR) 10 MG tablet Take 1 tablet by mouth daily 90 tablet 2    hydroCHLOROthiazide (HYDRODIURIL) 25 MG tablet Take 1 tablet by mouth daily Take with Potassium supplement 30 tablet 3    potassium chloride (KLOR-CON) 10 MEQ extended release tablet Take 1 tablet by mouth daily Take with HCTZ 30 tablet 3    Lancets MISC Dx: DM-2.  Use 2-3 times daily 100 each 3    Alcohol Swabs (ALCOHOL PREP) PADS Use as directed 100 each 11    vitamin D (CHOLECALCIFEROL) 25 MCG (1000 UT) TABS tablet Take 1 tablet by mouth daily 30 tablet 3     Current Facility-Administered Medications   Medication Dose Route Frequency Provider Last Rate Last Dose    methylPREDNISolone acetate (DEPO-MEDROL) injection 80 mg  80 mg Intra-articular Once Meggan Goody, DO        bupivacaine (MARCAINE) 0.25 % injection 5 mg  2 mL Intra-articular Once Meggan Goody, DO       :     Comments:  Allergies: Allergies   Allergen Reactions    Pcn [Penicillins] Hives and Other (See Comments)     Lose motor function of legs and collaps    Demerol Hcl [Meperidine] Other (See Comments)     Panic attack    Metformin And Related      Abdominal pain    Tape Arsenio Dior Tape] Other (See Comments)     Blister      Morphine Nausea And Vomiting and Other (See Comments)     Stomach pain         A1C blood level - at goal < 7%   Lab Results   Component Value Date    LABA1C 11.1 (H) 10/05/2020    LABA1C 11.2 (H) 08/29/2020    LABA1C 11.1 03/10/2020     Lab Results   Component Value Date    GLUF 312 (H) 08/29/2020    LABMICR CANNOT BE CALCULATED 08/29/2020    LDLCALC 110 03/19/2015    CREATININE 0.74 08/29/2020       Blood pressure ( 130/ 80)  Or less  BP Readings from Last 3 Encounters:   11/10/20 111/63   10/07/20 132/86   10/05/20 138/89        Cholesterol ( LDL under  100)   Lab Results   Component Value Date    LDLCALC 110 03/19/2015    LDLCHOLESTEROL 74 10/07/2020       Diabetes Self- Management Education Record    Participant Name: Albertina Soto  Referring Provider: KIMANI Roca CNP   Assessment/Evaluation Ratings:  1=Needs Instruction   4=Demonstrates Understanding/Competency  2=Needs Review   NC=Not Covered    3=Comprehends Key Points  N/A=Not Applicable  Topics/Learning Objectives Pre-session Assess Date:  11/9/20CB Instr.  Date Reinforce Date Post- session Eval Comments   Diabetes disease process & Treatment process: Define diabetes & pre-diabetes; Identify own type of diabetes; role of the pancreas; signs/symptoms; diagnostic criteria; prevention & treatment options; contributing factors. 1 11/11/20CB   Dx about 2004, no classes but has some knowledge. Also had gastric bypass years ago. Pt has family Hx. Admits to not taking care of self until recently daughter in law lost dad to DM and told her she did not want to lose her and she needs to take care of self. Mom with her today as she is up visiting from [facility]. 11/9/20CB    Pt states Grandmother had T1DM,Dx early adult and on insulin. Viewed [employer] resource \"Understanding T2DM\" Also discussed production of ketones. 11/11/20CB   Incorporating nutritional management into lifestyle: Describe effect of type, amount & timing of food on blood glucose; Describe basic meal planning techniques & current nutrition guideline   Eats breakfst, but snacks all day on sweets, Mt Dew, Sweet tea  States she is \"sugar junky\"   Knows she has stretched stomach out since surgery ( gastric bypass)11/9/20CB    Pt asking about KvrrHdit51/11/20CB   11/24/20 JW- sidetracked. Show healthy eating video. Suggested decreasing sugary beverages and working in at least  Fruit and 1 vege per day. Pt doesn't want to cook and tends to eat out often. Question readiness for change. What to eat - Food groups, When to eat - timing of meals and snacks, and How much to eat - portions control. Would suggest 1800  calories/ day   CHO choices/ meal 4,1,4,1,4,1   CHO choices/  day   grams of protein /day   gram of fat /day     Correctly read food labels & demonstrate CHO counting & portion control with personalized meal plan. Identify dining out strategies, & dietary sick day guidelines. 1 11/24/20 JW      Incorporating physical activity into lifestyle:   Verbalize effect of exercise on blood glucose levels; benefits of regular exercise; safety considerations; contraindications; maintenance of activity.    1 11/24/20 LISETH   Likes to be active but having back pain and shoulder pain, getting steroid injections, going to water PT. So limited but wants to walk longer. Also gained 30 # since all the pain. 11/9/20CB   Using medications safely:  Identify effects of diabetes medicines on blood glucose levels; List diabetes medication taken, action & side effects;    1    No oral meds11/9/20CB   Insulin / Injectable - Appropriate injection sites; proper storage; supplies needed; proper technique; safe needle disposal guidelines. 1    Tresiba 45 units am and HS  Humalog Kwikpen 20 units 3x/d at meals11/9/20CB. 11/24/20 JW pt might benefit from a GLP!-RA   Monitoring blood glucose, interpreting and using results:  Identify recommended & personal blood glucose targets; importance of testing; testing supplies; HgbA1C target levels; Factors affecting blood glucose; Importance of logging blood glucose levels for pattern recognition; ketone testing; safe lancet disposal.   1 11/11/20CB   Checking BG 3x/d 123 before meals, 200's 2hpp. A1C 11.1 on 10/6/20  11/9/20CB    Pt did ask Dr. Cristian Neal at appt yesterday about CGMS and he will submit but she has to check BG 4x/d for documentation to submit. Pt struggling with checking that much.  11/11/20CB   Prevention, detection & treatment of acute complications:  Identify symptoms of hyper & hypoglycemia, and prevention & treatment strategies. 1 11/11/20CB   States feels low and ate peppermint but did not have meter with her but also cutting back on sugar in tea and drinking [address] which she has in am  Follow up with michaelle and PB. Pt will check BG out in car before driving after class. 11/11/20CB   Describe sick day guidelines & indications for  physician notification. Identify short term consequences of poor control.  Disaster preparedness strategies    1       Prevention, detection & treatment of chronic complications:  Define the natural course of diabetes & describe the relationship of blood glucose levels to long term complications of diabetes. Identify preventative measures & standards of care. 1    No dentist, no teeth, recent eye and foot exam in Sept11/9/20CB   Developing strategies to address psychosocial issues:  Describe feelings about living with diabetes; Describe how stress, depression & anxiety affect blood glucose; Identify coping strategies; Identify support needed & support network available. 1 11/11/20CB   Concerned about BG since daughter in law told her she did not want to lose her. Wants to be healthy for grandkids as she likes to be active and do things with them. Mother very supportive and encouraging. 11/9/20CB    Mom having back issues so did not come today. Pt expressed concerns for grandkids and future DM and wants to eat healthy but admits to being emotional eater and more junk food. 11/11/20CB   Developing strategies to promote health/change behavior: Identify 7 self-care behaviors; Personal health risk factors; Benefits, challenges & strategies for behavioral change;    1         Individualized goal selection. My goal , to help me improve my health, I will:   1.try to stop reg pop      2. Plan  Follow-up Appointments planned in individual setting. Next Appointment on 11/11/20 @ 11:30    Instruction Method: [x]Lecture/Discussion  []Power Point Presentation  [x]Handouts  []Return Demonstration      Education Materials/Equipment Provided:    [x]Self-Management - Initial assessment - Enrolment in to ADA  Where do I Begin, Living with Type 2 diabetes ADA home support program and handout for no concentrated sweets and diet meal planning basics, handout on diabetes education classes.  11/9/20CB      [x]Self-Management  Class 1 - \"Diabetes - your take control guide\" - ADA booklet and Healthy i On the Road to better managing your diabetes map debcljsa58/11/20CB    [x] Self-Management  Class 2 - Meal Plan and handout for serving sizes, smarter snacking, Ready Set Carb Counting / Plate Method, Nutrient Conversion and International Diabetes [address] Eating for People with Diabetes and Nutrition in the [employer] - fast facts about fast food11/24/20 JW  [] Self-Management  Class 3 -  Diabetes ID card,  foot care tips sheet, Healthy I  Continuing Your Journey with Diabetes map handout, Individualized Diabetes report card    [] Self-Management Class 4 - BD Booklet  Sick Day Rules and  Dinning Out Guide , recipe hand outs and tips, diabetes Cookbooks  ( when available)     []Self-Management -  Self-Management - 3 month follow -  AADE7 Self care behaviors work sheets, 2020 [employer],  Online resource list - March 2020      []Self-Management  Gestational - RN class -Gestational Diabetes Mellitus ( GDM) toolkit form [place] gestational diabetes postpartum care learning collaborative 2018. Gestational diabetes handout from [facility] jan 2016  \"Simple Guidelines for meal planning with gestational diabetes\" handout 3 meals and 3 snacks  SMBG sheets to fax back to [facility] weekly  BD  healthy injection site selection and rotation with 6 mm insulin syringe and 4 mm pen needle  Did you have gestational diabetes when you were pregnant?  Handout from [facility]  April 2014    []Self-Management Gestational - RD class - My Food Plan for Gestational diabetes    []Glucose Meter     []Insulin Kit     []Other      Encounter Type Date Start Time End Time Comments No Show Dates   Assessment 11/9/20CB   12:30 1:40   [x]In Person  []Telephone pt told appt was 12:30 when she completed therapy today    Class 1 - Understanding diabetes 11/11/20CB 11:30 12:30  x    Class 2- Nutrition and diabetes   11/24/20 JW 10:00 11:00 x    Class 3 - Preventing Complications         Class 4 -  In depth Nutrition and sick day care        Class 5 - 3 month follow up / goal reassessment        Gestational - RN         Gestational - RD        Individual MNT Shared Med Appt         Yearly Follow-up        Meter Instrx        Insulin Instrx      []Pen  []Vial & Syringe      DSMS Support :   [] MNT      [] Annual update     [] Starting Fresh  adults living with diabetes or pre diabetes. [address] [facility]    [id number]-2:30pm- on 6 week rotation  Free -  REGISTRATION IS REQUIRED - GIANNA [phone] call for dates    []  Diabetes Group at  [address] - Free 6 week diabetes education support   classes - contact : Nidhi Hsieh [phone]  for dates and locations      []ADA  Where do I Begin, Living with Type 2 diabetes ADA home support program  Web site: diabetes. org/living    Call: 1800 DIABETES  e-mail: [email or web address]. org     []  Internet web sites - ADAWeb site: diabetes. org/living   D- life   [] [facility] opens at 8 30 am daily for walkers, shops open at 8 am   [address] [facility], [facility]   [phone] Daily - 8:30am - 10am    3rd Tuesday of every month [facility] expert speakers visit from 9 - 10am        [] [facility], [facility], [facility], [place], [facility] (site rotate)  Call [phone] or visit   website: [email or web address] for more details   Check web site for updated times/ dates       S[] avvy [employer] [address]  Free class   [facility]  [address] [facility], [id number] [place] [address] South Tuesday and Thursday -   9 :30 am- 10:30am - ongoing   [] [address]  [address] [facility], [address] [facility] [address] [address] [facility] Thursday 11:00am - 11:45am     [] Third Wednesday Cooking  Class  Free  Registration is required     [address].   [address] [facility], [address] [facility] [phone] or   Email [email or web address]   Wednesdays-5:00- 6:00 pm       [] Eat Smart  Be Active & Learn How - Free   Families & grandparents with children in home 0- 25 & pregnant moms          Various sites in community - call to find next session near you. [facility] extension office for future sessions - Manju Charles  Email : [email or web address]. [email or web address]  Phone: [phone]     [] (SNAP-Ed)   - free nutrition education   - adults and youth, who are eligible for the Supplemental Nutrition Assistance Program    Various sites in community - call to find next session near you. [facility] AKOSUA-CRANBERRY-JESICA SNAP-Ed  contact Leonard Trivedi, Email:[email or web address]. [phone]           Post Education Referrals:      [] [place] Tobacco Quit information sheet and [address] [facility] , 21       [] Dental care - [facility] care of [facility]     [] [facility] ([facility]) link  phone number - for information and referral to [address], WOUND, WEIGHT MANAGEMENT        []Other  Anna Downs, CHRISTY, LD

## 2020-11-25 ENCOUNTER — HOSPITAL ENCOUNTER (OUTPATIENT)
Dept: PHYSICAL THERAPY | Age: 55
Setting detail: THERAPIES SERIES
Discharge: HOME | End: 2020-11-25
Payer: COMMERCIAL

## 2020-11-25 PROCEDURE — 97113 AQUATIC THERAPY/EXERCISES: CPT

## 2020-11-25 ASSESSMENT — PAIN DESCRIPTION - ORIENTATION
ORIENTATION_2: LOWER;MID
ORIENTATION: RIGHT;LOWER

## 2020-11-25 ASSESSMENT — PAIN DESCRIPTION - FREQUENCY: FREQUENCY: CONTINUOUS

## 2020-11-25 ASSESSMENT — PAIN DESCRIPTION - PAIN TYPE
TYPE_2: CHRONIC PAIN
TYPE: CHRONIC PAIN

## 2020-11-25 ASSESSMENT — PAIN DESCRIPTION - INTENSITY: RATING_2: 4

## 2020-11-25 ASSESSMENT — PAIN DESCRIPTION - LOCATION
LOCATION_2: BACK
LOCATION: SHOULDER

## 2020-11-25 ASSESSMENT — PAIN DESCRIPTION - DESCRIPTORS
DESCRIPTORS_2: ACHING;CONSTANT
DESCRIPTORS: ACHING;CONSTANT

## 2020-11-25 ASSESSMENT — PAIN SCALES - GENERAL: PAINLEVEL_OUTOF10: 6

## 2020-11-25 NOTE — FLOWSHEET NOTE
[address] [facility]   Outpatient Physical Therapy  [facility] [place]. [address]  Phone: [phone]  Fax: [phone]  Daily Progress Note    Date: 20    Patient Name: Marquis Cuadra        MRN: [id number]  Account: [de-identified] : 1965      General Information:  Chart Reviewed: Yes  Patient assessed for rehabilitation services?: Yes  Referring Practitioner: Randell Perrin DO   Referral Date : 20  Diagnosis: Right shoulder pain (M25.511)/ back pain  Follows Commands: Within Functional Limits  Onset Date: 20  PT Insurance Information: Timothy Garcia   Total # of Visits Approved: 18  Total # of Visits to Date: 18  No Show: 0  Canceled Appointment: 0    Subjective:  Subjective: pt repors having a good morning today. denies any issues after last visit. Pain:  Patient Currently in Pain: Yes  Pain Assessment: 0-10  Pain Level: 6  Patient's Stated Pain Goal: No pain  Pain Type: Chronic pain  Pain Location: Shoulder  Pain Orientation: Right; Lower  Pain Descriptors: Aching;Constant  Pain Frequency: Continuous  Pain Rating 2: 4  Pain Type 2: Chronic Pain  Pain Location 2: Back  Pain Orientation 2: Lower;Mid  Pain Descriptors 2: Aching;Constant    Objective:      [facility]   Rehabilitation Services Exercise Log  Protocol Fibromyalgia     Date of Eval: 10-                               Primary PT: Lucio  Diagnosis: Right shoulder pain and low back pain   Things to Focus On (goals): lesson pain, restore function  Surgical Precautions:  Medical Precautions:DM, Diabetic Polyneuropathy  []??????????? C-9 dates  []??????????? Occ Med   []??????????? Medicare         Date 20   Visit # 15/18 16/18 17/18 18/18   Walk F/L/R 2 Laps w/ UE ROM 2 Laps w/ UE ROM 2 Laps w/ UE ROM 2 Laps w/ UE ROM   LE Exercise          Marching 2 Laps 2 Laps 2 Laps 2 Laps   Squats 15x5\" 15x5\" 15x5\" 15x5\"   Step-Ups F/L      low box    Heel-toe raises 15x 15x 15x 15x   SLR F/L/R 15x 15x 15x 15x   HS Curl 15x 15x 15x 15x   Lunges          Knee flex/ext 15x 15x 15x 15x   UE exercises          Shoulder Rolls Lvl 1 Paddles Lvl 3  Paddles Lvl 3 Paddles Lvl 3 paddles   DB rows   Blue DB  15x Blue DB  15x Blue DB  15x   Tricep press   Blue DB   15x Blue DB  15x Blue DB  15x   Shoulder shrugs Retro 15x Retro 15x Retro 15x Retro 15x   Horiz abd/add 15x 15x 15x 15x   Fwd flex 15x 15x 15x 15x   Abd/add 15x 15x 15x 15x   PNF 15x 15x 15x 15x   Bicep Curls 15x 15x 15x 15x   IR/ER 15x 15x 15x 15x   Wall Push-Ups           Tband rows/pull downs Green 20x Green 20x Green 20x Green 20x   Kickboard Ex. Med Med Med Med   ROM w/ kickboard 3-ways  3x10\" 3-ways   3x10\" 3-ways  3x10\" 3-ways   3x10\"   Iso Abd.          Push-pull 15x 15x 15x 15x   Paddling 15x 15x 15x 15x   Breast-stroke 2 Laps 2 Laps 2 Laps 2 Laps   Rope pull           Shapes w/ ball Lg 5x ea dir  cw/ccw Lg 5x ea dir  cw/ccw Lg 5x ea dir  cw/ccw Lg 5x ea dir cw/ccw   UE Stretches          Corner stretch          Post. Capsule stretch          LE Stretches          Hamstring 2x20\" 2x20\" 2x20\" 2x20\"   Achilles 2x20\" 2x20\" 2x20\" 2x20\"   Quad          C-Spine Stretches          Upper trap          Chin tucks          Cerv ROM          Deep H2O 1 Noodle 1 Noodle 1 Noodle 1 Noodle   Cycling 2' 2' 2' 2'   Jacks 2' 2' 2' 2'   Cross-country          Hang 5' 5' 5' 5'                                                Cool Down                     Pain Rating 7 L shldr  7 LB 5 LB  7 L shldr 6 LB  5 L shoulder 6 LB  4 shoulder        Assessment: Body structures, Functions, Activity limitations: Decreased functional mobility ; Decreased ADL status; Decreased ROM; Decreased strength;Decreased balance;Decreased high-level IADLs;Decreased posture; Increased pain  Treatment Diagnosis: Right arm pain/ low back pain  Prognosis: Good  REQUIRES PT FOLLOW UP: Yes  Activity Tolerance: Patient Tolerated treatment well    Plan:  Plan: Discharge    Therapy Time:  Time In: 1012  Time Out: 1100  Minutes: 48  Timed Code Treatment Minutes: 38 Minutes    Treatment Charges: Minutes Units   []  Ultrasound     []  Electrical-Stim     []  Iontophoresis     []  Traction     []  Massage       []  Eval     []  Gait     []  Vasopneumatic Device     []  Ther Exercise       []  Manual Therapy       []  Ther Activities       [x]  Aquatics 38 3   []  Neuro Re-Ed       []  Other       Total Treatment Time: 45  3     Mike Keane, SUSIE

## 2020-12-01 ENCOUNTER — HOSPITAL ENCOUNTER (OUTPATIENT)
Dept: GENERAL RADIOLOGY | Age: 55
Discharge: HOME | End: 2020-12-03
Payer: COMMERCIAL

## 2020-12-01 ENCOUNTER — HOSPITAL ENCOUNTER (OUTPATIENT)
Dept: PAIN MANAGEMENT | Age: 55
Discharge: HOME | End: 2020-12-01
Payer: COMMERCIAL

## 2020-12-01 VITALS
WEIGHT: 275 LBS | RESPIRATION RATE: 16 BRPM | BODY MASS INDEX: 44.2 KG/M2 | OXYGEN SATURATION: 98 % | SYSTOLIC BLOOD PRESSURE: 150 MMHG | DIASTOLIC BLOOD PRESSURE: 80 MMHG | HEIGHT: 66 IN | TEMPERATURE: 97.4 F | HEART RATE: 88 BPM

## 2020-12-01 PROCEDURE — 62323 NJX INTERLAMINAR LMBR/SAC: CPT

## 2020-12-01 PROCEDURE — 3209999900 FLUORO FOR SURGICAL PROCEDURES

## 2020-12-01 PROCEDURE — 62323 NJX INTERLAMINAR LMBR/SAC: CPT | Performed by: PAIN MEDICINE

## 2020-12-01 PROCEDURE — 6360000004 HC RX CONTRAST MEDICATION: Performed by: PAIN MEDICINE

## 2020-12-01 PROCEDURE — 6360000002 HC RX W HCPCS: Performed by: PAIN MEDICINE

## 2020-12-01 RX ORDER — TRIAMCINOLONE ACETONIDE 40 MG/ML
INJECTION, SUSPENSION INTRA-ARTICULAR; INTRAMUSCULAR
Status: COMPLETED | OUTPATIENT
Start: 2020-12-01 | End: 2020-12-01

## 2020-12-01 RX ADMIN — TRIAMCINOLONE ACETONIDE 80 MG: 40 INJECTION, SUSPENSION INTRA-ARTICULAR; INTRAMUSCULAR at 10:17

## 2020-12-01 RX ADMIN — IOHEXOL 2 ML: 180 INJECTION INTRAVENOUS at 10:17

## 2020-12-01 ASSESSMENT — PAIN DESCRIPTION - LOCATION: LOCATION: BACK

## 2020-12-01 ASSESSMENT — PAIN DESCRIPTION - ORIENTATION: ORIENTATION: RIGHT;LOWER

## 2020-12-01 ASSESSMENT — PAIN SCALES - GENERAL: PAINLEVEL_OUTOF10: 6

## 2020-12-01 ASSESSMENT — PAIN DESCRIPTION - PROGRESSION: CLINICAL_PROGRESSION: GRADUALLY WORSENING

## 2020-12-01 ASSESSMENT — PAIN DESCRIPTION - PAIN TYPE: TYPE: CHRONIC PAIN

## 2020-12-01 ASSESSMENT — PAIN DESCRIPTION - ONSET: ONSET: ON-GOING

## 2020-12-01 ASSESSMENT — PAIN DESCRIPTION - FREQUENCY: FREQUENCY: CONTINUOUS

## 2020-12-01 ASSESSMENT — PAIN - FUNCTIONAL ASSESSMENT
PAIN_FUNCTIONAL_ASSESSMENT: 0-10
PAIN_FUNCTIONAL_ASSESSMENT: PREVENTS OR INTERFERES SOME ACTIVE ACTIVITIES AND ADLS

## 2020-12-01 ASSESSMENT — PAIN DESCRIPTION - DESCRIPTORS: DESCRIPTORS: CONSTANT

## 2020-12-01 NOTE — PROGRESS NOTES
Brandon Sarabia is a 50 y.o. female evaluated on 12/1/2020. Chief complaint: Brandon Sarabia is 50 y.o., [place] female, with  Back Pain  . HPI   Patient is initially evaluated on 11/3/2020 as a virtual visit. A physical examination is done today to complete the evaluation. Please refer to the original note for complete history.     Past Medical History:   Diagnosis Date    Allergic rhinitis 10/21/2014    Anxiety     Arthritis     Bleeds easily ([address]     per pt, has never had a work up   Mike Salm Complete rotator cuff tear of left shoulder 2018    Diabetes mellitus ([address]     On Insulin, metformin, Glipizide    Diabetic polyneuropathy associated with type 2 diabetes mellitus ([address] 3/26/2018    Fibromyalgia     GERD (gastroesophageal reflux disease) 10/21/2014    H/O transfusion     History of blood transfusion     Hypertension     Obesity 10/21/2014    Pure hypercholesterolemia 2/23/2016    Tobacco abuse     Type II or unspecified type diabetes mellitus without mention of complication, not stated as uncontrolled     Unspecified sleep apnea     CPAP machine is broken    Wears glasses        Past Surgical History:   Procedure Laterality Date    BRONCHOSCOPY  9/27/2017    BRONCHOSCOPY BRUSHINGS performed by Chayo Bocanegra MD at [facility] Right 2002    CHOLECYSTECTOMY N/A 9/29/2017    CHOLECYSTECTOMY OPEN performed by Jackeline Richardson DO at [facility] N/A 2/21/2018    COLONOSCOPY POLYPECTOMY SNARE/COLD BIOPSY performed by Ly Sr MD at [facility]    with revision x1    LARYNGOSCOPY N/A 9/29/2017    DIRECT MICRO LARYNGOSCOPY WITH EXCISION VOCAL CORD LESION  performed by Kathy Verduzco MD at [address] [place] [address] Right 1988    ME EGD TRANSORAL BIOPSY SINGLE/MULTIPLE N/A 9/27/2017    EGD BIOPSY performed by Ly Sr MD at [address] [place] [address] ARTHROSCOP,SURG,W/ROTAT CUFF REPR Left 6/19/2018    SHOULDER ARTHROSCOPY WITH ROTATOR CUFF REPAIR, SUBACROMIAL DECOMPRESSION performed by Chris Kuhn MD at [address] Left     SHOULDER ARTHROSCOPY WITH ROTATOR CUFF REPAIR, SUBACROMIAL DECOMPRESSION (Left        Family History   Problem Relation Age of Onset    Diabetes Mother     Heart Disease Mother         murmur    Heart Disease Father         MVP    Diabetes Sister     Diabetes Maternal Grandmother     High Blood Pressure Maternal Grandmother     Heart Failure Maternal Grandfather     Heart Attack Paternal Grandmother        Social History     Socioeconomic History    Marital status: Legally      Spouse name: Not on file    Number of children: 3    Years of education: Not on file    Highest education level: Not on file   Occupational History    Occupation:    Social Needs    Financial resource strain: Not on file    Food insecurity     Worry: Not on file     Inability: Not on file   [employer] needs     Medical: Not on file     Non-medical: Not on file   Tobacco Use    Smoking status: Former Smoker     Packs/day: 2.00     Years: 34.00     Pack years: 68.00     Types: Cigarettes     Start date: 1983     Last attempt to quit: 9/28/2017     Years since quitting: 3.1    Smokeless tobacco: Never Used   Substance and Sexual Activity    Alcohol use:  Yes     Alcohol/week: 0.0 standard drinks     Frequency: Monthly or less     Drinks per session: 1 or 2     Comment: rare    Drug use: No    Sexual activity: Never   Lifestyle    Physical activity     Days per week: Not on file     Minutes per session: Not on file    Stress: Not on file   Relationships    Social connections     Talks on phone: Not on file     Gets together: Not on file     Attends Gnosticist service: Not on file     Active member of club or organization: Not on file     Attends meetings of clubs or organizations: Not on file     Relationship status: Not on file    Intimate partner violence     Fear of current or ex partner: Not on file     Emotionally abused: Not on file     Physically abused: Not on file     Forced sexual activity: Not on file   Other Topics Concern    Not on file   Social History Narrative    Not on file       Allergies   Allergen Reactions    Pcn [Penicillins] Hives and Other (See Comments)     Lose motor function of legs and collaps    Demerol Hcl [Meperidine] Other (See Comments)     Panic attack    Metformin And Related      Abdominal pain    Tape Cipriano Ernesto Tape] Other (See Comments)     Blister      Morphine Nausea And Vomiting and Other (See Comments)     Stomach pain       Current Outpatient Medications on File Prior to Encounter   Medication Sig Dispense Refill    meloxicam (MOBIC) 15 MG tablet Take 1 tablet by mouth daily 90 tablet 2    Insulin Degludec (TRESIBA FLEXTOUCH) 100 UNIT/ML SOPN Inject 45 Units into the skin 2 times daily      glipiZIDE (GLUCOTROL) 5 MG tablet Take 5 mg by mouth 2 times daily (before meals)      tiZANidine (ZANAFLEX) 4 MG tablet TAKE ONE TABLET BY MOUTH EVERY 8 HOURS AS NEEDED FOR NECK AND BACK PAIN. **CAUSES SEDATION AND DO NOT DRIVE WHILE TAKING THIS MEDICATION 90 tablet 0    lisinopril (PRINIVIL;ZESTRIL) 20 MG tablet TAKE ONE TABLET BY MOUTH DAILY 90 tablet 0    cetirizine (ZYRTEC) 10 MG tablet Take 10 mg by mouth daily      pregabalin (LYRICA) 100 MG capsule Take 1 capsule by mouth 2 times daily for 90 days.  180 capsule 2    Blood Glucose Monitoring Suppl (TRUE METRIX METER) MANOLO Testing BID 1 Device 0    Insulin Pen Needle (PEN NEEDLES 3/16\") 31G X 5 MM MISC 1 each by Does not apply route daily 100 each 3    insulin lispro, 1 Unit Dial, (HUMALOG KWIKPEN) 100 UNIT/ML SOPN Inject 15 Units into the skin 3 times daily (before meals) 5 pen 1    Insulin Syringes, Disposable, U-100 1 ML MISC 1 each by Does not apply route daily 100 each 3    blood glucose monitor strips Test 2-3 times a day & as needed for symptoms of irregular blood glucose. BRAND OF CHOICE INSURANCE ALLOWS. 100 strip 11    atorvastatin (LIPITOR) 10 MG tablet Take 1 tablet by mouth daily 90 tablet 2    Lancets MISC Dx: DM-2. Use 2-3 times daily 100 each 3    Alcohol Swabs (ALCOHOL PREP) PADS Use as directed 100 each 11    hydroCHLOROthiazide (HYDRODIURIL) 25 MG tablet Take 1 tablet by mouth daily Take with Potassium supplement 30 tablet 3    potassium chloride (KLOR-CON) 10 MEQ extended release tablet Take 1 tablet by mouth daily Take with HCTZ 30 tablet 3    vitamin D (CHOLECALCIFEROL) 25 MCG (1000 UT) TABS tablet Take 1 tablet by mouth daily 30 tablet 3     Current Facility-Administered Medications on File Prior to Encounter   Medication Dose Route Frequency Provider Last Rate Last Dose    methylPREDNISolone acetate (DEPO-MEDROL) injection 80 mg  80 mg Intra-articular Once Kathrynn Brain, DO        bupivacaine (MARCAINE) 0.25 % injection 5 mg  2 mL Intra-articular Once Kathrynn Brain, DO            Review of Systems   Please refer to review of additional evaluation note for review of systems. Patient denies any changes since then  Physical Exam  Constitutional:       Appearance: Normal appearance. She is obese. HENT:      Head: Normocephalic and atraumatic. Nose: Nose normal.      Mouth/Throat:      Mouth: Mucous membranes are moist.   Eyes:      Extraocular Movements: Extraocular movements intact. Pupils: Pupils are equal, round, and reactive to light. Neck:      Musculoskeletal: Neck supple. No neck rigidity. Cardiovascular:      Rate and Rhythm: Normal rate and regular rhythm. Pulses: Normal pulses. Pulmonary:      Effort: Pulmonary effort is normal. No respiratory distress. Breath sounds: Normal breath sounds. Abdominal:      General: Abdomen is flat. Bowel sounds are normal. There is no distension. Musculoskeletal:      Right lower leg: No edema. Left lower leg: No edema. Skin:     Findings: No rash. Neurological:      Mental Status: She is alert and oriented to person, place, and time. Cranial Nerves: Cranial nerves are intact. No cranial nerve deficit. Sensory: Sensation is intact. No sensory deficit. Motor: Tremor present. No weakness, atrophy or abnormal muscle tone. Deep Tendon Reflexes:      Reflex Scores:       Patellar reflexes are 1+ on the right side and 1+ on the left side. Achilles reflexes are 1+ on the right side and 1+ on the left side. Comments: Overall deconditioning of the lower extremities   Psychiatric:         Mood and Affect: Mood normal.         Speech: Speech normal.         Behavior: Behavior normal.         Thought Content: Thought content normal.         Judgment: Judgment normal.      Right Ankle Exam     Muscle Strength   The patient has normal right ankle strength. Left Ankle Exam     Muscle Strength   The patient has normal left ankle strength. Right Knee Exam     Muscle Strength   The patient has normal right knee strength. Left Knee Exam     Muscle Strength   The patient has normal left knee strength. Right Hip Exam     Muscle Strength   The patient has normal right hip strength. Left Hip Exam     Muscle Strength   The patient has normal left hip strength. Back Exam     Tenderness   The patient is experiencing tenderness in the lumbar and sacroiliac. Range of Motion   Back extension: Limited and painful. Back flexion: Limited and painful. Back lateral bend right: Limited and painful. Back lateral bend left: Limited and painful. Back rotation right: Limited and painful. Back rotation left: Limited and painful.      Tests   Straight leg raise right: negative  Straight leg raise left: positive    Other   Sensation: normal  Gait: antalgic   Scars: present    Comments:  Skin --normal appearance  Surgical Scar --Absent   kyphosis absent and scoliosis absent  Alignment of her shoulders, scapulae and iliac crests--symmetric  Paraspinal tenderness:Present  Lumbar lordosis-----------Increased  Movements of the lumbar spine indicated above are diminished range with pain   Facet loading---  : Right side--    Pain-Moderate                                   Left side----   Pain  Moderate                 DATA[de-identified]    X-Ray reports:  EXAMINATION:    MRI OF THE LUMBAR SPINE WITHOUT CONTRAST, 10/22/2020 11:38 am         TECHNIQUE:    Multiplanar multisequence MRI of the lumbar spine was performed without the    administration of intravenous contrast.         COMPARISON:    Radiographs October 7, 2020         MRI July 11, 2006         HISTORY:    ORDERING SYSTEM PROVIDED HISTORY: Lumbar radiculopathy    TECHNOLOGIST PROVIDED HISTORY:    neuropathy    Is the patient pregnant?->No    Reason for Exam: pt stated back pain radiates down lt leg    Acuity: Chronic    Type of Exam: Initial    Additional signs and symptoms: pt stated back pain radiates down lt leg         FINDINGS:    BONES/ALIGNMENT: No acute fracture.  No subluxation.  No suspicious bone    marrow replacing lesion.         SPINAL CORD: Normal signal within the conus which terminates at T12-L1.         SOFT TISSUES: No paraspinal abnormality.         L1-L2: No significant central canal or foraminal stenosis.         L2-L3: Mild broad-based disc bulge and facet degenerative changes combine to    create mild central canal stenosis.  Changes combine to create minimal    bilateral foraminal stenosis.         L3-L4: Broad-based disc bulge and facet degenerative changes combine to    create mild central canal stenosis.  Changes combine to create mild bilateral    foraminal stenosis.         L4-L5: Broad-based disc bulge and facet degenerative changes combine to    create mild central canal stenosis.  Changes combine to create mild-moderate    bilateral foraminal stenosis.         L5-S1: Mild broad-based disc bulge and facet degenerative changes do not    cause significant central canal stenosis.  Changes combine to create mild    bilateral foraminal stenosis.           Impression    Overall mild multilevel degenerative changes. Clinical  impression:  1. Lumbar radiculopathy    2. DDD (degenerative disc disease), lumbar    3. Lumbosacral spondylosis without myelopathy    4. Arthropathy of lumbar facet joint        Plan of care: The following treatment plan was developed after discussion with patient:    We discussed Lumbar Epidural steroid Injections x 1  at L4 - L5. Patient tried and failed NSAIDS,Home exercises, Physical Therapy, Chiropractic manipulations without relief. Patient exhibited signs of radiculopathy with positive straight leg raising test on left    Patient has not had prior Lumbar Surgery. We will see the patient in 2 weeks after the procedures and re-evaluate symptoms. Controlled Substances Monitoring:          PDMP Monitoring:    Last PDMP Luiz as Reviewed [facility]):  Review User Review Instant Review Result   Enzo Oviedo 11/3/2020  6:15 AM Reviewed PDMP [1]     Counselling/Preventive measures for pain  Control:    [x]  Spine strengthening exercises are discussed with patient in detail. Decision Making Process : Patient's health history and referral records thoroughly reviewed before focused physical examination and discussion with patient. ILevel of complexity of date to be reviewed is Moderate. The chart date reviewed include the following: Imaging Reports. Summary of Care. Time spent reviewing with patient the below reports:   Medication safety, Treatment options. Level of diagnosis and management options of this case is multiple: involving the following management options: Interventions as needed, medication management as appropriate, future visits, activity modification, heat/ice as needed, Urine drug screen as required.      [x]The patient's questions were answered to the best of my

## 2020-12-01 NOTE — PROGRESS NOTES
Discharge criteria met. Patient alert and oriented x3  Post procedure dressing dry and intact. Sensory and motor function intact as per pre-procedure. Instructions and follow up reviewed with pt at patient at discharge.   Patient discharged with transporter @ 1047

## 2020-12-01 NOTE — PROCEDURES
candidate for plan of sedation: yes  Patient's History and Physical examination was reviewed and there is no change. Electronically signed by José Luis Bansal MD on 12/1/2020 at 10:25 AM        Preoperative Diagnosis:    1. Lumbar radiculopathy    2. DDD (degenerative disc disease), lumbar    3. Lumbosacral spondylosis without myelopathy    4. Arthropathy of lumbar facet joint        Postoperative Diagnosis:    1. Lumbar radiculopathy    2. DDD (degenerative disc disease), lumbar    3. Lumbosacral spondylosis without myelopathy    4. Arthropathy of lumbar facet joint        Procedure Performed:  Lumbar epidural steroid injection under fluoroscopy guidance without IV sedation. Indication for the Procedure: The patient failed conservative management  for pain in the low back radiating to lower extremities. As the patient is not responding to conservative management and it is interfering with activities of daily living we decided to proceed with lumbar epidural steroid injection. The procedure and risks were discussed with the patient and an informed consent was obtained  Current Pain Assessment  Pain Assessment  Pain Assessment: 0-10  Pain Level: 6  Patient's Stated Pain Goal: 2(decrease pain and increase actiivty)  Pain Type: Chronic pain  Pain Location: Back  Pain Orientation: Right, Lower  Pain Radiating Towards: back to the hip to buttocks to bilateral legs  Pain Descriptors: Constant  Pain Frequency: Continuous  Pain Onset: On-going  Clinical Progression: Gradually worsening  Functional Pain Assessment: Prevents or interferes some active activities and ADLs  RASS Score: Alert and calm     Procedure:       Patient's vital signs including BP, EKG and SaO2 were monitored by the RN and they remained stable during the procedure. A meaningful communication was kept up with the patient throughout  the procedure. The patient is placed in prone position.  Skin over the back was prepped and draped in

## 2020-12-03 ENCOUNTER — TELEPHONE (OUTPATIENT)
Dept: PAIN MANAGEMENT | Age: 55
End: 2020-12-03

## 2020-12-13 ASSESSMENT — ENCOUNTER SYMPTOMS
ABDOMINAL PAIN: 0
SHORTNESS OF BREATH: 0
ALLERGIC/IMMUNOLOGIC NEGATIVE: 1
PHOTOPHOBIA: 0
BACK PAIN: 1
WHEEZING: 0
BLOOD IN STOOL: 0
CONSTIPATION: 0
VOMITING: 0
RHINORRHEA: 0
NAUSEA: 0
RESPIRATORY NEGATIVE: 1
COLOR CHANGE: 0
SINUS PRESSURE: 0
EYES NEGATIVE: 1
COUGH: 0
EYE PAIN: 0

## 2020-12-13 NOTE — PROGRESS NOTES
Gabino Baires is a 50 y.o. female evaluated on 12/15/2020. Modality of virtual service provided -via  telephone   Consent:  Patient and/or health care decision maker is aware that that patient may receive a bill for this telephone service, depending on one's insurance coverage, and has provided verbal consent to proceed: Yes    Patient identification was verified at the start of the visit: Yes    Chief complaint: Gabino Baires is 50 y.o., [place] American female, with complaint of pain involving the low back and shoulders. Referral Diagnosis   M54.5, G89.29 (ICD-10-CM) - Chronic bilateral low back pain without sciatica   M25.511, M25.512 (ICD-10-CM) - Acute pain of both shoulders     Patient is complaining of pain involving the low back area. She had undergone lumbar epidural steroid injection on 12/1/2020. She reports about 60% improvement in the pain. She is able to increase her activity levels. She also reports her shoulder pain is somewhat better. Overall she is happy with the pain relief. Back Pain  This is a chronic problem. The current episode started more than 1 year ago. The problem occurs constantly. Progression since onset: 50% improvement from the injection. The pain is present in the lumbar spine, sacro-iliac and gluteal. The quality of the pain is described as aching [employer]). The pain radiates to the right thigh and left thigh. The pain is at a severity of 4/10 (3-6). The pain is moderate. The pain is worse during the night. The symptoms are aggravated by bending and standing (Lifting, walking). Associated symptoms include dysuria. Pertinent negatives include no abdominal pain, chest pain, numbness or weakness. Risk factors include lack of exercise and obesity (Former smoker). She has tried home exercises for the symptoms. Alleviating factors:heat and rest NSAIDS  Lifestyle changes experienced with pain: Increases w/activity.  Increases w/prolonged sitting/standing/walking  Mood changes,none  Patient currently unemployed. Physical therapy did help the pain. Are you under psychological counseling at present: No  Goals for treatment include:  Decrease in pain  Enjoy daily and recreational activities, return to previous status. Last procedure was lumbar epidural steroid injection 12/1/2020    ACTIVITY/SOCIAL/EMOTIONAL:  Sleep Pattern: 7 hours per night.  generally restful sleep  Home Exercises: daily streaching and strengthening  Activity:increased  Emotional Issues: normal.   Currently seeing a Psychiatrist or Psychologist:  No    Past Medical History:   Diagnosis Date    Allergic rhinitis 10/21/2014    Anxiety     Arthritis     Bleeds easily ([address]     per pt, has never had a work up   [facility] Complete rotator cuff tear of left shoulder 2018    Diabetes mellitus ([address]     On Insulin, metformin, Glipizide    Diabetic polyneuropathy associated with type 2 diabetes mellitus ([address] 3/26/2018    Fibromyalgia     GERD (gastroesophageal reflux disease) 10/21/2014    H/O transfusion     History of blood transfusion     Hypertension     Obesity 10/21/2014    Pure hypercholesterolemia 2/23/2016    Tobacco abuse     Type II or unspecified type diabetes mellitus without mention of complication, not stated as uncontrolled     Unspecified sleep apnea     CPAP machine is broken    Wears glasses        Past Surgical History:   Procedure Laterality Date    BRONCHOSCOPY  9/27/2017    BRONCHOSCOPY BRUSHINGS performed by Georgia Herrera MD at [facility] 2002    CHOLECYSTECTOMY N/A 9/29/2017    CHOLECYSTECTOMY OPEN performed by Freddy Hurtado DO at [facility] N/A 2/21/2018    COLONOSCOPY POLYPECTOMY SNARE/COLD BIOPSY performed by Osiris Banegas MD at [address] [place] [address]    with revision x1    LARYNGOSCOPY N/A 9/29/2017    DIRECT MICRO LARYNGOSCOPY WITH EXCISION VOCAL CORD LESION  performed or organization: None     Attends meetings of clubs or organizations: None     Relationship status: None    Intimate partner violence     Fear of current or ex partner: None     Emotionally abused: None     Physically abused: None     Forced sexual activity: None   Other Topics Concern    None   Social History Narrative    None       Allergies   Allergen Reactions    Pcn [Penicillins] Hives and Other (See Comments)     Lose motor function of legs and collaps    Demerol Hcl [Meperidine] Other (See Comments)     Panic attack    Metformin And Related      Abdominal pain    Tape [Adhesive Tape] Other (See Comments)     Blister      Morphine Nausea And Vomiting and Other (See Comments)     Stomach pain       Current Outpatient Medications on File Prior to Encounter   Medication Sig Dispense Refill    meloxicam (MOBIC) 15 MG tablet Take 1 tablet by mouth daily 90 tablet 2    Insulin Degludec (TRESIBA FLEXTOUCH) 100 UNIT/ML SOPN Inject 45 Units into the skin 2 times daily      glipiZIDE (GLUCOTROL) 5 MG tablet Take 5 mg by mouth 2 times daily (before meals)      tiZANidine (ZANAFLEX) 4 MG tablet TAKE ONE TABLET BY MOUTH EVERY 8 HOURS AS NEEDED FOR NECK AND BACK PAIN. **CAUSES SEDATION AND DO NOT DRIVE WHILE TAKING THIS MEDICATION 90 tablet 0    lisinopril (PRINIVIL;ZESTRIL) 20 MG tablet TAKE ONE TABLET BY MOUTH DAILY 90 tablet 0    cetirizine (ZYRTEC) 10 MG tablet Take 10 mg by mouth daily      pregabalin (LYRICA) 100 MG capsule Take 1 capsule by mouth 2 times daily for 90 days.  180 capsule 2    Blood Glucose Monitoring Suppl (TRUE METRIX METER) MANOLO Testing BID 1 Device 0    Insulin Pen Needle (PEN NEEDLES 3/16\") 31G X 5 MM MISC 1 each by Does not apply route daily 100 each 3    insulin lispro, 1 Unit Dial, (HUMALOG KWIKPEN) 100 UNIT/ML SOPN Inject 15 Units into the skin 3 times daily (before meals) 5 pen 1    Insulin Syringes, Disposable, U-100 1 ML MISC 1 each by Does not apply route daily 100 each 3  blood glucose monitor strips Test 2-3 times a day & as needed for symptoms of irregular blood glucose. BRAND OF CHOICE INSURANCE ALLOWS. 100 strip 11    atorvastatin (LIPITOR) 10 MG tablet Take 1 tablet by mouth daily 90 tablet 2    hydroCHLOROthiazide (HYDRODIURIL) 25 MG tablet Take 1 tablet by mouth daily Take with Potassium supplement 30 tablet 3    potassium chloride (KLOR-CON) 10 MEQ extended release tablet Take 1 tablet by mouth daily Take with HCTZ 30 tablet 3    Lancets MISC Dx: DM-2. Use 2-3 times daily 100 each 3    Alcohol Swabs (ALCOHOL PREP) PADS Use as directed 100 each 11    vitamin D (CHOLECALCIFEROL) 25 MCG (1000 UT) TABS tablet Take 1 tablet by mouth daily 30 tablet 3     Current Facility-Administered Medications on File Prior to Encounter   Medication Dose Route Frequency Provider Last Rate Last Admin    methylPREDNISolone acetate (DEPO-MEDROL) injection 80 mg  80 mg Intra-articular Once [place] Jonathan, DO        bupivacaine (MARCAINE) 0.25 % injection 5 mg  2 mL Intra-articular Once [place] Jonathan, DO            Review of Systems   Constitutional: Negative. Negative for activity change, appetite change, fatigue and unexpected weight change. HENT: Negative. Negative for congestion, hearing loss, rhinorrhea and sinus pressure. Eyes: Negative. Negative for photophobia, pain and visual disturbance. Respiratory: Negative. Negative for cough, shortness of breath and wheezing. Cardiovascular: Negative. Negative for chest pain. Gastrointestinal: Negative for abdominal pain, blood in stool, constipation, nausea and vomiting. Gastric bypass sx. Endocrine: Negative for cold intolerance and polyuria. Diabetic      Genitourinary: Positive for dysuria. Negative for hematuria. Musculoskeletal: Positive for arthralgias and back pain. Skin: Negative. Negative for color change and wound. Allergic/Immunologic: Negative.     Neurological: Negative for weakness and numbness. Hematological: Negative. Does not bruise/bleed easily. Psychiatric/Behavioral: Negative. Negative for self-injury and sleep disturbance. The patient is not nervous/anxious. Physical Exam  Skin:         Neurological:      Mental Status: She is alert and oriented to person, place, and time.    Psychiatric:         Mood and Affect: Mood normal.            DATA:  LAB.:  8/29/2020  8:31 AM - Zak Cuellar Incoming Lab Results From [facility]    Component Value Ref Range & Units Status Collected Lab   Glucose, Fasting 312High   70 - 99 mg/dL Final 08/29/2020  7:30 AM [facility] [address] Lab   BUN 8  6 - 20 mg/dL Final 08/29/2020  7:30 AM [facility] [address] Lab   CREATININE 0.74  0.50 - 0.90 mg/dL Final 08/29/2020  7:30 AM [facility] [address] Lab   Bun/Cre Ratio NOT REPORTED  9 - 20 Final 08/29/2020  7:30 AM [facility] [address] Lab   Calcium 9.8  8.6 - 10.4 mg/dL Final 08/29/2020  7:30 AM [facility] [address] Lab   Sodium 140  135 - 144 mmol/L Final 08/29/2020  7:30 AM [facility] [address] Lab   Potassium 4.6  3.7 - 5.3 mmol/L Final 08/29/2020  7:30 AM [facility] [address] Lab   Chloride 103  98 - 107 mmol/L Final 08/29/2020  7:30 AM [facility] [address] Lab   CO2 30  20 - 31 mmol/L Final 08/29/2020  7:30 AM [facility] [address] Lab   Anion Gap 7Low   9 - 17 mmol/L Final 08/29/2020  7:30 AM [facility] [address] Lab   Alkaline Phosphatase 142High   35 - 104 U/L Final 08/29/2020  7:30 AM [facility] [address] Lab   ALT 20  5 - 33 U/L Final 08/29/2020  7:30 AM [facility] [address] Lab   AST 17  <32 U/L Final 08/29/2020  7:30 AM [facility] [address] Lab   Total Bilirubin 0.32  0.3 - 1.2 mg/dL Final 08/29/2020  7:30 AM [facility] [address] Lab   Total Protein 7.1  6.4 - 8.3 g/dL Final 08/29/2020  7:30 AM [facility] [address] Lab   Alb 4.2  3.5 - 5.2 g/dL Final 08/29/2020  7:30 AM - [address]  Lab   Albumin/Globulin Ratio NOT REPORTED 1.0 - 2.5 Final 08/29/2020  7:30 AM [facility] Lab   GFR Non-African American >60  >60 mL/min Final 08/29/2020  7:30 AM [facility] Lab   GFR  >60  >60 mL/min          X-Ray reports:  History: 57-year-old female with chronic right shoulder pain. Findings: Views: 3V  Right Shoulder Weight bearing: No Findings: No acute fractures or dislocations appreciated. Glenohumeral alignment appears normal.  No lytic or blastic lesion are present in all visualized osseous structures. Degenerative changes noted in the [facility] joint with osteophytic changes and joint space narrowing. Mild degenerative changes noted in the glenohumeral joint with joint space narrowing and irregularities of the humeral head. Small inferior osteophyte noted. Previous comparison films not available   Impression: No acute osseous abnormality          THREE XRAY VIEWS OF THE LUMBAR SPINE        10/7/2020 2:13 pm        COMPARISON:    12/28/2018, CT 02/23/2018        HISTORY:    ORDERING SYSTEM PROVIDED HISTORY: Chronic bilateral low back pain without    sciatica    TECHNOLOGIST PROVIDED HISTORY:    Reason for Exam: pt stated low back pain difficult to stand x 3 wks , bilat    shoulder pain    Acuity: Acute    Type of Exam: Initial    Additional signs and symptoms: pt stated low back pain difficult to stand x 3    wks , bilat shoulder pain        FINDINGS:    Multiple clips in the abdomen and partially visualized curvilinear density    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 Mild fecal stasis in the colon with a nonspecific nonobstructive    bowel gas pattern. Mild osteopenia. Vertebral body heights are maintained. Pedicles are intact. There is no convincing evidence of acute fracture. SI    joints show mild degenerative changes. Multilevel mild loss of disc space    height and osteophytes due to disc degenerative disease.   Moderate facet    arthropathy mid and lower lumbar spine with mild grade 1 anterolisthesis    L4-L5 and slight anterolisthesis L5-S1, increased since the prior studies. Impression    Increasing mild-to-moderate lumbar spondylotic changes with grade 1    anterolisthesis L4-L5. XRAY VIEWS OF THE CERVICAL SPINE        10/7/2020 2:13 pm        COMPARISON:    None. HISTORY:    ORDERING SYSTEM PROVIDED HISTORY: Acute pain of both shoulders    TECHNOLOGIST PROVIDED HISTORY:    Reason for Exam: pt stated low back pain difficult to stand x 3 wks , bilat    shoulder pain    Acuity: Acute    Type of Exam: Initial    Additional signs and symptoms: pt stated low back pain difficult to stand x 3    wks , bilat shoulder pain        FINDINGS:    There is mild C3-4, C5-6 and moderate C6-7 joint space compromise with mild    anterior spurring at the C3-4 level. The remaining disc spaces and vertical heights of the vertebral bodies are    maintained        There is no fracture, dislocation or significant soft tissue finding            Impression    Multilevel degenerative changes      EXAMINATION:    MRI OF THE LUMBAR SPINE WITHOUT CONTRAST, 10/22/2020 11:38 am        TECHNIQUE:    Multiplanar multisequence MRI of the lumbar spine was performed without the    administration of intravenous contrast.        COMPARISON:    Radiographs October 7, 2020        MRI July 11, 2006        HISTORY:    ORDERING SYSTEM PROVIDED HISTORY: Lumbar radiculopathy    TECHNOLOGIST PROVIDED HISTORY:    neuropathy    Is the patient pregnant?->No    Reason for Exam: pt stated back pain radiates down lt leg    Acuity: Chronic    Type of Exam: Initial    Additional signs and symptoms: pt stated back pain radiates down lt leg        FINDINGS:    BONES/ALIGNMENT: No acute fracture. No subluxation. No suspicious bone    marrow replacing lesion. SPINAL CORD: Normal signal within the conus which terminates at T12-L1.         SOFT TISSUES: No making it easier for patients to start an exercise program.\"   The following important principles were discussed with patient:  1. Limit Bed Rest--Studies show that people with short-term low-back pain who rest feel more pain and have a harder time with daily tasks than those who stay active. 2. Keep Exercising-patient is advised to stay away from strenuous activities like gardening and avoid whatever motion caused the pain in the first place.   3. Maintain Good Posture-Exercises  to maintain good posture were shown to patient. 4. To do exercises learned in PT regularly. PDMP Monitoring:    Last PDMP Griffin Kirk as Reviewed [facility]):  Review User Review Instant Review Result   Manolo Thornton 12/13/2020  4:21 AM Reviewed PDMP [1]       Decision Making Process : Patient's health history and referral records thoroughly reviewed before focused physical examination and discussion with patient. I have spent 20 mins. Over 50% of today's visit is spent on examining the patient and counseling and coordinating the care. Level of complexity of date to be reviewed is Moderate. The chart date reviewed include the following: Imaging Reports. Summary of Care. Time spent reviewing with patient the below reports:   Medication safety, Treatment options. Level of diagnosis and management options of this case is multiple: involving the following management options: Interventions as needed, medication management as appropriate, future visits, activity modification, heat/ice as needed, Urine drug screen as required. [x]The patient's questions were answered to the best of my abilities. This note was created using voice recognition software. There may be inaccuracies of transcription  that are inadvertently overlooked prior to the signature. There is any questions about the transcription please contact me. Return as needed with physician / CNP  for further plan of treatment.   Due to the COVID-19 pandemic and the appropriate interventions by [facility], our non-urgent pain management patients will not be seen in the office at this time for their protection and the protection of our staff. To offer continuity of care, their prescriptions will be escribed this month after a careful chart review and review of their OARRS report  Pursuant to the emergency declaration under the [address] and [employer], [employer] waiver authority and the [employer] Act, this Virtual Visit was conducted, with patient's consent, to reduce the patient's risk of exposure to COVID-19 and provide continuity of care for an established patient. Services were provided through a video synchronous discussion virtually to substitute for in-person appointment. \"  Documentation:  I communicated with the patient and/or health care decision maker about plan of care  Details of this discussion including any medical advice provided: Total Time: 10 to 20 minutes    I affirm this is a Patient Initiated Episode with an Established Patient who has not had a related appointment within my department in the past 7 days or scheduled within the next 24 hours.     Electronically signed by [employer] MD on 12/13/2020 at 4:23 AM

## 2020-12-15 ENCOUNTER — HOSPITAL ENCOUNTER (OUTPATIENT)
Dept: PAIN MANAGEMENT | Age: 55
Discharge: HOME | End: 2020-12-15
Payer: COMMERCIAL

## 2020-12-15 PROCEDURE — 99213 OFFICE O/P EST LOW 20 MIN: CPT

## 2020-12-29 RX ORDER — POTASSIUM CHLORIDE 750 MG/1
TABLET, FILM COATED, EXTENDED RELEASE ORAL
Qty: 30 TABLET | Refills: 2 | Status: SHIPPED | OUTPATIENT
Start: 2020-12-29 | End: 2021-03-16 | Stop reason: SDUPTHER

## 2021-01-04 ENCOUNTER — HOSPITAL ENCOUNTER (OUTPATIENT)
Dept: SLEEP CENTER | Age: 56
Discharge: HOME | End: 2021-01-06
Payer: COMMERCIAL

## 2021-01-04 ENCOUNTER — TELEPHONE (OUTPATIENT)
Dept: FAMILY MEDICINE CLINIC | Age: 56
End: 2021-01-04

## 2021-01-04 DIAGNOSIS — G47.33 OBSTRUCTIVE SLEEP APNEA SYNDROME: ICD-10-CM

## 2021-01-04 DIAGNOSIS — F41.8 SITUATIONAL ANXIETY: ICD-10-CM

## 2021-01-04 PROCEDURE — 95810 POLYSOM 6/> YRS 4/> PARAM: CPT

## 2021-01-04 RX ORDER — BUSPIRONE HYDROCHLORIDE 5 MG/1
5 TABLET ORAL 3 TIMES DAILY PRN
Qty: 90 TABLET | Refills: 0 | Status: SHIPPED | OUTPATIENT
Start: 2021-01-04 | End: 2021-02-01

## 2021-01-05 VITALS
BODY MASS INDEX: 44.2 KG/M2 | RESPIRATION RATE: 20 BRPM | HEART RATE: 93 BPM | TEMPERATURE: 97.7 F | WEIGHT: 275 LBS | OXYGEN SATURATION: 92 % | HEIGHT: 66 IN

## 2021-01-05 ASSESSMENT — SLEEP AND FATIGUE QUESTIONNAIRES
HOW LIKELY ARE YOU TO NOD OFF OR FALL ASLEEP WHILE SITTING INACTIVE IN A PUBLIC PLACE: 0
HOW LIKELY ARE YOU TO NOD OFF OR FALL ASLEEP IN A CAR, WHILE STOPPED FOR A FEW MINUTES IN TRAFFIC: 0
HOW LIKELY ARE YOU TO NOD OFF OR FALL ASLEEP WHILE SITTING QUIETLY AFTER LUNCH WITHOUT ALCOHOL: 2
HOW LIKELY ARE YOU TO NOD OFF OR FALL ASLEEP WHILE WATCHING TV: 1
HOW LIKELY ARE YOU TO NOD OFF OR FALL ASLEEP WHILE SITTING AND READING: 0

## 2021-01-05 NOTE — PAYOR INFORMATION
Verified Demographics with Patient? No   If no why? Already verified  [facility] Completed? No    If not why? Already completed  Verified Insurance through: Already verified  [employer] Completed? Not required  Auth Verification #:[id number]  Liability Due: $0  Discount Offered: na%       Previous Balance: $ 0   Discount Offered: na%  Site Collect Status: no liability  Patient Response: no liability  Financial Aid Offered?  Yes Pt declined  Cards Scanned: yes

## 2021-01-06 ENCOUNTER — TELEPHONE (OUTPATIENT)
Dept: FAMILY MEDICINE CLINIC | Age: 56
End: 2021-01-06

## 2021-01-06 DIAGNOSIS — M25.512 ACUTE PAIN OF BOTH SHOULDERS: ICD-10-CM

## 2021-01-06 DIAGNOSIS — M12.9 ARTHRITIS INVOLVING MULTIPLE SITES: Primary | ICD-10-CM

## 2021-01-06 DIAGNOSIS — G89.29 CHRONIC BILATERAL LOW BACK PAIN WITHOUT SCIATICA: ICD-10-CM

## 2021-01-06 DIAGNOSIS — R94.2 DECREASED FUNCTIONAL RESIDUAL CAPACITY: ICD-10-CM

## 2021-01-06 DIAGNOSIS — M54.50 CHRONIC BILATERAL LOW BACK PAIN WITHOUT SCIATICA: ICD-10-CM

## 2021-01-06 DIAGNOSIS — M25.511 ACUTE PAIN OF BOTH SHOULDERS: ICD-10-CM

## 2021-01-06 DIAGNOSIS — M79.7 FIBROMYALGIA: ICD-10-CM

## 2021-01-06 DIAGNOSIS — M54.16 LUMBAR RADICULOPATHY: ICD-10-CM

## 2021-01-07 DIAGNOSIS — M12.9 ARTHRITIS INVOLVING MULTIPLE SITES: ICD-10-CM

## 2021-01-07 DIAGNOSIS — M54.16 LUMBAR RADICULOPATHY: ICD-10-CM

## 2021-01-07 DIAGNOSIS — M79.7 FIBROMYALGIA: ICD-10-CM

## 2021-01-07 DIAGNOSIS — G89.29 CHRONIC BILATERAL LOW BACK PAIN WITHOUT SCIATICA: ICD-10-CM

## 2021-01-07 DIAGNOSIS — R94.2 DECREASED FUNCTIONAL RESIDUAL CAPACITY: ICD-10-CM

## 2021-01-07 DIAGNOSIS — M54.50 CHRONIC BILATERAL LOW BACK PAIN WITHOUT SCIATICA: ICD-10-CM

## 2021-01-07 DIAGNOSIS — M25.512 ACUTE PAIN OF BOTH SHOULDERS: ICD-10-CM

## 2021-01-07 DIAGNOSIS — M25.511 ACUTE PAIN OF BOTH SHOULDERS: ICD-10-CM

## 2021-01-09 ASSESSMENT — ENCOUNTER SYMPTOMS
RHINORRHEA: 0
COUGH: 0
ALLERGIC/IMMUNOLOGIC NEGATIVE: 1
BACK PAIN: 1
VOMITING: 0
PHOTOPHOBIA: 0
CONSTIPATION: 0
NAUSEA: 0
ABDOMINAL PAIN: 0
SHORTNESS OF BREATH: 0
SINUS PRESSURE: 0
EYES NEGATIVE: 1
BLOOD IN STOOL: 0
EYE PAIN: 0
RESPIRATORY NEGATIVE: 1
WHEEZING: 0
COLOR CHANGE: 0

## 2021-01-09 NOTE — PROGRESS NOTES
Dari Trimble is a 50 y.o. female evaluated on 1/11/2021. Modality of virtual service provided -via Video+audio / telephone   Consent:  Patient and/or health care decision maker is aware that that patient may receive a bill for this telephone service, depending on one's insurance coverage, and has provided verbal consent to proceed: Yes    Patient identification was verified at the start of the visit: Yes    Chief complaint: Dari Trimble is 50 y.o.,  female, with chief complaint of back pain. Referral Diagnosis   M54.5, G89.29 (ICD-10-CM) - Chronic bilateral low back pain without sciatica   M25.511, M25.512 (ICD-10-CM) - Acute pain of both shoulders     Patient is complaining of increasing low back pain for the last 6 months or so. She reports she is not able to stand for any length of time. She had lumbar epidural steroid injection which helped her pain does relief for couple of weeks but since then the pain returned to its original extent. Patient is not able to function because of the pain. Also has some pain over the ankle and the right side. Requesting a repeat lumbar epidural steroid injection as her pain is getting worse and would like to change her medications.     Back Pain This is a chronic problem. The current episode started more than 1 year ago. The problem occurs constantly. Progression since onset: 50% improvement from the injection. The pain is present in the lumbar spine, sacro-iliac and gluteal. The quality of the pain is described as aching [place]). The pain radiates to the right thigh and left thigh. The pain is at a severity of 9/10 (7-10). The pain is moderate. The pain is worse during the night. The symptoms are aggravated by bending and standing (Lifting, walking). Associated symptoms include dysuria. Pertinent negatives include no abdominal pain, chest pain, numbness or weakness. Risk factors include lack of exercise and obesity (Former smoker). She has tried home exercises for the symptoms. Alleviating factors:rest   Lifestyle changes experienced with pain: Wakes from sleep, Prevents or limits ADLs, Increases w/activity., Increases w/prolonged sitting/standing/walking  Mood changes,anxious and depressed  Patient currently unemployed. Physical therapy did not help the pain. Are you under psychological counseling at present: No  Goals for treatment include:  Decrease in pain  Enjoy daily and recreational activities, return to previous status. Last procedure was dural steroid injection 12/1/2020 60 to 80% improvement in the pain for about 2 weeks    Patient relates current medications are helping the pain. Patient reports taking pain medications as prescribed, denies obtaining medications from different sources and denies use of illegal drugs. Patient denies side effects from medications like nausea, vomiting, constipation or drowsiness. Patient reports current activities of daily living ar possible due to medications and would like to continue them.        ACTIVITY/SOCIAL/EMOTIONAL:  Sleep Pattern: 7 hours per night. nightime awakenings and difficulty falling back asleep if awakened  Home Exercises: daily no regular exercise Activity:not significantly changed  Emotional Issues: normal.   Currently seeing a Psychiatrist or Psychologist:  No     ADVERSE MEDICATION EFFECTS:   Nausea and vomiting: no   Constipation: no-Undercontrol-: yes  Dizziness/drowsy/sleepy--no  Urinary Retention: no    ABERRANT BEHAVIORS SINCE LAST VISIT  Lost rx/pills:------------------------------------------ no  Taking  medication as prescribed: ----------- yes  Urine Drug Screen ---------------------------------  no             Date-------------------------------------------------              Results as expected ---------------------not applicable    Recent ER visits: -------------------------------------No  Pill count is appropriate: ---------------------------yes   Refills for prescriptions appropriate:---------- yes      Past Medical History:   Diagnosis Date    Allergic rhinitis 10/21/2014    Anxiety     Arthritis     Bleeds easily ([facility] [address]     per pt, has never had a work up   [facility] Officer Complete rotator cuff tear of left shoulder 2018    Diabetes mellitus ([address]     On Insulin, metformin, Glipizide    Diabetic polyneuropathy associated with type 2 diabetes mellitus ([address] 3/26/2018    Fibromyalgia     GERD (gastroesophageal reflux disease) 10/21/2014    H/O transfusion     History of blood transfusion     Hypertension     Obesity 10/21/2014    Pure hypercholesterolemia 2/23/2016    Tobacco abuse     Type II or unspecified type diabetes mellitus without mention of complication, not stated as uncontrolled     Unspecified sleep apnea     CPAP machine is broken    Wears glasses        Past Surgical History:   Procedure Laterality Date    BRONCHOSCOPY  9/27/2017    BRONCHOSCOPY BRUSHINGS performed by Kash Ashraf MD at [place] [address]    CHOLECYSTECTOMY N/A 9/29/2017    CHOLECYSTECTOMY OPEN performed by Valerie Morris DO at [address] [place] [address] N/A 2/21/2018 COLONOSCOPY POLYPECTOMY SNARE/COLD BIOPSY performed by Jose Alejandro Iraheta MD at [address] [facility]    with revision x1    LARYNGOSCOPY N/A 9/29/2017    DIRECT MICRO LARYNGOSCOPY WITH EXCISION VOCAL CORD LESION  performed by Nelda Eid MD at [address] [place] [address] Right 1988    MS EGD TRANSORAL BIOPSY SINGLE/MULTIPLE N/A 9/27/2017    EGD BIOPSY performed by Jose Alejandro Iraheta MD at [address] [place] [address] ARTHROSCOP,SURG,W/ROTAT CUFF REPR Left 6/19/2018    SHOULDER ARTHROSCOPY WITH ROTATOR CUFF REPAIR, SUBACROMIAL DECOMPRESSION performed by Dalton Burks MD at [place] [address] Left     SHOULDER ARTHROSCOPY WITH ROTATOR CUFF REPAIR, SUBACROMIAL DECOMPRESSION (Left        Family History   Problem Relation Age of Onset    Diabetes Mother     Heart Disease Mother         murmur    Heart Disease Father         MVP    Diabetes Sister     Diabetes Maternal Grandmother     High Blood Pressure Maternal Grandmother     Heart Failure Maternal Grandfather     Heart Attack Paternal Grandmother        Social History     Socioeconomic History    Marital status: Legally      Spouse name: None    Number of children: 3    Years of education: None    Highest education level: None   Occupational History    Occupation:    Social Needs    Financial resource strain: None    Food insecurity     Worry: None     Inability: None    Transportation needs     Medical: None     Non-medical: None   Tobacco Use    Smoking status: Former Smoker     Packs/day: 2.00     Years: 34.00     Pack years: 68.00     Types: Cigarettes     Start date: 28     Quit date: 9/28/2017     Years since quitting: 3.2    Smokeless tobacco: Never Used   Substance and Sexual Activity    Alcohol use:  Yes     Alcohol/week: 0.0 standard drinks     Frequency: Monthly or less     Drinks per session: 1 or 2     Comment: rare    Drug use: No    Sexual activity: Never   Lifestyle  Physical activity     Days per week: None     Minutes per session: None    Stress: None   Relationships    Social connections     Talks on phone: None     Gets together: None     Attends Taoist service: None     Active member of club or organization: None     Attends meetings of clubs or organizations: None     Relationship status: None    Intimate partner violence     Fear of current or ex partner: None     Emotionally abused: None     Physically abused: None     Forced sexual activity: None   Other Topics Concern    None   Social History Narrative    None       Allergies   Allergen Reactions    Pcn [Penicillins] Hives and Other (See Comments)     Lose motor function of legs and collaps    Demerol Hcl [Meperidine] Other (See Comments)     Panic attack    Metformin And Related      Abdominal pain    Tape [Adhesive Tape] Other (See Comments)     Blister      Morphine Nausea And Vomiting and Other (See Comments)     Stomach pain       Current Outpatient Medications on File Prior to Encounter   Medication Sig Dispense Refill    Handicap Placard MISC by Does not apply route Prescription good for 5 Years  Expires January 2026 1 each 0    busPIRone (BUSPAR) 5 MG tablet Take 1 tablet by mouth 3 times daily as needed (Anxiety) 90 tablet 0    potassium chloride (KLOR-CON) 10 MEQ extended release tablet TAKE ONE TABLET BY MOUTH DAILY WITH HYDROCHLOROTHIAZIDE 30 tablet 2    meloxicam (MOBIC) 15 MG tablet Take 1 tablet by mouth daily 90 tablet 2    Insulin Degludec (TRESIBA FLEXTOUCH) 100 UNIT/ML SOPN Inject 45 Units into the skin 2 times daily      glipiZIDE (GLUCOTROL) 5 MG tablet Take 5 mg by mouth 2 times daily (before meals)      tiZANidine (ZANAFLEX) 4 MG tablet TAKE ONE TABLET BY MOUTH EVERY 8 HOURS AS NEEDED FOR NECK AND BACK PAIN. **CAUSES SEDATION AND DO NOT DRIVE WHILE TAKING THIS MEDICATION 90 tablet 0    lisinopril (PRINIVIL;ZESTRIL) 20 MG tablet TAKE ONE TABLET BY MOUTH DAILY 90 tablet 0  cetirizine (ZYRTEC) 10 MG tablet Take 10 mg by mouth daily      pregabalin (LYRICA) 100 MG capsule Take 1 capsule by mouth 2 times daily for 90 days. 180 capsule 2    Blood Glucose Monitoring Suppl (TRUE METRIX METER) MANOLO Testing BID 1 Device 0    Insulin Pen Needle (PEN NEEDLES 3/16\") 31G X 5 MM MISC 1 each by Does not apply route daily 100 each 3    insulin lispro, 1 Unit Dial, (HUMALOG KWIKPEN) 100 UNIT/ML SOPN Inject 15 Units into the skin 3 times daily (before meals) 5 pen 1    Insulin Syringes, Disposable, U-100 1 ML MISC 1 each by Does not apply route daily 100 each 3    blood glucose monitor strips Test 2-3 times a day & as needed for symptoms of irregular blood glucose. BRAND OF CHOICE INSURANCE ALLOWS. 100 strip 11    atorvastatin (LIPITOR) 10 MG tablet Take 1 tablet by mouth daily 90 tablet 2    hydroCHLOROthiazide (HYDRODIURIL) 25 MG tablet Take 1 tablet by mouth daily Take with Potassium supplement 30 tablet 3    Lancets MISC Dx: DM-2. Use 2-3 times daily 100 each 3    Alcohol Swabs (ALCOHOL PREP) PADS Use as directed 100 each 11    vitamin D (CHOLECALCIFEROL) 25 MCG (1000 UT) TABS tablet Take 1 tablet by mouth daily 30 tablet 3     Current Facility-Administered Medications on File Prior to Encounter   Medication Dose Route Frequency Provider Last Rate Last Admin    methylPREDNISolone acetate (DEPO-MEDROL) injection 80 mg  80 mg Intra-articular Once Martine Sabins, DO        bupivacaine (MARCAINE) 0.25 % injection 5 mg  2 mL Intra-articular Once Martine Sabins, DO            Review of Systems   Constitutional: Negative. Negative for activity change, appetite change, fatigue and unexpected weight change. HENT: Negative. Negative for congestion, hearing loss, rhinorrhea and sinus pressure. Eyes: Negative. Negative for photophobia, pain and visual disturbance. Respiratory: Negative. Negative for cough, shortness of breath and wheezing. Alkaline Phosphatase 142High   35 - 104 U/L Final 08/29/2020  7:30  [address] [facility]   ALT 20  5 - 33 U/L Final 08/29/2020  7:30 AM [facility]   AST 17  <32 U/L Final 08/29/2020  7:30 AM [facility]   Total Bilirubin 0.32  0.3 - 1.2 mg/dL Final 08/29/2020  7:30 AM [facility]   Total Protein 7.1  6.4 - 8.3 g/dL Final 08/29/2020  7:30 AM [facility]   Alb 4.2  3.5 - 5.2 g/dL Final 08/29/2020  7:30 AM [facility]   Albumin/Globulin Ratio NOT REPORTED  1.0 - 2.5 Final 08/29/2020  7:30 AM [facility]   GFR Non-African American >60  >60 mL/min Final 08/29/2020  7:30 AM [facility]   GFR African American >60  >60 mL/min Final         X-Ray reports:  History: 51-year-old female with chronic right shoulder pain. Findings: Views: 3V  Right Shoulder Weight bearing: No Findings: No acute fractures or dislocations appreciated. Glenohumeral alignment appears normal.  No lytic or blastic lesion are present in all visualized osseous structures. Degenerative changes noted in the [facility] joint with osteophytic changes and joint space narrowing. Mild degenerative changes noted in the glenohumeral joint with joint space narrowing and irregularities of the humeral head. Small inferior osteophyte noted.  Previous comparison films not available   Impression: No acute osseous abnormality          THREE XRAY VIEWS OF THE LUMBAR SPINE        10/7/2020 2:13 pm        COMPARISON:    12/28/2018, CT 02/23/2018        HISTORY:    ORDERING SYSTEM PROVIDED HISTORY: Chronic bilateral low back pain without    sciatica    TECHNOLOGIST PROVIDED HISTORY:    Reason for Exam: pt stated low back pain difficult to stand x 3 wks , bilat    shoulder pain    Acuity: Acute    Type of Exam: Initial    Additional signs and symptoms: pt stated low back pain difficult to stand x 3    wks , bilat shoulder pain        FINDINGS: Multiple clips in the abdomen and partially visualized curvilinear density    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 Mild fecal stasis in the colon with a nonspecific nonobstructive    bowel gas pattern. Mild osteopenia. Vertebral body heights are maintained. Pedicles are intact. There is no convincing evidence of acute fracture. SI    joints show mild degenerative changes. Multilevel mild loss of disc space    height and osteophytes due to disc degenerative disease. Moderate facet    arthropathy mid and lower lumbar spine with mild grade 1 anterolisthesis    L4-L5 and slight anterolisthesis L5-S1, increased since the prior studies. Impression    Increasing mild-to-moderate lumbar spondylotic changes with grade 1    anterolisthesis L4-L5. XRAY VIEWS OF THE CERVICAL SPINE        10/7/2020 2:13 pm        COMPARISON:    None. HISTORY:    ORDERING SYSTEM PROVIDED HISTORY: Acute pain of both shoulders    TECHNOLOGIST PROVIDED HISTORY:    Reason for Exam: pt stated low back pain difficult to stand x 3 wks , bilat    shoulder pain    Acuity: Acute    Type of Exam: Initial    Additional signs and symptoms: pt stated low back pain difficult to stand x 3    wks , bilat shoulder pain        FINDINGS:    There is mild C3-4, C5-6 and moderate C6-7 joint space compromise with mild    anterior spurring at the C3-4 level.         The remaining disc spaces and vertical heights of the vertebral bodies are    maintained        There is no fracture, dislocation or significant soft tissue finding            Impression    Multilevel degenerative changes      EXAMINATION:    MRI OF THE LUMBAR SPINE WITHOUT CONTRAST, 10/22/2020 11:38 am        TECHNIQUE:    Multiplanar multisequence MRI of the lumbar spine was performed without the    administration of intravenous contrast.        COMPARISON:    Radiographs October 7, 2020        MRI July 11, 2006 HISTORY:    ORDERING SYSTEM PROVIDED HISTORY: Lumbar radiculopathy    TECHNOLOGIST PROVIDED HISTORY:    neuropathy    Is the patient pregnant?->No    Reason for Exam: pt stated back pain radiates down lt leg    Acuity: Chronic    Type of Exam: Initial    Additional signs and symptoms: pt stated back pain radiates down lt leg        FINDINGS:    BONES/ALIGNMENT: No acute fracture. No subluxation. No suspicious bone    marrow replacing lesion. SPINAL CORD: Normal signal within the conus which terminates at T12-L1. SOFT TISSUES: No paraspinal abnormality. L1-L2: No significant central canal or foraminal stenosis. L2-L3: Mild broad-based disc bulge and facet degenerative changes combine to    create mild central canal stenosis. Changes combine to create minimal    bilateral foraminal stenosis. L3-L4: Broad-based disc bulge and facet degenerative changes combine to    create mild central canal stenosis. Changes combine to create mild bilateral    foraminal stenosis. L4-L5: Broad-based disc bulge and facet degenerative changes combine to    create mild central canal stenosis. Changes combine to create mild-moderate    bilateral foraminal stenosis. L5-S1: Mild broad-based disc bulge and facet degenerative changes do not    cause significant central canal stenosis. Changes combine to create mild    bilateral foraminal stenosis. Impression    Overall mild multilevel degenerative changes. Clinical  impression:  1. Lumbar radiculopathy    2. DDD (degenerative disc disease), lumbar    3. Lumbosacral spondylosis without myelopathy    4. Arthropathy of lumbar facet joint    5. Impingement syndrome of right shoulder    6. Status post right rotator cuff repair    7. Chronic left shoulder pain    8. Diabetic polyneuropathy associated with type 2 diabetes mellitus ([facility] [address]    9.  Morbid obesity with BMI of 40.0-44.9, adult [facility])        Plan of care: We will continue current pain medications  Current medications are being tolerated without any Adverse side effects. Orders Placed This Encounter   Medications    traMADol (ULTRAM) 50 MG tablet     Sig: Take 1 tablet by mouth every 6 hours as needed for Pain for up to 30 days. Dispense:  120 tablet     Refill:  0     Reduce doses taken as pain becomes manageable     Urine drug screens have been appropriate. No aberrant activity noted. Analgesia is achieved. Activities of daily living are possible because of medications. Safe use of medications explained to patient. PDMP Monitoring:    Last PDMP Basim Shaw as Reviewed [facility]):  Review User Review Instant Review Result   Geovanny Vincent 1/9/2021  9:42 AM Reviewed PDMP [1]     Counselling/Preventive measures for pain  Control:    [x]  Spine strengthening exercises are discussed with patient in detail. [x] Ill effects of being on chronic pain medications such as sleep disturbances, hormonal changes, withdrawal symptoms,  chronic opioid dependence and tolerance were discussed with patient. I had asked the patient to minimize medication use and utilize pain medications only for uncontrolled rest pain or pain with exertional activities. I advised patient not to self escalate pain medications without consulting with us. At each of patient's future visits we will try to taper pain medications, while adjusting the adjunct medications, and re-evaluating for Physical Therapy to improve spinal and joint strength. We will continue to have discussions to decrease pain medications as tolerated. I also discussed with the patient regarding the dangers of combining narcotic pain medication with tranquilizers, alcohol or illegal drugs or taking the medication any other than prescribed. The dangers including the respiratory depression and death. Patient was told to tell  to all  physicians regarding the medications he is getting from pain clinic. Patient is warned not to take any unprescribed medications over-the-counter medications that can depress breathing . Patient is advised to talk to the pharmacist or physicians if planning to take any over-the-counter medications before  takeing them. Patient is strongly advised to avoid tranquilizers or  Relaxants for any medications that can depress breathing or recreational drugs. Patient is also advised to tell us if there is any changes in his medications from other physicians. We discussed the same at today's visit and have not been to implement it, as the patient's pain is not under control with current medications. As the patient is having severe pain which is not responding well to the conservative measures we will repeat the lumbar epidural steroid injection to help with her pain. We will also refer her to physical therapy and aquatic therapy to help her pain. The procedure risks as well as alternate therapies were discussed with the patient and patient would like to proceed with lumbar epidural steroid injection.   Orders Placed This Encounter   Procedures    Lumbar Epidural Steroid Injection/Caudal     Standing Status:   Future     Standing Expiration Date:   1/11/2022    FLUORO FOR SURGICAL PROCEDURES     Standing Status:   Future     Standing Expiration Date:   1/11/2022    PT aquatic therapy     TBD by Pain Clinic MD     Standing Status:   Future     Standing Expiration Date:   1/11/2022    PT eval and treat     TBD by Pain Clinic MD     Standing Status:   Future     Standing Expiration Date:   1/11/2022    Saline lock IV Standing Status:   Future     Standing Expiration Date:   7/11/2022       Decision Making Process : Patient's health history and referral records thoroughly reviewed before focused physical examination and discussion with patient. I have spent 25 mins. Over 50% of today's visit is spent on examining the patient and counseling and coordinating the care. Level of complexity of date to be reviewed is Moderate. The chart date reviewed include the following: Imaging Reports. Summary of Care. Time spent reviewing with patient the below reports:   Medication safety, Treatment options. Level of diagnosis and management options of this case is multiple: involving the following management options: Interventions as needed, medication management as appropriate, future visits, activity modification, heat/ice as needed, Urine drug screen as required. [x]The patient's questions were answered to the best of my abilities. This note was created using voice recognition software. There may be inaccuracies of transcription  that are inadvertently overlooked prior to the signature. There is any questions about the transcription please contact me. Return in  4 weeks  with physician / CNP  for further plan of treatment. Due to the COVID-19 pandemic and the appropriate interventions by Camden Martinez, our non-urgent pain management patients will not be seen in the office at this time for their protection and the protection of our staff.  To offer continuity of care, their prescriptions will be escribed this month after a careful chart review and review of their OARRS report Pursuant to the emergency declaration under the [address] and [facility] waiver authority and the [employer] Act, this Virtual Visit was conducted, with patient's consent, to reduce the patient's risk of exposure to COVID-19 and provide continuity of care for an established patient. Services were provided through a video synchronous discussion virtually to substitute for in-person appointment. \"  Documentation:  I communicated with the patient and/or health care decision maker about plan of care  Details of this discussion including any medical advice provided: Total Time: minutes: 21-30 minutes    I affirm this is a Patient Initiated Episode with an Established Patient who has not had a related appointment within my department in the past 7 days or scheduled within the next 24 hours.     Electronically signed by Eva Donovan MD on 1/11/2021 at 2:11 PM

## 2021-01-11 ENCOUNTER — HOSPITAL ENCOUNTER (OUTPATIENT)
Dept: PAIN MANAGEMENT | Age: 56
Discharge: HOME | End: 2021-01-11
Payer: COMMERCIAL

## 2021-01-11 DIAGNOSIS — M54.16 LUMBAR RADICULOPATHY: Primary | ICD-10-CM

## 2021-01-11 DIAGNOSIS — M51.36 DDD (DEGENERATIVE DISC DISEASE), LUMBAR: ICD-10-CM

## 2021-01-11 DIAGNOSIS — M47.817 LUMBOSACRAL SPONDYLOSIS WITHOUT MYELOPATHY: ICD-10-CM

## 2021-01-11 DIAGNOSIS — E11.42 DIABETIC POLYNEUROPATHY ASSOCIATED WITH TYPE 2 DIABETES MELLITUS (HCC): ICD-10-CM

## 2021-01-11 DIAGNOSIS — G89.29 CHRONIC LEFT SHOULDER PAIN: ICD-10-CM

## 2021-01-11 DIAGNOSIS — M75.41 IMPINGEMENT SYNDROME OF RIGHT SHOULDER: ICD-10-CM

## 2021-01-11 DIAGNOSIS — E66.01 MORBID OBESITY WITH BMI OF 40.0-44.9, ADULT (HCC): ICD-10-CM

## 2021-01-11 DIAGNOSIS — M25.512 CHRONIC LEFT SHOULDER PAIN: ICD-10-CM

## 2021-01-11 DIAGNOSIS — Z98.890 STATUS POST RIGHT ROTATOR CUFF REPAIR: ICD-10-CM

## 2021-01-11 DIAGNOSIS — M47.816 ARTHROPATHY OF LUMBAR FACET JOINT: ICD-10-CM

## 2021-01-11 PROCEDURE — 99214 OFFICE O/P EST MOD 30 MIN: CPT | Performed by: PAIN MEDICINE

## 2021-01-11 PROCEDURE — 99213 OFFICE O/P EST LOW 20 MIN: CPT

## 2021-01-11 RX ORDER — TRAMADOL HYDROCHLORIDE 50 MG/1
50 TABLET ORAL EVERY 6 HOURS PRN
Qty: 120 TABLET | Refills: 0 | Status: SHIPPED | OUTPATIENT
Start: 2021-01-11 | End: 2021-01-29 | Stop reason: SDUPTHER

## 2021-01-12 LAB — STATUS: NORMAL

## 2021-01-14 ENCOUNTER — TELEPHONE (OUTPATIENT)
Dept: PAIN MANAGEMENT | Age: 56
End: 2021-01-14

## 2021-01-21 ENCOUNTER — TELEPHONE (OUTPATIENT)
Dept: PAIN MANAGEMENT | Age: 56
End: 2021-01-21

## 2021-01-21 NOTE — TELEPHONE ENCOUNTER
I called patient regarding a change in her injection time on Feb 4th, to 0950. Patient states it will work out for her; she will call a medical cab to get here and have her  pick her up. I advised she would be here approximately an hour to an hour and a half. She says that should work out; asks if it is ok if she needs to wait for her  and I stated that would be fine. She could stay up here in an empty room or downstairs in the lobby; whatever is more comfortable for her. Patient agrees.

## 2021-01-24 DIAGNOSIS — I10 ESSENTIAL HYPERTENSION: ICD-10-CM

## 2021-01-25 RX ORDER — GLIPIZIDE 5 MG/1
TABLET ORAL
Qty: 174 TABLET | Refills: 3 | Status: SHIPPED | OUTPATIENT
Start: 2021-01-25 | End: 2021-12-02

## 2021-01-25 RX ORDER — LISINOPRIL 20 MG/1
TABLET ORAL
Qty: 87 TABLET | Refills: 3 | Status: SHIPPED | OUTPATIENT
Start: 2021-01-25 | End: 2021-06-09 | Stop reason: SDUPTHER

## 2021-01-28 ENCOUNTER — HOSPITAL ENCOUNTER (OUTPATIENT)
Dept: PHYSICAL THERAPY | Age: 56
Setting detail: THERAPIES SERIES
Discharge: HOME | End: 2021-01-28
Payer: COMMERCIAL

## 2021-01-28 PROCEDURE — 97163 PT EVAL HIGH COMPLEX 45 MIN: CPT

## 2021-01-28 PROCEDURE — 97110 THERAPEUTIC EXERCISES: CPT

## 2021-01-28 NOTE — PROGRESS NOTES
[] Assist [x] Independent  [] Assist    Driving [x] Independent  [] Assist [x] Independent  [] Assist    Housekeeping [x] Independent  [] Assist [x] Independent  [] Assist    Grocery shop/meal prep [x] Independent  [] Assist [x] Independent  [] Assist Cart      Gait Prior level of function Current level of function    [x] Independent  [] Assist [x] Independent  [] Assist   Device: [x] Independent [x] Independent    [] Straight Cane [] Quad cane [] Straight Cane [] Quad cane    [] Standard walker [] Rolling walker   [] 4 wheeled walker [] Standard walker [] Rolling walker   [x] 4 wheeled walker    [] Wheelchair [] Wheelchair     Pain:  [x] Yes  [] No Location: low back  Pain Rating: (0-10 scale) 7 /10  Pain altered Tx:  [x] Yes  [] No  Action:    Symptoms:  [] Improving [x] Worsening [] Same  Better:  [] AM    [x] PM    [] Sit    [] Rise/Sit    []Stand    [] Walk    [x] Lying    [] Other:  Worse: [x] AM    [] PM    [x] Sit    [x] Rise/Sit    [x]Stand    [x] Walk    [] Lying    [x] Bend                             [x] Valsalva    [] Other: use of walker   Sleep: [] OK    [x] Disturbed    Objective:      STRENGTH  STRENGTH  ROM    Left Right  Left Right Cervical    C5 Shld Abd   L1-2 Hip Flex   Flexion    Shld Flexion   Hip Abd   Extension    Shld IR   L3-4 Knee Ext   Rotation L R   Shld ER   L4 Ankle DF   Sidebend L R   C6 Elb Flex   L5 EHL   Retraction    C7 Elb Ext   S1 Plant. Flex   Lumbar (+) = painful    C8 EPL   Abdominals   Flexion 75% loss(+)   T1 Fing Abd   Erector Spinae   Extension 100% loss(+)         Rotation L50%loss R 50% loss         Sidebend L50% loss R 50% loss         [facility]                                                                 TESTS (+/-) LEFT RIGHT Not Tested   SLR [x] sit [x] supine (-) (-) []   Hamstring (SLR) (+) (+) []   SKTC (-) (-) []   DKTC   []   Slump/Dural (-) (-) []   SI JT   []   APARNA   []   Joint Mobility   []   Cerv. Comp   []   Cerv.  Distraction   [] Bridger Hurst Alar/Transverse   []   Vertebral Artery   []   AdsonS   []   Denise Clark   []   Sebastian Tests ? Pain ? Pain No Change Not Tested   RFIS [] [] [] [x]   ANNE-MARIE [] [] [] [x]   RFIL [x] [] [] []   REIL [] [x] [] []   Rep Prot [] [] [] []   Rep Retract [] [] [] []       OBSERVATION No Deficit Deficit Not Tested Comments   Posture       Forward Head [] [x] []    Rounded Shoulders [] [x] []    Kyphosis [] [x] []    Lordosis [] [x] []    Lateral Shift [] [] []    Scoliosis [] [] []    Iliac Crest [] [] []    PSIS [] [] []    ASIS [] [] []    Genu Valgus [] [] []    Genu Varus [] [] []    Genu Recurvatum [] [] []    Pronation [] [] []    Supination [] [] []    Leg Length Discrp [] [] []    Slumped Sitting [] [x] []    Palpation [x] [] []    Sensation [x] [] []    Edema [] [] []    Neurological [] [] []                FUNCTION Normal Difficult Unable   Sitting [x] [] []   Standing [] [x] []   Ambulation [] [x] []   Groom/Dress [x] [] []   Lift/Carry [] [x] []   Stairs [] [x] []   Bending [] [x] []   OH reach [] [x] []   Sit to Stand [] [x] []     Comments:    Assessment:  Problems:    [x] ? Back Pain:     [x] ? ROM:     [x] ? Strength:   [x] ? Function:  [x] Postural Deviations  [x] Gait Deviations      STG: (to be met in 6 treatments)  1. ? Pain: lesson pain levels 3/10  2. ? ROM:AROM of lumbar spine to 50% of normal.   3. ? Strength:Lumbar spine strength 2+/5  4. ? Function: OPTIMAL score of 9/3  5. Independent with Home Exercise Programs  6. Demonstrate Knowledge of fall prevention  LTG: (to be met in 12 treatments)   1. OPTIMAL score of 6/3. Patient goals:  Walk without pain.      Functional Assessment Used: OPTMAL   Current Status Score: 13/3  Goal Status Sore: 6/3    Evaluation Complexity:  History (Personal factors, comorbidity) [] 0 [x] 1-2 [] 3+   Exam (limitations, restrictions) [] 1-2 [x] 3 [] 4+   Clinical presentation (progression) [] Stable [x] Evolving  [] Unstable Decision Making [] Low [x] Moderate [] High    [] Low Complexity [x] Moderate Complexity [] High Complexity       Rehab Potential:  [] Good  [x] Fair  [] Poor   Suggested Professional Referral:  [x] No  [] Yes:  Barriers to Goal Achievement[de-identified]  [x] No  [] Yes:  Domestic Concerns:  [x] No  [] Yes:    Pt. Education:  [x] Plans/Goals, Risks/Benefits discussed  [x] Home exercise program  Method of Education: [x] Verbal  [x] Demo  [x] Written  Comprehension of Education:  [x] Verbalizes understanding. [x] Demonstrates understanding. [] Needs Review. [] Demonstrates/verbalizes understanding of HEP/Ed previously given.     Treatment Plan:  [x] Therapeutic Exercise   14625  [] Iontophoresis: 4 mg/mL Dexamethasone Sodium Phosphate  mAmin  52243   [] Therapeutic Activity  27230 [] Vasopneumatic cold with compression  35828    [] Gait Training   79178 [] Ultrasound   64173   [] Neuromuscular Re-education  31979 [] Electrical Stimulation Unattended  05203   [] Manual Therapy  11160 [] Electrical Stimulation Attended  43633   [x] Instruction in HEP  [] Lumbar/Cervical Traction  24587   [x] Aquatic Therapy   22107 [] Cold/hot pack    [] Massage   64509      [] Dry Needling, 1 or 2 muscles  36538   [] Biofeedback, first 15 minutes   15728  [] Biofeedback, additional 15 minutes   42545 [] Dry Needling, 3 or more muscles  41110           Frequency:  3 x/week for 12 visits    TodayS Treatment:  Modalities: Exercise   Precautions:Falls   Exercises:  Exercise Reps/ Time Weight/ Level Comments   Hook laying  2 min   (HEP)   SKTC  10 x  \"   LTR  10 x   \"   Pariformis  30 sec   \"         Other:    Specific Instructions for next treatment: Aquatic therapy     Treatment Charges: Mins Units   [x] Evaluation       []  Low       [x]  Moderate       []  High 45 1   []  Modalities     [x]  Ther Exercise 15 1   []  Manual Therapy     []  Ther Activities     []  Aquatics     []  Neuromuscular     []  Gait Training     []  Dry Needling 1-2 muscles     []  Dry Needling           3 or more muscles     [] Vasocompression     []  Other       TOTAL TREATMENT TIME: 60    Time in: 5698      Time out: 5831    Electronically signed by: Dayron Truong PT

## 2021-01-29 DIAGNOSIS — M47.817 LUMBOSACRAL SPONDYLOSIS WITHOUT MYELOPATHY: ICD-10-CM

## 2021-01-29 DIAGNOSIS — M47.816 ARTHROPATHY OF LUMBAR FACET JOINT: ICD-10-CM

## 2021-01-29 DIAGNOSIS — M51.36 DDD (DEGENERATIVE DISC DISEASE), LUMBAR: ICD-10-CM

## 2021-01-29 DIAGNOSIS — M54.16 LUMBAR RADICULOPATHY: ICD-10-CM

## 2021-01-29 RX ORDER — TRAMADOL HYDROCHLORIDE 50 MG/1
50 TABLET ORAL EVERY 6 HOURS PRN
Qty: 120 TABLET | Refills: 0 | Status: SHIPPED | OUTPATIENT
Start: 2021-01-31 | End: 2021-03-02

## 2021-02-01 DIAGNOSIS — F41.8 SITUATIONAL ANXIETY: ICD-10-CM

## 2021-02-01 RX ORDER — BUSPIRONE HYDROCHLORIDE 5 MG/1
TABLET ORAL
Qty: 90 TABLET | Refills: 0 | Status: SHIPPED | OUTPATIENT
Start: 2021-02-01 | End: 2021-03-01 | Stop reason: SDUPTHER

## 2021-02-02 ENCOUNTER — TELEPHONE (OUTPATIENT)
Dept: PAIN MANAGEMENT | Age: 56
End: 2021-02-02

## 2021-02-02 DIAGNOSIS — E11.42 TYPE 2 DIABETES MELLITUS WITH DIABETIC POLYNEUROPATHY, WITH LONG-TERM CURRENT USE OF INSULIN (HCC): ICD-10-CM

## 2021-02-02 DIAGNOSIS — I10 ESSENTIAL HYPERTENSION: ICD-10-CM

## 2021-02-02 DIAGNOSIS — Z79.4 TYPE 2 DIABETES MELLITUS WITH DIABETIC POLYNEUROPATHY, WITH LONG-TERM CURRENT USE OF INSULIN (HCC): ICD-10-CM

## 2021-02-02 NOTE — TELEPHONE ENCOUNTER
Chart reviewed along with medication list. Pre procedure instructions given. Covid-19 screening questions asked. Information given where to be dropped off, a person to remain in the car and which door to enter in. Temp must be taken. Patient advised to avoid ALL vaccines 2 weeks prior to Injection. Patient acknowledges an understanding.

## 2021-02-03 ENCOUNTER — HOSPITAL ENCOUNTER (OUTPATIENT)
Dept: PHYSICAL THERAPY | Age: 56
Setting detail: THERAPIES SERIES
Discharge: HOME | End: 2021-02-03
Payer: COMMERCIAL

## 2021-02-03 PROCEDURE — 97113 AQUATIC THERAPY/EXERCISES: CPT

## 2021-02-03 RX ORDER — HYDROCHLOROTHIAZIDE 25 MG/1
TABLET ORAL
Qty: 22 TABLET | Refills: 2 | Status: SHIPPED | OUTPATIENT
Start: 2021-02-03 | End: 2021-03-16 | Stop reason: SDUPTHER

## 2021-02-03 RX ORDER — LANCETS 33 GAUGE
EACH MISCELLANEOUS
Qty: 90 EACH | Refills: 2 | Status: SHIPPED | OUTPATIENT
Start: 2021-02-03

## 2021-02-03 NOTE — FLOWSHEET NOTE
[facility]   [id number] [facility] [address]   Phone: [phone]   Fax: [phone]    Physical Therapy Daily Treatment Note    Date:  2/3/2021  Patient Name:  Inge Record    :  1965  MRN: [id number]  Physician: [id number]: [address] Diagnosis: DDD lumbar                       Rehab Codes: M51.36  Onset Date:  2020    Next  appt.: N/A  Visit# / total visits:   Cancels/No Shows: 0/1    Subjective:   Pt reports pain across low back and down right LE to knee and left LE. States still only able to stand for about 10-15 minutes max before \"excoriating pain\" makes her have to sit. Pain:  [x] Yes  [] No Location: Lumbar back Pain Rating: (0-10 scale) 8/10  Pain altered Tx:  [x] No  [] Yes  Action:  Comments: Initial aquatic therapy visit. Educated on postural awareness, importance of core stability and working in pain free ranges. Objective:      [address]  [facility] Exercise Log  Aquatic, Hip & DLS Program- Phase 1    Date of Eval:                                Primary PT:  Diagnosis: Things to Focus On (goals):   Surgical Precautions:  Medical Precautions:  [] C-9 dates  [] Occ Med   [] Medicare       Date 2/3/21       Visit #        Walk F/L/R 2 Laps       Marching 2 Laps       Squats 10x5\"       Step-Ups F/L Low 10x       Heel-toe raises 10x       SLR F/L/R 15x       Knee/Flex/Ext 10x       F/L Lunges        Kickboard Ex. Lg       Iso Abd. 10x5\"       Push-pull 15x       Paddling 15x               Balance                        Stretches        Achllies 2x20\"       Hamstring 2x20\"               Deep water  1 Noodle       cycling 2'       Hang 5'               Pain Rating 8         Specific Instructions for next treatment:Assess response to initial aquatic therapy visit and progress as able. Assessment: [x] Progressing toward goals. Initiated aquatic therapy for lumbar pain today. Tolerated all exercises well with no increased pain noted. Cues for posture with exercises. Deep water hang at end of treatment for pain relief and spinal decompression. [] No change. [] Other:    [] Patient would continue to benefit from skilled physical therapy services in order to:     STG: (to be met in 6 treatments)  1. ? Pain: lesson pain levels 3/10  2. ? ROM:AROM of lumbar spine to 50% of normal.   3. ? Strength:Lumbar spine strength 2+/5  4. ? Function: OPTIMAL score of 9/3  5. Independent with Home Exercise Programs  6. Demonstrate Knowledge of fall prevention  LTG: (to be met in 12 treatments)   1. OPTIMAL score of 6/3. Pt. Education:  [x] Yes  [] No  [x] Reviewed Prior HEP/Ed  Method of Education: [x] Verbal  [x] Demo  [] Written  Comprehension of Education:  [x] Verbalizes understanding. [x] Demonstrates understanding. [] Needs review. [] Demonstrates/verbalizes HEP/Ed previously given. Plan: [x] Continue per plan of care.  Cancelled appointments scheduled for next week due to cold weather.   [] Other:      Treatment Charges: Mins Units   []  Modalities     []  Ther Exercise     []  Manual Therapy     []  Ther Activities     [x]  Aquatics 38 3   []  Neuromuscular     [] Vasocompression     [] Gait Training     [] Dry needling        [] 1 or 2 muscles        [] 3 or more muscles     []  Other     Total Treatment time 38 3     Time In: 5864            Time Out: 7741    Electronically signed by:  Agustin Kiran PTA

## 2021-02-04 ENCOUNTER — HOSPITAL ENCOUNTER (OUTPATIENT)
Dept: GENERAL RADIOLOGY | Age: 56
Discharge: HOME | End: 2021-02-06
Payer: COMMERCIAL

## 2021-02-04 ENCOUNTER — HOSPITAL ENCOUNTER (OUTPATIENT)
Dept: PAIN MANAGEMENT | Age: 56
Discharge: HOME | End: 2021-02-04
Payer: COMMERCIAL

## 2021-02-04 VITALS
HEIGHT: 67 IN | TEMPERATURE: 98.2 F | WEIGHT: 277 LBS | DIASTOLIC BLOOD PRESSURE: 97 MMHG | OXYGEN SATURATION: 97 % | BODY MASS INDEX: 43.47 KG/M2 | HEART RATE: 87 BPM | SYSTOLIC BLOOD PRESSURE: 148 MMHG | RESPIRATION RATE: 16 BRPM

## 2021-02-04 DIAGNOSIS — M47.816 ARTHROPATHY OF LUMBAR FACET JOINT: ICD-10-CM

## 2021-02-04 DIAGNOSIS — M47.817 LUMBOSACRAL SPONDYLOSIS WITHOUT MYELOPATHY: ICD-10-CM

## 2021-02-04 DIAGNOSIS — M54.16 LUMBAR RADICULOPATHY: ICD-10-CM

## 2021-02-04 DIAGNOSIS — M54.16 LUMBAR RADICULOPATHY: Primary | ICD-10-CM

## 2021-02-04 DIAGNOSIS — M51.36 DDD (DEGENERATIVE DISC DISEASE), LUMBAR: ICD-10-CM

## 2021-02-04 PROCEDURE — 6360000002 HC RX W HCPCS: Performed by: PAIN MEDICINE

## 2021-02-04 PROCEDURE — 80307 DRUG TEST PRSMV CHEM ANLYZR: CPT

## 2021-02-04 PROCEDURE — 62323 NJX INTERLAMINAR LMBR/SAC: CPT

## 2021-02-04 PROCEDURE — 62323 NJX INTERLAMINAR LMBR/SAC: CPT | Performed by: PAIN MEDICINE

## 2021-02-04 PROCEDURE — 3209999900 FLUORO FOR SURGICAL PROCEDURES

## 2021-02-04 PROCEDURE — 6360000004 HC RX CONTRAST MEDICATION: Performed by: PAIN MEDICINE

## 2021-02-04 RX ORDER — TRIAMCINOLONE ACETONIDE 40 MG/ML
INJECTION, SUSPENSION INTRA-ARTICULAR; INTRAMUSCULAR
Status: COMPLETED | OUTPATIENT
Start: 2021-02-04 | End: 2021-02-04

## 2021-02-04 RX ADMIN — IOHEXOL 2 ML: 180 INJECTION INTRAVENOUS at 10:34

## 2021-02-04 RX ADMIN — TRIAMCINOLONE ACETONIDE 80 MG: 40 INJECTION, SUSPENSION INTRA-ARTICULAR; INTRAMUSCULAR at 10:34

## 2021-02-04 ASSESSMENT — PAIN - FUNCTIONAL ASSESSMENT: PAIN_FUNCTIONAL_ASSESSMENT: PREVENTS OR INTERFERES SOME ACTIVE ACTIVITIES AND ADLS

## 2021-02-04 NOTE — PLAN OF CARE
Discharge criteria met. Patient alert and oriented x3  Post procedure dressing dry and intact. Sensory and motor function intact as per pre-procedure. Instructions and follow up reviewed with pt at patient at discharge.   Patient discharged per darrell @ [phone]

## 2021-02-04 NOTE — PROCEDURES
Pre-Procedure Note    Patient Name: Юлия De Jesus   YOB: 1965  Room/Bed: Room/bed info not found  Medical Record Number: [id number]  Date: 2/4/2021       Indication:    1. Lumbar radiculopathy    2. Lumbosacral spondylosis without myelopathy    3. Arthropathy of lumbar facet joint    4. DDD (degenerative disc disease), lumbar        Consent: On file. Vital Signs:   Vitals:    02/04/21 1033   BP: (!) 161/103   Pulse: 86   Resp: 20   Temp:    SpO2: 96%       Past Medical History:   has a past medical history of Allergic rhinitis, Anxiety, Arthritis, Bleeds easily ([address] Complete rotator cuff tear of left shoulder, Diabetes mellitus ([address] Diabetic polyneuropathy associated with type 2 diabetes mellitus ([address] Fibromyalgia, GERD (gastroesophageal reflux disease), H/O transfusion, History of blood transfusion, Hypertension, Obesity, Pure hypercholesterolemia, Tobacco abuse, Type II or unspecified type diabetes mellitus without mention of complication, not stated as uncontrolled, Unspecified sleep apnea, and Wears glasses. Past Surgical History:   has a past surgical history that includes Gastric bypass surgery (1987); ovarian cyst removal (Right, 1988); Carpal tunnel release (Right, 2002); bronchoscopy (9/27/2017); pr egd transoral biopsy single/multiple (N/A, 9/27/2017); Cholecystectomy (N/A, 9/29/2017); laryngoscopy (N/A, 9/29/2017); Colonoscopy (N/A, 2/21/2018); Rotator cuff repair (Left); and pr shldr arthroscop,surg,w/rotat cuff repr (Left, 6/19/2018). Pre-Sedation Documentation and Exam:   Vital signs have been reviewed (see flow sheet for vitals). Mallampati Airway Assessment:  normal    ASA Classification:  Class 3 - A patient with severe systemic disease that limits activity but is not incapacitating    Sedation/ Anesthesia Plan:   intravenous sedation   as needed. Medications Planned:   midazolam (Versed) / Fentanyl  Intravenously  as needed. Patient is an appropriate candidate for plan of sedation: yes  Patient's History and Physical examination was reviewed and there is no change. Electronically signed by Byron Wynn MD on 2/4/2021 at 10:38 AM        Preoperative Diagnosis:    1. Lumbar radiculopathy    2. Lumbosacral spondylosis without myelopathy    3. Arthropathy of lumbar facet joint    4. DDD (degenerative disc disease), lumbar        Postoperative Diagnosis:    1. Lumbar radiculopathy    2. Lumbosacral spondylosis without myelopathy    3. Arthropathy of lumbar facet joint    4. DDD (degenerative disc disease), lumbar        Procedure Performed:  Lumbar epidural steroid injection under fluoroscopy guidance without IV sedation. Indication for the Procedure: The patient failed conservative management  for pain in the low back radiating to lower extremities. The patient has undergone lumbar epidural steroid injections and the last procedure gave 75% relief. As the patient is not responding to conservative management and it is interfering with activities of daily living we decided to proceed with lumbar epidural steroid injection. The procedure and risks were discussed with the patient and an informed consent was obtained  Current Pain Assessment  Pain Assessment  Pain Level: 7  Patient's Stated Pain Goal: 2  Pain Type: Chronic pain  Pain Location: Back  Pain Orientation: Left, Right  Pain Radiating Towards: both legs left worse  Pain Descriptors: Aching, Constant, Dull, Nagging, Sharp, Pins and needles  Pain Frequency: Continuous  Pain Onset: On-going  Clinical Progression: Gradually worsening  Functional Pain Assessment: Prevents or interferes some active activities and ADLs     Procedure:       Patient's vital signs including BP, EKG and SaO2 were monitored by the RN and they remained stable during the procedure. A meaningful communication was kept up with the patient throughout  the procedure. The patient is placed in prone position. Skin over the back was prepped and draped in sterile manner. Then using fluoroscopy the L4, L5 interspace was observed and the skin and deep tissues in the left paramedian area were infiltrated with 5 ml of 1% lidocaine. The #20-gauge, 3-1/2 inch Tuohy needle was inserted through the skin wheal and the epidural space was identified using loss of resistance technique with normal saline. This was confirmed with AP and lateral views using fluoroscopy after injecting about 2 ml of Omnipaque-180 and observing the spread of the contrast in the epidural space. Then after negative aspiration a total of 80 mg of triamcinolone with 8 ml of normal saline was injected into the epidural space. The needle is removed and a Band-Aid was placed over the needle insertion site. Patient's vital signs remained stable and the patient tolerated the procedure well. The patient was discharged home in stable condition and will be followed in the pain clinic in the next few weeks for further planning.     Electronically signed by Mathieu Stovall MD on 2/4/2021 at 10:38 AM

## 2021-02-04 NOTE — PROGRESS NOTES
Jhony Aragon is a 49 y.o. female evaluated on 2/4/2021. Chief complaint: Jhony Aragon is 49 y.o.,  female, with  Lower Back Pain  . HPI   Patient was initially evaluated by our virtual visit and a physical examination was not done. Patient history was again reviewed patient denies any changes in his symptoms from his initial visit. Please review the history from the previous visit.     Past Medical History:   Diagnosis Date    Allergic rhinitis 10/21/2014    Anxiety     Arthritis     Bleeds easily ([address]     per pt, has never had a work up   Romana Belcher Complete rotator cuff tear of left shoulder 2018    Diabetes mellitus ([address]     On Insulin, metformin, Glipizide    Diabetic polyneuropathy associated with type 2 diabetes mellitus ([address] 3/26/2018    Fibromyalgia     GERD (gastroesophageal reflux disease) 10/21/2014    H/O transfusion     History of blood transfusion     Hypertension     Obesity 10/21/2014    Pure hypercholesterolemia 2/23/2016    Tobacco abuse     Type II or unspecified type diabetes mellitus without mention of complication, not stated as uncontrolled     Unspecified sleep apnea     CPAP machine is broken    Wears glasses        Past Surgical History:   Procedure Laterality Date    BRONCHOSCOPY  9/27/2017    BRONCHOSCOPY BRUSHINGS performed by Karolina White MD at [facility] 2002    CHOLECYSTECTOMY N/A 9/29/2017    CHOLECYSTECTOMY OPEN performed by Maxx Stokes DO at [address] [place] [address] N/A 2/21/2018    COLONOSCOPY POLYPECTOMY SNARE/COLD BIOPSY performed by Osmel Kuhn MD at [address] [place] [address]    with revision x1    LARYNGOSCOPY N/A 9/29/2017    DIRECT MICRO LARYNGOSCOPY WITH EXCISION VOCAL CORD LESION  performed by Sandy Hu MD at [facility] EGD TRANSORAL BIOPSY SINGLE/MULTIPLE N/A 9/27/2017 EGD BIOPSY performed by Mariama Armijo MD at [address] [facility] ARTHROSCOP,SURG,W/ROTAT CUFF REPR Left 6/19/2018    SHOULDER ARTHROSCOPY WITH ROTATOR CUFF REPAIR, SUBACROMIAL DECOMPRESSION performed by Rene Rodriguez MD at [address] [facility] Left     SHOULDER ARTHROSCOPY WITH ROTATOR CUFF REPAIR, SUBACROMIAL DECOMPRESSION (Left        Family History   Problem Relation Age of Onset    Diabetes Mother     Heart Disease Mother         murmur    Heart Disease Father         MVP    Diabetes Sister     Diabetes Maternal Grandmother     High Blood Pressure Maternal Grandmother     Heart Failure Maternal Grandfather     Heart Attack Paternal Grandmother        Social History     Socioeconomic History    Marital status: Legally      Spouse name: None    Number of children: 3    Years of education: None    Highest education level: None   Occupational History    Occupation:    Social Needs    Financial resource strain: None    Food insecurity     Worry: None     Inability: None    Transportation needs     Medical: None     Non-medical: None   Tobacco Use    Smoking status: Former Smoker     Packs/day: 2.00     Years: 34.00     Pack years: 68.00     Types: Cigarettes     Start date: 28     Quit date: 9/28/2017     Years since quitting: 3.3    Smokeless tobacco: Never Used   Substance and Sexual Activity    Alcohol use:  Yes     Alcohol/week: 0.0 standard drinks     Frequency: Monthly or less     Drinks per session: 1 or 2     Comment: rare    Drug use: No    Sexual activity: Never   Lifestyle    Physical activity     Days per week: None     Minutes per session: None    Stress: None   Relationships    Social connections     Talks on phone: None     Gets together: None     Attends Bahai service: None     Active member of club or organization: None     Attends meetings of clubs or organizations: None     Relationship status: None  Intimate partner violence     Fear of current or ex partner: None     Emotionally abused: None     Physically abused: None     Forced sexual activity: None   Other Topics Concern    None   Social History Narrative    None       Allergies   Allergen Reactions    Pcn [Penicillins] Hives and Other (See Comments)     Lose motor function of legs and collaps    Demerol Hcl [Meperidine] Other (See Comments)     Panic attack    Metformin And Related      Abdominal pain    Tape [Adhesive Tape] Other (See Comments)     Blister      Morphine Nausea And Vomiting and Other (See Comments)     Stomach pain       Current Outpatient Medications on File Prior to Encounter   Medication Sig Dispense Refill    hydroCHLOROthiazide (HYDRODIURIL) 25 MG tablet TAKE ONE TABLET BY MOUTH DAILY WITH POTASSIUM SUPPLEMENT 22 tablet 2    Easy Touch Lancets 33G/Twist MISC USE TWO TO THREE TIMES DAILY 90 each 2    busPIRone (BUSPAR) 5 MG tablet TAKE ONE TABLET BY MOUTH THREE TIMES A DAY AS NEEDED FOR ANXIETY 90 tablet 0    traMADol (ULTRAM) 50 MG tablet Take 1 tablet by mouth every 6 hours as needed for Pain for up to 30 days.  120 tablet 0    lisinopril (PRINIVIL;ZESTRIL) 20 MG tablet TAKE ONE TABLET BY MOUTH DAILY 87 tablet 3    glipiZIDE (GLUCOTROL) 5 MG tablet TAKE ONE TABLET BY MOUTH TWICE A DAY BEFORE BIGGEST MEAL 174 tablet 3    Handicap Placard MISC by Does not apply route Prescription good for 5 Years  Expires January 2026 1 each 0    potassium chloride (KLOR-CON) 10 MEQ extended release tablet TAKE ONE TABLET BY MOUTH DAILY WITH HYDROCHLOROTHIAZIDE 30 tablet 2    meloxicam (MOBIC) 15 MG tablet Take 1 tablet by mouth daily 90 tablet 2    Insulin Degludec (TRESIBA FLEXTOUCH) 100 UNIT/ML SOPN Inject 45 Units into the skin 2 times daily      tiZANidine (ZANAFLEX) 4 MG tablet TAKE ONE TABLET BY MOUTH EVERY 8 HOURS AS NEEDED FOR NECK AND BACK PAIN. **CAUSES SEDATION AND DO NOT DRIVE WHILE TAKING THIS MEDICATION 90 tablet 0  cetirizine (ZYRTEC) 10 MG tablet Take 10 mg by mouth daily      Blood Glucose Monitoring Suppl (TRUE METRIX METER) MANOLO Testing BID 1 Device 0    Insulin Pen Needle (PEN NEEDLES 3/16\") 31G X 5 MM MISC 1 each by Does not apply route daily 100 each 3    insulin lispro, 1 Unit Dial, (HUMALOG KWIKPEN) 100 UNIT/ML SOPN Inject 15 Units into the skin 3 times daily (before meals) 5 pen 1    Insulin Syringes, Disposable, U-100 1 ML MISC 1 each by Does not apply route daily 100 each 3    blood glucose monitor strips Test 2-3 times a day & as needed for symptoms of irregular blood glucose. BRAND OF CHOICE INSURANCE ALLOWS. 100 strip 11    atorvastatin (LIPITOR) 10 MG tablet Take 1 tablet by mouth daily 90 tablet 2    Alcohol Swabs (ALCOHOL PREP) PADS Use as directed 100 each 11    pregabalin (LYRICA) 100 MG capsule Take 1 capsule by mouth 2 times daily for 90 days. 180 capsule 2    vitamin D (CHOLECALCIFEROL) 25 MCG (1000 UT) TABS tablet Take 1 tablet by mouth daily 30 tablet 3    [DISCONTINUED] hydroCHLOROthiazide (HYDRODIURIL) 25 MG tablet Take 1 tablet by mouth daily Take with Potassium supplement 30 tablet 3    [DISCONTINUED] Lancets MISC Dx: DM-2. Use 2-3 times daily 100 each 3     Current Facility-Administered Medications on File Prior to Encounter   Medication Dose Route Frequency Provider Last Rate Last Admin    methylPREDNISolone acetate (DEPO-MEDROL) injection 80 mg  80 mg Intra-articular Once Honora Perfect, DO        bupivacaine (MARCAINE) 0.25 % injection 5 mg  2 mL Intra-articular Once Marta Portillo, DO            Review of Systems   There is no change in the review of systems from the previous visit . Please  see the previous notes for review of systems    Physical Exam  Constitutional:       Appearance: Normal appearance. She is obese. HENT:      Head: Normocephalic and atraumatic.       Mouth/Throat:      Mouth: Mucous membranes are moist.   Eyes: Extraocular Movements: Extraocular movements intact. Pupils: Pupils are equal, round, and reactive to light. Neck:      Musculoskeletal: Neck supple. No neck rigidity. Cardiovascular:      Rate and Rhythm: Normal rate and regular rhythm. Pulmonary:      Effort: Pulmonary effort is normal. No respiratory distress. Abdominal:      General: There is no distension. Palpations: Abdomen is soft. Tenderness: There is no abdominal tenderness. Musculoskeletal:      Right lower leg: No edema. Left lower leg: No edema. Lymphadenopathy:      Cervical: No cervical adenopathy. Skin:     Findings: No rash. Neurological:      General: No focal deficit present. Mental Status: She is alert and oriented to person, place, and time. Cranial Nerves: No cranial nerve deficit. Sensory: No sensory deficit. Psychiatric:         Mood and Affect: Mood normal.         Speech: Speech normal.         Behavior: Behavior normal.         Thought Content: Thought content normal.         Judgment: Judgment normal.      Right Ankle Exam     Muscle Strength   The patient has normal right ankle strength. Left Ankle Exam     Muscle Strength   The patient has normal left ankle strength. Right Knee Exam     Muscle Strength   The patient has normal right knee strength. Left Knee Exam     Muscle Strength   The patient has normal left knee strength. Right Hip Exam     Muscle Strength   The patient has normal right hip strength. Left Hip Exam     Muscle Strength   The patient has normal left hip strength. Back Exam     Tenderness   The patient is experiencing tenderness in the lumbar and sacroiliac. Range of Motion   Back extension: Limited and painful. Back flexion: Limited and painful. Back lateral bend right: Limited and painful. Back lateral bend left: Limited and painful. Back rotation right: Limited and painful. Back rotation left: Limited and painful. Tests   Straight leg raise right: positive  Straight leg raise left: positive    Other   Sensation: normal  Gait: antalgic   Scars: absent               DATA:  LAB.:  10/7/2020 12:56 PM - Zak Cuellar Incoming Lab Results From [facility]    Component Value Ref Range & Units Status Collected Lab   Cholesterol 159  <200 mg/dL Final 10/07/2020  9:05 AM [facility]       Cholesterol Guidelines:       <200  Desirable    200-240  Borderline       >240  Undesirable        HDL 58  >40 mg/dL Final 10/07/2020  9:05 AM [facility]       HDL Guidelines:     <40     Undesirable    40-59    Borderline     >59     Desirable        LDL Cholesterol 74  0 - 130 mg/dL Final 10/07/2020  9:05 AM [facility]       LDL Guidelines:      <100    Desirable    100-129   Near to/above Desirable    130-159   Borderline       >159   Undesirable       Direct (measured) LDL and calculated LDL are not interchangeable tests. Chol/HDL Ratio 2.7  <5 Final 10/07/2020  9:05 AM [facility]          Triglycerides 134  <150 mg/dL Final 10/07/2020  9:05 AM [facility]       Triglyceride Guidelines:        X-Ray reports:  History: 53-year-old female with chronic right shoulder pain. Findings: Views: 3V  Right Shoulder Weight bearing: No Findings: No acute fractures or dislocations appreciated. Glenohumeral alignment appears normal.  No lytic or blastic lesion are present in all visualized osseous structures. Degenerative changes noted in the [facility] joint with osteophytic changes and joint space narrowing. Mild degenerative changes noted in the glenohumeral joint with joint space narrowing and irregularities of the humeral head. Small inferior osteophyte noted.  Previous comparison films not available   Impression: No acute osseous abnormality          THREE XRAY VIEWS OF THE LUMBAR SPINE        10/7/2020 2:13 pm COMPARISON:    12/28/2018, CT 02/23/2018        HISTORY:    ORDERING SYSTEM PROVIDED HISTORY: Chronic bilateral low back pain without    sciatica    TECHNOLOGIST PROVIDED HISTORY:    Reason for Exam: pt stated low back pain difficult to stand x 3 wks , bilat    shoulder pain    Acuity: Acute    Type of Exam: Initial    Additional signs and symptoms: pt stated low back pain difficult to stand x 3    wks , bilat shoulder pain        FINDINGS:    Multiple clips in the abdomen and partially visualized curvilinear density    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 Mild fecal stasis in the colon with a nonspecific nonobstructive    bowel gas pattern. Mild osteopenia. Vertebral body heights are maintained. Pedicles are intact. There is no convincing evidence of acute fracture. SI    joints show mild degenerative changes. Multilevel mild loss of disc space    height and osteophytes due to disc degenerative disease. Moderate facet    arthropathy mid and lower lumbar spine with mild grade 1 anterolisthesis    L4-L5 and slight anterolisthesis L5-S1, increased since the prior studies. Impression    Increasing mild-to-moderate lumbar spondylotic changes with grade 1    anterolisthesis L4-L5. XRAY VIEWS OF THE CERVICAL SPINE        10/7/2020 2:13 pm        COMPARISON:    None. HISTORY:    ORDERING SYSTEM PROVIDED HISTORY: Acute pain of both shoulders    TECHNOLOGIST PROVIDED HISTORY:    Reason for Exam: pt stated low back pain difficult to stand x 3 wks , bilat    shoulder pain    Acuity: Acute    Type of Exam: Initial    Additional signs and symptoms: pt stated low back pain difficult to stand x 3    wks , bilat shoulder pain        FINDINGS:    There is mild C3-4, C5-6 and moderate C6-7 joint space compromise with mild    anterior spurring at the C3-4 level.         The remaining disc spaces and vertical heights of the vertebral bodies are maintained        There is no fracture, dislocation or significant soft tissue finding            Impression    Multilevel degenerative changes      EXAMINATION:    MRI OF THE LUMBAR SPINE WITHOUT CONTRAST, 10/22/2020 11:38 am        TECHNIQUE:    Multiplanar multisequence MRI of the lumbar spine was performed without the    administration of intravenous contrast.        COMPARISON:    Radiographs October 7, 2020        MRI July 11, 2006        HISTORY:    ORDERING SYSTEM PROVIDED HISTORY: Lumbar radiculopathy    TECHNOLOGIST PROVIDED HISTORY:    neuropathy    Is the patient pregnant?->No    Reason for Exam: pt stated back pain radiates down lt leg    Acuity: Chronic    Type of Exam: Initial    Additional signs and symptoms: pt stated back pain radiates down lt leg        FINDINGS:    BONES/ALIGNMENT: No acute fracture. No subluxation. No suspicious bone    marrow replacing lesion. SPINAL CORD: Normal signal within the conus which terminates at T12-L1. SOFT TISSUES: No paraspinal abnormality. L1-L2: No significant central canal or foraminal stenosis. L2-L3: Mild broad-based disc bulge and facet degenerative changes combine to    create mild central canal stenosis. Changes combine to create minimal    bilateral foraminal stenosis. L3-L4: Broad-based disc bulge and facet degenerative changes combine to    create mild central canal stenosis. Changes combine to create mild bilateral    foraminal stenosis. L4-L5: Broad-based disc bulge and facet degenerative changes combine to    create mild central canal stenosis. Changes combine to create mild-moderate    bilateral foraminal stenosis. L5-S1: Mild broad-based disc bulge and facet degenerative changes do not    cause significant central canal stenosis. Changes combine to create mild    bilateral foraminal stenosis. Impression    Overall mild multilevel degenerative changes.         Clinical  impression: 1. Lumbar radiculopathy    2. Lumbosacral spondylosis without myelopathy    3. Arthropathy of lumbar facet joint    4. DDD (degenerative disc disease), lumbar        Plan of care: The following treatment plan was developed after discussion with patient:    We discussed Lumbar Epidural steroid Injections x 1  at L4 - L5. Patient tried and failed NSAIDS,Home exercises, Physical Therapy, Chiropractic manipulations without relief. Patient exhibited signs of radiculopathy with positive straight leg raising test on left    Patient has not had prior Lumbar Surgery. We will see the patient in 2 weeks after the procedures and re-evaluate symptoms. PDMP Monitoring:    Last PDMP Marco Long as Reviewed [facility]):  Review User Review Instant Review Result   Brenton Rosado 1/29/2021  5:25 AM Reviewed PDMP [1]     Patient's physical examination is consistent with the initial evaluation. Hence will proceed with the procedure as planned during the initial consultation. The procedure ,risks as well as alternate therapies were again discussed with the patient and patient would like to proceed with it. Informed consent was obtained. Counselling/Preventive measures for pain  Control:    [x]  Spine strengthening exercises are discussed with patient in detail. Decision Making Process : Patient's health history and referral records thoroughly reviewed before focused physical examination and discussion with patient. Level of complexity of date to be reviewed is Moderate. The chart date reviewed include the following: Imaging Reports. Summary of Care. Time spent reviewing with patient the below reports:   Medication safety, Treatment options. Level of diagnosis and management options of this case is multiple: involving the following management options: Interventions as needed, medication management as appropriate, future visits, activity modification, heat/ice as needed, Urine drug screen as required. [x]The patient's questions were answered to the best of my abilities. This note was created using voice recognition software. There may be inaccuracies of transcription  that are inadvertently overlooked prior to the signature. There is any questions about the transcription please contact me.         Electronically signed by Brendon Washington MD on 2/4/2021 at 10:17 AM

## 2021-02-05 ENCOUNTER — APPOINTMENT (OUTPATIENT)
Dept: PHYSICAL THERAPY | Age: 56
End: 2021-02-05
Payer: COMMERCIAL

## 2021-02-08 ENCOUNTER — APPOINTMENT (OUTPATIENT)
Dept: PHYSICAL THERAPY | Age: 56
End: 2021-02-08
Payer: COMMERCIAL

## 2021-02-08 ENCOUNTER — TELEPHONE (OUTPATIENT)
Dept: PAIN MANAGEMENT | Age: 56
End: 2021-02-08

## 2021-02-08 LAB
6-ACETYLMORPHINE, UR: NOT DETECTED
7-AMINOCLONAZEPAM, URINE: NOT DETECTED
ALPHA-OH-ALPRAZ, URINE: NOT DETECTED
ALPRAZOLAM, URINE: NOT DETECTED
AMPHETAMINES, URINE: NOT DETECTED
BARBITURATES, URINE: NOT DETECTED
BENZOYLECGONINE, UR: NOT DETECTED
BUPRENORPHINE URINE: NOT DETECTED
CARISOPRODOL, UR: NOT DETECTED
CLONAZEPAM, URINE: NOT DETECTED
CODEINE, URINE: NOT DETECTED
CREATININE URINE: 106.2 MG/DL (ref 20–400)
DIAZEPAM, URINE: NOT DETECTED
DRUGS EXPECTED, UR: NORMAL
EER HI RES INTERP UR: NORMAL
ETHYL GLUCURONIDE UR: NOT DETECTED
FENTANYL URINE: NOT DETECTED
HYDROCODONE, URINE: NOT DETECTED
HYDROMORPHONE, URINE: NOT DETECTED
LORAZEPAM, URINE: NOT DETECTED
MARIJUANA METAB, UR: NOT DETECTED
MDA, UR: NOT DETECTED
MDEA, EVE, UR: NOT DETECTED
MDMA URINE: NOT DETECTED
MEPERIDINE METAB, UR: NOT DETECTED
METHADONE, URINE: NOT DETECTED
METHAMPHETAMINE, URINE: NOT DETECTED
METHYLPHENIDATE: NOT DETECTED
MIDAZOLAM, URINE: NOT DETECTED
MORPHINE URINE: NOT DETECTED
NORBUPRENORPHINE, URINE: NOT DETECTED
NORDIAZEPAM, URINE: NOT DETECTED
NORFENTANYL, URINE: NOT DETECTED
NORHYDROCODONE, URINE: NOT DETECTED
NOROXYCODONE, URINE: NOT DETECTED
NOROXYMORPHONE, URINE: NOT DETECTED
OXAZEPAM, URINE: NOT DETECTED
OXYCODONE URINE: NOT DETECTED
OXYMORPHONE, URINE: NOT DETECTED
PAIN MANAGEMENT DRUG PANEL INTERP, URINE: NORMAL
PAIN MGT DRUG PANEL, HI RES, UR: NORMAL
PCP,URINE: NOT DETECTED
PHENTERMINE, UR: NOT DETECTED
PROPOXYPHENE, URINE: NOT DETECTED
TAPENTADOL, URINE: NOT DETECTED
TAPENTADOL-O-SULFATE, URINE: NOT DETECTED
TEMAZEPAM, URINE: NOT DETECTED
TRAMADOL, URINE: PRESENT
ZOLPIDEM, URINE: NOT DETECTED

## 2021-02-09 ENCOUNTER — APPOINTMENT (OUTPATIENT)
Dept: PHYSICAL THERAPY | Age: 56
End: 2021-02-09
Payer: COMMERCIAL

## 2021-02-10 ENCOUNTER — OFFICE VISIT (OUTPATIENT)
Dept: NEUROSURGERY | Age: 56
End: 2021-02-10
Payer: COMMERCIAL

## 2021-02-10 VITALS
BODY MASS INDEX: 43.88 KG/M2 | WEIGHT: 276 LBS | DIASTOLIC BLOOD PRESSURE: 72 MMHG | HEART RATE: 109 BPM | SYSTOLIC BLOOD PRESSURE: 142 MMHG

## 2021-02-10 DIAGNOSIS — N62 BREAST PROMINENCE: ICD-10-CM

## 2021-02-10 DIAGNOSIS — M47.26 OTHER SPONDYLOSIS WITH RADICULOPATHY, LUMBAR REGION: Primary | ICD-10-CM

## 2021-02-10 PROCEDURE — G8427 DOCREV CUR MEDS BY ELIG CLIN: HCPCS | Performed by: NEUROLOGICAL SURGERY

## 2021-02-10 PROCEDURE — G8417 CALC BMI ABV UP PARAM F/U: HCPCS | Performed by: NEUROLOGICAL SURGERY

## 2021-02-10 PROCEDURE — 1036F TOBACCO NON-USER: CPT | Performed by: NEUROLOGICAL SURGERY

## 2021-02-10 PROCEDURE — G8482 FLU IMMUNIZE ORDER/ADMIN: HCPCS | Performed by: NEUROLOGICAL SURGERY

## 2021-02-10 PROCEDURE — 3017F COLORECTAL CA SCREEN DOC REV: CPT | Performed by: NEUROLOGICAL SURGERY

## 2021-02-10 PROCEDURE — 99204 OFFICE O/P NEW MOD 45 MIN: CPT | Performed by: NEUROLOGICAL SURGERY

## 2021-02-10 NOTE — PROGRESS NOTES
Gabriela  [address]  [address] [place] [id number]  Dept: [phone]    Patient:  Paulette Carlton  YOB: 1965  Date: 2/10/21    The patient is a 49 y.o. female who presents today for consult of the following problems:     Chief Complaint   Patient presents with    Back Pain     NP-Buldged lumbar intervertebral disc             HPI:     Paulette Carlton is a 49 y.o. female on whom neurosurgical consultation was requested by KIMANI Fiore CNP for management of lumbar spondylosis with radiculopathy. The patient has had longstanding intermittent axial back pain radiating from 3-4 out of 10 but is actually maintained a very active lifestyle with multiple hikes prior to September of this year. In September she did however have significant midline paraspinal axial back pain that was bilateral radiating down the bilateral lower extremities to approximately hamstring and S1 distribution with left leg greater than the right leg. Symptoms have been ongoing and progressive and states that she was unable to stand for greater than 5 minutes with significant disabling pain that improved when she sat down. She does use heat intermittently and is also been through 2 epidural injections with initial 1 not offering any assistance but the second 1 improving her pain significantly. She does state that she can stand for approximately 10 minutes now since her most recent injection and reinitiating aquatic therapy. She previously has been through therapy greater than 2 months ago with some improvement but more recently she has restarted due to the issues recurring. Denies any saddle anesthesia or bowel bladder incontinence.   In addition she does note significant amount of pain when she is upright or leaning forward she had been previously evaluated for breast reduction surgery but unfortunately has been denied multiple times with insurance claiming that there is no correlation and it is a cosmetic procedure. [employer]       History:     Past Medical History:   Diagnosis Date    Allergic rhinitis 10/21/2014    Anxiety     Arthritis     Bleeds easily ([address]     per pt, has never had a work up   [employer] Complete rotator cuff tear of left shoulder 2018    Diabetes mellitus ([address]     On Insulin, metformin, Glipizide    Diabetic polyneuropathy associated with type 2 diabetes mellitus ([address] 3/26/2018    Fibromyalgia     GERD (gastroesophageal reflux disease) 10/21/2014    H/O transfusion     History of blood transfusion     Hypertension     Obesity 10/21/2014    Pure hypercholesterolemia 2/23/2016    Tobacco abuse     Type II or unspecified type diabetes mellitus without mention of complication, not stated as uncontrolled     Unspecified sleep apnea     CPAP machine is broken    Wears glasses      Past Surgical History:   Procedure Laterality Date    BRONCHOSCOPY  9/27/2017    BRONCHOSCOPY BRUSHINGS performed by Heidi Neal MD at [facility] Right 2002    CHOLECYSTECTOMY N/A 9/29/2017    CHOLECYSTECTOMY OPEN performed by Roderick Dacosta DO at [address] [place] [address] Arlene N/A 2/21/2018    COLONOSCOPY POLYPECTOMY SNARE/COLD BIOPSY performed by Larry Smalls MD at [facility]    with revision x1    LARYNGOSCOPY N/A 9/29/2017    DIRECT MICRO LARYNGOSCOPY WITH EXCISION VOCAL CORD LESION  performed by Ivanna Ward MD at [address] [place] [address] Right 1988    MO EGD TRANSORAL BIOPSY SINGLE/MULTIPLE N/A 9/27/2017    EGD BIOPSY performed by Larry Smalls MD at [address] [place] [address] ARTHROSCOP,SURG,W/ROTAT CUFF REPR Left 6/19/2018    SHOULDER ARTHROSCOPY WITH ROTATOR CUFF REPAIR, SUBACROMIAL DECOMPRESSION performed by Suzanne Romero MD at [address] [place] [address] Av Left     SHOULDER ARTHROSCOPY WITH ROTATOR CUFF REPAIR, SUBACROMIAL DECOMPRESSION (Left      Family History   Problem Relation Age of Onset    Diabetes Mother     Heart Disease Mother         murmur    Heart Disease Father         MVP    Diabetes Sister     Diabetes Maternal Grandmother     High Blood Pressure Maternal Grandmother     Heart Failure Maternal Grandfather     Heart Attack Paternal Grandmother      Current Outpatient Medications on File Prior to Visit   Medication Sig Dispense Refill    hydroCHLOROthiazide (HYDRODIURIL) 25 MG tablet TAKE ONE TABLET BY MOUTH DAILY WITH POTASSIUM SUPPLEMENT 22 tablet 2    Easy Touch Lancets 33G/Twist MISC USE TWO TO THREE TIMES DAILY 90 each 2    busPIRone (BUSPAR) 5 MG tablet TAKE ONE TABLET BY MOUTH THREE TIMES A DAY AS NEEDED FOR ANXIETY 90 tablet 0    traMADol (ULTRAM) 50 MG tablet Take 1 tablet by mouth every 6 hours as needed for Pain for up to 30 days. 120 tablet 0    lisinopril (PRINIVIL;ZESTRIL) 20 MG tablet TAKE ONE TABLET BY MOUTH DAILY 87 tablet 3    glipiZIDE (GLUCOTROL) 5 MG tablet TAKE ONE TABLET BY MOUTH TWICE A DAY BEFORE BIGGEST MEAL 174 tablet 3    Handicap Placard MISC by Does not apply route Prescription good for 5 Years  Expires January 2026 1 each 0    potassium chloride (KLOR-CON) 10 MEQ extended release tablet TAKE ONE TABLET BY MOUTH DAILY WITH HYDROCHLOROTHIAZIDE 30 tablet 2    meloxicam (MOBIC) 15 MG tablet Take 1 tablet by mouth daily 90 tablet 2    Insulin Degludec (TRESIBA FLEXTOUCH) 100 UNIT/ML SOPN Inject 45 Units into the skin 2 times daily      tiZANidine (ZANAFLEX) 4 MG tablet TAKE ONE TABLET BY MOUTH EVERY 8 HOURS AS NEEDED FOR NECK AND BACK PAIN. **CAUSES SEDATION AND DO NOT DRIVE WHILE TAKING THIS MEDICATION 90 tablet 0    cetirizine (ZYRTEC) 10 MG tablet Take 10 mg by mouth daily      pregabalin (LYRICA) 100 MG capsule Take 1 capsule by mouth 2 times daily for 90 days.  180 capsule 2    Blood Glucose Monitoring Suppl (TRUE METRIX METER) MANOLO Testing BID 1 Device 0    Insulin Pen Needle (PEN NEEDLES 3/16\") 31G X 5 MM MISC 1 each by Does not apply route daily 100 each 3    insulin lispro, 1 Unit Dial, (HUMALOG KWIKPEN) 100 UNIT/ML SOPN Inject 15 Units into the skin 3 times daily (before meals) 5 pen 1    Insulin Syringes, Disposable, U-100 1 ML MISC 1 each by Does not apply route daily 100 each 3    blood glucose monitor strips Test 2-3 times a day & as needed for symptoms of irregular blood glucose. BRAND OF CHOICE INSURANCE ALLOWS. 100 strip 11    atorvastatin (LIPITOR) 10 MG tablet Take 1 tablet by mouth daily 90 tablet 2    Alcohol Swabs (ALCOHOL PREP) PADS Use as directed 100 each 11    vitamin D (CHOLECALCIFEROL) 25 MCG (1000 UT) TABS tablet Take 1 tablet by mouth daily 30 tablet 3    [DISCONTINUED] hydroCHLOROthiazide (HYDRODIURIL) 25 MG tablet Take 1 tablet by mouth daily Take with Potassium supplement 30 tablet 3    [DISCONTINUED] Lancets MISC Dx: DM-2. Use 2-3 times daily 100 each 3     Current Facility-Administered Medications on File Prior to Visit   Medication Dose Route Frequency Provider Last Rate Last Admin    methylPREDNISolone acetate (DEPO-MEDROL) injection 80 mg  80 mg Intra-articular Once Nathaniel Wells DO        bupivacaine (MARCAINE) 0.25 % injection 5 mg  2 mL Intra-articular Once Nathaniel Wells, DO         Social History     Tobacco Use    Smoking status: Former Smoker     Packs/day: 2.00     Years: 34.00     Pack years: 68.00     Types: Cigarettes     Start date: 28     Quit date: 9/28/2017     Years since quitting: 3.3    Smokeless tobacco: Never Used   Substance Use Topics    Alcohol use:  Yes     Alcohol/week: 0.0 standard drinks     Frequency: Monthly or less     Drinks per session: 1 or 2     Comment: rare    Drug use: No       Allergies   Allergen Reactions    Pcn [Penicillins] Hives and Other (See Comments)     Lose motor function of legs and collaps    Demerol Hcl [Meperidine] Other (See Comments)     Panic attack    Metformin And Related      Abdominal pain    Tape [employer] Tape] Other (See Comments)     Blister      Morphine Nausea And Vomiting and Other (See Comments)     Stomach pain       Review of Systems  Constitutional: Negative for activity change and appetite change. HENT: Negative for ear pain and facial swelling. Eyes: Negative for discharge and itching. Respiratory: Negative for choking and chest tightness. Cardiovascular: Negative for chest pain and leg swelling. Gastrointestinal: Negative for nausea and abdominal pain. Endocrine: Negative for cold intolerance and heat intolerance. Genitourinary: Negative for frequency and flank pain. Musculoskeletal: Negative for myalgias and joint swelling. Skin: Negative for rash and wound. Allergic/Immunologic: Negative for environmental allergies and food allergies. Hematological: Negative for adenopathy. Does not bruise/bleed easily. Psychiatric/Behavioral: Negative for self-injury. The patient is not nervous/anxious. Physical Exam:      BP (!) 142/79 (Site: Right Upper Arm, Position: Sitting, Cuff Size: Medium Adult)   Pulse 89   Wt 276 lb (125.2 kg) Comment: verbal  LMP  (LMP Unknown)   BMI 43.88 kg/m²   Estimated body mass index is 43.88 kg/m² as calculated from the following:    Height as of 2/4/21: 5' 6.5\" (1.689 m). Weight as of this encounter: 276 lb (125.2 kg). General:  Sree Hull is a 49y.o. year old female who appears her stated age. HEENT: Normocephalic atraumatic. Neck supple. Chest: regular rate; pulses equal  Abdomen: Soft nontender nondistended. Normoactive bowel sounds.   Ext: DP and PT pulses 2+, good cap refill  Neuro    Mentation  Appropriate affect  Registration intact  Orientation intact  3 item recall intact  Judgement intact to situation    Cranial Nerves:   Pupils equal and reactive to light  Extraocular motion intact  Face and shrug symmetric  Tongue midline  No dysarthria  v1-3 sensation symmetric, masseter tone symmetric  Hearing symmetric and intact to finger rub    Sensation:   Intact    Motor  L deltoid 5/5; R deltoid 5/5  L biceps 5/5; R biceps 5/5  L triceps 5/5; R triceps 5/5  L wrist extension 5/5; R wrist extension 5/5  L intrinsics 5/5; R intrinsics 5/5     L iliopsoas 5/5 , R iliopsoas 5/5  L quadriceps 5/5; R quadriceps 5/5  L Dorsiflexion 5/5; R dorsiflexion 5/5  L Plantarflexion 5/5; R plantarflexion 5/5  L EHL 5/5; R EHL 5/5    Reflexes  L Brachioradialis 2+/4; R brachioradialis 2+/4  L Biceps 2+/4; R Biceps 2+/4  L Triceps 2+/4; R Triceps 2+/4  L Patellar 2+/4: R Patellar 2+/4  L Achilles 2+/4; R Achilles 2+/4    hoffmans L: neg  hoffmans R: neg  Clonus L: neg  Clonus R: neg  Babinski L: up  Babinski R; up    Negative straight leg raise  Negative Tresea Brod. Negative tenderness over the greater trochanter. Studies Review:     MRI of the lumbar spine does reveal multilevel spondylosis with disc bulges. No significant central stenosis or foraminal disease. Upright x-rays show a grade 1 anterolisthesis at L4-5. Assessment and Plan:      1. Other spondylosis with radiculopathy, lumbar region    2. Breast prominence          Plan: We will obtain new x-rays with flexion-extension films to see how gross instability there is at L4-5. In addition recommend pain management to attempt facet block at L4-5 with ablation if successful. Continue with physical therapy oriented towards lumbar core strengthening as well as myofascial release. I strongly believe that the large and heavy nature of her breasts are contributing significantly towards her axial back pain. The patient has attempted an active lifestyle and has gone through with full compliance physical therapy as well as multiple interventional procedures and is still having refractory pain. Unfortunately I do not believe that she will get any relief of her axial pain with conservative measures until the breast reduction issue has been dealt with directly.   I believe that this is a direct causative factor towards her significant axial symptoms. I did explain to her that weight loss is also a factor that we have to consider but considering her overall habitus with large pendulous breasts and the source of her pain being in the midline paraspinal axial region I cannot ignore the fact that there is definitively a direct correlation. Followup: No follow-ups on file. Prescriptions Ordered:  No orders of the defined types were placed in this encounter. Orders Placed:  Orders Placed This Encounter   Procedures    XR LUMBAR SPINE FLEXION AND EXTENSION ONLY     Standing lateral Flexion, Neutral, extension  Include both femoral heads on neutral imaging     Standing Status:   Future     Standing Expiration Date:   2/10/2022     Order Specific Question:   Reason for exam:     Answer:   evaluate for instability   Daryle Christ, MD, Plastic Surgery, [place]     Referral Priority:   Routine     Referral Type:   Eval and Treat     Referral Reason:   Specialty Services Required     Referred to Provider:   Rubén Cooper MD     Requested Specialty:   Plastic Surgery     Number of Visits Requested:   1        Electronically signed by Francisco Montez DO on 2/10/2021 at 9:32 AM    Please note that this chart was generated using voice recognition Dragon dictation software. Although every effort was made to ensure the accuracy of this automated transcription, some errors in transcription may have occurred.

## 2021-02-12 ENCOUNTER — APPOINTMENT (OUTPATIENT)
Dept: PHYSICAL THERAPY | Age: 56
End: 2021-02-12
Payer: COMMERCIAL

## 2021-02-14 DIAGNOSIS — G47.30 SEVERE SLEEP APNEA: Primary | ICD-10-CM

## 2021-02-14 NOTE — PROGRESS NOTES
Please let the patient know that her  recent sleep study resulted with severe THIAGO. She will need a cpap trial and a pulmonology consult. We can discuss the rest of the result further on her  next visit. Thanks.

## 2021-02-15 ENCOUNTER — HOSPITAL ENCOUNTER (OUTPATIENT)
Dept: PHYSICAL THERAPY | Age: 56
Setting detail: THERAPIES SERIES
Discharge: HOME | End: 2021-02-15
Payer: COMMERCIAL

## 2021-02-15 PROCEDURE — 97113 AQUATIC THERAPY/EXERCISES: CPT

## 2021-02-15 NOTE — FLOWSHEET NOTE
[facility] Outpatient Physical Therapy   [address] [facility] [address]   Phone: [phone]   Fax: [phone]    Physical Therapy Daily Treatment Note    Date:  2/15/2021  Patient Name:  Jhony Aragon    :  1965  MRN: [id number]  Physician: [address]: [address] [place] [address] Diagnosis: DDD lumbar                       Rehab Codes: M51.36  Onset Date:  2020    Next  appt.: N/A  Visit# / total visits: 3/12  Cancels/No Shows: 0/1    Subjective:   Pt reports low back and shoulder pain much higher in the last few days. Notes doing more physical labor bent over working on cabinets and painting. Having an x-ray on lumbar back and get a referral to pain management and possible breast reduction surgery. Pain:  [x] Yes  [] No Location: Lumbar back Pain Rating: (0-10 scale) 8/10  Pain altered Tx:  [x] No  [] Yes  Action:  Comments:  Educated on postural awareness, importance of core stability and working in pain free ranges. Objective:      [address]  [employer] Exercise Log  Aquatic, Hip & DLS Program- Phase 1    Date of Eval:                                Primary PT:  Diagnosis: Things to Focus On (goals):   Surgical Precautions:  Medical Precautions:  [] C-9 dates  [] Occ Med   [] Medicare       Date 2/3/21 2/15/21      Visit # 2/12 3/12      Walk F/L/R 2 Laps 2 Laps      Marching 2 Laps 2 Laps      Squats 10x5\" 10x5\"      Step-Ups F/L Low 10x Low 15x      Heel-toe raises 10x 15x      SLR F/L/R 15x 15x      Knee/Flex/Ext 10x 15x      F/L Lunges        Kickboard Ex.  Lg Lg      Iso Abd. 10x5\" 15x5\"      Push-pull 15x 15x      Paddling 15x 15x              Balance                        Stretches        Achllies 2x20\" 2x20\"      Hamstring 2x20\" 2x20\"              Deep water  1 Noodle 1 Noodle      cycling 2' 2'      jacks  2'      Hang 5' 5'              Pain Rating 8 8        Specific Instructions for next treatment: Assess response to visit and progress as able. Assessment: [x] Progressing toward goals. Some discomfort with squats today but loosen up. Notes able to tolerated jacks in deep water with fatigue at the end of treatment. [] No change. [] Other:    [] Patient would continue to benefit from skilled physical therapy services in order to:     STG: (to be met in 6 treatments)  1. ? Pain: lesson pain levels 3/10  2. ? ROM:AROM of lumbar spine to 50% of normal.   3. ? Strength:Lumbar spine strength 2+/5  4. ? Function: OPTIMAL score of 9/3  5. Independent with Home Exercise Programs  6. Demonstrate Knowledge of fall prevention  LTG: (to be met in 12 treatments)   1. OPTIMAL score of 6/3. Pt. Education:  [x] Yes  [] No  [x] Reviewed Prior HEP/Ed  Method of Education: [x] Verbal  [x] Demo  [] Written  Comprehension of Education:  [x] Verbalizes understanding. [x] Demonstrates understanding. [] Needs review. [] Demonstrates/verbalizes HEP/Ed previously given. Plan: [x] Continue per plan of care.     [] Other:      Treatment Charges: Mins Units   []  Modalities     []  Ther Exercise     []  Manual Therapy     []  Ther Activities     [x]  Aquatics 40 3   []  Neuromuscular     [] Vasocompression     [] Gait Training     [] Dry needling        [] 1 or 2 muscles        [] 3 or more muscles     []  Other     Total Treatment time 40 3     Time In: 2331            Time Out: 1130    Electronically signed by:  Alesha Singer PTA

## 2021-02-17 ENCOUNTER — HOSPITAL ENCOUNTER (OUTPATIENT)
Dept: PHYSICAL THERAPY | Age: 56
Setting detail: THERAPIES SERIES
Discharge: HOME | End: 2021-02-17
Payer: COMMERCIAL

## 2021-02-17 ENCOUNTER — TELEPHONE (OUTPATIENT)
Dept: FAMILY MEDICINE CLINIC | Age: 56
End: 2021-02-17

## 2021-02-17 PROBLEM — Z79.4 DIABETES MELLITUS DUE TO UNDERLYING CONDITION WITH HYPEROSMOLARITY WITHOUT COMA, WITH LONG-TERM CURRENT USE OF INSULIN (HCC): Status: ACTIVE | Noted: 2021-02-17

## 2021-02-17 PROBLEM — E08.00 DIABETES MELLITUS DUE TO UNDERLYING CONDITION WITH HYPEROSMOLARITY WITHOUT COMA, WITH LONG-TERM CURRENT USE OF INSULIN (HCC): Status: ACTIVE | Noted: 2021-02-17

## 2021-02-17 ASSESSMENT — ENCOUNTER SYMPTOMS
CONSTIPATION: 0
DIARRHEA: 0
SHORTNESS OF BREATH: 0
NAUSEA: 0
BACK PAIN: 1
RESPIRATORY NEGATIVE: 1
ABDOMINAL PAIN: 0
COUGH: 0

## 2021-02-17 NOTE — FLOWSHEET NOTE
[] [facility]) - [facility] &  Therapy  [address]    [phone]  F: [phone]   [] [address]  [address] [facility]   [address]  P: [phone]  F: [phone]  [] [facility] &  Therapy  [address] [facility]  P: [phone]  F: [phone] [] [address] [facility]  P: [phone]  F: [phone]  [] [address] [facility]   [address]   [place]: [phone]  F: [phone]   [x] [facility]  [address] [facility] [address]  [place]: [phone]   F: [phone]     Physical Therapy Cancel/No Show note    Date: 2021  Patient: Inge Record  : 1965  MRN: [id number]    Cancels/No Shows to date:     For today's appointment patient:    [x]  Cancelled    [] Rescheduled appointment    [] No-show     Reason given by patient:    []  Patient ill    []  Conflicting appointment    [] No transportation      [] Conflict with work    [x] No reason given    [] Weather related    [] [id number]    [] Other:      Comments:        [x] Next appointment was confirmed    Electronically signed by: Tommy Harp PTA

## 2021-02-17 NOTE — TELEPHONE ENCOUNTER
Future Appointments   Date Time Provider [facility]   2/18/2021 10:00 AM Jhoncarlyn Rao, APRN - CNP [facility] [address]   2/18/2021  1:15 PM KIMANI Lakhani CNP fp sc [facility]   2/22/2021 10:00 AM Makeda Montes De Oca, PTA [facility]   2/24/2021 11:00 AM Makeda Montes De Oca, PTA [facility]   2/26/2021 11:00 AM Makeda Montes De Oca, PTA [facility]   3/12/2021  9:00 AM Bhupinder Ryan MD [facility]   3/16/2021  1:00 PM Katina Clemons, APRN - CNP fp sc [facility]   4/13/2021  1:00 PM [address], APRN - CNP [facility]

## 2021-02-18 ENCOUNTER — TELEMEDICINE (OUTPATIENT)
Dept: FAMILY MEDICINE CLINIC | Age: 56
End: 2021-02-18
Payer: COMMERCIAL

## 2021-02-18 ENCOUNTER — TELEPHONE (OUTPATIENT)
Dept: PAIN MANAGEMENT | Age: 56
End: 2021-02-18

## 2021-02-18 ENCOUNTER — HOSPITAL ENCOUNTER (OUTPATIENT)
Dept: PAIN MANAGEMENT | Age: 56
Discharge: HOME | End: 2021-02-18
Payer: COMMERCIAL

## 2021-02-18 DIAGNOSIS — M47.817 LUMBOSACRAL SPONDYLOSIS WITHOUT MYELOPATHY: ICD-10-CM

## 2021-02-18 DIAGNOSIS — M47.816 ARTHROPATHY OF LUMBAR FACET JOINT: ICD-10-CM

## 2021-02-18 DIAGNOSIS — G89.29 CHRONIC BILATERAL LOW BACK PAIN WITH BILATERAL SCIATICA: ICD-10-CM

## 2021-02-18 DIAGNOSIS — E66.01 MORBID OBESITY WITH BMI OF 40.0-44.9, ADULT (HCC): ICD-10-CM

## 2021-02-18 DIAGNOSIS — M54.16 LUMBAR RADICULOPATHY: ICD-10-CM

## 2021-02-18 DIAGNOSIS — M47.816 FACET DEGENERATION OF LUMBAR REGION: ICD-10-CM

## 2021-02-18 DIAGNOSIS — Z79.4 DIABETES MELLITUS DUE TO UNDERLYING CONDITION WITH HYPEROSMOLARITY WITHOUT COMA, WITH LONG-TERM CURRENT USE OF INSULIN (HCC): Primary | ICD-10-CM

## 2021-02-18 DIAGNOSIS — M79.7 FIBROMYALGIA: ICD-10-CM

## 2021-02-18 DIAGNOSIS — Z51.81 MEDICATION MONITORING ENCOUNTER: ICD-10-CM

## 2021-02-18 DIAGNOSIS — R94.2 DECREASED FUNCTIONAL RESIDUAL CAPACITY: ICD-10-CM

## 2021-02-18 DIAGNOSIS — M51.36 DDD (DEGENERATIVE DISC DISEASE), LUMBAR: ICD-10-CM

## 2021-02-18 DIAGNOSIS — M54.41 CHRONIC BILATERAL LOW BACK PAIN WITH BILATERAL SCIATICA: ICD-10-CM

## 2021-02-18 DIAGNOSIS — M54.16 LUMBAR RADICULOPATHY: Primary | ICD-10-CM

## 2021-02-18 DIAGNOSIS — E08.00 DIABETES MELLITUS DUE TO UNDERLYING CONDITION WITH HYPEROSMOLARITY WITHOUT COMA, WITH LONG-TERM CURRENT USE OF INSULIN (HCC): Primary | ICD-10-CM

## 2021-02-18 DIAGNOSIS — G47.33 SEVERE OBSTRUCTIVE SLEEP APNEA: ICD-10-CM

## 2021-02-18 DIAGNOSIS — M54.42 CHRONIC BILATERAL LOW BACK PAIN WITH BILATERAL SCIATICA: ICD-10-CM

## 2021-02-18 PROCEDURE — 99442 PR PHYS/QHP TELEPHONE EVALUATION 11-20 MIN: CPT | Performed by: NURSE PRACTITIONER

## 2021-02-18 PROCEDURE — 99214 OFFICE O/P EST MOD 30 MIN: CPT | Performed by: FAMILY MEDICINE

## 2021-02-18 PROCEDURE — 3017F COLORECTAL CA SCREEN DOC REV: CPT | Performed by: FAMILY MEDICINE

## 2021-02-18 PROCEDURE — 99213 OFFICE O/P EST LOW 20 MIN: CPT

## 2021-02-18 PROCEDURE — G8427 DOCREV CUR MEDS BY ELIG CLIN: HCPCS | Performed by: FAMILY MEDICINE

## 2021-02-18 ASSESSMENT — ENCOUNTER SYMPTOMS
RESPIRATORY NEGATIVE: 1
BACK PAIN: 1
EYES NEGATIVE: 1
CONSTIPATION: 1

## 2021-02-18 NOTE — TELEPHONE ENCOUNTER
Spoke with patient and scheduled for Lumbar facet joint injection on 3/11/21 at 9:40am. Pre-procedure instructions given. Medication list reviewed, entrance to be used, mask protocol, and ride home. Patient verbalized understanding.

## 2021-02-18 NOTE — PROGRESS NOTES
Luiza 89 PROGRESS NOTE      Patient  completed []  video visit   [x]   phone call: 20    Minutes :   [x]    to  review Medication Agreement    [x]  Follow up after procedure   []  Discuss treatment options    Location:  Provider:  working from    [x]    home    []   [facility] - ADRIAN MARTINI , patient at home   Chief Complaint:  Low back pain    She had lumbar epidural injection L4, L5 on 2-4-2021 with 20% relief. She states had numbness left leg a few days ago and it subsided in the evening. Ernie Gaona Her blood sugar increased after the injection . Her blood sugar was 186 today. She is tolerate standing now up to 10 minutes, before injection 5 minutes. She states saw Neurosurgeon and he recommended another type of injection. She states she is sleeping okay, No Ed visits. Back Pain  This is a chronic problem. The problem occurs constantly. The problem is unchanged. The pain is present in the lumbar spine. The quality of the pain is described as aching. The pain does not radiate. The pain is at a severity of 7/10. The pain is moderate. The pain is the same all the time. The symptoms are aggravated by standing (walking). Associated symptoms include weakness. (Left leg) She has tried analgesics and heat (sitting) for the symptoms.          Treatment goals:  Functional status: stand longer, weight loss      Aberrancy:   Any alcoholic beverages   no         Any illegal drugs   no      Analgesia:  7                   Adverse  Effects :no      ADL;s : paces activiities  Data:    When was thelast UDS:  2-4-2021         Was the UDS appropriate:yes      Record/Diagnostics Review:      As above, I did review the imaging       2/8/2021 10:14 AM - Zak Cuellar Incoming Lab Results From [facility] [employer] [facility]    Component Value Ref Range & Units Status Collected Lab   Pain Management Drug Panel Interp, Urine Consistent   Final 02/04/2021  9:53 AM [facility]   (NOTE)   ________________________________________________________________ DRUGS EXPECTED:   TRAMADOL [2/3/21]   ________________________________________________________________   CONSISTENT with medications provided:   TRAMADOL : based on immunoassay detection   ________________________________________________________________   INTERPRETIVE INFORMATION: Targeted drug profile Interp   Interpretation depends on accuracy and completeness of patient   medication information submitted by client. 6-Acetylmorphine, Ur Not Detected   Final 02/04/2021  9:53 AM [facility]   7-Aminoclonazepam, Urine Not Detected   Final 02/04/2021  9:53 AM [facility]   Alpha-OH-Alpraz, Urine Not Detected   Final 02/04/2021  9:53 AM [facility]   Alprazolam, Urine Not Detected   Final 02/04/2021  9:53 AM [facility]   Amphetamines, urine Not Detected   Final 02/04/2021  9:53 AM [facility]   Barbiturates, Ur Not Detected   Final 02/04/2021  9:53 AM [facility]   Benzoylecgonine, Ur Not Detected   Final 02/04/2021  9:53 AM [facility]   Buprenorphine Urine Not Detected   Final 02/04/2021  9:53 AM [facility]   Carisoprodol, Ur Not Detected   Final 02/04/2021  9:53 AM [facility]   (NOTE)   The carisoprodol immunoassay has cross-reactivity to carisoprodol   and meprobamate.     Clonazepam, Urine Not Detected   Final 02/04/2021  9:53 AM [facility]   Codeine, Urine Not Detected   Final 02/04/2021  9:53 AM [facility]   MDA, Ur Not Detected   Final 02/04/2021  9:53 AM [facility]   Diazepam, Urine Not Detected   Final 02/04/2021  9:53 AM [facility]   Ethyl Glucuronide Ur Not Detected   Final 02/04/2021  9:53 AM [facility]   Fentanyl, Ur Not Detected   Final 02/04/2021  9:53 AM [facility]   Hydrocodone, Urine Not Detected   Final 02/04/2021  9:53 AM [facility]   Hydromorphone, Urine Not Detected   Final 02/04/2021  9:53 AM [facility]   Lorazepam, Urine Not Detected   Final 02/04/2021  9:53 AM [facility]   Marijuana Metab, Ur Not Detected   Final 02/04/2021  9:53 AM [facility]   MDEA, JEFFERSON, Ur Not Detected   Final 02/04/2021  9:53 AM [facility]   MDMA, Urine Not Detected   Final 02/04/2021  9:53 AM [facility] Meperidine Metab, Ur Not Detected   Final 02/04/2021  9:53 AM [facility]   Methadone, Urine Not Detected   Final 02/04/2021  9:53 AM [facility]   Methamphetamine, Urine Not Detected   Final 02/04/2021  9:53 AM [facility]   Methylphenidate Not Detected   Final 02/04/2021  9:53 AM [facility]   Midazolam, Urine Not Detected   Final 02/04/2021  9:53 AM [facility]   Morphine Urine Not Detected   Final 02/04/2021  9:53 AM [facility]   Norbuprenorphine, Urine Not Detected   Final 02/04/2021  9:53 AM [facility]   Nordiazepam, Urine Not Detected   Final 02/04/2021  9:53 AM [facility]   Norfentanyl, Urine Not Detected   Final 02/04/2021  9:53 AM [facility]   NORHYDROCODONE, URINE Not Detected   Final 02/04/2021  9:53 AM [facility]   Noroxycodone, Urine Not Detected   Final 02/04/2021  9:53 AM [facility]   NOROXYMORPHONE, URINE Not Detected   Final 02/04/2021  9:53 AM [facility]   Oxazepam, Urine Not Detected   Final 02/04/2021  9:53 AM [facility]   Oxycodone Urine Not Detected   Final 02/04/2021  9:53 AM [facility]   Oxymorphone, Urine Not Detected   Final 02/04/2021  9:53 AM [facility]   PCP, Urine Not Detected   Final 02/04/2021  9:53 AM [facility]   Phentermine, Ur Not Detected   Final 02/04/2021  9:53 AM [facility]   Propoxyphene, Urine Not Detected   Final 02/04/2021  9:53 AM [facility]   Tapentadol-O-Sulfate, Urine Not Detected   Final 02/04/2021  9:53 AM [facility]   Tapentadol, Urine Not Detected   Final 02/04/2021  9:53 AM [facility]   Temazepam, Urine Not Detected   Final 02/04/2021  9:53 AM [facility]   Tramadol, Urine Present   Final 02/04/2021  9:53 AM [facility]   Zolpidem, Urine Not Detected   Final 02/04/2021  9:53 AM [facility]   Drugs Expected, Ur   Final 02/04/2021  9:53 AM [facility]   TRAMADOL [id number] 3PM    Creatinine, Ur 106.2  20.0 - 400.0 mg/dL Final 02/04/2021  9:53 AM [facility]   Pain Mgt Drug Panel, Hi Res, Ur See Below   Final 02/04/2021  9:53 AM [facility]   (NOTE)   Methodology: Qualitative Enzyme Immunoassay and Qualitative Liquid   Chromatography-Tandem Mass Spectrometry, Quantitative   Spectrophotometry The absence of expected drug(s) and/or drug metabolite(s) may   indicate non-compliance, inappropriate timing of specimen   collection relative to drug administration, poor drug absorption,   diluted/adulterated urine, or limitations of testing. The   concentration must be greater than or equal to the cutoff to be   reported as present.  If specific drug concentrations are   required, contact the laboratory within two weeks of specimen   collection to request quantification by a second analytical   technique. Interpretive questions should be directed to the   laboratory. Results based on immunoassay detection that do not match clinical   expectations should be   interpreted with caution. Confirmatory testing by mass   spectrometry for immunoassay-based results is available, if   ordered within two weeks of specimen collection. Additional   charges apply. For medical purposes only; not valid for forensic use. This test was developed and its performance characteristics   determined by [facility]. It has not been cleared or   approved by the [employer] and Drug Administration. This test was   performed in a CLIA certified laboratory and is intended for   clinical purposes.           EXAMINATION:   MRI OF THE LUMBAR SPINE WITHOUT CONTRAST, 10/22/2020 11:38 am       TECHNIQUE:   Multiplanar multisequence MRI of the lumbar spine was performed without the   administration of intravenous contrast.       COMPARISON:   Radiographs October 7, 2020       MRI July 11, 2006       HISTORY:   ORDERING SYSTEM PROVIDED HISTORY: Lumbar radiculopathy   TECHNOLOGIST PROVIDED HISTORY:   neuropathy   Is the patient pregnant?->No   Reason for Exam: pt stated back pain radiates down lt leg   Acuity: Chronic   Type of Exam: Initial   Additional signs and symptoms: pt stated back pain radiates down lt leg       FINDINGS:   BONES/ALIGNMENT: No acute fracture.  No subluxation.  No suspicious bone   marrow replacing lesion.     SPINAL CORD: Normal signal within the conus which terminates at T12-L1.       SOFT TISSUES: No paraspinal abnormality.       L1-L2: No significant central canal or foraminal stenosis.       L2-L3: Mild broad-based disc bulge and facet degenerative changes combine to   create mild central canal stenosis.  Changes combine to create minimal   bilateral foraminal stenosis.       L3-L4: Broad-based disc bulge and facet degenerative changes combine to   create mild central canal stenosis.  Changes combine to create mild bilateral   foraminal stenosis.       L4-L5: Broad-based disc bulge and facet degenerative changes combine to   create mild central canal stenosis.  Changes combine to create mild-moderate   bilateral foraminal stenosis.       L5-S1: Mild broad-based disc bulge and facet degenerative changes do not   cause significant central canal stenosis.  Changes combine to create mild   bilateral foraminal stenosis.         Impression   Overall mild multilevel degenerative changes.                       Pill count: appropriate  fill date : [facility] has #90 tramadol tabs left  Morphine equivalent dose as reported on OARRS:20  Periodic Controlled Substance Monitoring: Possible medication side effects, risk of tolerance/dependence & alternative treatments discussed., No signs of potential drug abuse or diversion identified. , Assessed functional status., Obtaining appropriate analgesic effect of treatment. Laurie Sutton, APRN - CNP)  Review ofOARRS does not show any aberrant prescription behavior. Medication is helping the patient stay active. Patient denies any side effects and reports adequate analgesia. No sign of misuse/abuse.             Past Medical History:   Diagnosis Date    Allergic rhinitis 10/21/2014    Anxiety     Arthritis     Bleeds easily ([address]     per pt, has never had a work up   Toby Rcio Complete rotator cuff tear of left shoulder 2018    Diabetes mellitus ([address]     On Insulin, metformin, Glipizide  Diabetic polyneuropathy associated with type 2 diabetes mellitus ([facility] 3/26/2018    Facet degeneration of lumbar region 2/18/2021    Fibromyalgia     GERD (gastroesophageal reflux disease) 10/21/2014    H/O transfusion     History of blood transfusion     Hypertension     Obesity 10/21/2014    Pure hypercholesterolemia 2/23/2016    Tobacco abuse     Type II or unspecified type diabetes mellitus without mention of complication, not stated as uncontrolled     Unspecified sleep apnea     CPAP machine is broken    Wears glasses        Past Surgical History:   Procedure Laterality Date    BRONCHOSCOPY  9/27/2017    BRONCHOSCOPY BRUSHINGS performed by Darryl Escobar MD at [facility] Right 2002    CHOLECYSTECTOMY N/A 9/29/2017    CHOLECYSTECTOMY OPEN performed by Annika Powers DO at [address] [facility] COLONOSCOPY N/A 2/21/2018    COLONOSCOPY POLYPECTOMY SNARE/COLD BIOPSY performed by Tania Singer MD at [facility]    with revision x1    LARYNGOSCOPY N/A 9/29/2017    DIRECT MICRO LARYNGOSCOPY WITH EXCISION VOCAL CORD LESION  performed by Bal Pa MD at [address] [place] [address] Right 1988    CO EGD TRANSORAL BIOPSY SINGLE/MULTIPLE N/A 9/27/2017    EGD BIOPSY performed by Tania Singer MD at [address] [place] [address] ARTHROSCOP,SURG,W/ROTAT [address] [facility],  [address] Left 6/19/2018    SHOULDER ARTHROSCOPY WITH ROTATOR CUFF REPAIR, SUBACROMIAL DECOMPRESSION performed by Jeferson Garcia MD at [facility] Left     SHOULDER ARTHROSCOPY WITH ROTATOR CUFF REPAIR, SUBACROMIAL DECOMPRESSION (Left        Allergies   Allergen Reactions    Pcn [Penicillins] Hives and Other (See Comments)     Lose motor function of legs and collaps    Demerol Hcl [Meperidine] Other (See Comments)     Panic attack    Metformin And Related      Abdominal pain    Tape Sadia Anis Tape] Other (See Comments)     Blister  Morphine Nausea And Vomiting and Other (See Comments)     Stomach pain         Current Outpatient Medications:     busPIRone (BUSPAR) 5 MG tablet, TAKE ONE TABLET BY MOUTH THREE TIMES A DAY AS NEEDED FOR ANXIETY, Disp: 90 tablet, Rfl: 0    traMADol (ULTRAM) 50 MG tablet, Take 1 tablet by mouth every 6 hours as needed for Pain for up to 30 days. , Disp: 120 tablet, Rfl: 0    lisinopril (PRINIVIL;ZESTRIL) 20 MG tablet, TAKE ONE TABLET BY MOUTH DAILY, Disp: 87 tablet, Rfl: 3    glipiZIDE (GLUCOTROL) 5 MG tablet, TAKE ONE TABLET BY MOUTH TWICE A DAY BEFORE BIGGEST MEAL, Disp: 174 tablet, Rfl: 3    potassium chloride (KLOR-CON) 10 MEQ extended release tablet, TAKE ONE TABLET BY MOUTH DAILY WITH HYDROCHLOROTHIAZIDE, Disp: 30 tablet, Rfl: 2    meloxicam (MOBIC) 15 MG tablet, Take 1 tablet by mouth daily, Disp: 90 tablet, Rfl: 2    Insulin Degludec (TRESIBA FLEXTOUCH) 100 UNIT/ML SOPN, Inject 45 Units into the skin 2 times daily, Disp: , Rfl:     cetirizine (ZYRTEC) 10 MG tablet, Take 10 mg by mouth daily, Disp: , Rfl:     pregabalin (LYRICA) 100 MG capsule, Take 1 capsule by mouth 2 times daily for 90 days. , Disp: 180 capsule, Rfl: 2    insulin lispro, 1 Unit Dial, (HUMALOG KWIKPEN) 100 UNIT/ML SOPN, Inject 15 Units into the skin 3 times daily (before meals), Disp: 5 pen, Rfl: 1    atorvastatin (LIPITOR) 10 MG tablet, Take 1 tablet by mouth daily, Disp: 90 tablet, Rfl: 2    hydroCHLOROthiazide (HYDRODIURIL) 25 MG tablet, TAKE ONE TABLET BY MOUTH DAILY WITH POTASSIUM SUPPLEMENT, Disp: 22 tablet, Rfl: 2    Easy Touch Lancets 33G/Twist MISC, USE TWO TO THREE TIMES DAILY, Disp: 90 each, Rfl: 2    Handicap Placard MISC, by Does not apply route Prescription good for 5 Years Expires January 2026, Disp: 1 each, Rfl: 0    tiZANidine (ZANAFLEX) 4 MG tablet, TAKE ONE TABLET BY MOUTH EVERY 8 HOURS AS NEEDED FOR NECK AND BACK PAIN. **CAUSES SEDATION AND DO NOT DRIVE WHILE TAKING THIS MEDICATION, Disp: 90 tablet, Rfl: 0   Blood Glucose Monitoring Suppl (TRUE METRIX METER) MANOLO, Testing BID, Disp: 1 Device, Rfl: 0    Insulin Pen Needle (PEN NEEDLES 3/16\") 31G X 5 MM MISC, 1 each by Does not apply route daily, Disp: 100 each, Rfl: 3    Insulin Syringes, Disposable, U-100 1 ML MISC, 1 each by Does not apply route daily, Disp: 100 each, Rfl: 3    blood glucose monitor strips, Test 2-3 times a day & as needed for symptoms of irregular blood glucose.  BRAND OF CHO[employer] INSURANCE ALLOWS., Disp: 100 strip, Rfl: 11    Alcohol Swabs (ALCOHOL PREP) PADS, Use as directed, Disp: 100 each, Rfl: 11    vitamin D (CHOLECALCIFEROL) 25 MCG (1000 UT) TABS tablet, Take 1 tablet by mouth daily, Disp: 30 tablet, Rfl: 3    Current Facility-Administered Medications:     methylPREDNISolone acetate (DEPO-MEDROL) injection 80 mg, 80 mg, Intra-articular, Once, Selwyn Gong, DO    bupivacaine (MARCAINE) 0.25 % injection 5 mg, 2 mL, Intra-articular, Once, Selwyn Dickersong, DO    Family History   Problem Relation Age of Onset    Diabetes Mother     Heart Disease Mother         murmur    Heart Disease Father         MVP    Diabetes Sister     Diabetes Maternal Grandmother     High Blood Pressure Maternal Grandmother     Heart Failure Maternal Grandfather     Heart Attack Paternal Grandmother        Social History     Socioeconomic History    Marital status: Legally      Spouse name: Not on file    Number of children: 3    Years of education: Not on file    Highest education level: Not on file   Occupational History    Occupation:    Social Needs    Financial resource strain: Not on file    Food insecurity     Worry: Not on file     Inability: Not on file   [employer] needs     Medical: Not on file     Non-medical: Not on file   Tobacco Use    Smoking status: Former Smoker     Packs/day: 2.00     Years: 34.00     Pack years: 68.00     Types: Cigarettes     Start date: 1983     Quit date: 9/28/2017 Years since quitting: 3.3    Smokeless tobacco: Never Used   Substance and Sexual Activity    Alcohol use: Yes     Alcohol/week: 0.0 standard drinks     Frequency: Monthly or less     Drinks per session: 1 or 2     Comment: rare    Drug use: No    Sexual activity: Never   Lifestyle    Physical activity     Days per week: Not on file     Minutes per session: Not on file    Stress: Not on file   Relationships    Social connections     Talks on phone: Not on file     Gets together: Not on file     Attends Caoda[employer] service: Not on file     Active member of club or organization: Not on file     Attends meetings of clubs or organizations: Not on file     Relationship status: Not on file    Intimate partner violence     Fear of current or ex partner: Not on file     Emotionally abused: Not on file     Physically abused: Not on file     Forced sexual activity: Not on file   Other Topics Concern    Not on file   Social History Narrative    Not on file         Review of Systems:  Review of Systems   Constitution: Negative. HENT: Negative. Eyes: Negative. Cardiovascular: Negative. Respiratory: Negative. Endocrine:        Diabetic   Hematologic/Lymphatic: Bruises/bleeds easily. Skin: Negative. Musculoskeletal: Positive for back pain. Gastrointestinal: Positive for constipation. Genitourinary: Negative. Neurological: Positive for loss of balance and weakness. Psychiatric/Behavioral: Positive for depression. Managing         Physical Exam:  LMP  (LMP Unknown)     Physical Exam  Skin:         Neurological:      Mental Status: She is alert and oriented to person, place, and time. Psychiatric:         Mood and Affect: Mood normal.         Thought Content:  Thought content normal.           Assessment:      Problem List Items Addressed This Visit     Medication monitoring encounter    Lumbar radiculopathy - Primary    Facet degeneration of lumbar region      Other Visit Diagnoses Lumbosacral spondylosis without myelopathy        Arthropathy of lumbar facet joint        DDD (degenerative disc disease), lumbar                Treatment Plan:  DISCUSSION: Treatment options discussed withpatient and all questions answered to patient's satisfaction. Possible side effects, risk of tolerance and or dependence and alternative treatments discussed    Obtaining appropriate analgesic effect of treatment   No signs of potential drug abuse or diversion identified    [x] Ill effects of being on chronic pain medications such as sleep disturbances, respiratory depression, hormonal changes, withdrawal symptoms, chronic opioid dependence and tolerance as well as risk of taking opioids with Benzodiazepines and taking opioids along with alcohol,  werediscussed with patient. I had asked the patient to minimize medication use and utilize pain medications only for uncontrolled rest pain or pain with exertional activities. I advised patient not to self-escalate painmedications without consulting with us. At each of patient's future visits we will try to taper pain medications, while adjusting the adjunct medications, and re-evaluating for Physical Therapy to improve spinal andjoint strength. We will continue to have discussions to decrease pain medications as tolerated. Counseled patient on effects their pain medication and /or their medical condition mayhave on their  ability to drive or operate machinery.  Instructed not to drive or operate machinery if drowsy I also discussed with the patient regarding the dangers of combining narcotic pain medication with tranquilizers, alcohol or illegal drugs or taking the medication any way other than prescribed. The dangers were discussed  including respiratory depression and death. Patient was told to tell  all  physicians regarding the medications he is getting from pain clinic. Patient is warned not to take any unprescribed medications over-the-countermedications that can depress breathing . Patient is advised to talk to the pharmacist or physicians if planning to take any over-the-counter medications before  takeing them. Patient is strongly advised to avoid tranquilizers or  relaxants, illegal drugs  or any medications that can depress breathing  Patient is also advised to tell us if there is any changes in their medications from other physicians. 1, notes reviewed from patient's visit with Dr Ender Banerjee on 2- he recommends L4, l5 facet injection  . Schedule lumbar facet injection L4, L5, pain axial,MRi shows lumbar facet  Degenerative changes, given pre-procedure instructions    2. The patient was counseled about the risks of juancarlos Covid-19 during their procedure period and any recovery window from their procedure. The patient was made aware that juancarlos Covid-19 may worsen their prognosis for recovering from their procedure and lend to a higher morbidity and/or mortality risk. All material risks, benefits, and reasonable alternatives including postponing the procedure were discussed. The patient (DOES   wish) to proceed with their procedure at this time.     3. She did not need refill on tramadol yet, states has #90 tabs left    TREATMENT OPTIONS:     Lumbar facet injection L4, L5  Medication Agreement Requirements Met  Continue Opioid therapy  Follow up appointment made
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
## 2021-02-18 NOTE — PATIENT INSTRUCTIONS
Schedule a Vaccine  When you qualify to receive the vaccine, call the [facility]) COVID-19 Vaccination Hotline to schedule your appointment or to get additional information about the [facility]) locations which are offering the COVID-19 vaccine. To be 94% effective, it's important that you receive two doses of one of the COVID-19 vaccines. -If you are receiving the [place] vaccine, your second shot will be scheduled as close to 21 days after the first shot as possible. -If you are receiving the Moderna vaccine, your second shot will be scheduled as close to 28 days after the first shot as possible. [facility]) COVID-19 Vaccination Hotline: [phone]    Links to [facility]) website and [facility] website:    [email or web address]    [email or web address]

## 2021-02-19 ENCOUNTER — APPOINTMENT (OUTPATIENT)
Dept: PHYSICAL THERAPY | Age: 56
End: 2021-02-19
Payer: COMMERCIAL

## 2021-02-22 ENCOUNTER — HOSPITAL ENCOUNTER (OUTPATIENT)
Dept: PHYSICAL THERAPY | Age: 56
Setting detail: THERAPIES SERIES
Discharge: HOME | End: 2021-02-22
Payer: COMMERCIAL

## 2021-02-22 LAB
AVERAGE GLUCOSE: NORMAL
HBA1C MFR BLD: 10.3 %

## 2021-02-22 NOTE — FLOWSHEET NOTE
[] [facility]) - [facility] &  Therapy  [address]    [phone]  F: [phone]   [] [address]  [facility] [address]   [address]  P: [phone]  F: [phone]  [] [address] &  Therapy  [address] [facility]  P: [phone]  F: [phone] [] [address]  P: [phone]  F: [phone]  [] [address]  [facility]   [address]   [place]: [phone]  F: [phone]   [x] [place] [address] [facility] [address]  [place]: [phone]   F: [phone]     Physical Therapy Cancel/No Show note    Date: 2021  Patient: Kain Clayton  : 1965  MRN: [id number]    Cancels/No Shows to date:     For today's appointment patient:    [x]  Cancelled    [] Rescheduled appointment    [] No-show     Reason given by patient:    []  Patient ill    []  Conflicting appointment    [] No transportation      [] Conflict with work    [] No reason given    [] Weather related    [] COVID-19    [x] Other:      Comments: can't make it today       [x] Next appointment was confirmed    Electronically signed by: Benjy Neal PTA

## 2021-02-24 ENCOUNTER — HOSPITAL ENCOUNTER (OUTPATIENT)
Facility: CLINIC | Age: 56
Discharge: HOME | End: 2021-02-26
Payer: COMMERCIAL

## 2021-02-24 ENCOUNTER — HOSPITAL ENCOUNTER (OUTPATIENT)
Dept: PHYSICAL THERAPY | Age: 56
Setting detail: THERAPIES SERIES
Discharge: HOME | End: 2021-02-24
Payer: COMMERCIAL

## 2021-02-24 ENCOUNTER — HOSPITAL ENCOUNTER (OUTPATIENT)
Dept: GENERAL RADIOLOGY | Facility: CLINIC | Age: 56
Discharge: HOME | End: 2021-02-26
Payer: COMMERCIAL

## 2021-02-24 DIAGNOSIS — M47.26 OTHER SPONDYLOSIS WITH RADICULOPATHY, LUMBAR REGION: ICD-10-CM

## 2021-02-24 PROCEDURE — 97113 AQUATIC THERAPY/EXERCISES: CPT

## 2021-02-24 PROCEDURE — 72120 X-RAY BEND ONLY L-S SPINE: CPT

## 2021-02-24 NOTE — FLOWSHEET NOTE
Pain Rating 8 8 6       Specific Instructions for next treatment: Assess response to visit and progress as able. Assessment: [x] Progressing toward goals. Tolerated exercises well. Increased step height and added deep water cross-country to patient's tolerance. Continued with deep water hang at end of treatment for pain relief. [] No change. [] Other:    [] Patient would continue to benefit from skilled physical therapy services in order to:     STG: (to be met in 6 treatments)  1. ? Pain: lesson pain levels 3/10  2. ? ROM:AROM of lumbar spine to 50% of normal.   3. ? Strength:Lumbar spine strength 2+/5  4. ? Function: OPTIMAL score of 9/3  5. Independent with Home Exercise Programs  6. Demonstrate Knowledge of fall prevention  LTG: (to be met in 12 treatments)   1. OPTIMAL score of 6/3. Pt. Education:  [x] Yes  [] No  [x] Reviewed Prior HEP/Ed  Method of Education: [x] Verbal  [x] Demo  [] Written  Comprehension of Education:  [x] Verbalizes understanding. [x] Demonstrates understanding. [] Needs review. [] Demonstrates/verbalizes HEP/Ed previously given. Plan: [x] Continue per plan of care.     [] Other:      Treatment Charges: Mins Units   []  Modalities     []  Ther Exercise     []  Manual Therapy     []  Ther Activities     [x]  Aquatics 38 3   []  Neuromuscular     [] Vasocompression     [] Gait Training     [] Dry needling        [] 1 or 2 muscles        [] 3 or more muscles     []  Other     Total Treatment time 38 3     Time In: 2081            Time Out: 1130    Electronically signed by:  Elisabeth Mas PTA

## 2021-02-26 ENCOUNTER — HOSPITAL ENCOUNTER (OUTPATIENT)
Dept: PHYSICAL THERAPY | Age: 56
Setting detail: THERAPIES SERIES
Discharge: HOME | End: 2021-02-26
Payer: COMMERCIAL

## 2021-02-26 PROCEDURE — 97113 AQUATIC THERAPY/EXERCISES: CPT

## 2021-02-26 NOTE — FLOWSHEET NOTE
551 [facility]   [address] [facility] [address]   Phone: [phone]   Fax: [phone]    Physical Therapy Daily Treatment Note    Date:  2021  Patient Name:  Genesis Poon    :  1965  MRN: [id number]  Physician: [address]: [address] [place] [address] Diagnosis: DDD lumbar                       Rehab Codes: M51.36  Onset Date:  2020    Next  appt.: N/A  Visit# / total visits:   Cancels/No Shows: 0/1    Subjective:   Pt reports some soreness after last visit but tolerable. States feeling better recently with pacing herself with activiites around the house. Notes starting walking for exercise and stopping before pain gets too high. Notes less \"high pain\" days recently. Pain:  [x] Yes  [] No Location: Lumbar back Pain Rating: (0-10 scale) 6/10  Pain altered Tx:  [x] No  [] Yes  Action:  Comments:  Educated on postural awareness, importance of core stability and working in pain free ranges. Objective:      [address]  [facility] Exercise Log  Aquatic, Hip & DLS Program- Phase 1    Date of Eval:                                Primary PT:  Diagnosis: Things to Focus On (goals):   Surgical Precautions:  Medical Precautions:  [] C-9 dates  [] Occ Med   [] Medicare       Date 2/3/21 2/15/21 2/24/21 2/26/21    Visit # 2/12 3/12 4/12 5/12    Walk F/L/R 2 Laps 2 Laps 2 Laps 2 Laps     Marching 2 Laps 2 Laps 2 Laps 2 Laps    Squats 10x5\" 10x5\" 15x5\" 15x5\"    Step-Ups F/L Low 10x Low 15x Tall 10x Tall 15x    Heel-toe raises 10x 15x 15x 15x    SLR F/L/R 15x 15x 15x 15x    Knee/Flex/Ext 10x 15x 15x 15x    F/L Lunges        Kickboard Ex.  Lg Lg Lg Lg    Iso Abd. 10x5\" 15x5\" 15x5\" 15x5\"    Push-pull 15x 15x 15x 15x    Paddling 15x 15x 15x 15x            Balance                        Stretches        Achllies 2x20\" 2x20\" 2x20\" 2x20\"    Hamstring 2x20\" 2x20\" 2x20\" 2x20\" Deep water  1 Noodle 1 Noodle 1 Noodle 1 Noodle    cycling 2' 2' 2' 2'    jacks  2' 2' 2'    Cross-country   2' 2'    Hang 5' 5' 5' 5'            Pain Rating 8 8 6 6      Specific Instructions for next treatment: Assess response to visit and progress as able. Assessment: [x] Progressing toward goals. Tolerated all exercise well today. Increased reps on tall box for step ups. Did not add UE format due to some shoulder discomfort noted with KB exercises today. [] No change. [] Other:    [] Patient would continue to benefit from skilled physical therapy services in order to:     STG: (to be met in 6 treatments)  1. ? Pain: lesson pain levels 3/10  2. ? ROM:AROM of lumbar spine to 50% of normal.   3. ? Strength:Lumbar spine strength 2+/5  4. ? Function: OPTIMAL score of 9/3  5. Independent with Home Exercise Programs  6. Demonstrate Knowledge of fall prevention  LTG: (to be met in 12 treatments)   1. OPTIMAL score of 6/3. Pt. Education:  [x] Yes  [] No  [x] Reviewed Prior HEP/Ed  Method of Education: [x] Verbal  [x] Demo  [] Written  Comprehension of Education:  [x] Verbalizes understanding. [x] Demonstrates understanding. [] Needs review. [] Demonstrates/verbalizes HEP/Ed previously given. Plan: [x] Continue per plan of care.     [] Other:      Treatment Charges: Mins Units   []  Modalities     []  Ther Exercise     []  Manual Therapy     []  Ther Activities     [x]  Aquatics 30 2   []  Neuromuscular     [] Vasocompression     [] Gait Training     [] Dry needling        [] 1 or 2 muscles        [] 3 or more muscles     []  Other     Total Treatment time 30 2     Time In: 2069            Time Out: 5698    Electronically signed by:  Leandra Park PTA

## 2021-03-01 ENCOUNTER — HOSPITAL ENCOUNTER (OUTPATIENT)
Dept: PHYSICAL THERAPY | Age: 56
Setting detail: THERAPIES SERIES
Discharge: HOME | End: 2021-03-01
Payer: COMMERCIAL

## 2021-03-01 DIAGNOSIS — F41.8 SITUATIONAL ANXIETY: ICD-10-CM

## 2021-03-01 PROCEDURE — U0005 INFEC AGEN DETEC AMPLI PROBE: HCPCS

## 2021-03-01 PROCEDURE — 97113 AQUATIC THERAPY/EXERCISES: CPT

## 2021-03-01 PROCEDURE — U0003 INFECTIOUS AGENT DETECTION BY NUCLEIC ACID (DNA OR RNA); SEVERE ACUTE RESPIRATORY SYNDROME CORONAVIRUS 2 (SARS-COV-2) (CORONAVIRUS DISEASE [COVID-19]), AMPLIFIED PROBE TECHNIQUE, MAKING USE OF HIGH THROUGHPUT TECHNOLOGIES AS DESCRIBED BY CMS-2020-01-R: HCPCS

## 2021-03-01 RX ORDER — BUSPIRONE HYDROCHLORIDE 5 MG/1
TABLET ORAL
Qty: 90 TABLET | Refills: 3 | Status: SHIPPED | OUTPATIENT
Start: 2021-03-01 | End: 2022-08-01

## 2021-03-01 NOTE — FLOWSHEET NOTE
509 [facility] Outpatient Physical Therapy   [facility] [place] [address] [address]   Phone: [phone]   Fax: [phone]    Physical Therapy Daily Treatment Note    Date:  3/1/2021  Patient Name:  Bandar Haas    :  1965  MRN: [id number]  Physician: [address]: [address] [place] [address] Diagnosis: DDD lumbar                       Rehab Codes: M51.36  Onset Date:  2020    Next  appt.: N/A  Visit# / total visits:   Cancels/No Shows: 0/    Subjective:   Pt reports pain 7/10, patient thinks it is due to the weather on the way. Patient reports she cannot clip her swim suit due to increased shoulder pain. Pain:  [x] Yes  [] No Location: Lumbar back Pain Rating: (0-10 scale) 7/10  Pain altered Tx:  [x] No  [] Yes  Action:  Comments:       Objective:      [address] [facility] [address] Exercise Log  Aquatic, Hip & DLS Program- Phase 1    Date of Eval:                                Primary PT:  Diagnosis: Things to Focus On (goals):   Surgical Precautions:  Medical Precautions:  [] C-9 dates  [] Occ Med   [] Medicare       Date 2/3/21 2/15/21 2/24/21 2/26/21 3/1/21   Visit # 2/12 3/12 4/12 5/12 6/12   Walk F/L/R 2 Laps 2 Laps 2 Laps 2 Laps  2 Laps   Marching 2 Laps 2 Laps 2 Laps 2 Laps 2 Laps   Squats 10x5\" 10x5\" 15x5\" 15x5\" Small 15x5\"   Step-Ups F/L Low 10x Low 15x Tall 10x Tall 15x Tall 15x   Heel-toe raises 10x 15x 15x 15x 15x   SLR F/L/R 15x 15x 15x 15x 15x   Knee/Flex/Ext 10x 15x 15x 15x 15x   F/L Lunges        Kickboard Ex. Lg Lg Lg Lg Lg   Iso Abd. 10x5\" 15x5\" 15x5\" 15x5\" 15x5\"   Push-pull 15x 15x 15x 15x 15x   Paddling 15x 15x 15x 15x 15x           UE Format        Horz.  Abd     10x   Abd/Add     7x high pain   Flex/Ext     N/T                   Stretches        Achllies 2x20\" 2x20\" 2x20\" 2x20\" 2x20\"   Hamstring 2x20\" 2x20\" 2x20\" 2x20\" 2x20\"           Deep water  1 Noodle 1 Noodle 1 Noodle 1 Noodle 1 Noodle   cycling 2' 2' 2' 2' 2'   jacks  2' 2' 2' 2'   Cross-country   2' 2' 2'   Hang 5' 5' 5' 5' 3'           Pain Rating 8 8 6 6 7     Specific Instructions for next treatment: Plan is to add step-up and overs fwd/lat and forward lunges onto tall box to improve strengthening and balance of LE. Assessment: [x] Progressing toward goals. Continued exercises per chart, patient tolerated exercises with min/mod tightness in low back. Mild increase of pain in low back while marching during ambulation. Patient demonstrates minimal LOB while performing 3-way hip. R low back pain increases with EXT during 3-way hip. Attempted UE exercises, patient tolerated Horz. ABD but could not tolerate Abd/Add or Flex/Ext without pain. Patient has increased pain while weight bearing outside pool. [] No change. [] Other:    [] Patient would continue to benefit from skilled physical therapy services in order to:     STG: (to be met in 6 treatments)  1. ? Pain: lesson pain levels 3/10  2. ? ROM:AROM of lumbar spine to 50% of normal.   3. ? Strength:Lumbar spine strength 2+/5  4. ? Function: OPTIMAL score of 9/3  5. Independent with Home Exercise Programs  6. Demonstrate Knowledge of fall prevention  LTG: (to be met in 12 treatments)   1. OPTIMAL score of 6/3. Pt. Education:  [x] Yes  [] No  [] Reviewed Prior HEP/Ed  Method of Education: [x] Verbal  [x] Demo  [] Written  Comprehension of Education:  [x] Verbalizes understanding. [x] Demonstrates understanding. [] Needs review. [] Demonstrates/verbalizes HEP/Ed previously given. Plan: [x] Continue per plan of care.     [] Other:      Treatment Charges: Mins Units   []  Modalities     []  Ther Exercise     []  Manual Therapy     []  Ther Activities     [x]  Aquatics 37 2   []  Neuromuscular     [] Vasocompression     [] Gait Training     [] Dry needling        [] 1 or 2 muscles        [] 3 or more muscles     []  Other     Total Treatment time 37 2     Time In: 9491 am           Time Out: 1037    Electronically signed by:  Enrique Chavarria Doctor    The above documentation completed by Student Physical Therapist Assistant Luis Mi was reviewed and accepted by the supervising Clinical instructor July Irby PTA

## 2021-03-01 NOTE — TELEPHONE ENCOUNTER
Please Approve or Refuse.   Send to Pharmacy per Pt's Request:      Next Visit Date:  3/16/2021   Last Visit Date: 2/18/2021    Hemoglobin A1C (%)   Date Value   10/05/2020 11.1 (H)   08/29/2020 11.2 (H)   03/10/2020 11.1             ( goal A1C is < 7)   BP Readings from Last 3 Encounters:   02/10/21 (!) 142/72   02/04/21 (!) 148/97   12/01/20 (!) 150/80          (goal 120/80)  BUN   Date Value Ref Range Status   08/29/2020 8 6 - 20 mg/dL Final     CREATININE   Date Value Ref Range Status   08/29/2020 0.74 0.50 - 0.90 mg/dL Final     Potassium   Date Value Ref Range Status   08/29/2020 4.6 3.7 - 5.3 mmol/L Final

## 2021-03-02 ENCOUNTER — HOSPITAL ENCOUNTER (OUTPATIENT)
Dept: SLEEP CENTER | Age: 56
Discharge: HOME | End: 2021-03-04
Payer: COMMERCIAL

## 2021-03-02 DIAGNOSIS — G47.30 SEVERE SLEEP APNEA: ICD-10-CM

## 2021-03-02 LAB
SARS-COV-2: NORMAL
SARS-COV-2: NOT DETECTED
SOURCE: NORMAL

## 2021-03-02 PROCEDURE — 95811 POLYSOM 6/>YRS CPAP 4/> PARM: CPT

## 2021-03-03 ENCOUNTER — TELEPHONE (OUTPATIENT)
Dept: PRIMARY CARE CLINIC | Age: 56
End: 2021-03-03

## 2021-03-03 VITALS
RESPIRATION RATE: 20 BRPM | HEIGHT: 66 IN | OXYGEN SATURATION: 92 % | HEART RATE: 80 BPM | WEIGHT: 275 LBS | BODY MASS INDEX: 44.2 KG/M2 | TEMPERATURE: 97.1 F

## 2021-03-05 ENCOUNTER — HOSPITAL ENCOUNTER (OUTPATIENT)
Dept: PHYSICAL THERAPY | Age: 56
Setting detail: THERAPIES SERIES
End: 2021-03-05
Payer: COMMERCIAL

## 2021-03-08 ENCOUNTER — HOSPITAL ENCOUNTER (OUTPATIENT)
Dept: PHYSICAL THERAPY | Age: 56
Setting detail: THERAPIES SERIES
Discharge: HOME | End: 2021-03-08
Payer: COMMERCIAL

## 2021-03-08 PROCEDURE — 97113 AQUATIC THERAPY/EXERCISES: CPT

## 2021-03-08 NOTE — FLOWSHEET NOTE
[address] [place] [address] Outpatient Physical Therapy   [address] [place], [place] [address] [facility] [address]   Phone: [phone]   Fax: [phone]    Physical Therapy Daily Treatment Note    Date:  3/8/2021  Patient Name:  Paulette Carlton    :  1965  MRN: [id number]  Physician: [address]: [address] [place] [address] Diagnosis: DDD lumbar                       Rehab Codes: M51.36  Onset Date:  2020    Next  appt.: N/A  Visit# / total visits:   Cancels/No Shows: 0/1    Subjective:   Pt reports pain started increasing last night and did not decrease over night. Notes [facility] which could have caused increased pain. Pain:  [x] Yes  [] No Location: Lumbar back Pain Rating: (0-10 scale) 7/10  Pain altered Tx:  [x] No  [] Yes  Action:  Comments:       Objective:      [address] [facility] [address] Exercise Log  Aquatic, Hip & DLS Program- Phase 1    Date of Eval:                                Primary PT:  Diagnosis: Things to Focus On (goals):   Surgical Precautions:  Medical Precautions:  [] C-9 dates  [] Occ Med   [] Medicare       Date 2/24/21 2/26/21 3/1/21 3/8/21   Visit #    Walk F/L/R 2 Laps 2 Laps  2 Laps 2 Laps   Marching 2 Laps 2 Laps 2 Laps 2 Laps   Squats 15x5\" 15x5\" Small 15x5\" Low 15x5\"   Step-Ups F/L Tall 10x Tall 15x Tall 15x Tall 15x   Heel-toe raises 15x 15x 15x 15x   SLR F/L/R 15x 15x 15x 15x   Knee/Flex/Ext 15x 15x 15x 15x   F/L Lunges       Kickboard Ex. Lg Lg Lg Lg   Iso Abd. 15x5\" 15x5\" 15x5\" 15x5\"   Push-pull 15x 15x 15x 15x   Paddling 15x 15x 15x 15x          UE Format       Horz.  Abd   10x 10x   Abd/Add   7x high pain 10x   Flex/Ext   N/T 10x                 Stretches       Achllies 2x20\" 2x20\" 2x20\" 2x20\"   Hamstring 2x20\" 2x20\" 2x20\" 2x20\"          Deep water  1 Noodle 1 Noodle 1 Noodle 1 noodle   cycling 2' 2' 2' 2'   [facility] 2' 2' 2' 2'   Cross-country 2' 2' 2' 2' Hang 5' 5' 3' 5'          Pain Rating 6 6 7 7     Specific Instructions for next treatment: Plan is to add step-up and overs fwd/lat and forward lunges onto tall box to improve strengthening and balance of LE. Assessment: [] Progressing toward goals. [x] No change. No progression today due to increased pain upon arrival.  Able to complete above exercises without increased pain. Ended with deep water hang for pain relief.     [] Other:    [] Patient would continue to benefit from skilled physical therapy services in order to:     STG: (to be met in 6 treatments)  1. ? Pain: lesson pain levels 3/10  2. ? ROM:AROM of lumbar spine to 50% of normal.   3. ? Strength:Lumbar spine strength 2+/5  4. ? Function: OPTIMAL score of 9/3  5. Independent with Home Exercise Programs  6. Demonstrate Knowledge of fall prevention  LTG: (to be met in 12 treatments)   1. OPTIMAL score of 6/3. Pt. Education:  [x] Yes  [] No  [] Reviewed Prior HEP/Ed  Method of Education: [x] Verbal  [x] Demo  [] Written  Comprehension of Education:  [x] Verbalizes understanding. [x] Demonstrates understanding. [] Needs review. [] Demonstrates/verbalizes HEP/Ed previously given. Plan: [x] Continue per plan of care. [] Other:      Treatment Charges: Mins Units   []  Modalities     []  Ther Exercise     []  Manual Therapy     []  Ther Activities     [x]  Aquatics 30 2   []  Neuromuscular     [] Vasocompression     [] Gait Training     [] Dry needling        [] 1 or 2 muscles        [] 3 or more muscles     [x]  Other  Reeval 15 0   Total Treatment time 30 2   Physical therapy treatment completed today in part by physical therapist assistant (PTA). Treatment times reflect total treatment time combined by both PT and PTA for today's service.     Time In: 1053            Time Out: 1130    Electronically signed by:  Alex Fox PTA

## 2021-03-09 ENCOUNTER — HOSPITAL ENCOUNTER (OUTPATIENT)
Dept: PHYSICAL THERAPY | Age: 56
Setting detail: THERAPIES SERIES
Discharge: HOME | End: 2021-03-09
Payer: COMMERCIAL

## 2021-03-09 ENCOUNTER — TELEPHONE (OUTPATIENT)
Dept: PAIN MANAGEMENT | Age: 56
End: 2021-03-09

## 2021-03-09 PROCEDURE — 97113 AQUATIC THERAPY/EXERCISES: CPT

## 2021-03-09 NOTE — FLOWSHEET NOTE
509 [facility] Outpatient Physical Therapy   [address]   Phone: [phone]   Fax: [phone]    Physical Therapy Update Note    Date:  3/9/2021  Patient Name:  Inge Record    :  1965  MRN: [id number]  Physician: [address]: [address] [place] [address] Diagnosis: DDD lumbar                       Rehab Codes: M51.36  Onset Date:  2020    Next  appt.: N/A  Visit# / total visits:  (Correct count)  Cancels/No Shows: 0/1    Subjective:   Pt reports increased soreness and more discomfort from yesterdays treatment but it is tolerable. States pain across lumbar back upon arrival.  Pain:  [x] Yes  [] No Location: Lumbar back Pain Rating: (0-10 scale) 7/10  Pain altered Tx:  [x] No  [] Yes  Action:  Comments:       Objective:     Standing flexion finger 12'' from the floor mild pain, Standing extension is 100% limited and severely painful in the  lower back. Strenght of lumbar spine is 2/5 no change, limited by lower back pain. Walking, standing and lifting all remain limited to a moderate degree due to pain. Assessment: [x] Progressing toward goals. Performed all exercises on tall box to patient's tolerance and increased reps for UE format exercises. [] No change. [] Other:    [x] Patient would continue to benefit from skilled physical therapy services in order to: lesson pain and restore function. STG: (to be met in 6 treatments)  1. ? Pain: lesson pain levels 3/10  2. ? ROM:AROM of lumbar spine to 50% of normal.   3. ? Strength:Lumbar spine strength 2+/5  4. ? Function: OPTIMAL score of 9/3  5. Independent with Home Exercise Programs  6. Demonstrate Knowledge of fall prevention    LTG: (to be met in 12 treatments)   1. OPTIMAL score of 6/3.      Pt. Education:  [x] Yes  [] No  [] Reviewed Prior HEP/Ed  Method of Education: [x] Verbal  [x] Demo  [x] Written  Comprehension of Education: [] Verbalizes understanding. [] Demonstrates understanding. [] Needs review. [x] Demonstrates/verbalizes HEP/Ed previously given. Plan: [] Continue with aquatic treatment for her loss of function and painful lumbar spine. [x] Other: Re-Eval with Primary PT today.       Treatment Charges: Mins Units   []  Modalities     []  Ther Exercise     []  Manual Therapy     []  Ther Activities     []  Aquatics     []  Neuromuscular     [] Vasocompression     [] Gait Training     [] Dry needling        [] 1 or 2 muscles        [] 3 or more muscles     [x]  Other  RVD  15 0   Total Treatment time 15 0     Time In: 0001            Time Out: 1200    Electronically signed by:  Debora Day, PT

## 2021-03-09 NOTE — FLOWSHEET NOTE
[facility] Outpatient Physical Therapy   [address]   Phone: [phone]   Fax: [phone]    Physical Therapy Daily Treatment Note    Date:  3/9/2021  Patient Name:  Jose Flaherty    :  1965  MRN: [id number]  Physician: [address]: [address] [place] [address] Diagnosis: DDD lumbar                       Rehab Codes: M51.36  Onset Date:  2020    Next  appt.: N/A  Visit# / total visits:  (Correct count)  Cancels/No Shows: 0/1    Subjective:   Pt reports increased soreness and more discomfort from yesterdays treatment but it is tolerable. States pain across lumbar back upon arrival.  Pain:  [x] Yes  [] No Location: Lumbar back Pain Rating: (0-10 scale) 7/10  Pain altered Tx:  [x] No  [] Yes  Action:  Comments:       Objective:      [address] [facility] Exercise Log  Aquatic, Hip & DLS Program- Phase 1    Date of Eval:                                Primary PT:  Diagnosis: Things to Focus On (goals):   Surgical Precautions:  Medical Precautions:  [] C-9 dates  [] Occ Med   [] Medicare       Date 2/24/21 2/26/21 3/1/21 3/8/21 3/9/21   Visit #    Walk F/L/R 2 Laps 2 Laps  2 Laps 2 Laps 2 Laps   Marching 2 Laps 2 Laps 2 Laps 2 Laps 2 Laps   Squats 15x5\" 15x5\" Small 15x5\" Low 15x5\" Low 15x5\"   Step-Ups F/L Tall 10x Tall 15x Tall 15x Tall 15x Tall 15x   Heel-toe raises 15x 15x 15x 15x 15x   SLR F/L/R 15x 15x 15x 15x 15x   Knee/Flex/Ext 15x 15x 15x 15x 15x   F/L Lunges        Kickboard Ex. Lg Lg Lg Lg Lg   Iso Abd. 15x5\" 15x5\" 15x5\" 15x5\" 15x5\"   Push-pull 15x 15x 15x 15x 15x   Paddling 15x 15x 15x 15x 15x           UE Format        Horz.  Abd   10x 10x 10x   Abd/Add   7x high pain 10x 10x   Flex/Ext   N/T 10x 10x                   Stretches        Achllies 2x20\" 2x20\" 2x20\" 2x20\" 2x20\"   Hamstring 2x20\" 2x20\" 2x20\" 2x20\" 2x20\"           Deep water  1 Noodle 1 Noodle 1 Noodle 1 noodle 1 Noodle   cycling 2' 2' 2' 2' 2'   jacks 2' 2' 2' 2' 2'   Cross-country 2' 2' 2' 2' 2'   Hang 5' 5' 3' 5' 5'           Pain Rating 6 6 7 7 7     Specific Instructions for next treatment: Add Small buoyancy cuffs next visit. Assessment: [x] Progressing toward goals. Performed all exercises on tall box to patient's tolerance and increased reps for UE format exercises. [] No change. [] Other:    [] Patient would continue to benefit from skilled physical therapy services in order to:     STG: (to be met in 6 treatments)  1. ? Pain: lesson pain levels 3/10  2. ? ROM:AROM of lumbar spine to 50% of normal.   3. ? Strength:Lumbar spine strength 2+/5  4. ? Function: OPTIMAL score of 9/3  5. Independent with Home Exercise Programs  6. Demonstrate Knowledge of fall prevention  LTG: (to be met in 12 treatments)   1. OPTIMAL score of 6/3. Pt. Education:  [x] Yes  [] No  [] Reviewed Prior HEP/Ed  Method of Education: [x] Verbal  [x] Demo  [] Written  Comprehension of Education:  [x] Verbalizes understanding. [x] Demonstrates understanding. [] Needs review. [] Demonstrates/verbalizes HEP/Ed previously given. Plan: [] Continue per plan of care. [x] Other: Re-Eval with Primary PT today.       Treatment Charges: Mins Units   []  Modalities     []  Ther Exercise     []  Manual Therapy     []  Ther Activities     [x]  Aquatics 30 2   []  Neuromuscular     [] Vasocompression     [] Gait Training     [] Dry needling        [] 1 or 2 muscles        [] 3 or more muscles     []  Other     Total Treatment time 30 2     Time In: 1636            Time Out: 8956    Electronically signed by:  Radha Elam PTA

## 2021-03-10 ENCOUNTER — APPOINTMENT (OUTPATIENT)
Dept: PHYSICAL THERAPY | Age: 56
End: 2021-03-10
Payer: COMMERCIAL

## 2021-03-11 ENCOUNTER — HOSPITAL ENCOUNTER (OUTPATIENT)
Dept: PAIN MANAGEMENT | Age: 56
Discharge: HOME | End: 2021-03-11
Payer: COMMERCIAL

## 2021-03-11 ENCOUNTER — HOSPITAL ENCOUNTER (OUTPATIENT)
Dept: GENERAL RADIOLOGY | Age: 56
Discharge: HOME | End: 2021-03-13
Payer: COMMERCIAL

## 2021-03-11 VITALS
SYSTOLIC BLOOD PRESSURE: 141 MMHG | OXYGEN SATURATION: 95 % | HEIGHT: 66 IN | HEART RATE: 86 BPM | WEIGHT: 280 LBS | RESPIRATION RATE: 20 BRPM | BODY MASS INDEX: 45 KG/M2 | TEMPERATURE: 98 F | DIASTOLIC BLOOD PRESSURE: 70 MMHG

## 2021-03-11 DIAGNOSIS — M47.816 FACET DEGENERATION OF LUMBAR REGION: ICD-10-CM

## 2021-03-11 DIAGNOSIS — M51.36 DDD (DEGENERATIVE DISC DISEASE), LUMBAR: ICD-10-CM

## 2021-03-11 DIAGNOSIS — M47.817 LUMBOSACRAL SPONDYLOSIS WITHOUT MYELOPATHY: Primary | ICD-10-CM

## 2021-03-11 DIAGNOSIS — M47.816 ARTHROPATHY OF LUMBAR FACET JOINT: ICD-10-CM

## 2021-03-11 PROCEDURE — 6360000004 HC RX CONTRAST MEDICATION: Performed by: PAIN MEDICINE

## 2021-03-11 PROCEDURE — 64494 INJ PARAVERT F JNT L/S 2 LEV: CPT

## 2021-03-11 PROCEDURE — 64493 INJ PARAVERT F JNT L/S 1 LEV: CPT | Performed by: PAIN MEDICINE

## 2021-03-11 PROCEDURE — 6360000002 HC RX W HCPCS: Performed by: PAIN MEDICINE

## 2021-03-11 PROCEDURE — 64493 INJ PARAVERT F JNT L/S 1 LEV: CPT

## 2021-03-11 PROCEDURE — 2500000003 HC RX 250 WO HCPCS: Performed by: PAIN MEDICINE

## 2021-03-11 PROCEDURE — 3209999900 FLUORO FOR SURGICAL PROCEDURES

## 2021-03-11 RX ORDER — BUPIVACAINE HYDROCHLORIDE 5 MG/ML
INJECTION, SOLUTION EPIDURAL; INTRACAUDAL
Status: COMPLETED | OUTPATIENT
Start: 2021-03-11 | End: 2021-03-11

## 2021-03-11 RX ORDER — MIDAZOLAM HYDROCHLORIDE 1 MG/ML
INJECTION INTRAMUSCULAR; INTRAVENOUS
Status: COMPLETED | OUTPATIENT
Start: 2021-03-11 | End: 2021-03-11

## 2021-03-11 RX ORDER — TRIAMCINOLONE ACETONIDE 40 MG/ML
INJECTION, SUSPENSION INTRA-ARTICULAR; INTRAMUSCULAR
Status: COMPLETED | OUTPATIENT
Start: 2021-03-11 | End: 2021-03-11

## 2021-03-11 RX ORDER — ONDANSETRON 4 MG/1
4 TABLET, ORALLY DISINTEGRATING ORAL 3 TIMES DAILY PRN
Qty: 30 TABLET | Refills: 3 | Status: SHIPPED | OUTPATIENT
Start: 2021-03-11 | End: 2022-08-01

## 2021-03-11 RX ADMIN — BUPIVACAINE HYDROCHLORIDE 8 ML: 5 INJECTION, SOLUTION EPIDURAL; INTRACAUDAL; PERINEURAL at 10:35

## 2021-03-11 RX ADMIN — IOHEXOL 1 ML: 180 INJECTION INTRAVENOUS at 10:36

## 2021-03-11 RX ADMIN — MIDAZOLAM 1 MG: 1 INJECTION INTRAMUSCULAR; INTRAVENOUS at 10:28

## 2021-03-11 RX ADMIN — TRIAMCINOLONE ACETONIDE 40 MG: 40 INJECTION, SUSPENSION INTRA-ARTICULAR; INTRAMUSCULAR at 10:36

## 2021-03-11 ASSESSMENT — PAIN DESCRIPTION - PROGRESSION: CLINICAL_PROGRESSION: NOT CHANGED

## 2021-03-11 ASSESSMENT — PAIN DESCRIPTION - ONSET: ONSET: ON-GOING

## 2021-03-11 ASSESSMENT — PAIN DESCRIPTION - LOCATION: LOCATION: BACK

## 2021-03-11 ASSESSMENT — PAIN - FUNCTIONAL ASSESSMENT: PAIN_FUNCTIONAL_ASSESSMENT: 0-10

## 2021-03-11 ASSESSMENT — PAIN DESCRIPTION - DESCRIPTORS: DESCRIPTORS: BURNING

## 2021-03-11 ASSESSMENT — PAIN DESCRIPTION - ORIENTATION: ORIENTATION: LOWER;MID

## 2021-03-11 NOTE — PROGRESS NOTES
Discharge criteria met. Patient alert and oriented x3  Post procedure dressing dry and intact. Sensory and motor function intact as per pre-procedure. Instructions and follow up reviewed with pt at patient at discharge. Patient discharged per wheelchair@ gait steady, denies weakness or numbness in legs.  Patient has help at home today and Lidya Delaney is her  [phone]

## 2021-03-11 NOTE — PLAN OF CARE
Dr. Cecy Tabor reviews chart and notified patient complaining of nausea with Tramadol.  Physician notified of most recent A1c and blood sugar reading this am.

## 2021-03-11 NOTE — PROCEDURES
Intravenously  as needed. Patient is an appropriate candidate for plan of sedation: yes  Patient's History and Physical examination was reviewed and there is no change. Electronically signed by Daisy Ferraro MD on 3/11/2021 at 10:52 AM        Preoperative Diagnosis:    1. Lumbosacral spondylosis without myelopathy    2. Arthropathy of lumbar facet joint    3. DDD (degenerative disc disease), lumbar    4. Facet degeneration of lumbar region        Postoperative Diagnosis:   1. Lumbosacral spondylosis without myelopathy    2. Arthropathy of lumbar facet joint    3. DDD (degenerative disc disease), lumbar    4. Facet degeneration of lumbar region        Procedure Performed[de-identified]  Lumbar facet joint injections at the levels of  L4-L5,on the Bilaterally. with fluoroscopy guidance, with IV sedation. Indication for the Procedure: The patient had history of chronic low back pain which is not responding well to the conservative treatment. The patient's pain is mostly axial in nature. Pain is interfering with the activities of daily living. Physical examination revealed facet tenderness and facet loading is positive. We decided to try lumbar facet joint injection for diagnostic as well as for therapeutic purposes. The procedure and its risks were discussed with the patient and an informed consent was obtained.   .Current Pain Assessment  Pain Assessment  Pain Assessment: 0-10  Pain Level: 8  Patient's Stated Pain Goal: (decrease pain at 5 and be able to be more active)  Pain Type: Chronic pain  Pain Location: Back  Pain Orientation: Lower, Mid  Pain Radiating Towards: left leg to foot, right thigh  Pain Descriptors: Burning(pins and needles in left leg)  Pain Frequency: Continuous  Pain Onset: On-going  Clinical Progression: Not changed  Functional Pain Assessment: Prevents or interferes some active activities and ADLs  Non-Pharmaceutical Pain Intervention(s): TENS, Rest, Repositioned  POSS Score (Patient Ctrl Analgesia): 1   Procedure:   After starting IV patient was sedated with 1 mg of Midazolam and 0 mcg of Fentanyl intravenously by the RN under my direct supervision. Patient's vital signs including BP, EKG and SaO2 were monitored by RN and they remained stable during the procedure. A meaningful communication was kept up with the patient throughout   the procedure. The patient is placed in prone position. Skin over the back was prepped and draped in sterile manner. Under fluoroscopy the facet joints were identified and were palpated, and the following joints were found to be tender:  L4-L5,on the Bilaterally. Hence we decided to inject these joints in the following way:  Using fluoroscopy the facet joints were identified and by adjusting the angle of the fluoroscopy, the view of the joint space was optimized. The skin and deep tissues over the joint space were anesthetized with 2 ml of 0.5% Marcaine. The #22-gauge, 3-1/2 inch spinal needle was introduced through the skin wheal under fluoroscopoc guidance such that the tip of the needle lies in the joint space. This was confirmed by injecting about 0.5 ml of Omnipaque-180 through the needle and observing the spread of the contrast along the joint space. Then after negative aspiration a mixture of 0.5% Marcaine with triamcinolone was injected into the joint space. This was done at the levels of  L4-L5,on the Bilaterally. A total of 40 mg of triamcinolone and 4 ml of 0.5% Marcaine was used and was divided in equal amounts among the joints. After removing the needles Band-Aids were placed over the needle insertion sites. Patient's vital signs remained stable and tolerated the procedure well. The patient was discharged home in stable condition and will be followed in the pain clinic in the next few weeks for further planning.     Electronically signed by Jessica Michelle MD on 3/11/2021 at 10:52 AM

## 2021-03-12 ENCOUNTER — OFFICE VISIT (OUTPATIENT)
Dept: SURGERY | Age: 56
End: 2021-03-12
Payer: COMMERCIAL

## 2021-03-12 VITALS
OXYGEN SATURATION: 95 % | WEIGHT: 283.4 LBS | HEIGHT: 66 IN | DIASTOLIC BLOOD PRESSURE: 73 MMHG | SYSTOLIC BLOOD PRESSURE: 131 MMHG | HEART RATE: 78 BPM | BODY MASS INDEX: 45.55 KG/M2

## 2021-03-12 DIAGNOSIS — R21 RASH: ICD-10-CM

## 2021-03-12 DIAGNOSIS — N62 MACROMASTIA: Primary | ICD-10-CM

## 2021-03-12 DIAGNOSIS — L98.491 SKIN ULCER, LIMITED TO BREAKDOWN OF SKIN (HCC): ICD-10-CM

## 2021-03-12 PROBLEM — G47.30 SEVERE SLEEP APNEA: Status: ACTIVE | Noted: 2020-11-10

## 2021-03-12 LAB — STATUS: NORMAL

## 2021-03-12 PROCEDURE — 95811 POLYSOM 6/>YRS CPAP 4/> PARM: CPT | Performed by: PSYCHIATRY & NEUROLOGY

## 2021-03-12 PROCEDURE — 99203 OFFICE O/P NEW LOW 30 MIN: CPT | Performed by: PLASTIC SURGERY

## 2021-03-12 PROCEDURE — 3017F COLORECTAL CA SCREEN DOC REV: CPT | Performed by: PLASTIC SURGERY

## 2021-03-12 PROCEDURE — G8427 DOCREV CUR MEDS BY ELIG CLIN: HCPCS | Performed by: PLASTIC SURGERY

## 2021-03-12 PROCEDURE — 1036F TOBACCO NON-USER: CPT | Performed by: PLASTIC SURGERY

## 2021-03-12 PROCEDURE — G8482 FLU IMMUNIZE ORDER/ADMIN: HCPCS | Performed by: PLASTIC SURGERY

## 2021-03-12 PROCEDURE — G8417 CALC BMI ABV UP PARAM F/U: HCPCS | Performed by: PLASTIC SURGERY

## 2021-03-12 NOTE — PROGRESS NOTES
[address] [place] [address] [facility]  [address] [place] [id number]       History and Physical     Chief Complaint   Patient presents with    New Patient     Patient states she is here to discuss breast reduction. HPI:   Tamiko Clarke is a 49 y.o. female who presents with symptomatic macromastia. Patient currently wears a 40 2G. Patient wishes to be a 36 2C. Patient has tried supportive bras for at least since she was 23years old without relief of lower back pain and neck pain and shoulder pain. Patient has had tried anti-inflammatory medication since the age of 30 without relief of her back pain neck pain and shoulder pain. Patient has had back pain and shoulder pains for approximately since age 30. She has done physical therapy and injections and she has seen her chiropractor for her neck pain and back pain without any relief. Patient also has rotator cuff tears for which she has been recommended to have bilateral breast reduction. Patient has had a repair on the one side and now she has a tear on the contralateral side. Patient does get headaches. Patient does have large shoulder grooving. Patient has numbness and tingling in her hands for at least 3 years. Patient does have ulcers on her breast.  Patient has tried treatment for her rashes but they continue to recur. Patient has also lost 100 pounds without any relief of her back pain neck pain and shoulder pain. Patient has been recommended by multiple physicians to have bilateral breast reduction. Patient is here to discuss her options as far as breast reduction is concerned.       Medications:     Current Outpatient Medications   Medication Sig Dispense Refill    ondansetron (ZOFRAN-ODT) 4 MG disintegrating tablet Take 1 tablet by mouth 3 times daily as needed for Nausea or Vomiting 30 tablet 3    busPIRone (BUSPAR) 5 MG tablet TAKE ONE TABLET BY MOUTH THREE TIMES A DAY AS NEEDED FOR ANXIETY 90 tablet 3    hydroCHLOROthiazide (HYDRODIURIL) 25 MG tablet TAKE ONE TABLET BY MOUTH DAILY WITH POTASSIUM SUPPLEMENT 22 tablet 2    Easy Touch Lancets 33G/Twist MISC USE TWO TO THREE TIMES DAILY 90 each 2    lisinopril (PRINIVIL;ZESTRIL) 20 MG tablet TAKE ONE TABLET BY MOUTH DAILY 87 tablet 3    glipiZIDE (GLUCOTROL) 5 MG tablet TAKE ONE TABLET BY MOUTH TWICE A DAY BEFORE BIGGEST MEAL 174 tablet 3    Handicap Placard MISC by Does not apply route Prescription good for 5 Years  Expires January 2026 1 each 0    potassium chloride (KLOR-CON) 10 MEQ extended release tablet TAKE ONE TABLET BY MOUTH DAILY WITH HYDROCHLOROTHIAZIDE 30 tablet 2    meloxicam (MOBIC) 15 MG tablet Take 1 tablet by mouth daily 90 tablet 2    Insulin Degludec (TRESIBA FLEXTOUCH) 100 UNIT/ML SOPN Inject 45 Units into the skin 2 times daily      tiZANidine (ZANAFLEX) 4 MG tablet TAKE ONE TABLET BY MOUTH EVERY 8 HOURS AS NEEDED FOR NECK AND BACK PAIN. **CAUSES SEDATION AND DO NOT DRIVE WHILE TAKING THIS MEDICATION 90 tablet 0    cetirizine (ZYRTEC) 10 MG tablet Take 10 mg by mouth daily      Blood Glucose Monitoring Suppl (TRUE METRIX METER) MANOLO Testing BID 1 Device 0    Insulin Pen Needle (PEN NEEDLES 3/16\") 31G X 5 MM MISC 1 each by Does not apply route daily 100 each 3    insulin lispro, 1 Unit Dial, (HUMALOG KWIKPEN) 100 UNIT/ML SOPN Inject 15 Units into the skin 3 times daily (before meals) 5 pen 1    Insulin Syringes, Disposable, U-100 1 ML MISC 1 each by Does not apply route daily 100 each 3    blood glucose monitor strips Test 2-3 times a day & as needed for symptoms of irregular blood glucose. BRAND OF CHOICE INSURANCE ALLOWS. 100 strip 11    atorvastatin (LIPITOR) 10 MG tablet Take 1 tablet by mouth daily 90 tablet 2    Alcohol Swabs (ALCOHOL PREP) PADS Use as directed 100 each 11    pregabalin (LYRICA) 100 MG capsule Take 1 capsule by mouth 2 times daily for 90 days.  180 capsule 2    vitamin D (CHOLECALCIFEROL) 25 MCG (1000 UT) TABS tablet Take 1 tablet by mouth daily 30 tablet 3     Current Facility-Administered Medications   Medication Dose Route Frequency Provider Last Rate Last Admin    methylPREDNISolone acetate (DEPO-MEDROL) injection 80 mg  80 mg Intra-articular Once Paulett Nine, DO        bupivacaine (MARCAINE) 0.25 % injection 5 mg  2 mL Intra-articular Once Paulett Nine, DO          Allergies: Allergies   Allergen Reactions    Pcn [Penicillins] Hives and Other (See Comments)     Lose motor function of legs and collaps    Demerol Hcl [Meperidine] Other (See Comments)     Panic attack    Metformin And Related      Abdominal pain    Tape Vonna Lorraine Tape] Other (See Comments)     Blister      Morphine Nausea And Vomiting and Other (See Comments)     Stomach pain     Review of Systems:   Constitutional: Negative for fever, chills, fatigue and unexpected weight change. HENT: Patient has a history of allergic rhinitis. Eyes: Negative for pain and discharge. Respiratory: Negative for cough and shortness of breath. Cardiovascular: Patient with hypercholesterolemia. Gastrointestinal: Patient has a history of GERD. .   Skin: Negative for pallor and rash. Neurological: Patient has polyneuropathy and fibromyalgia. Hematological: Does not bruise/bleed easily. Psychiatric/Behavioral: Negative for behavioral problems. Patient has a history of anxiety. Musculoskeletal: Patient has a history of arthritis  Endocrine: Patient has a history of diabetes.   Past Medical History:   Diagnosis Date    Allergic rhinitis 10/21/2014    Anxiety     Arthritis     Bleeds easily ([address]     per pt, has never had a work up   Mallorie Trujillo Complete rotator cuff tear of left shoulder 2018    Diabetes mellitus ([address]     On Insulin, metformin, Glipizide    Diabetic polyneuropathy associated with type 2 diabetes mellitus ([address] 3/26/2018    Facet degeneration of lumbar region 2/18/2021    Fibromyalgia     GERD (gastroesophageal reflux disease) 10/21/2014    H/O transfusion     History of blood transfusion     Hypertension     Obesity 10/21/2014    Pure hypercholesterolemia 2/23/2016    Tobacco abuse     Type II or unspecified type diabetes mellitus without mention of complication, not stated as uncontrolled     Unspecified sleep apnea     CPAP machine is broken    Wears glasses      Past Surgical History:   Procedure Laterality Date    BRONCHOSCOPY  9/27/2017    BRONCHOSCOPY BRUSHINGS performed by Ganesh Lepe [facility] at [facility] Right 2002    CHOLECYSTECTOMY N/A 9/29/2017    CHOLECYSTECTOMY OPEN performed by Vazquez Roth DO at [address] [place] [address] COLONOSCOPY N/A 2/21/2018    COLONOSCOPY POLYPECTOMY SNARE/COLD BIOPSY performed by Klaudia Ryan MD at [facility]    with revision x1    LARYNGOSCOPY N/A 9/29/2017    DIRECT MICRO LARYNGOSCOPY WITH EXCISION VOCAL CORD LESION  performed by Efrain Nazario MD at [address] [place] [address] Right 1988    [place] EGD TRANSORAL BIOPSY SINGLE/MULTIPLE N/A 9/27/2017    EGD BIOPSY performed by Klaudia Ryan MD at [address] [place] [address] ARTHROSCOP,SURG,W/ROTAT [facility] [address] Left 6/19/2018    SHOULDER ARTHROSCOPY WITH ROTATOR CUFF REPAIR, SUBACROMIAL DECOMPRESSION performed by Richard Wolfe MD at [place] [address] Left     SHOULDER ARTHROSCOPY WITH ROTATOR CUFF REPAIR, SUBACROMIAL DECOMPRESSION (Left      Social History     Socioeconomic History    Marital status: Legally      Spouse name: Not on file    Number of children: 3    Years of education: Not on file    Highest education level: Not on file   Occupational History    Occupation:    Social Needs    Financial resource strain: Not on file    Food insecurity     Worry: Not on file     Inability: Not on file   [employer] needs     Medical: Not on file     Non-medical: Not on file   Tobacco Use    Smoking status: Former Smoker     Packs/day: 2.00     Years: 34.00     Pack years: 68.00     Types: Cigarettes     Start date: 28     Quit date: 9/28/2017     Years since quitting: 3.4    Smokeless tobacco: Never Used   Substance and Sexual Activity    Alcohol use: Yes     Alcohol/week: 0.0 standard drinks     Frequency: Monthly or less     Drinks per session: 1 or 2     Comment: rare    Drug use: No    Sexual activity: Never   Lifestyle    Physical activity     Days per week: Not on file     Minutes per session: Not on file    Stress: Not on file   Relationships    Social connections     Talks on phone: Not on file     Gets together: Not on file     Attends Spiritism service: Not on file     Active member of club or organization: Not on file     Attends meetings of clubs or organizations: Not on file     Relationship status: Not on file    Intimate partner violence     Fear of current or ex partner: Not on file     Emotionally abused: Not on file     Physically abused: Not on file     Forced sexual activity: Not on file   Other Topics Concern    Not on file   Social History Narrative    Not on file     Family History   Problem Relation Age of Onset    Diabetes Mother     Heart Disease Mother         murmur    Heart Disease Father         MVP    Diabetes Sister     Diabetes Maternal Grandmother     High Blood Pressure Maternal Grandmother     Heart Failure Maternal Grandfather     Heart Attack Paternal Grandmother      Physical Exam:   /73   Pulse 78   Ht 5' 6\" (1.676 m)   Wt 283 lb 6.4 oz (128.5 kg)   LMP  (LMP Unknown)   SpO2 95%   BMI 45.74 kg/m²    Body mass index is 45.74 kg/m². Physical Exam   Nursing note and vitals reviewed. Constitutional: Oriented to person, place, and time. Appears well-developed and well-nourished. No distress. Head: Normocephalic and atraumatic. Eyes: Conjunctivae and EOM are normal.   Pulmonary/Chest: Effort normal. No respiratory distress.    Neurological: Alert and oriented to person, place, and time. Skin:   Patient with a personal history of skin ulcers. Psychiatric: Normal mood and affect. Behavior is normal  BREAST REDUCTION QUESTIONNAIRE   Height: Height: 5' 6\" (167.6 cm)    Weight: Weight: 283 lb 6.4 oz (128.5 kg)    BMI: Body mass index is 45.74 kg/m². Bra size: 42 G   Desired size: 42 C   Have you tried wearing supportive bras? [ x ] Yes     [  ]   No   Length of time: age 23   Have you tried anti-inflammatory medications? [ x ] Yes    [  ]    No   Length of time: age 30   Do you have upper back, neck or shoulder pain? [x  ] Yes    [  ]    No   Length of time: age 30        Any treatment: Physical therapy/ Diet/ Injections  [ X ] Yes     [  ]   No   Length of time: age 30   Have you ever seen a chiropractor for neck or back pain? [X  ]Yes      [  ]  No   Do you have headaches? [ x ]Yes       [  ] No   Length of time: occasionally        Any treatment:  [  Sandhya Rasheed      [ x ]  No   Do you have shoulder grooving? [x  ]Yes      [  ]  No      Do you have numbness or tingling in hands or arms? [x  ]Yes       [  ] No   Length of time: 3 years   Have you tried weight reduction? [ x ] Yes        [   Anne Marie Lente     [ x  ] Diet       [ x ] Surgery    [   ] Other   Do you experience rashes/ulcers under breasts? [ x ] Yes      [   ]  No           Type of treatment: [  ] Powder       [ x ] Cream     [  Zohra  by Physician:         How long on meds? Has a physician recommended breast reduction to you? [x  ] Yes      [   ]  No           Name of Physician: Zohra Hernandez, DO   Have you ever had X-ray of the spine to check for kyphosis curving of the spine)? [ x  ] Yes          [   ] No        Testing facility: [facility]   How much weight lost? 100 lbs   What therapeutic medication is patient currently taking? CBD oil     BREAST WORKSHEET     Weight: Weight: 283 lb 6.4 oz (128.5 kg)   Height: Height: 5' 6\" (167.6 cm)    BMI: Body mass index is 45.74 kg/m². Marital Status:        [x ] S       [ ] M       [ ] D        [ ] W  How many pregnancies: 4  Children: 3  Planning to breast feed in the future? no    PATIENT HISTORY  History of Breast Cancer:      [ ] Yes       [x ] No      [ ] Right    [ ] Left     [ ] Bilateral  Last Mammogram: 10/2020       Where: [facility]  Family History of Breast Cancer? [x ] Yes        [ ] No     Breast Masses: no      [ ] Right     [ ] Left     Year:  Breast Biopsies: no                 [ ] Right     [ ] Left     Year:  Previous Breast Surgeries:      [ ] Yes       [x ] No       Year:             Type:   Mastectomy:  no      [ ] Right     [ ] Left     Year:  Lumpectomy:  no      [ ] Right     [ ] Left     Year:  Radiation Treatment:       [ ] Yes       [x ] No       Year:               Where? Doctor? Miscellaneous:       [ ] Fibrocystic changes            [ ] Systemic Disease   [ ] Mastalgia                              [x ] Diabetes  Bleeding Problems:        [ ] Yes       [x ] No        Type: Allergies:    Allergies as of 03/12/2021 - Review Complete 03/12/2021   Allergen Reaction Noted    Pcn [penicillins] Hives and Other (See Comments) 08/04/2014    Demerol hcl [meperidine] Other (See Comments) 08/04/2014    Metformin and related  02/13/2020    Tape Hildred Gueydan tape] Other (See Comments) 06/11/2018    Morphine Nausea And Vomiting and Other (See Comments) 08/04/2014     Other:     PHYSICAL EXAM  Chest Wall        [ ] Pectus Deform      [ ] Scoliosis      [ ] Scars      [ ] Venous Stasis  Asymmetries:         [ ] Right      [ ] Left      [x ] Bilateral  Re-Construction       [ ] Right      [ ] Left      [ ] Bilateral  Tubular:Discussed Options:       [ ] Right      [ ] Left      [x ] Bilateral  Striae:          [ ] Right      [ ] Left      [ ] Bilateral  Other:               Problem/Explain:   MEASUREMENTS     Chest Circumference in Inches   Above the breast: 50.0 Chest Circumference in Inches   Bellow the breast: 49.0   Breast Girth (inches)            62.75                                             Sternal Notch to Nipple RIGHT (cm)  50.5 Sternal Notch to Nipple LEFT (cm)  51.5   Nipple to IMF RIGHT (cm)  30.0 Nipple to IMF LEFT (cm)  29.5   Breast Width RIGHT (cm)          29.0                                     Breast Width LEFT (cm)    28.0                                               Current bra size:   Desired bra size       Resection Grams for each Size  32-34 = 100g   36-38 = 200g     42/44 = 300g     44-46 = 400g   BSA GRAMS OF TISSUE TO BE REMOVED PER BREAST    1.40-1.50 218-260   1.51-1.60 261-310   1.61-1.70 311-370   1.71-1.80 371-441   1.81-1.90 442-527   1.91-2.00 009-779   2.01-2.10 629-750   2.11-2.20 826-184   2.21-2.30 [phone]   2.31-2.40 8768-7082   2.41-2.50 7356-5686   2.51-2.60 3155-2804   2.61-2.70 7355-5682   2.7-2.80 5643-3675   2.81-2.90 6348-3492   2.91-3.00 1028-7606     Body surface area is 2.45 meters squared. I discussed with the patient that the patient is responsible for obtaining a mammogram prior to any surgical intervention if they have not had a mammogram within 1 year of the scheduled procedure. Refer to: Patient states that she had her mammogram in October. Breast reduction options discussed:                                             Imaging:   [unfilled]        Impression/Plan:      Diagnosis Orders   1. Macromastia     2. Rash     3.  Skin ulcer, limited to breakdown of skin [facility])       Patient Active Problem List   Diagnosis    Allergic rhinitis    Fibromyalgia    Pure hypercholesterolemia    Chronic cholecystitis    Gastroesophageal reflux disease with esophagitis    Medication monitoring encounter    Type 2 diabetes mellitus with diabetic polyneuropathy, with long-term current use of insulin ([address]    Chronic left shoulder pain    Essential hypertension    Vitamin D deficiency    Fatigue    Diabetic polyneuropathy associated with type 2 diabetes have hypersensitivity which is excess sensitivity of the nipple/areolar skin. Skin contour irregularities may occur after breast reduction surgery. Visible and palpable wrinkling may occur. There may be dimpling. One breast may be smaller than the other. Nipple position and shape will not be identical from one side to the next. Residual skin irregularities at the end of the incisions \" dogears\" are always a possibility when there is resection of excessive skin and redundant tissue. This may improve in time or may need to be surgically corrected. There are some breast asymmetry, that has been pointed out to you in this consultation. Differences in terms of breast and nipple shape, size, or symmetry and may also occur after surgery. Additional surgery may be necessary to revise asymmetry after breast reduction surgery. Unsatisfactory results may also occur there is no guarantee or warrantees expressed or implied as to what type of results that may be obtained. Also, no guarantee is expressed or implied on the size that is obtained. I explained to the patient that each bra company is different and a size in one bra company is not necessarily the same as another. I also explained to her that she may be disappointed with the results of that breast reduction surgery. Asymmetry and the nipple location, unanticipated breast shape and size, loss of function, wound disruption especially in the T-zone where the vertical and horizontal incision come together, poor healing, and loss of sensation or hypersensitivity may occur after her breast reduction surgery. Breast size and contour may not be as it was expected, or as the patient has anticipated. Unsatisfactory surgical scar, location or visible deformities may be presents.   There may be fat necrosis which may feel as hard lumps and spots in the breast.  Lipoplasty may be necessary to thin breast tissue that is outside of the normal surgical location for reduction mammoplasties. This is generally not covered by the insurance. It may be necessary to perform additional surgeries in order to improve the results. I discussed with the patient that we cannot guarantee as size. I discussed the breast asymmetry. I also discussed with the patient we cannot guarantee an upper symmetry. I also discussed with the patient about fat necrosis. Also discussed with the patient regarding the loss of nipple and areolar complex. Also discussed with the patient about changes in sensation in the nipple and areolar complex including hyper or hypersensitivity. Also discussed with the patient regarding wound healing issues. I also discussed with the patient regarding seromas bleeding infection. No guarantees were made as far as relief of pain and discomfort. General surgical risks: This is an elective surgery. You always have the option of no surgery. Seroma- Fluid may accumulate around the tissue expander/implant or surgical sites following surgery, from trauma or vigorous exercise. Additional treatment may be necessary to drain fluid accumulation around tissue expander or implant or surgical site. This may contribute to infection, capsular contracture, or other problems. Bleeding- It is possible, to experience a bleeding episode during or after surgery this can increase if you are on any blood thinners. Should post-operative bleeding occur, it may require emergency treatment to drain accumulated blood or blood transfusion. Intra-operative blood transfusion may also be required. Hematoma may contribute to capsular contracture, infection or other problems. Do not take any aspirin or anti-inflammatory medications for ten days before or after surgery, as this may increase the risk of bleeding.   Also discussed with the patient that if the patient is taking aspirin for medical reasons, we may need to continue this to prevent medical complications patient understands that this will lead to increased bleeding. Non-prescription herbs and dietary supplements can increase the risk of surgical bleeding. Hematoma can occur at any time following injury to the breast. If blood transfusions are necessary to treat blood loss, there is the risk of blood-related infections such as hepatitis and HIV (AIDS). Heparin or other blood thinner medications that are used to prevent blood clots in veins can produce bleeding and decreased blood platelets. List of medications that thin the blood was provided to the patient. Infection- Infections can occur after any type of surgery, it may appear in the immediate post-operative period or at any time following surgery. Subacute or chronic infections may be difficult to diagnose. Should an infection occur, treatment including antibiotics. You may need additional surgery. In extremely rare instances, life-threatening infections, including toxic shock syndrome have been noted after tissue expander/implant/nasal packing or any implantable device or packing. Individuals with an active infection in their body or weakened immune system (currently receiving or have received chemotherapy or drugs to suppress the immune system), may be at greater risk for infection. Surgical Anesthesia- Both local and general anesthesia involved risk. There is the possibility of complications, injury, and even death from all forms of surgical anesthesia or sedation. Scarring- All surgery leaves scars, some more visible than others. Excessive scarring can occur depending on the healing process. Although wound healing after a surgical procedure is expected, abnormal scars may occur within the skin and deeper tissues. Scars may be unattractive and of different color than the surrounding skin tone. Scar appearance may also vary within the same scar. Scars may be asymmetrical (appear different on the right and left side of the body.   There is the possibility of visible marks in the skin from sutures. In some cases, scars may require surgical revision or treatment. Firmness:  Excessive firmness can occur after surgery due to the internal scarring. This can also be due to fat necrosis. This occurrence is not predictable. Additional treatment or surgery may be required to treat this. Allergic Reactions- In some cases, local allergies to tape, suture material and glues, blood products, topical preparations or injected agents have been reported. Serious systemic reactions including shock (anaphylaxis) may occur in response to drugs used during surgery and prescription medicines. Allergic reactions may require additional treatment  Thrombosed Veins- Thrombosed veins, which resemble cords, occasionally develop in the area of the surgery, and resolve without medical or surgical treatment. Unusual Activities and Occupations- Activities and occupations that have the potential for trauma to the area could potentially break or damage any device or cause bleeding/seroma. Pain- You will experience pain after your surgery. Pain of varying intensity and duration may occur and persist after surgery. Pain may be the result of multiple factors including but not limited to internal or external scarring, or sensory nerve entrapment or injury. Chronic pain may occur for various reasons such as from nerves becoming trapped in scar tissue or due to tissue stretching this may be temporary or permanent. Skin Discoloration / Swelling- Some bruising and swelling normally occurs after surgery. The skin in or near the surgical site can appear either lighter or darker than surrounding skin. Although sometimes swelling and skin discoloration may persist for long periods of time and, in some situations, may be permanent. Sutures- Most surgical techniques use deep sutures. You may notice these sutures after your surgery.   Sutures may spontaneously poke through the skin, become visible or produce irritation that requires suture removal.  At times these deep sutures can cause skin infections and he need to be removed. Damage to Deeper Structures- There is the potential for injury to deeper structures including nerves, blood vessels and muscles and lungs (pneumothorax) during any surgical procedure. The potential for this to occur varies according to the type of procedure being performed. Injury to deeper structures may be temporary or permanent. Delayed Healing- Wound disruption or delayed wound healing is possible. Some areas of the skin region may not heal normally and may take a long time to heal.  Areas of skin or tissue may die. This may require frequent dressing changes or further surgery to remove the non-healed tissue. Individuals who have decreased blood supply to any tissue from past surgery, or radiation therapy, medications, or other reasons may be at increased risk for wound healing and poor surgical outcome. Smokers or secondhand smoke places you at a greater risk of skin loss and wound healing complications, and possible cardiopulmonary complications including but not limited to postoperative pneumonia. Cardiac and Pulmonary Complications- Pulmonary complications may occur secondarily to both blood clots (pulmonary emboli), fat deposits (fat emboli) or partial collapse of the lungs after general anesthesia. Pulmonary emboli can be life threatening or fatal in some circumstances. Inactivity and other conditions may increase the incidence of blood clots traveling to the lungs causing a major blood clot that may result in death. It is important to discuss with your physician any past history of swelling in your legs or blood clots that may contribute to this condition. Cardiac complications are a risk with any surgery and anesthesia, even in patients without symptoms. Should any of these complications occur, you may require hospitalization and additional treatment. If you experience after surgery shortness of breath, chest pain, or unusual heart beats, you should have this evaluated immediately  Shock- In rare circumstances, your surgical procedure can cause severe trauma, particularly when multiple or extensive procedures are performed. Although serious complications are infrequent, infections or excessive fluid loss can lead to severe illness and even death. If surgical shock occurs, hospitalization and additional treatment would be necessary. Radiation Therapy- Radiation therapy to the region before or after surgery can produce unacceptable firmness or other long-term complications. Unsatisfactory Result- There is no guarantee or warranty expressed or implied, on the results that may be obtained. You may be disappointed with the results of surgery. Asymmetry, loss of function, wound disruption, poor healing, and loss of sensation may occur after surgery. Unsatisfactory surgical scar location may occur. any type of surgical treatment may fail due to complications attributable to previous or from later chemotherapy/radiation therapy treatments, or other treatments. It may be necessary to perform additional surgery to improve your results. Obesity: If you're BMI is greater than then 30 you have higher chance of surgical complications including but not limited to wound healing problems etc.  Long-Term Results- Subsequent alterations in breast/body shape may occur as the result of aging, sun exposure, weight loss, weight gain, pregnancy, menopause, or other circumstances not related to your surgery. These factors may affect your surgical results. Mental Health Disorders and Elective Surgery- It is important that all patients seeking to undergo elective surgery have realistic expectations that focus on improvement rather than perfection. Complications or less than satisfactory results are sometimes unavoidable, may require additional surgery and often are stressful. Please openly discuss with your surgeon, prior to surgery, any history that you may have of significant emotional depression or mental health disorders. Although many individuals may benefit psychologically from the results of elective surgery, effects on mental health cannot be accurately predicted. Medications- There are potential adverse reactions that occur as the result of taking over the counter, herbal, and/or prescription medications. Be sure to check with your physician about any drug interactions that may exist with medications that you are already taking. If you have an adverse reaction, stop the drugs immediately and call your plastic surgeon for further instructions. If the reaction is severe, go immediately to the nearest emergency room. When taking the prescribed pain medications after surgery, realize that they can affect your thought process and coordination. DO NOT drive, DO NOT  operate complex equipment, DO NOT make any important decisions, and DO NOT drink any alcohol while taking these medications. Be sure to take your prescribed medication only as directed. Smoking, Second-Hand Smoke Exposure, Nicotine Products (Patch, Gum, Nasal Spray):   Patients who are currently smoking, use tobacco products, or nicotine products (patch, gum, or nasal spray) are at a greater risk for significant surgical complications of skin dying and delayed healing. Individuals exposed to second-hand smoke are also at potential risk for similar complications attributable to nicotine exposure. Additionally, smokers may have a significant negative effect on anesthesia and recovery from anesthesia, with coughing and possibly increased bleeding. Individuals who are not exposed to tobacco smoke or nicotine-containing products have a significantly lower risk of this type of complication. Obesity: If you're BMI is greater than 30 you may have a higher chance of complications.   This may include but not limited to wound healing and infections. Also, if you have other medical problems such as diabetes and hypertension it may effect your healing as well as your surgical results in bleeding. ADDITIONAL SURGERY NECESSARY  Many variable conditions may influence the long-term result surgery . It is unknown how your tissue may respond to surgery or how wound healing will occur after surgery. Secondary surgery may be necessary to improve your outcome. Should complications occur, additional surgery or other treatments may be necessary. Other complications and risks can occur but are even more uncommon. The practice of medicine and surgery is not an exact science. There is no guarantee or warranty expressed or implied, on the results that may be obtained. In some situations, it may not be possible to achieve optimal results with a single surgical procedure. PATIENT COMPLIANCE  Discussed the importance of patient's compliance and falling down postoperative instructions carefully; this is essential for the success of your outcome. It is important that the surgical incisions are not subjected to excessive force, swelling, abrasion, or motion during the time of healing. Personal and vocational activity must be restricted. Protective dressings and drains should not be removed unless instructed by me. Successful post-operative function depends on both surgery and subsequent care. Physical activity that increases your pulse or heart rate may cause bruising, swelling, fluid accumulation around implants/surgical site and the need for return to surgery. Physical activity that increases her pulse or heart rate may also cause some disruption of your wound and wound healing issue. It is wise to refrain from intimate physical activities after surgery until your physician states it is safe. It is important that you participate in follow-up care, return for aftercare, and promote your recovery after surgery. Electronically signed by:  Baron Moriah MD 3/12/2021

## 2021-03-12 NOTE — PROGRESS NOTES
BREAST REDUCTION QUESTIONNAIRE   Height: Height: 5' 6\" (167.6 cm)    Weight: Weight: 283 lb 6.4 oz (128.5 kg)    BMI: Body mass index is 45.74 kg/m². Bra size: 42 G   Desired size: 42 C   Have you tried wearing supportive bras? [ x ] Yes     [  ]   No   Length of time: age 23   Have you tried anti-inflammatory medications? [ x ] Yes    [  ]    No   Length of time: age 30   Do you have upper back, neck or shoulder pain? [x  ] Yes    [  ]    No   Length of time: age 30        Any treatment: Physical therapy/ Diet/ Injections  [ X ] Yes     [  ]   No   Length of time: age 30   Have you ever seen a chiropractor for neck or back pain? [X  ]Yes      [  ]  No   Do you have headaches? [ x ]Yes       [  ] No   Length of time: occasionally        Any treatment:  [  Maurie Scot      [ x ]  No   Do you have shoulder grooving? [x  ]Yes      [  ]  No      Do you have numbness or tingling in hands or arms? [x  ]Yes       [  ] No   Length of time: 3 years   Have you tried weight reduction? [ x ] Yes        [   Margaretann Tatyana     [ x  ] Diet       [ x ] Surgery    [   ] Other   Do you experience rashes/ulcers under breasts? [ x ] Yes      [   ]  No           Type of treatment: [  ] Powder       [ x ] Cream     [  Babatunde Justice by Physician:         How long on meds? Has a physician recommended breast reduction to you? [x  ] Yes      [   ]  No           Name of Physician: Marilee Resendiz, DO   Have you ever had X-ray of the spine to check for kyphosis curving of the spine)? [ x  ] Yes          [   ] No        Testing facility: [facility]   How much weight lost? 100 lbs   What therapeutic medication is patient currently taking? CBD oil   What medication is being used for rashes?  Diaper rash cream   Notes:

## 2021-03-12 NOTE — PROGRESS NOTES
BREAST WORKSHEET     Weight: Weight: 283 lb 6.4 oz (128.5 kg)   Height: Height: 5' 6\" (167.6 cm)    BMI: Body mass index is 45.74 kg/m². Marital Status:        [x ] S       [ ] M       [ ] D        [ ] W  How many pregnancies: 4  Children: 3  Planning to breast feed in the future? no    PATIENT HISTORY  History of Breast Cancer:      [ ] Yes       [x ] No      [ ] Right    [ ] Left     [ ] Bilateral  Last Mammogram: 10/2020       Where: [facility]  Family History of Breast Cancer? [x ] Yes        [ ] No     Breast Masses: no      [ ] Right     [ ] Left     Year:  Breast Biopsies: no                 [ ] Right     [ ] Left     Year:  Previous Breast Surgeries:      [ ] Yes       [x ] No       Year:             Type:   Mastectomy:  no      [ ] Right     [ ] Left     Year:  Lumpectomy:  no      [ ] Right     [ ] Left     Year:  Radiation Treatment:       [ ] Yes       [x ] No       Year:               Where? Doctor? Miscellaneous:       [ ] Fibrocystic changes            [ ] Systemic Disease   [ ] Mastalgia                              [x ] Diabetes  Bleeding Problems:        [ ] Yes       [x ] No        Type: Allergies:    Allergies as of 03/12/2021 - Review Complete 03/12/2021   Allergen Reaction Noted    Pcn [penicillins] Hives and Other (See Comments) 08/04/2014    Demerol hcl [meperidine] Other (See Comments) 08/04/2014    Metformin and related  02/13/2020    Tape Martinez Pattee tape] Other (See Comments) 06/11/2018    Morphine Nausea And Vomiting and Other (See Comments) 08/04/2014     Other:     PHYSICAL EXAM  Chest Wall        [ ] Pectus Deform      [ ] Scoliosis      [ ] Scars      [ ] Venous Stasis  Asymmetries:         [ ] Right      [ ] Left      [ ] Bilateral  Re-Construction       [ ] Right      [ ] Left      [ ] Bilateral  Tubular:Discussed Options:       [ ] Right      [ ] Left      [ ] Bilateral  Striae:          [ ] Right      [ ] Left      [ ] Bilateral  Other: NEUROLOGICAL EXAM  Normal:        [ ] Yes       [ ] No           Problem/Explain:   MEASUREMENTS     Chest Circumference in Inches   Above the breast: 50.0 Chest Circumference in Inches   Bellow the breast: 49.0   Breast Girth (inches)            62.75                                             Sternal Notch to Nipple RIGHT (cm)  50.5 Sternal Notch to Nipple LEFT (cm)  51.5   Nipple to IMF RIGHT (cm)  30.0 Nipple to IMF LEFT (cm)  29.5   Breast Width RIGHT (cm)          29.0                                     Breast Width LEFT (cm)    28.0                                               Current bra size:   Desired bra size       Resection Grams for each Size  32-34 = 100g   36-38 = 200g     42/44 = 300g     44-46 = 400g   BSA GRAMS OF TISSUE TO BE REMOVED PER BREAST    1.40-1.50 218-260   1.51-1.60 261-310   1.61-1.70 311-370   1.71-1.80 371-441   1.81-1.90 442-527   1.91-2.00 528-628   2.01-2.10 629-750   2.11-2.20 [phone]   2.21-2.30 [phone]   2.31-2.40 9150-2376   2.41-2.50 2075-3691   2.51-2.60 8924-7728   2.61-2.70 8781-4689   2.7-2.80 4695-1303   2.81-2.90 2602-4600   2.91-3.00 6245-2226       Reconstruction options discussed:   [ ] Tram         [ ] Latissimus   dorsi flap        [ ] Tissue Expanders   [ ]Delayed reconstruction          [ ] Free Flap                 [ ] No Reconstruction    I discussed with the patient that the patient is responsible for obtaining a mammogram prior to any surgical intervention if they have not had a mammogram within 1 year of the scheduled procedure.   Refer to:     Breast reduction options discussed:     [ ] Inferior Pedical                [ ] Superior Pedicle  [ ] Medial Pedicle        [ ] Mastopexy

## 2021-03-14 PROBLEM — N18.5 CHRONIC KIDNEY DISEASE, STAGE 5 (HCC): Status: ACTIVE | Noted: 2021-03-14

## 2021-03-14 PROBLEM — N18.5 CHRONIC KIDNEY DISEASE, STAGE 5 (HCC): Status: RESOLVED | Noted: 2021-03-14 | Resolved: 2021-03-14

## 2021-03-14 ASSESSMENT — ENCOUNTER SYMPTOMS
ABDOMINAL PAIN: 0
DIARRHEA: 0
RESPIRATORY NEGATIVE: 1
BACK PAIN: 1
SHORTNESS OF BREATH: 0
CONSTIPATION: 0
NAUSEA: 0
COUGH: 0

## 2021-03-15 ENCOUNTER — TELEPHONE (OUTPATIENT)
Dept: PAIN MANAGEMENT | Age: 56
End: 2021-03-15

## 2021-03-15 DIAGNOSIS — M47.816 FACET DEGENERATION OF LUMBAR REGION: ICD-10-CM

## 2021-03-15 NOTE — PATIENT INSTRUCTIONS
[facility] COVID-19 Vaccination Hotline: [phone]    COVID-19 vaccine appointments are not available through our practice. As you're eligible to receive the COVID-19 vaccine, as determined by your state's department of health, you will be able to schedule an appointment through [address] or by calling [phone]. Appointments are required to receive the COVID-19 vaccine. As vaccine supply continues to be limited, we anticipate open appointments to fill up quickly and appreciate your patience as we work through the process of providing vaccines to those in our communities. Schedule a Vaccine  When you qualify to receive the vaccine, call the [facility] COVID-19 Vaccination Hotline to schedule your appointment or to get additional information about the [facility] locations which are offering the COVID-19 vaccine. To be 94% effective, it's important that you receive two doses of one of the COVID-19 vaccines. -If you are receiving the [place] vaccine, your second shot will be scheduled as close to 21 days after the first shot as possible. -If you are receiving the Moderna vaccine, your second shot will be scheduled as close to 28 days after the first shot as possible. Links to [facility]) website and [facility] website:    [employer]/mercy-[facility]-[email or web address]    [email or web address]    [employer]  [email or web address]://[email or web address] [email or web address]/view/[email or web address]/home/vaccines/get-[email or web address]  [email or web address]. [email or web address]_On-Call_List      [email or web address]?:toolbar=[email or web address]&:display_count=n&:origin=viz_share_link&:embed=y    PHARMACY LOCATIONS  Https://vaccine. coronavirus. [place].gov/    [facility]  Https://[email or web address]?:toolbar=n&:display_count=n&:origin=viz_share_link&:embed=y    [facility] [email or web address]?:toolbar=n&:display_count=n&:origin=Taxizu_share_link&:embed=y    [address]  [email or web address]?:toolbar=n&:display_count=n&:origin=Antenna_link&:embed=y    [address]  [email or web address]?:toolbar=n&:display_count=n&:origin=Taxizu_share_link&:embed=y

## 2021-03-15 NOTE — PROGRESS NOTES
[facility] PHYSICIANS  [facility] PHYSICIANS [facility]  [address]  [address] [address]  Dept: [phone]     3/16/2021   Chief Complaint   Patient presents with    Hypertension    Diabetes     HPI  Kitty Singer (:  1965) is a 49 y.o. female is an established patient . Patient has a history of hypertension, diabetes, hypercholesteremia, nonseasonal allergic rhinitis, multiple arthritis, micromastia, obesity, and hepatitis B vaccination today. MACROMASTIA  Patient with macromastia. Patient is causing some chronic mid back and low back pain. She is being seen by surgeon and is scheduled for breast reduction with Dr. Macy Cruz. Patient is very satisfied with the visit with the surgeon and is looking forward to have the surgery. Surgery is set up for May. HYPERTENSION  Kitty Singer has a well controlled hypertension. she is currently on lisinopril and hydrochlorothiazide. Patient's most recent BP in the office was stable. she reports stable BP readings at home. Patient denies any adverse reaction to this therapy. she denies any CP, SOB, HA, or palpitations. Patient admits to exercising and adheres to low salt diet. No history of organ damage due to condition noted. Lab Results   Component Value Date/Time    CREATININE 0.74 2020 07:30 AM     DIABETES MELLITUS  Patient has a  unstable Diabetes Mellitus. Current therapy includes Tresiba, glimepiride, and Humalog. Patient is responding poorly with this therapy. Will discuss changes. Patient reports home glucose monitoring as Unstable readings. Patient denieshypoglycemia episodes such as{symptoms. Patient admits to neuropathy. Patient was sent to Dr. Supriya Pereira. Patient was kept on the same tresiba and Humolog on sliding scale. Sugar kept going up, eat more, reading high at midnight. 10 minutes,  1 pm 2 glimeperide, 25 units tresiba, 30 units humolog.  After an hour, it says high, humolog, 30 mins later- 552 mg/dl. Adjust humolog? Eye Exam: recent  Foot Exam:recent/podiatry/dm shoes  Lab Results   Component Value Date/Time    LABA1C 10.3 02/22/2021    LABA1C 11.1 (H) 10/05/2020 01:30 PM     55- 60 Tresiba,- Humolog 30-40-30- Sliding scale    HYPERLIPIDEMIA  Gregorio Puente is currently on atorvastatin (Lipitor). Patient denies adverse reaction to this medication. Compliance with treatment thus far has been good. The patient is not known to have coexisting coronary artery disease. The 10-year CVD risk score (CINDI'Agostino, et al., 2008) is: 8.9%    Values used to calculate the score:      Age: 49 years      Sex: Female      Diabetic: Yes      Tobacco smoker: No      Systolic Blood Pressure: 570 mmHg      Is BP treated: Yes      HDL Cholesterol: 58 mg/dL      Total Cholesterol: 159 mg/dL  Lab Results   Component Value Date/Time    CHOLFAST 166 08/29/2020 07:30 AM    CHOL 159 10/07/2020 09:05 AM    CHOL 181 03/19/2015 08:25 AM    HDL 58 10/07/2020 09:05 AM    LDLCHOLESTEROL 74 10/07/2020 09:05 AM    TRIG 134 10/07/2020 09:05 AM    CHOLHDLRATIO 2.7 10/07/2020 09:05 AM    VLDL NOT REPORTED 10/07/2020 09:05 AM     MULTIPLE ARTHRITIS/LUBAR RADICULOPATHY/FIBROMYALGIA  Patient has a known chronic low back pain for several years , Pain is worse after just 5 minutes of standing up when doing dishes. The pain is located midline and radiates up or down into the buttocks and thighs at times. Intensity of pain is 8/10, down to 5/10 when sitting. Patient also has known General muscle aches which is also chronic. Doing PT and TENS unit.  Taking tizanidine at nighttime helps a little. She is also on Meloxicam,Tramadol  Patient was referred to ortho and pain management. She started seeing Dr. Roxie Rod. SHe had several epidural injection. Patient is concerned about steroid raising her glucose levels. Injections are helping but not taking away all the pain. Patient also had shoulder injections by Dr. Jace Gibbs.    Steroid adult [facility])    Numbness and tingling in both hands    Chronic bilateral low back pain without sciatica    Acute pain of both shoulders    Severe sleep apnea    Diabetes mellitus due to underlying condition with hyperosmolarity without coma, with long-term current use of insulin ([facility])    Facet degeneration of lumbar region    Decreased functional residual capacity    Lumbosacral spondylosis without myelopathy    Macromastia    Skin ulcer, limited to breakdown of skin ([address]      Past Medical History:   Diagnosis Date    Allergic rhinitis 10/21/2014    Anxiety     Arthritis     Bleeds easily ([address]     per pt, has never had a work up   [facility] Complete rotator cuff tear of left shoulder 2018    Diabetes mellitus ([address]     On Insulin, metformin, Glipizide    Diabetic polyneuropathy associated with type 2 diabetes mellitus ([address] 3/26/2018    Facet degeneration of lumbar region 2/18/2021    Fibromyalgia     GERD (gastroesophageal reflux disease) 10/21/2014    H/O transfusion     History of blood transfusion     Hypertension     Obesity 10/21/2014    Pure hypercholesterolemia 2/23/2016    Tobacco abuse     Type II or unspecified type diabetes mellitus without mention of complication, not stated as uncontrolled     Unspecified sleep apnea     CPAP machine is broken    Wears glasses       Past Surgical History:   Procedure Laterality Date    BRONCHOSCOPY  9/27/2017    BRONCHOSCOPY BRUSHINGS performed by Aquiles Gonzalez MD at [facility] Right 2002    CHOLECYSTECTOMY N/A 9/29/2017    CHOLECYSTECTOMY OPEN performed by Carissa Toro DO at [address] [place] [address] N/A 2/21/2018    COLONOSCOPY POLYPECTOMY SNARE/COLD BIOPSY performed by Reza Mcwilliams MD at [address]    with revision x1    LARYNGOSCOPY N/A 9/29/2017    DIRECT MICRO LARYNGOSCOPY WITH EXCISION VOCAL CORD LESION  performed by Frederic Dorado MD at [address] [facility] REMOVAL Right 1988    ND EGD TRANSORAL BIOPSY SINGLE/MULTIPLE N/A 9/27/2017    EGD BIOPSY performed by Guera Caldwell MD at [address] ARTHROSCOP,SURG,W/ROTAT CUFF REPR Left 6/19/2018    SHOULDER ARTHROSCOPY WITH ROTATOR CUFF REPAIR, SUBACROMIAL DECOMPRESSION performed by Nati Noriega MD at [address] Left     SHOULDER ARTHROSCOPY WITH ROTATOR CUFF REPAIR, SUBACROMIAL DECOMPRESSION (Left      Family History   Problem Relation Age of Onset    Diabetes Mother     Heart Disease Mother         murmur    Heart Disease Father         MVP    Diabetes Sister     Diabetes Maternal Grandmother     High Blood Pressure Maternal Grandmother     Heart Failure Maternal Grandfather     Heart Attack Paternal Grandmother      Current Outpatient Medications   Medication Sig Dispense Refill    fexofenadine (ALLEGRA) 180 MG tablet Take 1 tablet by mouth daily 90 tablet 2    hydroCHLOROthiazide (HYDRODIURIL) 25 MG tablet TAKE ONE TABLET BY MOUTH DAILY WITH POTASSIUM SUPPLEMENT 90 tablet 2    potassium chloride (KLOR-CON) 10 MEQ extended release tablet TAKE ONE TABLET BY MOUTH DAILY WITH HYDROCHLOROTHIAZIDE 90 tablet 2    lidocaine 4 % external patch Place 1 patch onto the skin daily for 7 days 7 patch 0    ondansetron (ZOFRAN-ODT) 4 MG disintegrating tablet Take 1 tablet by mouth 3 times daily as needed for Nausea or Vomiting 30 tablet 3    busPIRone (BUSPAR) 5 MG tablet TAKE ONE TABLET BY MOUTH THREE TIMES A DAY AS NEEDED FOR ANXIETY 90 tablet 3    Easy Touch Lancets 33G/Twist MISC USE TWO TO THREE TIMES DAILY 90 each 2    lisinopril (PRINIVIL;ZESTRIL) 20 MG tablet TAKE ONE TABLET BY MOUTH DAILY 87 tablet 3    glipiZIDE (GLUCOTROL) 5 MG tablet TAKE ONE TABLET BY MOUTH TWICE A DAY BEFORE BIGGEST MEAL 174 tablet 3    Handicap Placard MISC by Does not apply route Prescription good for 5 Years  Expires January 2026 1 each 0    meloxicam (MOBIC) 15 MG tablet Take 1 tablet by mouth daily 90 tablet 2    Insulin Degludec (TRESIBA FLEXTOUCH) 100 UNIT/ML SOPN Inject 45 Units into the skin 2 times daily      tiZANidine (ZANAFLEX) 4 MG tablet TAKE ONE TABLET BY MOUTH EVERY 8 HOURS AS NEEDED FOR NECK AND BACK PAIN. **CAUSES SEDATION AND DO NOT DRIVE WHILE TAKING THIS MEDICATION 90 tablet 0    Blood Glucose Monitoring Suppl (TRUE METRIX METER) MANOLO Testing BID 1 Device 0    Insulin Pen Needle (PEN NEEDLES 3/16\") 31G X 5 MM MISC 1 each by Does not apply route daily 100 each 3    insulin lispro, 1 Unit Dial, (HUMALOG KWIKPEN) 100 UNIT/ML SOPN Inject 15 Units into the skin 3 times daily (before meals) 5 pen 1    Insulin Syringes, Disposable, U-100 1 ML MISC 1 each by Does not apply route daily 100 each 3    blood glucose monitor strips Test 2-3 times a day & as needed for symptoms of irregular blood glucose. BRAND OF CHOICE INSURANCE ALLOWS. 100 strip 11    atorvastatin (LIPITOR) 10 MG tablet Take 1 tablet by mouth daily 90 tablet 2    Alcohol Swabs (ALCOHOL PREP) PADS Use as directed 100 each 11    pregabalin (LYRICA) 100 MG capsule Take 1 capsule by mouth 2 times daily for 90 days. 180 capsule 2    vitamin D (CHOLECALCIFEROL) 25 MCG (1000 UT) TABS tablet Take 1 tablet by mouth daily 30 tablet 3     Current Facility-Administered Medications   Medication Dose Route Frequency Provider Last Rate Last Admin    methylPREDNISolone acetate (DEPO-MEDROL) injection 80 mg  80 mg Intra-articular Once [facility], DO        bupivacaine (MARCAINE) 0.25 % injection 5 mg  2 mL Intra-articular Once [facility], DO         Review of Systems   Constitutional: Positive for activity change and unexpected weight change (gaining weight). Negative for chills, fatigue and fever. HENT: Negative. Negative for congestion and hearing loss. Eyes: Negative for visual disturbance. Respiratory: Negative. Negative for cough and shortness of breath. Cardiovascular: Negative.   Negative for chest pain and palpitations. Gastrointestinal: Negative for abdominal pain, constipation, diarrhea and nausea. Endocrine: Negative. Negative for cold intolerance. Genitourinary: Negative for dysuria and enuresis. Musculoskeletal: Positive for arthralgias, back pain, gait problem and joint swelling. Negative for myalgias. Back pain, shoulder pains, large breasts G size   Skin: Negative for rash. Allergic/Immunologic: Positive for environmental allergies. Neurological: Positive for numbness (both feet). Negative for weakness, light-headedness and headaches. Hematological: Does not bruise/bleed easily. Psychiatric/Behavioral: Negative for sleep disturbance and suicidal ideas. The patient is nervous/anxious. Prior to Visit Medications    Medication Sig Taking?  Authorizing Provider   fexofenadine (ALLEGRA) 180 MG tablet Take 1 tablet by mouth daily Yes KIMANI Haas CNP   hydroCHLOROthiazide (HYDRODIURIL) 25 MG tablet TAKE ONE TABLET BY MOUTH DAILY WITH POTASSIUM SUPPLEMENT Yes KIMANI Haas CNP   potassium chloride (KLOR-CON) 10 MEQ extended release tablet TAKE ONE TABLET BY MOUTH DAILY WITH HYDROCHLOROTHIAZIDE Yes KIMANI Haas CNP   lidocaine 4 % external patch Place 1 patch onto the skin daily for 7 days Yes KIMANI Haas CNP   ondansetron (ZOFRAN-ODT) 4 MG disintegrating tablet Take 1 tablet by mouth 3 times daily as needed for Nausea or Vomiting Yes Michelle Walls MD   busPIRone (BUSPAR) 5 MG tablet TAKE ONE TABLET BY MOUTH THREE TIMES A DAY AS NEEDED FOR ANXIETY Yes KIMANI Haas CNP   Easy Touch Lancets 33G/Twist MISC USE TWO TO THREE TIMES DAILY Yes KIMANI Haas CNP   lisinopril (PRINIVIL;ZESTRIL) 20 MG tablet TAKE ONE TABLET BY MOUTH DAILY Yes KIMANI Haas CNP   glipiZIDE (GLUCOTROL) 5 MG tablet TAKE ONE TABLET BY MOUTH TWICE A DAY BEFORE BIGGEST MEAL Yes KIMANI Haas CNP   Handicap Placard MISC by Does not apply route Prescription good for 5 Years  Expires January 2026 Yes KIMANI Haas CNP   meloxicam (MOBIC) 15 MG tablet Take 1 tablet by mouth daily Yes KIMANI Haas CNP   Insulin Degludec (TRESIBA FLEXTOUCH) 100 UNIT/ML SOPN Inject 45 Units into the skin 2 times daily Yes Historical Provider, MD   tiZANidine (ZANAFLEX) 4 MG tablet TAKE ONE TABLET BY MOUTH EVERY 8 HOURS AS NEEDED FOR NECK AND BACK PAIN. **CAUSES SEDATION AND DO NOT DRIVE WHILE TAKING THIS MEDICATION Yes KIMANI Haas CNP   Blood Glucose Monitoring Suppl (TRUE METRIX METER) MANOLO Testing BID Yes KIMANI Haas CNP   Insulin Pen Needle (PEN NEEDLES 3/16\") 31G X 5 MM MISC 1 each by Does not apply route daily Yes KIMANI Haas CNP   insulin lispro, 1 Unit Dial, (HUMALOG KWIKPEN) 100 UNIT/ML SOPN Inject 15 Units into the skin 3 times daily (before meals) Yes KIMANI Haas CNP   Insulin Syringes, Disposable, U-100 1 ML MISC 1 each by Does not apply route daily Yes KIMANI Haas CNP   blood glucose monitor strips Test 2-3 times a day & as needed for symptoms of irregular blood glucose. BRAND OF CHOICE INSURANCE ALLOWS. Yes Rochelle Alcaraz MD   atorvastatin (LIPITOR) 10 MG tablet Take 1 tablet by mouth daily Yes KIMANI Haas CNP   Alcohol Swabs (ALCOHOL PREP) PADS Use as directed Yes KIMANI Haas CNP   pregabalin (LYRICA) 100 MG capsule Take 1 capsule by mouth 2 times daily for 90 days. KIMANI Haas CNP   vitamin D (CHOLECALCIFEROL) 25 MCG (1000 UT) TABS tablet Take 1 tablet by mouth daily  KIMANI Haas CNP   hydroCHLOROthiazide (HYDRODIURIL) 25 MG tablet Take 1 tablet by mouth daily Take with Potassium supplement  KIMANI Haas CNP   Lancets MISC Dx: DM-2.  Use 2-3 times daily  Renella A Gauamis, APRN - CNP      Social History     Tobacco Use    Smoking status: Former Smoker Packs/day: 2.00     Years: 34.00     Pack years: 68.00     Types: Cigarettes     Start date: 28     Quit date: 9/28/2017     Years since quitting: 3.4    Smokeless tobacco: Never Used   Substance Use Topics    Alcohol use: Yes     Alcohol/week: 0.0 standard drinks     Frequency: Monthly or less     Drinks per session: 1 or 2     Comment: rare      Vitals:    03/16/21 1258   BP: 122/84   Pulse: 88   Temp: 98.3 °F (36.8 °C)   TempSrc: Temporal   SpO2: 95%   Weight: 283 lb (128.4 kg)   Height: 5' 6\" (1.676 m)     Estimated body mass index is 45.68 kg/m² as calculated from the following:    Height as of this encounter: 5' 6\" (1.676 m). Weight as of this encounter: 283 lb (128.4 kg). Physical Exam  ASSESSMENT/PLAN:  1. Essential hypertension  Stable  Continue current therapy. DISCUSSED AND ADVISED TO:  Cut down on your salt intake. Cut down on caffeinated drinks, sports drinks. Instructed to check BP at home regularly. Report for any chest pains, shortness of breath, headaches, and lightheadedness. Call the office if your blood pressure continue to be higher than 140/90 or 90/50.      - hydroCHLOROthiazide (HYDRODIURIL) 25 MG tablet; TAKE ONE TABLET BY MOUTH DAILY WITH POTASSIUM SUPPLEMENT  Dispense: 90 tablet; Refill: 2  - potassium chloride (KLOR-CON) 10 MEQ extended release tablet; TAKE ONE TABLET BY MOUTH DAILY WITH HYDROCHLOROTHIAZIDE  Dispense: 90 tablet; Refill: 2  - AFL - Mike Soriano MD, Cardiology, [place]    2. Type 2 diabetes mellitus with diabetic polyneuropathy, with long-term current use of insulin (HCC)  Unstable  Continue therapy. We will continue to monitor Hemoglobin A1c   DISCUSSED and ADVISED TO:  Continue to check Glucose levels at home. Report increased and low levels as discussed. Decrease carbohydrates, sugary drinks, desserts in your diet. Exercise regularly, as tolerated. Try to lose weight.         3. Pure hypercholesterolemia (ASCVD score 21.7% July 2020)  Stable  Continue therapy. Advised to decrease the consumption of red meats, fried foods, trans fats, sweets, sugary beverages. Advised to increase fish, vegetables, and fruits consumption. Advised to add fiber or OTC supplements in diet. Discussed weight loss which will result in improvement of lipids levels. Advised to increase daily physical activities and add regular exercises. - Addi De La Torre MD, Cardiology, [place]    4. Non-seasonal allergic rhinitis due to other allergic trigger  Stable  Continue with the same therapy   ADVISED TO:  Avoid known allergens/irritants. Stop smoking or avoid second hand smoke. Stay hydrated. Report for worsening symptoms    - fexofenadine (ALLEGRA) 180 MG tablet; Take 1 tablet by mouth daily  Dispense: 90 tablet; Refill: 2    5. Arthritis involving multiple sites  Failure to Improve  Continue current therapy. DISCUSSED and ADVISED TO:  Stay at a healthy weight. Continue exercises/PT  Stretch to help prevent stiffness and to prevent injury before exercise. Gentle forms of yoga help keep joints and muscles flexible. Walk instead of jog, ride a bike, swim, and water exercise. Lift weights as tolerated. strong muscles help reduce stress on joints. Take pain medicines exactly as directed and only as needed. - lidocaine 4 % external patch; Place 1 patch onto the skin daily for 7 days  Dispense: 7 patch; Refill: 0    6. Macromastia  Failure to Improve  Scheduled for reduction    7. Morbid obesity with BMI of 45.0-49.9, adult (HCC)  Stable  BMI increasing  DISCUSSED AND ADVISED TO:  Eat a low-fat and low carbohydrates diet. Avoid fried foods especially fast food. Choose healthier options for snacks. Have 5-6 servings of fruits and vegetables per day. Cut down on eating processed food. Add 30 minutes to 1 hour aerobic exercise for 3-4 days a week. 8. Need for hepatitis B vaccination  Recommended by CDC. No current infection.   Denies previous adverse reaction to vaccination.      - Hep B Vaccine Adult (ENGERIX B)     Controlled Substance Monitoring:  Acute and Chronic Pain Monitoring:   RX Monitoring 3/16/2021   Attestation -   Periodic Controlled Substance Monitoring Potential drug abuse or diversion identified, see note documentation. Chronic Pain > 50 MEDD -     Orders Placed This Encounter   Procedures    Hep B Vaccine Adult (ENGERIX B)    AFL - Salo Rodriguez MD, Cardiology, [place]     Referral Priority:   Routine     Referral Type:   Eval and Treat     Referral Reason:   Specialty Services Required     Referred to Provider:   Criselda Pitts MD     Requested Specialty:   Cardiology     Number of Visits Requested:   1     Orders Placed This Encounter   Medications    fexofenadine (ALLEGRA) 180 MG tablet     Sig: Take 1 tablet by mouth daily     Dispense:  90 tablet     Refill:  2    hydroCHLOROthiazide (HYDRODIURIL) 25 MG tablet     Sig: TAKE ONE TABLET BY MOUTH DAILY WITH POTASSIUM SUPPLEMENT     Dispense:  90 tablet     Refill:  2    potassium chloride (KLOR-CON) 10 MEQ extended release tablet     Sig: TAKE ONE TABLET BY MOUTH DAILY WITH HYDROCHLOROTHIAZIDE     Dispense:  90 tablet     Refill:  2    lidocaine 4 % external patch     Sig: Place 1 patch onto the skin daily for 7 days     Dispense:  7 patch     Refill:  0      Medications Discontinued During This Encounter   Medication Reason    cetirizine (ZYRTEC) 10 MG tablet Therapy completed    lidocaine 4 % external patch REORDER    potassium chloride (KLOR-CON) 10 MEQ extended release tablet REORDER    hydroCHLOROthiazide (HYDRODIURIL) 25 MG tablet REORDER      Health Maintenance Due   Topic Date Due    COVID-19 Vaccine (1) Never done    Colon cancer screen colonoscopy  02/21/2021      Return in about 2 months (around 5/16/2021) for pre op.   Shereen Mahmood received counseling on the following healthy behaviors: nutrition, exercise and medication adherence  Reviewed prior labs and health maintenance  Continue current medications, diet and exercise. Discussed use, benefit, and side effects of prescribed medications. Barriers to medication compliance addressed. Patient given educational materials - see patient instructions  Was a self-tracking handout given in paper form or via [email or web address]? Yes    Requested Prescriptions     Signed Prescriptions Disp Refills    fexofenadine (ALLEGRA) 180 MG tablet 90 tablet 2     Sig: Take 1 tablet by mouth daily    hydroCHLOROthiazide (HYDRODIURIL) 25 MG tablet 90 tablet 2     Sig: TAKE ONE TABLET BY MOUTH DAILY WITH POTASSIUM SUPPLEMENT    potassium chloride (KLOR-CON) 10 MEQ extended release tablet 90 tablet 2     Sig: TAKE ONE TABLET BY MOUTH DAILY WITH HYDROCHLOROTHIAZIDE    lidocaine 4 % external patch 7 patch 0     Sig: Place 1 patch onto the skin daily for 7 days       All patient questions answered. Patient voiced understanding. Quality Measures    Body mass index is 45.68 kg/m². Elevated. Weight control planned discussed Healthy diet and regular exercise. BP: 122/84 Blood pressure is normal. Treatment plan consists of No treatment change needed. Lab Results   Component Value Date    [address] 110 03/19/2015    LDLCHOLESTEROL 74 10/07/2020    (goal LDL reduction with dx if diabetes is 50% LDL reduction)      PHQ Scores 3/16/2021 11/10/2020 10/5/2020 2/13/2020 4/16/2019 10/3/2018 11/10/2017   PHQ2 Score 2 0 0 0 0 6 4   PHQ9 Score 2 0 0 0 0 25 18     Interpretation of Total Score Depression Severity: 1-4 = Minimal depression, 5-9 = Mild depression, 10-14 = Moderate depression, 15-19 = Moderately severe depression, 20-27 = Severe depression   This note was completed by using the assistance of a speech-recognition program. However, inadvertent computerized transcription errors may be present.  Although every effort was made to ensure accuracy, no guarantees can be provided that every mistake has been identified and corrected by editing   An electronic signature was used to

## 2021-03-16 ENCOUNTER — HOSPITAL ENCOUNTER (OUTPATIENT)
Dept: PHYSICAL THERAPY | Age: 56
Setting detail: THERAPIES SERIES
Discharge: HOME | End: 2021-03-16
Payer: COMMERCIAL

## 2021-03-16 ENCOUNTER — OFFICE VISIT (OUTPATIENT)
Dept: FAMILY MEDICINE CLINIC | Age: 56
End: 2021-03-16
Payer: COMMERCIAL

## 2021-03-16 VITALS
WEIGHT: 283 LBS | OXYGEN SATURATION: 95 % | SYSTOLIC BLOOD PRESSURE: 122 MMHG | DIASTOLIC BLOOD PRESSURE: 84 MMHG | TEMPERATURE: 98.3 F | BODY MASS INDEX: 45.48 KG/M2 | HEART RATE: 88 BPM | HEIGHT: 66 IN

## 2021-03-16 DIAGNOSIS — I10 ESSENTIAL HYPERTENSION: Primary | ICD-10-CM

## 2021-03-16 DIAGNOSIS — N62 MACROMASTIA: ICD-10-CM

## 2021-03-16 DIAGNOSIS — Z79.4 TYPE 2 DIABETES MELLITUS WITH DIABETIC POLYNEUROPATHY, WITH LONG-TERM CURRENT USE OF INSULIN (HCC): ICD-10-CM

## 2021-03-16 DIAGNOSIS — E66.01 MORBID OBESITY WITH BMI OF 45.0-49.9, ADULT (HCC): ICD-10-CM

## 2021-03-16 DIAGNOSIS — Z23 NEED FOR HEPATITIS B VACCINATION: ICD-10-CM

## 2021-03-16 DIAGNOSIS — M12.9 ARTHRITIS INVOLVING MULTIPLE SITES: ICD-10-CM

## 2021-03-16 DIAGNOSIS — E78.00 PURE HYPERCHOLESTEROLEMIA: ICD-10-CM

## 2021-03-16 DIAGNOSIS — J30.89 NON-SEASONAL ALLERGIC RHINITIS DUE TO OTHER ALLERGIC TRIGGER: ICD-10-CM

## 2021-03-16 DIAGNOSIS — E11.42 TYPE 2 DIABETES MELLITUS WITH DIABETIC POLYNEUROPATHY, WITH LONG-TERM CURRENT USE OF INSULIN (HCC): ICD-10-CM

## 2021-03-16 PROCEDURE — G8417 CALC BMI ABV UP PARAM F/U: HCPCS | Performed by: FAMILY MEDICINE

## 2021-03-16 PROCEDURE — 97113 AQUATIC THERAPY/EXERCISES: CPT

## 2021-03-16 PROCEDURE — 99214 OFFICE O/P EST MOD 30 MIN: CPT | Performed by: FAMILY MEDICINE

## 2021-03-16 PROCEDURE — 2022F DILAT RTA XM EVC RTNOPTHY: CPT | Performed by: FAMILY MEDICINE

## 2021-03-16 PROCEDURE — 1036F TOBACCO NON-USER: CPT | Performed by: FAMILY MEDICINE

## 2021-03-16 PROCEDURE — G8482 FLU IMMUNIZE ORDER/ADMIN: HCPCS | Performed by: FAMILY MEDICINE

## 2021-03-16 PROCEDURE — 3046F HEMOGLOBIN A1C LEVEL >9.0%: CPT | Performed by: FAMILY MEDICINE

## 2021-03-16 PROCEDURE — G8427 DOCREV CUR MEDS BY ELIG CLIN: HCPCS | Performed by: FAMILY MEDICINE

## 2021-03-16 PROCEDURE — 3017F COLORECTAL CA SCREEN DOC REV: CPT | Performed by: FAMILY MEDICINE

## 2021-03-16 PROCEDURE — 90746 HEPB VACCINE 3 DOSE ADULT IM: CPT | Performed by: FAMILY MEDICINE

## 2021-03-16 RX ORDER — HYDROCHLOROTHIAZIDE 25 MG/1
TABLET ORAL
Qty: 90 TABLET | Refills: 2 | Status: SHIPPED | OUTPATIENT
Start: 2021-03-16 | End: 2021-06-10 | Stop reason: SDUPTHER

## 2021-03-16 RX ORDER — LIDOCAINE 4 G/G
1 PATCH TOPICAL DAILY
Qty: 7 PATCH | Refills: 0 | Status: SHIPPED | OUTPATIENT
Start: 2021-03-16 | End: 2021-03-23

## 2021-03-16 RX ORDER — POTASSIUM CHLORIDE 750 MG/1
TABLET, FILM COATED, EXTENDED RELEASE ORAL
Qty: 90 TABLET | Refills: 2 | Status: SHIPPED | OUTPATIENT
Start: 2021-03-16 | End: 2021-12-02 | Stop reason: SDUPTHER

## 2021-03-16 RX ORDER — FEXOFENADINE HCL 180 MG/1
180 TABLET ORAL DAILY
Qty: 90 TABLET | Refills: 2 | Status: SHIPPED | OUTPATIENT
Start: 2021-03-16 | End: 2021-06-10 | Stop reason: SDUPTHER

## 2021-03-16 ASSESSMENT — PATIENT HEALTH QUESTIONNAIRE - PHQ9
SUM OF ALL RESPONSES TO PHQ QUESTIONS 1-9: 2
SUM OF ALL RESPONSES TO PHQ QUESTIONS 1-9: 2
2. FEELING DOWN, DEPRESSED OR HOPELESS: 1
SUM OF ALL RESPONSES TO PHQ9 QUESTIONS 1 & 2: 2

## 2021-03-16 NOTE — FLOWSHEET NOTE
[facility]   [address]   Phone: [phone]   Fax: [phone]    Physical Therapy Daily Treatment Note    Date:  3/16/2021  Patient Name:  Alessandro Phillips    :  1965  MRN: [id number]  Physician: [id number]: [address] [place] [address] Diagnosis: DDD lumbar                       Rehab Codes: M51.36  Onset Date:  2020    Next  appt.: N/A  Visit# / total visits:  (Correct count)  Cancels/No Shows: 0/1    Subjective:   Pt reports got an injection on 3/11 and patient is able to remain active for up to 20 minutes- which is up from 6-7 minutes. States pain still at 6/10 in lumbar back. Notes some aching in left hip since injection. Pain:  [x] Yes  [] No Location: Lumbar back Pain Rating: (0-10 scale) 6/10  Pain altered Tx:  [x] No  [] Yes  Action:  Comments:       Objective:      [address] [facility] J Exercise Log  Aquatic, Hip & DLS Program- Phase 1    Date of Eval:                                Primary PT:  Diagnosis: Things to Focus On (goals):   Surgical Precautions:  Medical Precautions:  [] C-9 dates  [] Occ Med   [] Medicare       Date 3/1/21 3/8/21 3/9/21 3/16/21   Visit #    Walk F/L/R 2 Laps 2 Laps 2 Laps 2 Laps   Marching 2 Laps 2 Laps 2 Laps 2 Laps   Squats Small 15x5\" Low 15x5\" Low 15x5\" Tall 15x5\"   Step-Ups F/L Tall 15x Tall 15x Tall 15x Tall 15x   Heel-toe raises 15x 15x 15x 15x   SLR F/L/R 15x 15x 15x 15x   Knee/Flex/Ext 15x 15x 15x 15x   F/L Lunges       Kickboard Ex. Lg Lg Lg Lg   Iso Abd. 15x5\" 15x5\" 15x5\" 15x5\"   Push-pull 15x 15x 15x 15x   Paddling 15x 15x 15x 15x          UE Format       Horz.  Abd 10x 10x 10x 10x   Abd/Add 7x high pain 10x 10x 10x   Flex/Ext N/T 10x 10x 10x                 Stretches       Achllies 2x20\" 2x20\" 2x20\" 2x20\"   Hamstring 2x20\" 2x20\" 2x20\" 2x20\"          Deep water  1 Noodle 1 noodle 1 Noodle 1 Noodle   cycling 2' 2' 2' 2'   jacks 2' 2' 2' 2'   Cross-country 2' 2' 2' 2'   Hang 3' 5' 5' 5'          Pain Rating 7 7 7 6     Specific Instructions for next treatment: Add Small buoyancy cuffs next visit. Assessment: [] Progressing toward goals. [x] No change. No changes made today. Patient's 1st visit after receiving injection and did not want to over-do-it. Continued with cueing for postural awareness throughout. Tolerated all exercises well no pain noted. [] Other:    [] Patient would continue to benefit from skilled physical therapy services in order to:     STG: (to be met in 6 treatments)  1. ? Pain: lesson pain levels 3/10  2. ? ROM:AROM of lumbar spine to 50% of normal.   3. ? Strength:Lumbar spine strength 2+/5  4. ? Function: OPTIMAL score of 9/3  5. Independent with Home Exercise Programs  6. Demonstrate Knowledge of fall prevention  LTG: (to be met in 12 treatments)   1. OPTIMAL score of 6/3. Pt. Education:  [x] Yes  [] No  [] Reviewed Prior HEP/Ed  Method of Education: [x] Verbal  [x] Demo  [] Written  Comprehension of Education:  [x] Verbalizes understanding. [x] Demonstrates understanding. [] Needs review. [] Demonstrates/verbalizes HEP/Ed previously given. Plan: [x] Continue per plan of care.     [] Other:       Treatment Charges: Mins Units   []  Modalities     []  Ther Exercise     []  Manual Therapy     []  Ther Activities     [x]  Aquatics 30 2   []  Neuromuscular     [] Vasocompression     [] Gait Training     [] Dry needling        [] 1 or 2 muscles        [] 3 or more muscles     []  Other     Total Treatment time 30 2     Time In: 0167            Time Out: 8472    Electronically signed by:  Tavo Kidd PTA

## 2021-03-16 NOTE — PROGRESS NOTES
screen  Completed    HIV screen  Completed    Hepatitis A vaccine  Aged Out    Hib vaccine  Aged Out    Meningococcal (ACWY) vaccine  Aged Out

## 2021-03-17 NOTE — FLOWSHEET NOTE
[facility]   [address] [facility] [address]   Phone: [phone]   Fax: [phone]    Physical Therapy Daily Treatment Note    Date:  3/17/2021  Patient Name:  Tamiko Clarke    :  1965  MRN: [id number]  Physician: [address]: [address] [place] [address] Diagnosis: DDD lumbar                       Rehab Codes: M51.36  Onset Date:  2020    Next  appt.: N/A  Visit# / total visits:  (Correct count)  Cancels/No Shows: 0/1    Subjective:   Pt reports got an injection on 3/11 and patient is able to remain active for up to 20 minutes- which is up from 6-7 minutes. States pain still at 6/10 in lumbar back. Notes some aching in left hip since injection. Pain:  [x] Yes  [] No Location: Lumbar back Pain Rating: (0-10 scale) 6/10  Pain altered Tx:  [x] No  [] Yes  Action:  Comments:       Objective:      [address] [facility] J Exercise Log  Aquatic, Hip & DLS Program- Phase 1    Date of Eval:                                Primary PT:  Diagnosis: Things to Focus On (goals):   Surgical Precautions:  Medical Precautions:  [] C-9 dates  [] Occ Med   [] Medicare       Date 3/1/21 3/8/21 3/9/21 3/16/21   Visit #    Walk F/L/R 2 Laps 2 Laps 2 Laps 2 Laps   Marching 2 Laps 2 Laps 2 Laps 2 Laps   Squats Small 15x5\" Low 15x5\" Low 15x5\" Tall 15x5\"   Step-Ups F/L Tall 15x Tall 15x Tall 15x Tall 15x   Heel-toe raises 15x 15x 15x 15x   SLR F/L/R 15x 15x 15x 15x   Knee/Flex/Ext 15x 15x 15x 15x   F/L Lunges       Kickboard Ex. Lg Lg Lg Lg   Iso Abd. 15x5\" 15x5\" 15x5\" 15x5\"   Push-pull 15x 15x 15x 15x   Paddling 15x 15x 15x 15x          UE Format       Horz.  Abd 10x 10x 10x 10x   Abd/Add 7x high pain 10x 10x 10x   Flex/Ext N/T 10x 10x 10x                 Stretches       Achllies 2x20\" 2x20\" 2x20\" 2x20\"   Hamstring 2x20\" 2x20\" 2x20\" 2x20\"          Deep water  1 Noodle 1 noodle 1 Noodle 1 Noodle   cycling 2' 2' 2' 2'   jacks 2' 2' 2' 2'   Cross-country 2' 2' 2' 2'   Hang 3' 5' 5' 5'          Pain Rating 7 7 7 6     Specific Instructions for next treatment: Add Small buoyancy cuffs next visit. Assessment: [] Progressing toward goals. [x] No change. No changes made today. Patient's 1st visit after receiving injection and did not want to over-do-it. Continued with cueing for postural awareness throughout. Tolerated all exercises well no pain noted. [] Other:    [] Patient would continue to benefit from skilled physical therapy services in order to:     STG: (to be met in 6 treatments)  1. ? Pain: lesson pain levels 3/10  2. ? ROM:AROM of lumbar spine to 50% of normal.   3. ? Strength:Lumbar spine strength 2+/5  4. ? Function: OPTIMAL score of 9/3  5. Independent with Home Exercise Programs  6. Demonstrate Knowledge of fall prevention  LTG: (to be met in 12 treatments)   1. OPTIMAL score of 6/3. Pt. Education:  [x] Yes  [] No  [] Reviewed Prior HEP/Ed  Method of Education: [x] Verbal  [x] Demo  [] Written  Comprehension of Education:  [x] Verbalizes understanding. [x] Demonstrates understanding. [] Needs review. [] Demonstrates/verbalizes HEP/Ed previously given. Plan: [x] Continue per plan of care. [] Other:       Treatment Charges: Mins Units   []  Modalities     []  Ther Exercise     []  Manual Therapy     []  Ther Activities     [x]  Aquatics 30 2   []  Neuromuscular     [] Vasocompression     [] Gait Training     [] Dry needling        [] 1 or 2 muscles        [] 3 or more muscles     [x]  Other   RVD  15 0   Total Treatment time 30 2   Physical therapy treatment completed today in part by physical therapist assistant (PTA). Treatment times reflect total treatment time combined by both PT and PTA for today's service.       Time In: 1125            Time Out: 1216 PM     Electronically signed by:  Dayron Truong PT

## 2021-03-19 ENCOUNTER — HOSPITAL ENCOUNTER (OUTPATIENT)
Dept: PHYSICAL THERAPY | Age: 56
Setting detail: THERAPIES SERIES
Discharge: HOME | End: 2021-03-19
Payer: COMMERCIAL

## 2021-03-19 PROCEDURE — 97113 AQUATIC THERAPY/EXERCISES: CPT

## 2021-03-19 NOTE — FLOWSHEET NOTE
[facility]   [address]   Phone: [phone]   Fax: [phone]    Physical Therapy Daily Treatment Note    Date:  3/19/2021  Patient Name:  Louis Ruiz    :  1965  MRN: [id number]  Physician: [address]: [address] [place] [address] Diagnosis: DDD lumbar                       Rehab Codes: M51.36  Onset Date:  2020    Next  appt.: N/A  Visit# / total visits: 10/12 (Correct count)  Cancels/No Shows: 0/1    Subjective:   Pt reports got an injection on 3/11 and patient is able to remain active for up to 20 minutes- which is up from 6-7 minutes. States pain still at 6/10 in lumbar back. Notes some aching in left hip since injection-continues SD   Pain:  [x] Yes  [] No Location: Lumbar back Pain Rating: (0-10 scale) 6/10  Pain altered Tx:  [x] No  [] Yes  Action:  Comments:       Objective:      [address] [facility] J Exercise Log  Aquatic, Hip & DLS Program- Phase 1    Date of Eval:                                Primary PT:  Diagnosis: Things to Focus On (goals):   Surgical Precautions:  Medical Precautions:  [] C-9 dates  [] Occ Med   [] Medicare       Date 3/1/21 3/8/21 3/9/21 3/16/21 3/19/21   Visit # 6/12 7/12 8/12 9/12 10/12   Walk F/L/R 2 Laps 2 Laps 2 Laps 2 Laps Added sm cuffs  2 laps each   Marching 2 Laps 2 Laps 2 Laps 2 Laps 2 laps   Squats Small 15x5\" Low 15x5\" Low 15x5\" Tall 15x5\" Tall 15x5\"   Step-Ups F/L Tall 15x Tall 15x Tall 15x Tall 15x Tall 15x   Heel-toe raises 15x 15x 15x 15x 15x   SLR F/L/R 15x 15x 15x 15x 15x   Knee/Flex/Ext 15x 15x 15x 15x 15x   F/L Lunges        Kickboard Ex. Lg Lg Lg Lg Lf   Iso Abd. 15x5\" 15x5\" 15x5\" 15x5\" 15x5\"   Push-pull 15x 15x 15x 15x 15x   Paddling 15x 15x 15x 15x 15x           UE Format        Horz.  Abd 10x 10x 10x 10x 15x   Abd/Add 7x high pain 10x 10x 10x 15x   Flex/Ext N/T 10x 10x 10x 15x Stretches        Achllies 2x20\" 2x20\" 2x20\" 2x20\" 2x20\"   Hamstring 2x20\" 2x20\" 2x20\" 2x20\" 2x20\"           Deep water  1 Noodle 1 noodle 1 Noodle 1 Noodle 1 Noodle   cycling 2' 2' 2' 2' 2'   jacks 2' 2' 2' 2' 2'   Cross-country 2' 2' 2' 2' 2'   Hang 3' 5' 5' 5' 5'           Pain Rating 7 7 7 6 6     Specific Instructions for next treatment: Assess tolerance to small cuffs. Assessment: [] Progressing toward goals. [] No change. [x] Other: Pt needed constant cues to decrease stride length with cuffs/ambulation    [] Patient would continue to benefit from skilled physical therapy services in order to:     STG: (to be met in 6 treatments)  1. ? Pain: lesson pain levels 3/10  2. ? ROM:AROM of lumbar spine to 50% of normal.   3. ? Strength:Lumbar spine strength 2+/5  4. ? Function: OPTIMAL score of 9/3  5. Independent with Home Exercise Programs  6. Demonstrate Knowledge of fall prevention  LTG: (to be met in 12 treatments)   1. OPTIMAL score of 6/3. Pt. Education:  [x] Yes  [] No  [] Reviewed Prior HEP/Ed  Method of Education: [x] Verbal  [x] Demo  [] Written  Comprehension of Education:  [x] Verbalizes understanding. [x] Demonstrates understanding. [] Needs review. [] Demonstrates/verbalizes HEP/Ed previously given. Plan: [x] Continue per plan of care.     [] Other:       Treatment Charges: Mins Units   []  Modalities     []  Ther Exercise     []  Manual Therapy     []  Ther Activities     [x]  Aquatics 43 3   []  Neuromuscular     [] Vasocompression     [] Gait Training     [] Dry needling        [] 1 or 2 muscles        [] 3 or more muscles     []  Other     Total Treatment time 43 3     Time In: 8075            Time Out: 3053    Electronically signed by:  Mike Wallace, PT

## 2021-03-22 ENCOUNTER — TELEPHONE (OUTPATIENT)
Dept: PAIN MANAGEMENT | Age: 56
End: 2021-03-22

## 2021-03-23 ENCOUNTER — HOSPITAL ENCOUNTER (OUTPATIENT)
Dept: PAIN MANAGEMENT | Age: 56
Discharge: HOME | End: 2021-03-23
Payer: COMMERCIAL

## 2021-03-23 ENCOUNTER — TELEPHONE (OUTPATIENT)
Dept: PAIN MANAGEMENT | Age: 56
End: 2021-03-23

## 2021-03-23 DIAGNOSIS — M47.816 FACET DEGENERATION OF LUMBAR REGION: ICD-10-CM

## 2021-03-23 DIAGNOSIS — G89.29 CHRONIC BILATERAL LOW BACK PAIN WITHOUT SCIATICA: ICD-10-CM

## 2021-03-23 DIAGNOSIS — M54.50 CHRONIC BILATERAL LOW BACK PAIN WITHOUT SCIATICA: ICD-10-CM

## 2021-03-23 DIAGNOSIS — Z51.81 MEDICATION MONITORING ENCOUNTER: Primary | ICD-10-CM

## 2021-03-23 DIAGNOSIS — M54.16 LUMBAR RADICULOPATHY: ICD-10-CM

## 2021-03-23 DIAGNOSIS — M47.817 LUMBOSACRAL SPONDYLOSIS WITHOUT MYELOPATHY: ICD-10-CM

## 2021-03-23 PROCEDURE — 99442 PR PHYS/QHP TELEPHONE EVALUATION 11-20 MIN: CPT | Performed by: NURSE PRACTITIONER

## 2021-03-23 PROCEDURE — 99213 OFFICE O/P EST LOW 20 MIN: CPT

## 2021-03-23 RX ORDER — HYDROCODONE BITARTRATE AND ACETAMINOPHEN 5; 325 MG/1; MG/1
1 TABLET ORAL EVERY 8 HOURS PRN
Qty: 90 TABLET | Refills: 0 | Status: SHIPPED | OUTPATIENT
Start: 2021-03-23 | End: 2021-05-14 | Stop reason: SDUPTHER

## 2021-03-23 ASSESSMENT — ENCOUNTER SYMPTOMS
BACK PAIN: 1
NAUSEA: 1
RESPIRATORY NEGATIVE: 1
EYES NEGATIVE: 1
CONSTIPATION: 1

## 2021-03-23 NOTE — TELEPHONE ENCOUNTER
Spoke with patient via phone and scheduled for procedure- lumbar MBB on 4-19-21 at [address] [facility].  Pre procedure instructions reviewed with patient

## 2021-03-23 NOTE — PROGRESS NOTES
Luiza 89 PROGRESS NOTE      Patient  completed []  video visit   [x]   phone call:  15       Minutes :       [x]    to  review Medication Agreement    [x]  Follow up after procedure   []  Discuss treatment options      Location:  Provider:  working from    [x]    home    []   [facility] - ADRIAN MARTINI ,   patient at home    Chief Complaint: low back pain    She has bilateral lumbar facet injection L4-L5 on 3-, she obtained 40% relief. She is able to stand longer. She still has pain when up and walking, it goes up to an 8. She c/o left hip pain. She states it is easing up. She states her back pain radiates into her left hip, The leg pain has resolved. She is sleeping okay. She can do her housework. She is trying to do some exercise. She is still in PT. No ED visits. She is still having nausea with the tramadol, she takes zofran and it is not helping. Back Pain  This is a chronic problem. The problem occurs constantly. The problem has been gradually improving since onset. The pain is present in the lumbar spine. Quality: dull, sharp. Radiates to: left hip. The pain is at a severity of 6/10 (8 when up). The pain is moderate. Worse during: evening. The symptoms are aggravated by standing (walking). Associated symptoms include numbness and weakness. She has tried analgesics and heat (sitting, water therapy) for the symptoms.              Treatment goals:  Functional status: to be out of pain    Aberrancy:   Any alcoholic beverages     no       Any illegal drugs   no      Analgesia:        6             Adverse  Effects :nausea  From tramadol      ADL;s :in aquatics,  housework  Data:    When was thelast UDS:  2-4-2021          Was the UDS appropriate:  [x] yes []   no      Record/Diagnostics Review:      As above, I did review the imaging       2/8/2021 10:14 AM - Polo, Jennipn Incoming Lab Results From [email or web address]    Component Value Ref Range & Units Status Collected Lab   Pain Management Drug Panel Interp, Urine Consistent   Final 02/04/2021  9:53 AM [facility]   (NOTE)   ________________________________________________________________   DRUGS EXPECTED:   TRAMADOL [2/3/21]   ________________________________________________________________   CONSISTENT with medications provided:   TRAMADOL : based on immunoassay detection   ________________________________________________________________   INTERPRETIVE INFORMATION: Targeted drug profile Interp   Interpretation depends on accuracy and completeness of patient   medication information submitted by client.     6-Acetylmorphine, Ur Not Detected   Final 02            EXAMINATION:   MRI OF THE LUMBAR SPINE WITHOUT CONTRAST, 10/22/2020 11:38 am       TECHNIQUE:   Multiplanar multisequence MRI of the lumbar spine was performed without the   administration of intravenous contrast.       COMPARISON:   Radiographs October 7, 2020       MRI July 11, 2006       HISTORY:   ORDERING SYSTEM PROVIDED HISTORY: Lumbar radiculopathy   TECHNOLOGIST PROVIDED HISTORY:   neuropathy   Is the patient pregnant?->No   Reason for Exam: pt stated back pain radiates down lt leg   Acuity: Chronic   Type of Exam: Initial   Additional signs and symptoms: pt stated back pain radiates down lt leg       FINDINGS:   BONES/ALIGNMENT: No acute fracture.  No subluxation.  No suspicious bone   marrow replacing lesion.       SPINAL CORD: Normal signal within the conus which terminates at T12-L1.       SOFT TISSUES: No paraspinal abnormality.       L1-L2: No significant central canal or foraminal stenosis.       L2-L3: Mild broad-based disc bulge and facet degenerative changes combine to   create mild central canal stenosis.  Changes combine to create minimal   bilateral foraminal stenosis.       L3-L4: Broad-based disc bulge and facet degenerative changes combine to   create mild central canal stenosis.  Changes combine to create mild bilateral   foraminal stenosis.       L4-L5: Broad-based disc bulge and facet degenerative changes combine to   create mild central canal stenosis.  Changes combine to create mild-moderate   bilateral foraminal stenosis.       L5-S1: Mild broad-based disc bulge and facet degenerative changes do not   cause significant central canal stenosis.  Changes combine to create mild   bilateral foraminal stenosis.         Impression   Overall mild multilevel degenerative changes.             Pill count: appropriate    fill date : 3-    Morphine equivalent dose as reported on OARRS: 20  Periodic Controlled Substance Monitoring: Assessed functional status. , Possible medication side effects, risk of tolerance/dependence & alternative treatments discussed., No signs of potential drug abuse or diversion identified., Obtaining appropriate analgesic effect of treatment. Agustin Powers, APRN - CNP)  Review ofOARRS does not show any aberrant prescription behavior. Medication is helping the patient stay active. Patient denies any side effects and reports adequate analgesia. No sign of misuse/abuse.             Past Medical History:   Diagnosis Date    Allergic rhinitis 10/21/2014    Anxiety     Arthritis     Bleeds easily ([address]     per pt, has never had a work up   [facility] Complete rotator cuff tear of left shoulder 2018    Diabetes mellitus ([address]     On Insulin, metformin, Glipizide    Diabetic polyneuropathy associated with type 2 diabetes mellitus ([address] 3/26/2018    Facet degeneration of lumbar region 2/18/2021    Fibromyalgia     GERD (gastroesophageal reflux disease) 10/21/2014    H/O transfusion     History of blood transfusion     Hypertension     Obesity 10/21/2014    Pure hypercholesterolemia 2/23/2016    Tobacco abuse     Type II or unspecified type diabetes mellitus without mention of complication, not stated as uncontrolled     Unspecified sleep apnea     CPAP machine is broken    Wears glasses        Past Surgical History:   Procedure Laterality Date    BRONCHOSCOPY  9/27/2017 BRONCHOSCOPY BRUSHINGS performed by Francie Scruggs MD at [facility] Right 2002    CHOLECYSTECTOMY N/A 9/29/2017    CHOLECYSTECTOMY OPEN performed by Steven Edwards DO at [facility] COLONOSCOPY N/A 2/21/2018    COLONOSCOPY POLYPECTOMY SNARE/COLD BIOPSY performed by Little Hull MD at [facility]    with revision x1    LARYNGOSCOPY N/A 9/29/2017    DIRECT MICRO LARYNGOSCOPY WITH EXCISION VOCAL CORD LESION  performed by Dom Osuna MD at [address] [place] [address] Right 1988    [place] EGD TRANSORAL BIOPSY SINGLE/MULTIPLE N/A 9/27/2017    EGD BIOPSY performed by Little Hull MD at [facility] ARTHROSCOP,SURG,W/ROTAT [facility], [address] Left 6/19/2018    SHOULDER ARTHROSCOPY WITH ROTATOR CUFF REPAIR, SUBACROMIAL DECOMPRESSION performed by Chanda Enrique MD at [facility] Left     SHOULDER ARTHROSCOPY WITH ROTATOR CUFF REPAIR, SUBACROMIAL DECOMPRESSION (Left        Allergies   Allergen Reactions    Pcn [Penicillins] Hives and Other (See Comments)     Lose motor function of legs and collaps    Demerol Hcl [Meperidine] Other (See Comments)     Panic attack    Metformin And Related      Abdominal pain    Tape [Adhesive Tape] Other (See Comments)     Blister      Morphine Nausea And Vomiting and Other (See Comments)     Stomach pain         Current Outpatient Medications:     fexofenadine (ALLEGRA) 180 MG tablet, Take 1 tablet by mouth daily, Disp: 90 tablet, Rfl: 2    hydroCHLOROthiazide (HYDRODIURIL) 25 MG tablet, TAKE ONE TABLET BY MOUTH DAILY WITH POTASSIUM SUPPLEMENT, Disp: 90 tablet, Rfl: 2    potassium chloride (KLOR-CON) 10 MEQ extended release tablet, TAKE ONE TABLET BY MOUTH DAILY WITH HYDROCHLOROTHIAZIDE, Disp: 90 tablet, Rfl: 2    busPIRone (BUSPAR) 5 MG tablet, TAKE ONE TABLET BY MOUTH THREE TIMES A DAY AS NEEDED FOR ANXIETY, Disp: 90 tablet, Rfl: 3    lisinopril (PRINIVIL;ZESTRIL) 20 MG tablet, irregular blood glucose. BRAND OF CHOICE INSURANCE ALLOWS., Disp: 100 strip, Rfl: 11    Alcohol Swabs (ALCOHOL PREP) PADS, Use as directed, Disp: 100 each, Rfl: 11    Current Facility-Administered Medications:     methylPREDNISolone acetate (DEPO-MEDROL) injection 80 mg, 80 mg, Intra-articular, Once, Jayden Tijerina DO    bupivacaine (MARCAINE) 0.25 % injection 5 mg, 2 mL, Intra-articular, Once, Jayden Tijerina DO    Family History   Problem Relation Age of Onset    Diabetes Mother     Heart Disease Mother         murmur    Heart Disease Father         MVP    Diabetes Sister     Diabetes Maternal Grandmother     High Blood Pressure Maternal Grandmother     Heart Failure Maternal Grandfather     Heart Attack Paternal Grandmother        Social History     Socioeconomic History    Marital status: Legally      Spouse name: Not on file    Number of children: 3    Years of education: Not on file    Highest education level: Not on file   Occupational History    Occupation:    Social Needs    Financial resource strain: Not on file    Food insecurity     Worry: Not on file     Inability: Not on file   [employer] needs     Medical: Not on file     Non-medical: Not on file   Tobacco Use    Smoking status: Former Smoker     Packs/day: 2.00     Years: 34.00     Pack years: 68.00     Types: Cigarettes     Start date: 28     Quit date: 9/28/2017     Years since quitting: 3.4    Smokeless tobacco: Never Used   Substance and Sexual Activity    Alcohol use:  Yes     Alcohol/week: 0.0 standard drinks     Frequency: Monthly or less     Drinks per session: 1 or 2     Comment: rare    Drug use: No    Sexual activity: Never   Lifestyle    Physical activity     Days per week: Not on file     Minutes per session: Not on file    Stress: Not on file   Relationships    Social connections     Talks on phone: Not on file     Gets together: Not on file     Attends Quaker service: Not on file     Active member of club or organization: Not on file     Attends meetings of clubs or organizations: Not on file     Relationship status: Not on file    Intimate partner violence     Fear of current or ex partner: Not on file     Emotionally abused: Not on file     Physically abused: Not on file     Forced sexual activity: Not on file   Other Topics Concern    Not on file   Social History Narrative    Not on file         Review of Systems:  Review of Systems   Constitution: Negative. HENT: Negative. Eyes: Negative. Cardiovascular: Negative. Respiratory: Negative. Endocrine:        Blood sugar 147 today   Hematologic/Lymphatic: Bruises/bleeds easily. Skin: Negative. Musculoskeletal: Positive for back pain and joint pain. Gastrointestinal: Positive for constipation and nausea. Genitourinary: Negative. Neurological: Positive for loss of balance, numbness and weakness. Psychiatric/Behavioral: Positive for depression. Managing         Physical Exam:  LMP  (LMP Unknown)     Physical Exam  Skin:         Neurological:      Mental Status: She is alert and oriented to person, place, and time. Psychiatric:         Mood and Affect: Mood normal.         Thought Content: Thought content normal.           Assessment:      Problem List Items Addressed This Visit     Medication monitoring encounter - Primary    Lumbosacral spondylosis without myelopathy    Lumbar radiculopathy    Facet degeneration of lumbar region    Chronic bilateral low back pain without sciatica          Treatment Plan:  DISCUSSION: Treatment options discussed withpatient and all questions answered to patient's satisfaction.      Possible side effects, risk of tolerance and or dependence and alternative treatments discussed    Obtaining appropriate analgesic effect of treatment   No signs of potential drug abuse or diversion identified    [x] Ill effects of being on chronic pain medications such as sleep disturbances, respiratory depression, hormonal changes, withdrawal symptoms, chronic opioid dependence and tolerance as well as risk of taking opioids with Benzodiazepines and taking opioids along with alcohol,  werediscussed with patient. I had asked the patient to minimize medication use and utilize pain medications only for uncontrolled rest pain or pain with exertional activities. I advised patient not to self-escalate painmedications without consulting with us. At each of patient's future visits we will try to taper pain medications, while adjusting the adjunct medications, and re-evaluating for Physical Therapy to improve spinal andjoint strength. We will continue to have discussions to decrease pain medications as tolerated. Counseled patient on effects their pain medication and /or their medical condition mayhave on their  ability to drive or operate machinery. Instructed not to drive or operate machinery if drowsy     I also discussed with the patient regarding the dangers of combining narcotic pain medication with tranquilizers, alcohol or illegal drugs or taking the medication any way other than prescribed. The dangers were discussed  including respiratory depression and death. Patient was told to tell  all  physicians regarding the medications he is getting from pain clinic. Patient is warned not to take any unprescribed medications over-the-countermedications that can depress breathing . Patient is advised to talk to the pharmacist or physicians if planning to take any over-the-counter medications before  takeing them. Patient is strongly advised to avoid tranquilizers or  relaxants, illegal drugs  or any medications that can depress breathing  Patient is also advised to tell us if there is any changes in their medications from other physicians.     1. Schedule diagnostic lumbar MBBB L4, L5, still having pain, axial, given pre-procedure instructions  The patient was counseled about the risks of juancarlos Covid-19 during their procedure period and any recovery window from their procedure. The patient was made aware that juancarlos Covid-19 may worsen their prognosis for recovering from their procedure and lend to a higher morbidity and/or mortality risk. All material risks, benefits, and reasonable alternatives including postponing the procedure were discussed. The patient (DOES wish) to proceed with their procedure at this time.     2. Switch to hydrocodone still has nausea with tramadol even with taking zofran  TREATMENT OPTIONS:     Bilateral diagnostic lumbar MBB  Medication Agreement Requirements Met  Continue Opioid therapy  Script written for  hydrocodone  Follow up appointment made

## 2021-03-24 ENCOUNTER — APPOINTMENT (OUTPATIENT)
Dept: PHYSICAL THERAPY | Age: 56
End: 2021-03-24
Payer: COMMERCIAL

## 2021-03-26 ENCOUNTER — HOSPITAL ENCOUNTER (OUTPATIENT)
Dept: PHYSICAL THERAPY | Age: 56
Setting detail: THERAPIES SERIES
End: 2021-03-26
Payer: COMMERCIAL

## 2021-03-29 ENCOUNTER — IMMUNIZATION (OUTPATIENT)
Dept: PRIMARY CARE CLINIC | Age: 56
End: 2021-03-29
Payer: COMMERCIAL

## 2021-03-29 ENCOUNTER — TELEPHONE (OUTPATIENT)
Dept: FAMILY MEDICINE CLINIC | Age: 56
End: 2021-03-29

## 2021-03-29 DIAGNOSIS — E11.42 DIABETIC POLYNEUROPATHY ASSOCIATED WITH TYPE 2 DIABETES MELLITUS (HCC): ICD-10-CM

## 2021-03-29 PROCEDURE — 0001A PR IMM ADMN SARSCOV2 30MCG/0.3ML DIL RECON 1ST DOSE: CPT | Performed by: INTERNAL MEDICINE

## 2021-03-29 PROCEDURE — 91300 COVID-19, PFIZER VACCINE 30MCG/0.3ML DOSE: CPT | Performed by: INTERNAL MEDICINE

## 2021-03-29 RX ORDER — PREGABALIN 100 MG/1
100 CAPSULE ORAL 2 TIMES DAILY
Qty: 180 CAPSULE | Refills: 0 | Status: SHIPPED | OUTPATIENT
Start: 2021-03-29 | End: 2021-06-14 | Stop reason: SDUPTHER

## 2021-03-31 ENCOUNTER — APPOINTMENT (OUTPATIENT)
Dept: PHYSICAL THERAPY | Age: 56
End: 2021-03-31
Payer: COMMERCIAL

## 2021-04-05 DIAGNOSIS — E55.9 VITAMIN D DEFICIENCY: ICD-10-CM

## 2021-04-05 RX ORDER — MELATONIN
Qty: 30 TABLET | Refills: 2 | Status: SHIPPED | OUTPATIENT
Start: 2021-04-05 | End: 2022-08-01 | Stop reason: SDUPTHER

## 2021-04-05 NOTE — TELEPHONE ENCOUNTER
Please Approve or Refuse.   Send to Pharmacy per Pt's Request:      Next Visit Date:  Visit date not found   Last Visit Date: 3/16/2021    Hemoglobin A1C (%)   Date Value   02/22/2021 10.3   10/05/2020 11.1 (H)   08/29/2020 11.2 (H)             ( goal A1C is < 7)   BP Readings from Last 3 Encounters:   03/16/21 122/84   03/12/21 131/73   03/11/21 (!) 141/70          (goal 120/80)  BUN   Date Value Ref Range Status   08/29/2020 8 6 - 20 mg/dL Final     CREATININE   Date Value Ref Range Status   08/29/2020 0.74 0.50 - 0.90 mg/dL Final     Potassium   Date Value Ref Range Status   08/29/2020 4.6 3.7 - 5.3 mmol/L Final

## 2021-04-13 ENCOUNTER — OFFICE VISIT (OUTPATIENT)
Dept: NEUROSURGERY | Age: 56
End: 2021-04-13
Payer: COMMERCIAL

## 2021-04-13 VITALS
SYSTOLIC BLOOD PRESSURE: 132 MMHG | HEART RATE: 89 BPM | DIASTOLIC BLOOD PRESSURE: 81 MMHG | BODY MASS INDEX: 45.19 KG/M2 | WEIGHT: 280 LBS

## 2021-04-13 DIAGNOSIS — N62 BREAST PROMINENCE: ICD-10-CM

## 2021-04-13 DIAGNOSIS — M47.26 OTHER SPONDYLOSIS WITH RADICULOPATHY, LUMBAR REGION: Primary | ICD-10-CM

## 2021-04-13 PROCEDURE — G8417 CALC BMI ABV UP PARAM F/U: HCPCS | Performed by: NURSE PRACTITIONER

## 2021-04-13 PROCEDURE — 99214 OFFICE O/P EST MOD 30 MIN: CPT | Performed by: NURSE PRACTITIONER

## 2021-04-13 PROCEDURE — 1036F TOBACCO NON-USER: CPT | Performed by: NURSE PRACTITIONER

## 2021-04-13 PROCEDURE — 3017F COLORECTAL CA SCREEN DOC REV: CPT | Performed by: NURSE PRACTITIONER

## 2021-04-13 PROCEDURE — G8427 DOCREV CUR MEDS BY ELIG CLIN: HCPCS | Performed by: NURSE PRACTITIONER

## 2021-04-13 RX ORDER — DULAGLUTIDE 0.75 MG/.5ML
0.75 INJECTION, SOLUTION SUBCUTANEOUS WEEKLY
COMMUNITY
End: 2021-08-19 | Stop reason: SDUPTHER

## 2021-04-13 NOTE — PROGRESS NOTES
Gabriela Flores  [facility] [address] [address]  Dept: [phone]    Patient:  Matias Smith  YOB: 1965  Date: 4/13/21    The patient is a 49 y.o. female who presents today for consult of the following problems:     Chief Complaint   Patient presents with    Back Pain     Other spondylosis with radiculopathy, lumbar region         HPI:     Matias Smith is a 49 y.o. female who presents today for follow-up of lower back pain with bilateral lower extremity radiation. Reports that symptoms are essentially stable. Denies any saddle anesthesia, no loss of bowel or bladder function. Has been able to see pain specialist since her last visit here. Did undergo bilateral L4/L5 facet injections, states she had great improvement in her pain for approximately 2 weeks. Has been recommended that she undergo medial branch block, with hopes for eventual RFA. Has also been evaluated by plastic surgeon, does tentatively have breast reduction scheduled, but is awaiting insurance approval. Pain today 7/10, but she has been very active today. Groin pain: No  Saddle anesthesia: No  Hip Pain: Yes  Bowel/bladder incontinence: No  Constipation or Urinary retention: No    LIMITATIONS    Pain significantly limiting from a daily standpoint. Assistive devices: None  Daily pharmacologic pain control include: Norco, tizanidine  Back versus leg: Mostly back    MANAGEMENT     Prior Surgery: No  Prior to 1yr ago:   In the last year:    Physical Therapy: Yescurrently undergoing therapy   Chiropractic Interventions: No   Injections: Yes  Improvement: Great improvement for approximately 2 weeks    Types of injections/responses:   3/11/2021: Bilateral facet injections L4-L5, tremendous improvement for approximately 2 weeks per patient    History:     Past Medical History:   Diagnosis Date    Allergic rhinitis 10/21/2014    Anxiety     Arthritis     Bleeds easily ([address]     per pt, has never had a work up   [facility] Complete rotator cuff tear of left shoulder 2018    Diabetes mellitus ([address]     On Insulin, metformin, Glipizide    Diabetic polyneuropathy associated with type 2 diabetes mellitus ([address] 3/26/2018    Facet degeneration of lumbar region 2/18/2021    Fibromyalgia     GERD (gastroesophageal reflux disease) 10/21/2014    H/O transfusion     History of blood transfusion     Hypertension     Obesity 10/21/2014    Pure hypercholesterolemia 2/23/2016    Tobacco abuse     Type II or unspecified type diabetes mellitus without mention of complication, not stated as uncontrolled     Unspecified sleep apnea     CPAP machine is broken    Wears glasses      Past Surgical History:   Procedure Laterality Date    BRONCHOSCOPY  9/27/2017    BRONCHOSCOPY BRUSHINGS performed by Antonietta Villalba MD at [facility] Right 2002    CHOLECYSTECTOMY N/A 9/29/2017    CHOLECYSTECTOMY OPEN performed by Cj Hensley DO at [address] [place] [address] N/A 2/21/2018    COLONOSCOPY POLYPECTOMY SNARE/COLD BIOPSY performed by Bandar Rodriguez MD at [facility]    with revision x1    LARYNGOSCOPY N/A 9/29/2017    DIRECT MICRO LARYNGOSCOPY WITH EXCISION VOCAL CORD LESION  performed by Karlene Roa MD at [address] [place] [address] Right 1988    [place] EGD TRANSORAL BIOPSY SINGLE/MULTIPLE N/A 9/27/2017    EGD BIOPSY performed by Bandar Rodriguez MD at [address] [place] [address] ARTHROSCOP,SURG,W/ROTAT CUFF REPR Left 6/19/2018    SHOULDER ARTHROSCOPY WITH ROTATOR CUFF REPAIR, SUBACROMIAL DECOMPRESSION performed by Chantell Stovall MD at [address] [place] [address] Left     SHOULDER ARTHROSCOPY WITH ROTATOR CUFF REPAIR, SUBACROMIAL DECOMPRESSION (Left      Family History   Problem Relation Age of Onset    Diabetes Mother     Heart Disease Mother         murmur    Heart Disease Father         MVP    FOR NECK AND BACK PAIN. **CAUSES SEDATION AND DO NOT DRIVE WHILE TAKING THIS MEDICATION 90 tablet 0    Blood Glucose Monitoring Suppl (TRUE METRIX METER) MANOLO Testing BID 1 Device 0    Insulin Pen Needle (PEN NEEDLES 3/16\") 31G X 5 MM MISC 1 each by Does not apply route daily 100 each 3    insulin lispro, 1 Unit Dial, (HUMALOG KWIKPEN) 100 UNIT/ML SOPN Inject 15 Units into the skin 3 times daily (before meals) 5 pen 1    Insulin Syringes, Disposable, U-100 1 ML MISC 1 each by Does not apply route daily 100 each 3    blood glucose monitor strips Test 2-3 times a day & as needed for symptoms of irregular blood glucose. BRAND OF CHOICE INSURANCE ALLOWS. 100 strip 11    atorvastatin (LIPITOR) 10 MG tablet Take 1 tablet by mouth daily 90 tablet 2    Alcohol Swabs (ALCOHOL PREP) PADS Use as directed 100 each 11    [DISCONTINUED] hydroCHLOROthiazide (HYDRODIURIL) 25 MG tablet Take 1 tablet by mouth daily Take with Potassium supplement 30 tablet 3    [DISCONTINUED] Lancets MISC Dx: DM-2. Use 2-3 times daily 100 each 3     Current Facility-Administered Medications on File Prior to Visit   Medication Dose Route Frequency Provider Last Rate Last Admin    methylPREDNISolone acetate (DEPO-MEDROL) injection 80 mg  80 mg Intra-articular Once Domo Pal, DO        bupivacaine (MARCAINE) 0.25 % injection 5 mg  2 mL Intra-articular Once Domo Pal, DO         Social History     Tobacco Use    Smoking status: Former Smoker     Packs/day: 2.00     Years: 34.00     Pack years: 68.00     Types: Cigarettes     Start date: 28     Quit date: 9/28/2017     Years since quitting: 3.5    Smokeless tobacco: Never Used   Substance Use Topics    Alcohol use:  Yes     Alcohol/week: 0.0 standard drinks     Frequency: Monthly or less     Drinks per session: 1 or 2     Comment: rare    Drug use: No       Allergies   Allergen Reactions    Pcn [Penicillins] Hives and Other (See Comments)     Lose motor function of legs and significant improvement in overall chronic back pain. Will see the patient back in approximately 12 weeks after additional pain management interventions and hopefully breast reduction. Courage to continue physical therapy in the interim as well. Followup: Return in about 3 months (around 7/13/2021), or if symptoms worsen or fail to improve. Prescriptions Ordered:  No orders of the defined types were placed in this encounter. Orders Placed:  No orders of the defined types were placed in this encounter. Electronically signed by KIMANI Bustamante CNP on 4/13/2021 at 1:49 PM    Please note that this chart was generated using voice recognition Dragon dictation software. Although every effort was made to ensure the accuracy of this automated transcription, some errors in transcription may have occurred.

## 2021-04-14 ENCOUNTER — TELEPHONE (OUTPATIENT)
Dept: PAIN MANAGEMENT | Age: 56
End: 2021-04-14

## 2021-04-14 ENCOUNTER — IMMUNIZATION (OUTPATIENT)
Dept: PRIMARY CARE CLINIC | Age: 56
End: 2021-04-14
Payer: COMMERCIAL

## 2021-04-14 PROCEDURE — 91300 COVID-19, PFIZER VACCINE 30MCG/0.3ML DOSE: CPT | Performed by: INTERNAL MEDICINE

## 2021-04-14 PROCEDURE — 0002A COVID-19, PFIZER VACCINE 30MCG/0.3ML DOSE: CPT | Performed by: INTERNAL MEDICINE

## 2021-04-15 ENCOUNTER — TELEPHONE (OUTPATIENT)
Dept: PAIN MANAGEMENT | Age: 56
End: 2021-04-15

## 2021-04-30 ENCOUNTER — TELEPHONE (OUTPATIENT)
Dept: SURGERY | Age: 56
End: 2021-04-30

## 2021-04-30 NOTE — TELEPHONE ENCOUNTER
I received a call from [facility] [address] [facility] stating that patient was a \"no show\" to her PAT appointment that was scheduled for today. I tried to reach out to patient, but there was no answer. I left a VM asking patient to call our office.

## 2021-05-04 NOTE — TELEPHONE ENCOUNTER
I called and spoke to patient. She stated that she is in [facility] for a family emergency, and will have to reschedule. I called and canceled surgery.

## 2021-05-09 ENCOUNTER — HOSPITAL ENCOUNTER (OUTPATIENT)
Dept: LAB | Age: 56
Setting detail: SPECIMEN
Discharge: HOME | End: 2021-05-09
Payer: COMMERCIAL

## 2021-05-09 DIAGNOSIS — Z01.818 PRE-OP TESTING: Primary | ICD-10-CM

## 2021-05-14 ENCOUNTER — HOSPITAL ENCOUNTER (OUTPATIENT)
Dept: PAIN MANAGEMENT | Age: 56
Discharge: HOME | End: 2021-05-14
Payer: COMMERCIAL

## 2021-05-14 DIAGNOSIS — G89.29 CHRONIC BILATERAL LOW BACK PAIN WITHOUT SCIATICA: ICD-10-CM

## 2021-05-14 DIAGNOSIS — M54.16 LUMBAR RADICULOPATHY: ICD-10-CM

## 2021-05-14 DIAGNOSIS — M79.7 FIBROMYALGIA: ICD-10-CM

## 2021-05-14 DIAGNOSIS — Z51.81 MEDICATION MONITORING ENCOUNTER: Primary | ICD-10-CM

## 2021-05-14 DIAGNOSIS — M47.817 LUMBOSACRAL SPONDYLOSIS WITHOUT MYELOPATHY: ICD-10-CM

## 2021-05-14 DIAGNOSIS — M47.816 FACET DEGENERATION OF LUMBAR REGION: ICD-10-CM

## 2021-05-14 DIAGNOSIS — E11.42 DIABETIC POLYNEUROPATHY ASSOCIATED WITH TYPE 2 DIABETES MELLITUS (HCC): ICD-10-CM

## 2021-05-14 DIAGNOSIS — M54.50 CHRONIC BILATERAL LOW BACK PAIN WITHOUT SCIATICA: ICD-10-CM

## 2021-05-14 PROCEDURE — 99213 OFFICE O/P EST LOW 20 MIN: CPT

## 2021-05-14 PROCEDURE — 99442 PR PHYS/QHP TELEPHONE EVALUATION 11-20 MIN: CPT | Performed by: NURSE PRACTITIONER

## 2021-05-14 RX ORDER — HYDROCODONE BITARTRATE AND ACETAMINOPHEN 5; 325 MG/1; MG/1
1 TABLET ORAL EVERY 8 HOURS PRN
Qty: 90 TABLET | Refills: 0 | Status: SHIPPED | OUTPATIENT
Start: 2021-05-14 | End: 2021-06-13

## 2021-05-14 RX ORDER — METHYLPREDNISOLONE 4 MG/1
TABLET ORAL
Qty: 1 KIT | Refills: 0 | Status: SHIPPED | OUTPATIENT
Start: 2021-05-14 | End: 2021-05-20

## 2021-05-14 ASSESSMENT — ENCOUNTER SYMPTOMS
EYES NEGATIVE: 1
CONSTIPATION: 1
RESPIRATORY NEGATIVE: 1
BACK PAIN: 1

## 2021-05-14 NOTE — PROGRESS NOTES
Luiza 89 PROGRESS NOTE      Patient  completed []  video visit   [x]   phone call:    16     Minutes :       [x]    to  review Medication Agreement    []  Follow up after procedure   []  Discuss treatment options      Location:  Provider:  working from    [x]    home    []   [facility] - ADRIAN MARTINI ,   patient at [facility]: low back pain    She states went to Ed last week for  severe back pain and left leg pain and had x-rays done, she went back to Ed this week, states had CT lumbar spine and will be referred to surgeon. She states was given a steroid injection in the Ed. The Ed visits were in [facility], She has numbness in her left leg. She will be seeing a surgeon in [facility]. She will be in [facility] for 5 weeks. She states was given percocet in the Ed. She was going to have diagnostic  Lumbar MBB bilateral L5-S1 at our pain clinic and had seen CNP at Dr Quincy Choudhary office. .She had to go to [facility] for family emergency. She only sleeps a few hours . She is using a walker, it is painful to walk and stand. She has 5 percocet left from what ED prescribed,    Back Pain  This is a chronic problem. The current episode started 1 to 4 weeks ago. The problem has been gradually worsening since onset. Quality: sharp, electrifying. The pain is severe. The pain is worse during the day. The symptoms are aggravated by standing (walking). Associated symptoms include dysuria, headaches, numbness and weakness. (Left leg) Risk factors include menopause. She has tried analgesics, heat and ice for the symptoms.            Treatment goals:  Functional status: get surgery on back      Aberrancy:   Any alcoholic beverages    no        Any illegal drugs  no       Analgesia:    8                 Adverse  Effects :nausea with percocet given in ED      ADL;s :decreased      Data:    When was thelast UDS:  2-4-2021          Was the UDS appropriate:  [x] yes []   no      Record/Diagnostics Review:      As above, I did review the imaging             EXAMINATION:   MRI OF THE LUMBAR SPINE WITHOUT CONTRAST, 10/22/2020 11:38 am       TECHNIQUE:   Multiplanar multisequence MRI of the lumbar spine was performed without the   administration of intravenous contrast.       COMPARISON:   Radiographs October 7, 2020       MRI July 11, 2006       HISTORY:   ORDERING SYSTEM PROVIDED HISTORY: Lumbar radiculopathy   TECHNOLOGIST PROVIDED HISTORY:   neuropathy   Is the patient pregnant?->No   Reason for Exam: pt stated back pain radiates down lt leg   Acuity: Chronic   Type of Exam: Initial   Additional signs and symptoms: pt stated back pain radiates down lt leg       FINDINGS:   BONES/ALIGNMENT: No acute fracture.  No subluxation.  No suspicious bone   marrow replacing lesion.       SPINAL CORD: Normal signal within the conus which terminates at T12-L1.       SOFT TISSUES: No paraspinal abnormality.       L1-L2: No significant central canal or foraminal stenosis.       L2-L3: Mild broad-based disc bulge and facet degenerative changes combine to   create mild central canal stenosis.  Changes combine to create minimal   bilateral foraminal stenosis.       L3-L4: Broad-based disc bulge and facet degenerative changes combine to   create mild central canal stenosis.  Changes combine to create mild bilateral   foraminal stenosis.       L4-L5: Broad-based disc bulge and facet degenerative changes combine to   create mild central canal stenosis.  Changes combine to create mild-moderate   bilateral foraminal stenosis.       L5-S1: Mild broad-based disc bulge and facet degenerative changes do not   cause significant central canal stenosis.  Changes combine to create mild   bilateral foraminal stenosis.            Impression   Overall mild multilevel degenerative changes.               Pill count: appropriate    fill date : last filled 3-  Morphine equivalent dose as reported on OARRS:15  Periodic Controlled Substance Monitoring: No signs of potential drug abuse or diversion identified. , Possible medication side effects, risk of tolerance/dependence & alternative treatments discussed. , Assessed functional status., Obtaining appropriate analgesic effect of treatment. Harshal Lucas, APRN - CNP)  Review ofOARRS does not show any aberrant prescription behavior. Medication is helping the patient stay active. Patient denies any side effects and reports adequate analgesia. No sign of misuse/abuse.             Past Medical History:   Diagnosis Date    Allergic rhinitis 10/21/2014    Anxiety     Arthritis     Bleeds easily ([address]     per pt, has never had a work up   Sinha Complete rotator cuff tear of left shoulder 2018    Diabetes mellitus ([address]     On Insulin, metformin, Glipizide    Diabetic polyneuropathy associated with type 2 diabetes mellitus ([address] 3/26/2018    Facet degeneration of lumbar region 2/18/2021    Fibromyalgia     GERD (gastroesophageal reflux disease) 10/21/2014    H/O transfusion     History of blood transfusion     Hypertension     Obesity 10/21/2014    Pure hypercholesterolemia 2/23/2016    Tobacco abuse     Type II or unspecified type diabetes mellitus without mention of complication, not stated as uncontrolled     Unspecified sleep apnea     CPAP machine is broken    Wears glasses        Past Surgical History:   Procedure Laterality Date    BRONCHOSCOPY  9/27/2017    BRONCHOSCOPY BRUSHINGS performed by Gracia Dobbins MD at [place] [address]    CHOLECYSTECTOMY N/A 9/29/2017    CHOLECYSTECTOMY OPEN performed by Yvonne Leroy DO at [address] [place] [address] N/A 2/21/2018    COLONOSCOPY POLYPECTOMY SNARE/COLD BIOPSY performed by Laci Hawk MD at [address] [place] [address]    with revision x1    LARYNGOSCOPY N/A 9/29/2017    DIRECT MICRO LARYNGOSCOPY WITH EXCISION VOCAL CORD LESION  performed by Manoj Mcintosh MD at [place] [address] EGD TRANSORAL BIOPSY SINGLE/MULTIPLE N/A 9/27/2017    EGD BIOPSY performed by Melinda Pérez MD at [address] [facility] ARTHROSCOP,SURG,W/ROTAT CUFF REPR Left 6/19/2018    SHOULDER ARTHROSCOPY WITH ROTATOR CUFF REPAIR, SUBACROMIAL DECOMPRESSION performed by Enrico Higginbotham MD at [address] Left     SHOULDER ARTHROSCOPY WITH ROTATOR CUFF REPAIR, SUBACROMIAL DECOMPRESSION (Left        Allergies   Allergen Reactions    Pcn [Penicillins] Hives and Other (See Comments)     Lose motor function of legs and collaps    Demerol Hcl [Meperidine] Other (See Comments)     Panic attack    Metformin And Related      Abdominal pain    Tape [Adhesive Tape] Other (See Comments)     Blister      Morphine Nausea And Vomiting and Other (See Comments)     Stomach pain         Current Outpatient Medications:     Dulaglutide (TRULICITY) 5.89 HL/3.3WS SOPN, Inject 0.75 mg into the skin once a week, Disp: , Rfl:     vitamin D3 (CHOLECALCIFEROL) 25 MCG (1000 UT) TABS tablet, TAKE ONE TABLET BY MOUTH DAILY, Disp: 30 tablet, Rfl: 2    pregabalin (LYRICA) 100 MG capsule, Take 1 capsule by mouth 2 times daily for 90 days. , Disp: 180 capsule, Rfl: 0    fexofenadine (ALLEGRA) 180 MG tablet, Take 1 tablet by mouth daily, Disp: 90 tablet, Rfl: 2    hydroCHLOROthiazide (HYDRODIURIL) 25 MG tablet, TAKE ONE TABLET BY MOUTH DAILY WITH POTASSIUM SUPPLEMENT, Disp: 90 tablet, Rfl: 2    potassium chloride (KLOR-CON) 10 MEQ extended release tablet, TAKE ONE TABLET BY MOUTH DAILY WITH HYDROCHLOROTHIAZIDE, Disp: 90 tablet, Rfl: 2    lisinopril (PRINIVIL;ZESTRIL) 20 MG tablet, TAKE ONE TABLET BY MOUTH DAILY, Disp: 87 tablet, Rfl: 3    glipiZIDE (GLUCOTROL) 5 MG tablet, TAKE ONE TABLET BY MOUTH TWICE A DAY BEFORE BIGGEST MEAL, Disp: 174 tablet, Rfl: 3    meloxicam (MOBIC) 15 MG tablet, Take 1 tablet by mouth daily, Disp: 90 tablet, Rfl: 2    Insulin Degludec (TRESIBA FLEXTOUCH) 100 UNIT/ML SOPN, Inject 45 Units into the skin 2 times daily, Disp: , Rfl:     insulin lispro, 1 Unit Dial, (HUMALOG KWIKPEN) 100 UNIT/ML SOPN, Inject 15 Units into the skin 3 times daily (before meals), Disp: 5 pen, Rfl: 1    atorvastatin (LIPITOR) 10 MG tablet, Take 1 tablet by mouth daily, Disp: 90 tablet, Rfl: 2    ondansetron (ZOFRAN-ODT) 4 MG disintegrating tablet, Take 1 tablet by mouth 3 times daily as needed for Nausea or Vomiting, Disp: 30 tablet, Rfl: 3    busPIRone (BUSPAR) 5 MG tablet, TAKE ONE TABLET BY MOUTH THREE TIMES A DAY AS NEEDED FOR ANXIETY, Disp: 90 tablet, Rfl: 3    Easy Touch Lancets 33G/Twist MISC, USE TWO TO THREE TIMES DAILY, Disp: 90 each, Rfl: 2    Handicap Placard MISC, by Does not apply route Prescription good for 5 Years Expires January 2026, Disp: 1 each, Rfl: 0    tiZANidine (ZANAFLEX) 4 MG tablet, TAKE ONE TABLET BY MOUTH EVERY 8 HOURS AS NEEDED FOR NECK AND BACK PAIN. **CAUSES SEDATION AND DO NOT DRIVE WHILE TAKING THIS MEDICATION, Disp: 90 tablet, Rfl: 0    Blood Glucose Monitoring Suppl (TRUE METRIX METER) MANOLO, Testing BID, Disp: 1 Device, Rfl: 0    Insulin Pen Needle (PEN NEEDLES 3/16\") 31G X 5 MM MISC, 1 each by Does not apply route daily, Disp: 100 each, Rfl: 3    Insulin Syringes, Disposable, U-100 1 ML MISC, 1 each by Does not apply route daily, Disp: 100 each, Rfl: 3    blood glucose monitor strips, Test 2-3 times a day & as needed for symptoms of irregular blood glucose.  BRAND OF CHOICE INSURANCE ALLOWS., Disp: 100 strip, Rfl: 11    Alcohol Swabs (ALCOHOL PREP) PADS, Use as directed, Disp: 100 each, Rfl: 11    Current Facility-Administered Medications:     methylPREDNISolone acetate (DEPO-MEDROL) injection 80 mg, 80 mg, Intra-articular, Once, Lainey Flores,     bupivacaine (MARCAINE) 0.25 % injection 5 mg, 2 mL, Intra-articular, Once, Lainey Flores,     Family History   Problem Relation Age of Onset    Diabetes Mother     Heart Disease Mother         murmur    Heart Disease Father Respiratory: Negative. Endocrine:        Blood sugar 87   Hematologic/Lymphatic: Negative. Skin: Negative. Musculoskeletal: Positive for back pain. Gastrointestinal: Positive for constipation. Genitourinary: Positive for dysuria and hesitancy. Neurological: Positive for headaches, numbness and weakness. Using a walker   Psychiatric/Behavioral: Positive for depression. Managing         Physical Exam:  LMP  (LMP Unknown)     Physical Exam  Skin:         Neurological:      Mental Status: She is alert and oriented to person, place, and time. Psychiatric:         Mood and Affect: Mood normal.         Thought Content: Thought content normal.           Assessment:  Problem List Items Addressed This Visit     Medication monitoring encounter - Primary    Lumbosacral spondylosis without myelopathy    Lumbar radiculopathy    Fibromyalgia    Facet degeneration of lumbar region    Diabetic polyneuropathy associated with type 2 diabetes mellitus ([facility]    Chronic bilateral low back pain without sciatica              Treatment Plan:  DISCUSSION: Treatment options discussed withpatient and all questions answered to patient's satisfaction. Possible side effects, risk of tolerance and or dependence and alternative treatments discussed    Obtaining appropriate analgesic effect of treatment   No signs of potential drug abuse or diversion identified    [x] Ill effects of being on chronic pain medications such as sleep disturbances, respiratory depression, hormonal changes, withdrawal symptoms, chronic opioid dependence and tolerance as well as risk of taking opioids with Benzodiazepines and taking opioids along with alcohol,  werediscussed with patient. I had asked the patient to minimize medication use and utilize pain medications only for uncontrolled rest pain or pain with exertional activities. I advised patient not to self-escalate painmedications without consulting with us.   At each of patient's future visits we will try to taper pain medications, while adjusting the adjunct medications, and re-evaluating for Physical Therapy to improve spinal andjoint strength. We will continue to have discussions to decrease pain medications as tolerated. Counseled patient on effects their pain medication and /or their medical condition mayhave on their  ability to drive or operate machinery. Instructed not to drive or operate machinery if drowsy     I also discussed with the patient regarding the dangers of combining narcotic pain medication with tranquilizers, alcohol or illegal drugs or taking the medication any way other than prescribed. The dangers were discussed  including respiratory depression and death. Patient was told to tell  all  physicians regarding the medications he is getting from pain clinic. Patient is warned not to take any unprescribed medications over-the-countermedications that can depress breathing . Patient is advised to talk to the pharmacist or physicians if planning to take any over-the-counter medications before  takeing them. Patient is strongly advised to avoid tranquilizers or  relaxants, illegal drugs  or any medications that can depress breathing  Patient is also advised to tell us if there is any changes in their medications from other physicians.     1. Will prescribe medrol dose rosi and hydrocodone to her pharmacy, she states her pharmacy here [address] will transfer her scripts to pharmacy in [id number] [address]:       Medication Agreement Requirements Met  Continue Opioid therapy  Script written for  Medrol dose rosi, hydrocodone  Follow up appointment made

## 2021-05-25 DIAGNOSIS — E78.00 PURE HYPERCHOLESTEROLEMIA: ICD-10-CM

## 2021-05-25 RX ORDER — ATORVASTATIN CALCIUM 10 MG/1
TABLET, FILM COATED ORAL
Qty: 90 TABLET | Refills: 1 | Status: SHIPPED | OUTPATIENT
Start: 2021-05-25 | End: 2021-05-28

## 2021-05-25 NOTE — TELEPHONE ENCOUNTER
Please Approve or Refuse.   Send to Pharmacy per Pt's Request:      Next Visit Date:  Visit date not found   Last Visit Date: 3/16/2021    Hemoglobin A1C (%)   Date Value   02/22/2021 10.3   10/05/2020 11.1 (H)   08/29/2020 11.2 (H)             ( goal A1C is < 7)   BP Readings from Last 3 Encounters:   04/13/21 132/81   03/16/21 122/84   03/12/21 131/73          (goal 120/80)  BUN   Date Value Ref Range Status   08/29/2020 8 6 - 20 mg/dL Final     CREATININE   Date Value Ref Range Status   08/29/2020 0.74 0.50 - 0.90 mg/dL Final     Potassium   Date Value Ref Range Status   08/29/2020 4.6 3.7 - 5.3 mmol/L Final

## 2021-05-28 DIAGNOSIS — E78.00 PURE HYPERCHOLESTEROLEMIA: ICD-10-CM

## 2021-05-28 RX ORDER — ATORVASTATIN CALCIUM 10 MG/1
TABLET, FILM COATED ORAL
Qty: 90 TABLET | Refills: 1 | Status: SHIPPED | OUTPATIENT
Start: 2021-05-28 | End: 2021-08-10 | Stop reason: SDUPTHER

## 2021-06-09 RX ORDER — MELOXICAM 15 MG/1
15 TABLET ORAL DAILY
Qty: 90 TABLET | Refills: 2 | Status: CANCELLED | OUTPATIENT
Start: 2021-06-09

## 2021-06-09 SDOH — ECONOMIC STABILITY: FOOD INSECURITY: WITHIN THE PAST 12 MONTHS, THE FOOD YOU BOUGHT JUST DIDN'T LAST AND YOU DIDN'T HAVE MONEY TO GET MORE.: NEVER TRUE

## 2021-06-09 SDOH — ECONOMIC STABILITY: FOOD INSECURITY: WITHIN THE PAST 12 MONTHS, YOU WORRIED THAT YOUR FOOD WOULD RUN OUT BEFORE YOU GOT MONEY TO BUY MORE.: NEVER TRUE

## 2021-06-09 ASSESSMENT — PATIENT HEALTH QUESTIONNAIRE - PHQ9
SUM OF ALL RESPONSES TO PHQ QUESTIONS 1-9: 0
SUM OF ALL RESPONSES TO PHQ9 QUESTIONS 1 & 2: 0
2. FEELING DOWN, DEPRESSED OR HOPELESS: 0
1. LITTLE INTEREST OR PLEASURE IN DOING THINGS: 0
SUM OF ALL RESPONSES TO PHQ QUESTIONS 1-9: 0
SUM OF ALL RESPONSES TO PHQ QUESTIONS 1-9: 0

## 2021-06-09 ASSESSMENT — SOCIAL DETERMINANTS OF HEALTH (SDOH): HOW HARD IS IT FOR YOU TO PAY FOR THE VERY BASICS LIKE FOOD, HOUSING, MEDICAL CARE, AND HEATING?: NOT HARD AT ALL

## 2021-06-09 NOTE — PROGRESS NOTES
Visit Information    Have you changed or started any medications since your last visit including any over-the-counter medicines, vitamins, or herbal medicines? no   Have you stopped taking any of your medications? Is so, why? -  no  Are you having any side effects from any of your medications? - no    Have you seen any other physician or provider since your last visit?  no   Have you had any other diagnostic tests since your last visit?  no   Have you been seen in the emergency room and/or had an admission in a hospital since we last saw you?  yes -    Have you had your routine dental cleaning in the past 6 months?  no     Do you have an active MyChart account? If no, what is the barrier?   Yes    Patient Care Team:  KIMANI Deshpande CNP as PCP - General (Family Medicine)  KIMANI Deshpande CNP as PCP - [facility]  Manfred Mullen DO as Consulting Physician (General Surgery)  Sveta Lobo MD as Consulting Physician (Pain Management)  Emma Goncalves MD as Consulting Physician (Endocrinology)    Medical History Review  Past Medical, Family, and Social History reviewed and does contribute to the patient presenting condition    Health Maintenance   Topic Date Due    Colon cancer screen colonoscopy  02/21/2021    A1C test (Diabetic or Prediabetic)  05/22/2021    Diabetic foot exam  11/10/2021 (Originally 7/26/2020)    Diabetic microalbuminuria test  08/29/2021    Potassium monitoring  08/29/2021    Creatinine monitoring  08/29/2021    Diabetic retinal exam  09/15/2021    Lipid screen  10/07/2021    Breast cancer screen  10/08/2022    Cervical cancer screen  10/05/2025    DTaP/Tdap/Td vaccine (2 - Td or Tdap) 09/28/2030    Pneumococcal 0-64 years Vaccine (2 of 2) 01/30/2032    Hepatitis B vaccine  Completed    Flu vaccine  Completed    Shingles Vaccine  Completed    COVID-19 Vaccine  Completed    Hepatitis C screen  Completed    HIV screen  Completed    Hepatitis A vaccine  Aged Out    Hib vaccine  Aged Out    Meningococcal (ACWY) vaccine  Aged Out

## 2021-06-10 ENCOUNTER — TELEMEDICINE (OUTPATIENT)
Dept: FAMILY MEDICINE CLINIC | Age: 56
End: 2021-06-10
Payer: COMMERCIAL

## 2021-06-10 DIAGNOSIS — M54.42 CHRONIC MIDLINE LOW BACK PAIN WITH LEFT-SIDED SCIATICA: ICD-10-CM

## 2021-06-10 DIAGNOSIS — M79.7 FIBROMYALGIA: ICD-10-CM

## 2021-06-10 DIAGNOSIS — M54.16 ACUTE LUMBAR RADICULOPATHY: Primary | ICD-10-CM

## 2021-06-10 DIAGNOSIS — M54.16 LUMBAR RADICULOPATHY: ICD-10-CM

## 2021-06-10 DIAGNOSIS — G89.29 CHRONIC MIDLINE LOW BACK PAIN WITH LEFT-SIDED SCIATICA: ICD-10-CM

## 2021-06-10 DIAGNOSIS — I10 ESSENTIAL HYPERTENSION: ICD-10-CM

## 2021-06-10 DIAGNOSIS — J30.89 NON-SEASONAL ALLERGIC RHINITIS DUE TO OTHER ALLERGIC TRIGGER: ICD-10-CM

## 2021-06-10 DIAGNOSIS — M12.9 ARTHRITIS INVOLVING MULTIPLE SITES: ICD-10-CM

## 2021-06-10 PROCEDURE — 99214 OFFICE O/P EST MOD 30 MIN: CPT | Performed by: FAMILY MEDICINE

## 2021-06-10 PROCEDURE — G8427 DOCREV CUR MEDS BY ELIG CLIN: HCPCS | Performed by: FAMILY MEDICINE

## 2021-06-10 PROCEDURE — 3017F COLORECTAL CA SCREEN DOC REV: CPT | Performed by: FAMILY MEDICINE

## 2021-06-10 RX ORDER — HYDROCHLOROTHIAZIDE 25 MG/1
TABLET ORAL
Qty: 90 TABLET | Refills: 2 | Status: SHIPPED | OUTPATIENT
Start: 2021-06-10 | End: 2021-12-02 | Stop reason: SDUPTHER

## 2021-06-10 RX ORDER — LISINOPRIL 20 MG/1
TABLET ORAL
Qty: 87 TABLET | Refills: 3 | Status: SHIPPED | OUTPATIENT
Start: 2021-06-10 | End: 2021-12-02 | Stop reason: SDUPTHER

## 2021-06-10 RX ORDER — TIZANIDINE 4 MG/1
TABLET ORAL
Qty: 90 TABLET | Refills: 0 | Status: SHIPPED | OUTPATIENT
Start: 2021-06-10 | End: 2021-07-06

## 2021-06-10 RX ORDER — LIDOCAINE 4 G/G
1 PATCH TOPICAL DAILY
Qty: 30 PATCH | Refills: 0 | Status: SHIPPED | OUTPATIENT
Start: 2021-06-10 | End: 2021-07-10

## 2021-06-10 RX ORDER — MELOXICAM 15 MG/1
TABLET ORAL
Qty: 90 TABLET | Refills: 1 | Status: SHIPPED | OUTPATIENT
Start: 2021-06-10 | End: 2021-06-14 | Stop reason: SDUPTHER

## 2021-06-10 RX ORDER — PREDNISONE 10 MG/1
TABLET ORAL
Qty: 50 TABLET | Refills: 0 | Status: SHIPPED | OUTPATIENT
Start: 2021-06-10 | End: 2021-06-30

## 2021-06-10 RX ORDER — FEXOFENADINE HCL 180 MG/1
180 TABLET ORAL DAILY
Qty: 90 TABLET | Refills: 2 | Status: SHIPPED | OUTPATIENT
Start: 2021-06-10 | End: 2021-09-08

## 2021-06-10 ASSESSMENT — ENCOUNTER SYMPTOMS
SHORTNESS OF BREATH: 0
BACK PAIN: 1
ABDOMINAL PAIN: 0

## 2021-06-10 NOTE — PATIENT INSTRUCTIONS
[facility] COVID-19 Vaccination Hotline: [phone]    COVID-19 vaccine appointments are not available through our practice. As you're eligible to receive the COVID-19 vaccine, as determined by your state's department of health, you will be able to schedule an appointment through [facility] or by calling [phone]. Appointments are required to receive the COVID-19 vaccine. As vaccine supply continues to be limited, we anticipate open appointments to fill up quickly and appreciate your patience as we work through the process of providing vaccines to those in our communities. Schedule a Vaccine  When you qualify to receive the vaccine, call the [facility] COVID-19 Vaccination Hotline to schedule your appointment or to get additional information about the [facility] locations which are offering the COVID-19 vaccine. To be 94% effective, it's important that you receive two doses of one of the COVID-19 vaccines. -If you are receiving the [place] vaccine, your second shot will be scheduled as close to 21 days after the first shot as possible. -If you are receiving the Moderna vaccine, your second shot will be scheduled as close to 28 days after the first shot as possible. Links to [facility]) website and [facility] website:    [employer]/[facility]-monitoring-coronavirus-covid-19/covid-19-vaccine/[place]-vaccine    [email or web address]    [email or web address]  https://sites. google. com/view/wchd[place]-coronavirus/home/vaccines/get-vaccinated  [email or web address]. com/r/[email or web address]_Vaccine_On-Call_List      [email or web address]?:toolbar=n&:display_count=n&:origin=viz_share_link&:embed=y    PHARMACY LOCATIONS  Https://vaccine. coronavirus. [place].gov/    [facility]  Https://[email or web address]?:toolbar=n&:display_count=n&:origin=viz_share_link&:embed=y    [facility] [email or web address]?:toolbar=n&:display_count=n&:origin=[facility]_share_link&:embed=y    [address]  [email or web address]?:toolbar=n&:display_count=n&:origin=[employer]_link&:embed=y    [address]  [email or web address]?:toolbar=n&:display_count=n&:origin=[facility]_share_link&:embed=y

## 2021-06-10 NOTE — PROGRESS NOTES
[facility] at [address] [facility] [address] [facility] [place] [address]   O: [phone]  S:[phone]    Miguel Angel Aguiar is a 49 y.o. female evaluated via telephone on 6/10/2021. CONSENT:  She and/or health care decision maker is aware that that she may receive a bill for this telephone service, depending on her insurance coverage, and has provided verbal consent to proceed: Yes    DOCUMENTATION:  Patient scheduled this appointment today due to Diabetes, Hypertension, Discuss Medications (INSULIN), and Immunizations (YES TO COVID-19)      Sister is having a lot of issues and she's helping her. She's gonna lose children to CPS. Daughter in rehab. It happened on the way to [facility]. Piece of bone broke off. Hurts a lot. My back got worst- still in [facility] radiating left leg- sciatica- ct done- bone chip- nerve. 6 weeks in [place]     Steroids  Percocet  Pain mgmt- percocet- shasta- fill medication- gaineswille. Pain everyday. MULTIPLE ARTHRITIS/LUBAR RADICULOPATHY/FIBROMYALGIA    Patient has had some chronic low back pain for a while. Patient went for a drive to see her sister and had some acute low back pain. Patient states that the pain was a lot more painful than her previous problem in the past she describes it as sharp and stabbing. Pain was radiating down to her left leg which she is never had before. Patient had an evaluation done in the emergency room on the way to her sister's home. Patient was told to have a bone spur that may be causing some issues with her sciatic nerve irritation. Patient was given some Percocets to take and also short course of steroids which she is finished. Patient continues to have the pain. She is established with pain management but is unable to come back home until 6 weeks from now. Patient's emergency room physician had recommended she has a procedure done to correct the bone spur.   Patient denies any loss of control of bowel and bladder habits. She denies any weakness to her legs. She is not getting any relief with the medicine that she is on which includes tizanidine, Lyrica, meloxicam, and prednisone from her sister. Patient states that the pain is worse with sitting and standing. Patient had also reached out to her pain management office to get a refill of her Norco which she normally takes. Patient is recommended to see the neurosurgeon here in [place] rather than another place. Since she will not be going back and forth for treatments. We will go ahead and send a referral to a neurosurgeon. Which she will be reaching out as soon as she can. We will be providing with a longer course of prednisone since she is not able to come back anytime soon. Patient has a known chronic low back pain for several years , Pain is worse after just 5 minutes of standing up when doing dishes. The pain is located midline and radiates up or down into the buttocks and thighs at times. Intensity of pain is 8/10, down to 5/10 when sitting. Patient also has known General muscle aches which is also chronic. Doing PT and TENS unit. Taking tizanidine at nighttime helps a little. She is also on Meloxicam,Tramadol  Patient was referred to ortho and pain management. She started seeing Dr. Denisa Taylor. SHe had several epidural injection. Patient is concerned about steroid raising her glucose levels. Injections are helping but not taking away all the pain. Patient also had shoulder injections by Dr. Jose Alberto Velasquez. Steroid injection- 75 % relief- 25 relief. 4 injections so far. Tramadol- given- Started- making her sick. Gives her zofran. HYPERTENSION  Savanna Rojas has a well controlled hypertension. she is currently on lisinopril, HCTZ. Patient's most recent BP in the office was stable. she reports stable BP readings at home. Patient denies any adverse reaction to this therapy. she denies any CP, SOB, HA, or palpitations. Patient admits to exercising and adheres to low salt diet. No history of organ damage due to condition noted. Lab Results   Component Value Date/Time    CREATININE 0.74 08/29/2020 07:30 AM     DIABETES MELLITUS  Patient has a  stable Diabetes Mellitus. Current therapy includes Tresiba, Glimeperide, Humalog. Patient is responding well with this therapy. Patient reports home glucose monitoring as Stable readings. Patient denieshypoglycemia episodes such as{symptoms. Patient denies neither polyuria nor polydipsia. Dr. Rajesh Ceron. Patient was kept on the same tresiba and Humolog on sliding scale. Sugar kept going up, eat more, reading high at midnight. 10 minutes,  1 pm 2 glimeperide, 25 units tresiba, 30 units humolog. After an hour, it says high, humolog, 30 mins later- 552 mg/dl. Adjust humolog? Eye Exam: recent  Foot Exam:recent podiatry Diabetes Mellitus shoes  Lab Results   Component Value Date/Time    LABA1C 10.3 02/22/2021 12:00 AM    LABA1C 11.1 (H) 10/05/2020 01:30 PM      I communicated with the patient and/or health care decision maker about: PLAN     Review of Systems   Constitutional: Positive for activity change. Negative for chills and fever. Respiratory: Negative for shortness of breath. Cardiovascular: Negative for chest pain. Gastrointestinal: Negative for abdominal pain. Genitourinary: Negative for dysuria. Musculoskeletal: Positive for arthralgias, back pain, gait problem and myalgias. Neurological: Negative for weakness, numbness and headaches. Psychiatric/Behavioral: Positive for sleep disturbance. Negative for suicidal ideas. The patient is nervous/anxious. Details of this discussion including any medical advice provided: Under assessment.     PHYSICAL EXAM[INSTRUCTIONS:  \"[x]\" Indicates a positive item  \"[]\" Indicates a negative item  -- DELETE ALL ITEMS NOT EXAMINED]  Patient-Reported Vitals 6/9/2021   Patient-Reported Weight 283.0 LB   Patient-Reported Height 5 6 Patient-Reported Systolic -   Patient-Reported Diastolic -   Patient-Reported Pulse -   Patient-Reported Temperature -     Constitutional: [x] No apparent distress      [] Abnormal -   Mental status: [x] Alert and awake  [x] Oriented to person/place/time[] Abnormal -   Pulmonary/Chest: [x] Respiratory effort normal         [] Abnormal -          Psychiatric:       [x] Normal Affect [] Abnormal -        [x] No Hallucinations  Other pertinent observable physical exam findings:-appears very distressed. ASSESSMENT  1. Acute lumbar radiculopathy  Worsening  Continue current therapy. DISCUSSED AND ADVISED TO:  Use heat packs 15 to 20 mins every 2-3 hours. Do some back stretches as tolerated. Refer to hand out for instructions. Call for worsening, numbness, weakness. - tiZANidine (ZANAFLEX) 4 MG tablet; TAKE ONE TABLET BY MOUTH EVERY 8 HOURS AS NEEDED FOR NECK AND BACK PAIN. **CAUSES SEDATION AND DO NOT DRIVE WHILE TAKING THIS MEDICATION  Dispense: 90 tablet; Refill: 0  - predniSONE (DELTASONE) 10 MG tablet; Take 4 tablets by mouth daily for 5 days, THEN 3 tablets daily for 5 days, THEN 2 tablets daily for 5 days, THEN 1 tablet daily for 5 days. Dispense: 50 tablet; Refill: 0  - lidocaine 4 % external patch; Place 1 patch onto the skin daily  Dispense: 30 patch; Refill: 0  - [address], [place], , Neurosurgery, [place]    2. Chronic midline low back pain with left-sided sciatica  Worsening  Continue current therapy. DISCUSSED AND ADVISED TO:  Use heat packs 15 to 20 mins every 2-3 hours. Do some back stretches as tolerated. Refer to hand out for instructions. Call for worsening, numbness, weakness. - tiZANidine (ZANAFLEX) 4 MG tablet; TAKE ONE TABLET BY MOUTH EVERY 8 HOURS AS NEEDED FOR NECK AND BACK PAIN. **CAUSES SEDATION AND DO NOT DRIVE WHILE TAKING THIS MEDICATION  Dispense: 90 tablet; Refill: 0  - predniSONE (DELTASONE) 10 MG tablet;  Take 4 tablets by mouth daily for 5 days, THEN 3 of the visit: Yes    Total Time: minutes: 21-30 minutes    Note: not billable if this call serves to triage the patient into an appointment for the relevant concern  Patient-Reported Vitals 6/9/2021   Patient-Reported Weight 283.0 LB   Patient-Reported Height 5 6   Patient-Reported Systolic -   Patient-Reported Diastolic -   Patient-Reported Pulse -   Patient-Reported Temperature -     Patient Active Problem List   Diagnosis    Allergic rhinitis    Fibromyalgia    Pure hypercholesterolemia    Chronic cholecystitis    Gastroesophageal reflux disease with esophagitis    Medication monitoring encounter    Type 2 diabetes mellitus with diabetic polyneuropathy, with long-term current use of insulin (HCC)    Chronic left shoulder pain    Essential hypertension    Vitamin D deficiency    Fatigue    Diabetic polyneuropathy associated with type 2 diabetes mellitus (HCC)    Large breasts    Elevated LFTs    Lumbar radiculopathy    Arthritis involving multiple sites    Morbid obesity with BMI of 40.0-44.9, adult (HCC)    Numbness and tingling in both hands    Chronic bilateral low back pain without sciatica    Acute pain of both shoulders    Severe sleep apnea    Diabetes mellitus due to underlying condition with hyperosmolarity without coma, with long-term current use of insulin (HCC)    Facet degeneration of lumbar region    Decreased functional residual capacity    Lumbosacral spondylosis without myelopathy    Macromastia    Skin ulcer, limited to breakdown of skin ([facility] [address]     Balta Nathan is a 49 y.o. female patient  being evaluated by a Virtual Visit (video visit) encounter to address concerns as mentioned above. A caregiver was present when appropriate. Due to this being a TeleHealth encounter (During LRPWS-15 public health emergency), evaluation of the following organ systems was limited:Vitals/Constitutional/EENT/Resp/CV/GI//MS/Neuro/Skin/Heme-Lymph-Imm.   Services were provided through a video synchronous discussion virtually to substitute for in-person clinic visit. This is a telehealth visit that was performed with the originating site at Patient Location: home and provider Location of Critical access hospital. Pursuant to the emergency declaration under the [facility], [address] [place] [address] authority and the [employer] and [employer] Act, this Virtual Visit was conducted with patient's (and/or legal guardian's) consent, to reduce the patient's risk of exposure to COVID-19 and provide necessary medical care. The patient (and/or legal guardian) has also been advised to contact this office for worsening conditions or problems, and seek emergency medical treatment and/or call 911 if deemed necessary. This note was completed by using the assistance of a speech-recognition program. However, inadvertent computerized transcription errors may be present. Although every effort was made to ensure accuracy, no guarantees can be provided that every mistake has been identified and corrected by editing.   Electronically signed by KIMANI Connelly CNP on 6/9/21 at 10:03 PM EDT

## 2021-06-14 DIAGNOSIS — M12.9 ARTHRITIS INVOLVING MULTIPLE SITES: ICD-10-CM

## 2021-06-14 DIAGNOSIS — M79.7 FIBROMYALGIA: ICD-10-CM

## 2021-06-14 DIAGNOSIS — M54.16 LUMBAR RADICULOPATHY: ICD-10-CM

## 2021-06-14 DIAGNOSIS — E11.42 DIABETIC POLYNEUROPATHY ASSOCIATED WITH TYPE 2 DIABETES MELLITUS (HCC): ICD-10-CM

## 2021-06-14 RX ORDER — MELOXICAM 15 MG/1
TABLET ORAL
Qty: 90 TABLET | Refills: 1 | Status: SHIPPED | OUTPATIENT
Start: 2021-06-14 | End: 2021-12-02 | Stop reason: SDUPTHER

## 2021-06-14 RX ORDER — PREGABALIN 100 MG/1
100 CAPSULE ORAL 2 TIMES DAILY
Qty: 180 CAPSULE | Refills: 0 | Status: SHIPPED | OUTPATIENT
Start: 2021-06-14 | End: 2021-08-10 | Stop reason: SDUPTHER

## 2021-06-14 NOTE — TELEPHONE ENCOUNTER
Please Approve or Refuse. Send to Pharmacy per Pt's Request:       Patient states she wants the 5% LIdocaine patches instead of the 4%. Please advise.         Next Visit Date:  8/10/2021   Last Visit Date: 6/10/2021    Hemoglobin A1C (%)   Date Value   02/22/2021 10.3   10/05/2020 11.1 (H)   08/29/2020 11.2 (H)             ( goal A1C is < 7)   BP Readings from Last 3 Encounters:   04/13/21 132/81   03/16/21 122/84   03/12/21 131/73          (goal 120/80)  BUN   Date Value Ref Range Status   08/29/2020 8 6 - 20 mg/dL Final     CREATININE   Date Value Ref Range Status   08/29/2020 0.74 0.50 - 0.90 mg/dL Final     Potassium   Date Value Ref Range Status   08/29/2020 4.6 3.7 - 5.3 mmol/L Final

## 2021-07-06 DIAGNOSIS — M54.42 CHRONIC MIDLINE LOW BACK PAIN WITH LEFT-SIDED SCIATICA: ICD-10-CM

## 2021-07-06 DIAGNOSIS — G89.29 CHRONIC MIDLINE LOW BACK PAIN WITH LEFT-SIDED SCIATICA: ICD-10-CM

## 2021-07-06 DIAGNOSIS — M54.16 ACUTE LUMBAR RADICULOPATHY: ICD-10-CM

## 2021-07-06 RX ORDER — TIZANIDINE 4 MG/1
TABLET ORAL
Qty: 90 TABLET | Refills: 0 | Status: SHIPPED | OUTPATIENT
Start: 2021-07-06 | End: 2021-08-10 | Stop reason: SDUPTHER

## 2021-07-06 NOTE — TELEPHONE ENCOUNTER
Please Approve or Refuse.   Send to Pharmacy per Pt's Request:      Next Visit Date:  8/10/2021   Last Visit Date: 6/10/2021    Hemoglobin A1C (%)   Date Value   02/22/2021 10.3   10/05/2020 11.1 (H)   08/29/2020 11.2 (H)             ( goal A1C is < 7)   BP Readings from Last 3 Encounters:   04/13/21 132/81   03/16/21 122/84   03/12/21 131/73          (goal 120/80)  BUN   Date Value Ref Range Status   08/29/2020 8 6 - 20 mg/dL Final     CREATININE   Date Value Ref Range Status   08/29/2020 0.74 0.50 - 0.90 mg/dL Final     Potassium   Date Value Ref Range Status   08/29/2020 4.6 3.7 - 5.3 mmol/L Final

## 2021-07-21 ENCOUNTER — TELEPHONE (OUTPATIENT)
Dept: FAMILY MEDICINE CLINIC | Age: 56
End: 2021-07-21

## 2021-07-21 NOTE — TELEPHONE ENCOUNTER
Pt called in asking if lyrica could be changed from 1 tab 2 times a day to 1 tab 3 times a day.  Please advise

## 2021-08-10 DIAGNOSIS — G89.29 CHRONIC MIDLINE LOW BACK PAIN WITH LEFT-SIDED SCIATICA: ICD-10-CM

## 2021-08-10 DIAGNOSIS — M54.16 ACUTE LUMBAR RADICULOPATHY: ICD-10-CM

## 2021-08-10 DIAGNOSIS — E11.42 DIABETIC POLYNEUROPATHY ASSOCIATED WITH TYPE 2 DIABETES MELLITUS (HCC): ICD-10-CM

## 2021-08-10 DIAGNOSIS — E78.00 PURE HYPERCHOLESTEROLEMIA: ICD-10-CM

## 2021-08-10 DIAGNOSIS — M54.42 CHRONIC MIDLINE LOW BACK PAIN WITH LEFT-SIDED SCIATICA: ICD-10-CM

## 2021-08-10 RX ORDER — TIZANIDINE 4 MG/1
TABLET ORAL
Qty: 90 TABLET | Refills: 0 | Status: SHIPPED | OUTPATIENT
Start: 2021-08-10 | End: 2021-10-18

## 2021-08-10 RX ORDER — ATORVASTATIN CALCIUM 10 MG/1
TABLET, FILM COATED ORAL
Qty: 90 TABLET | Refills: 1 | Status: SHIPPED | OUTPATIENT
Start: 2021-08-10 | End: 2022-08-02 | Stop reason: ALTCHOICE

## 2021-08-10 RX ORDER — PREGABALIN 100 MG/1
100 CAPSULE ORAL 2 TIMES DAILY
Qty: 180 CAPSULE | Refills: 0 | Status: SHIPPED | OUTPATIENT
Start: 2021-08-10 | End: 2021-08-19 | Stop reason: SDUPTHER

## 2021-08-10 NOTE — TELEPHONE ENCOUNTER
Please Approve or Refuse.   Send to Pharmacy per Pt's Request:     Next Visit Date:  8/19/2021   Last Visit Date: 6/10/2021    Hemoglobin A1C (%)   Date Value   02/22/2021 10.3   10/05/2020 11.1 (H)   08/29/2020 11.2 (H)             ( goal A1C is < 7)   BP Readings from Last 3 Encounters:   04/13/21 132/81   03/16/21 122/84   03/12/21 131/73          (goal 120/80)  BUN   Date Value Ref Range Status   08/29/2020 8 6 - 20 mg/dL Final     CREATININE   Date Value Ref Range Status   08/29/2020 0.74 0.50 - 0.90 mg/dL Final     Potassium   Date Value Ref Range Status   08/29/2020 4.6 3.7 - 5.3 mmol/L Final

## 2021-08-17 NOTE — PROGRESS NOTES
[facility]icians at [address] [facility] [address] [facility] [address]   O: [phone]  F:[phone]    Matthew Valencia is a 49 y.o. female evaluated via telephone on 8/19/2021. CONSENT:  She and/or health care decision maker is aware that that she may receive a bill for this telephone service, depending on her insurance coverage, and has provided verbal consent to proceed: Yes    DOCUMENTATION:  Patient scheduled this appointment today due to Hypertension, Diabetes, and Discuss Medications (increase lyrica )    One mountain dew a month  Yaima peach tea bid  Real food- [employer]. Low back pain improved. May move back to [facility]  4 hours drive. HYPERTENSION  Matthew Valencia has a well controlled hypertension. she is currently on Lisinopril, HCTZ. Patient's most recent BP in the office was stable. she reports stable BP readings at home. Patient denies any adverse reaction to this therapy. she denies any CP, SOB, HA, or palpitations. Patient admits to exercising and adheres to low salt diet. No history of organ damage due to condition noted. Lab Results   Component Value Date/Time    CREATININE 0.74 08/29/2020 07:30 AM     DIABETES MELLITUS  Patient has a  stable Diabetes Mellitus. Current therapy includes Trulicity,Tresiba, Glimeperide, Humalog.- 20 units TID, Glipizide when on prednisone Patient is responding well with this therapy. Patient reports home glucose monitoring as Stable readings. Patient denieshypoglycemia episodes such as{symptoms. Patient denies neither polyuria nor polydipsia. Dr. Cristian Neal. Patient was kept on the same tresiba and Humolog on sliding scale. 55- 60 pm Tresiba  Sugar kept going up, eat more, reading high at midnight. 10 minutes,  1 pm 2 glimeperide, 25 units tresiba, 30 units humolog. After an hour, it says high, humolog, 30 mins later- 552 mg/dl. Adjust humolog? She was doing the trulicity . 75 mg .  Got sick from 1.5 mg  Lab Results Component Value Date    LABA1C 10.3 02/22/2021     Sister is having a lot of issues and she's helping her. She's gonna lose children to CPS. Daughter in rehab. MULTIPLE ARTHRITIS/LUBAR RADICULOPATHY/FIBROMYALGIA  PREVIOUS AND CURRENT NOTES   Patient has had some chronic low back pain for a while. She is established with pain management but is unable to come back home until 6 weeks from now. Patient denies any loss of control of bowel and bladder habits. She denies any weakness to her legs. She is not getting any relief with the medicine that she is on which includes tizanidine, Lyrica, meloxicam from her sister. Patient with known fibromyalgia. Better in a warmer weather. Patient states that the pain is worse with sitting and standing. Patient had also reached out to her pain management office to get a refill of her Norco which she normally takes. Patient is recommended to see the neurosurgeon here in [facility] rather than another place. Since she will not be going back and forth for treatments. We will go ahead and send a referral to a neurosurgeon. Which she will be reaching out as soon as she can. We will be providing with a longer course of prednisone since she is not able to come back anytime soon. Patient has a known chronic low back pain for several years , Pain is worse after just 5 minutes of standing up when doing dishes. The pain is located midline and radiates up or down into the buttocks and thighs at times. Intensity of pain is 8/10, down to 5/10 when sitting. Patient also has known General muscle aches which is also chronic. Doing PT and TENS unit. Taking tizanidine at nighttime helps a little. She is also on Meloxicam,Tramadol  Patient was referred to ortho and pain management. She started seeing Dr. Golden Wong. SHe had several epidural injection. Patient is concerned about steroid raising her glucose levels.   Injections are helping but not taking away all the pain.  Patient also had shoulder injections by Dr. Billy Murphy. Steroid injection- 75 % relief- 25 relief. 4 injections so far. OBESITY  Patient's BMI is There is no height or weight on file to calculate BMI.  kg/m2. BMI is increasing. Patient understands that this condition increases the patient's risk for chronic conditions. Patient advised to practice healthy eating habits. Diet should include plenty of fruits, vegetables, whole grains, lean protein, and low-fat dairy. Increase water consumption. Reduce consumption of dietary sugar and sugar-sweetened beverages. Increasing physical exercises at least 3-4 times a week. We will monitor progression of condition. I communicated with the patient and/or health care decision maker about: PLAN     Review of Systems   Constitutional: Positive for activity change. Negative for chills and fever. Respiratory: Negative for shortness of breath. Cardiovascular: Negative for chest pain. Gastrointestinal: Negative for abdominal pain. Genitourinary: Negative for dysuria. Musculoskeletal: Positive for arthralgias, back pain, gait problem and myalgias. Neurological: Negative for weakness, numbness and headaches. Psychiatric/Behavioral: Positive for sleep disturbance. Negative for suicidal ideas. The patient is nervous/anxious. Details of this discussion including any medical advice provided: Under assessment.     PHYSICAL EXAM[INSTRUCTIONS:  \"[x]\" Indicates a positive item  \"[]\" Indicates a negative item  -- DELETE ALL ITEMS NOT EXAMINED]  Patient-Reported Vitals 8/19/2021   Patient-Reported Weight -   Patient-Reported Height -   Patient-Reported Systolic 054   Patient-Reported Diastolic 76   Patient-Reported Pulse 81   Patient-Reported Temperature -     Constitutional: [x] No apparent distress      [] Abnormal -   Mental status: [x] Alert and awake  [x] Oriented to person/place/time[] Abnormal -   Pulmonary/Chest: [x] Respiratory effort normal [] Abnormal -          Psychiatric:       [x] Normal Affect [] Abnormal -        [x] No Hallucinations   Other pertinent observable physical exam findings:-mildly anxious  Controlled Substance Monitoring:  Acute and Chronic Pain Monitoring:   RX Monitoring 5/14/2021   Attestation -   Periodic Controlled Substance Monitoring No signs of potential drug abuse or diversion identified. ;Possible medication side effects, risk of tolerance/dependence & alternative treatments discussed. ;Assessed functional status. ;Obtaining appropriate analgesic effect of treatment. Chronic Pain > 50 MEDD Obtained or confirmed \"Consent for Opioid Use\" on file. ASSESSMENT  1. Essential hypertension  Stable  Continue current therapy. DISCUSSED AND ADVISED TO:  Cut down on your salt intake. Cut down on caffeinated drinks, sports drinks. Instructed to check BP at home regularly. Report for any chest pains, shortness of breath, headaches, and lightheadedness. Call the office if your blood pressure continue to be higher than 140/90 or 90/50.      2. Type 2 diabetes mellitus with diabetic polyneuropathy, with long-term current use of insulin (HCC)  Stable  Continue therapy. We will continue to monitor Hemoglobin A1c   DISCUSSED and ADVISED TO:  Continue to check Glucose levels at home. Report increased and low levels as discussed. Decrease carbohydrates, sugary drinks, desserts in your diet. Exercise regularly, as tolerated. Try to lose weight. - Dulaglutide (TRULICITY) 8.93 PJ/7.9WS SOPN; Inject 0.75 mg into the skin once a week  Dispense: 4 pen; Refill: 2  - Insulin Degludec (TRESIBA FLEXTOUCH) 100 UNIT/ML SOPN; 55 units in am and 60 units in PM  Dispense: 10 pen; Refill: 1  - pregabalin (LYRICA) 150 MG capsule; Take 1 capsule by mouth 2 times daily for 30 days. Dispense: 90 capsule; Refill: 0    3. Pure hypercholesterolemia (ASCVD score 21.7% July 2020)  Failure to Improve  Continue therapy.   Advised to decrease the consumption of red meats, fried foods, trans fats, sweets, sugary beverages. Advised to increase fish, vegetables, and fruits consumption. Advised to add fiber or OTC supplements in diet. Discussed weight loss which will result in improvement of lipids levels. Advised to increase daily physical activities and add regular exercises. 4. Fibromyalgia  Failure to Improve  DISCUSSED AND ADVISED TO:  Exercise often. Get a good night's sleep. Make healthy changes to diet. Quit smoking   Try to reduce stress  Carefully take medications as discussed  Report for worsening symptoms          - pregabalin (LYRICA) 150 MG capsule; Take 1 capsule by mouth 2 times daily for 30 days. Dispense: 90 capsule; Refill: 0    5. Chronic midline low back pain with left-sided sciatica  Failure to Improve  Continue current therapy. DISCUSSED AND ADVISED TO:  Use heat packs 15 to 20 mins every 2-3 hours. Do some back stretches as tolerated. Refer to hand out for instructions. Call for worsening, numbness, weakness. - pregabalin (LYRICA) 150 MG capsule; Take 1 capsule by mouth 2 times daily for 30 days. Dispense: 90 capsule; Refill: 0    6. Morbid obesity with BMI of 45.0-49.9, adult (HCC)  Failure to Improve  BMI decreasing  DISCUSSED AND ADVISED TO:  Eat a low-fat and low carbohydrates diet. Avoid fried foods especially fast food. Choose healthier options for snacks. Have 5-6 servings of fruits and vegetables per day. Cut down on eating processed food. Add 30 minutes to 1 hour aerobic exercise for 3-4 days a week. I affirm this is a Patient Initiated Episode with a Patient who has not had a related appointment within my department in the past 7 days or scheduled within the next 24 hours.     Patient identification was verified at the start of the visit: Yes    Total Time: minutes: 21-30 minutes    Note: not billable if this call serves to triage the patient into an appointment for the relevant concern  Patient-Reported Vitals 8/19/2021   Patient-Reported Weight -   Patient-Reported Height -   Patient-Reported Systolic 012   Patient-Reported Diastolic 76   Patient-Reported Pulse 81   Patient-Reported Temperature -     Patient Active Problem List   Diagnosis    Allergic rhinitis    Fibromyalgia    Pure hypercholesterolemia    Chronic cholecystitis    Gastroesophageal reflux disease with esophagitis    Medication monitoring encounter    Type 2 diabetes mellitus with diabetic polyneuropathy, with long-term current use of insulin (HCC)    Chronic left shoulder pain    Essential hypertension    Vitamin D deficiency    Fatigue    Diabetic polyneuropathy associated with type 2 diabetes mellitus (HCC)    Large breasts    Elevated LFTs    Lumbar radiculopathy    Arthritis involving multiple sites    Morbid obesity with BMI of 40.0-44.9, adult (HCC)    Numbness and tingling in both hands    Chronic bilateral low back pain without sciatica    Acute pain of both shoulders    Severe sleep apnea    Diabetes mellitus due to underlying condition with hyperosmolarity without coma, with long-term current use of insulin (HCC)    Facet degeneration of lumbar region    Decreased functional residual capacity    Lumbosacral spondylosis without myelopathy    Macromastia    Skin ulcer, limited to breakdown of skin ([address]     Matthew Valencia is a 49 y.o. female patient  being evaluated by a Virtual Visit (video visit) encounter to address concerns as mentioned above. A caregiver was present when appropriate. Due to this being a TeleHealth encounter (During PGADA-32 public health emergency), evaluation of the following organ systems was limited:Vitals/Constitutional/EENT/Resp/CV/GI//MS/Neuro/Skin/Heme-Lymph-Imm. Services were provided through a video synchronous discussion virtually to substitute for in-person clinic visit.  This is a telehealth visit that was performed with the originating site at Patient Location: home and provider Location of [place], [place]. Pursuant to the emergency declaration under the [address] [place] [address] and the [employer] and [employer] Act, this Virtual Visit was conducted with patient's (and/or legal guardian's) consent, to reduce the patient's risk of exposure to COVID-19 and provide necessary medical care. The patient (and/or legal guardian) has also been advised to contact this office for worsening conditions or problems, and seek emergency medical treatment and/or call 911 if deemed necessary. This note was completed by using the assistance of a speech-recognition program. However, inadvertent computerized transcription errors may be present. Although every effort was made to ensure accuracy, no guarantees can be provided that every mistake has been identified and corrected by editing.   Electronically signed by KIMANI Freedman CNP on 8/17/21 at 9:32 AM EDT

## 2021-08-17 NOTE — PATIENT INSTRUCTIONS
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 RRT was called yesterday for SOB and patient was transfered to ICU. Patient was expected to have procedure today- seems to be canceled per MD note. Patient is NPO day 1. Estimated Daily Nutrient Needs:  Energy (kcal): 7084-3547; Weight Used for Energy Requirements: Current  Protein (g): 75-90; Weight Used for Protein Requirements: Current (1-1.2g)  Fluid (ml/day): 2928-6785; Method Used for Fluid Requirements: 1 ml/kcal    Nutrition Related Findings:  Lab: K 3.1, mag 1.5, Ca 8.0. Med: humalog, lantus, pepcid. Wounds:    Surgical incision       Current Nutrition Therapies:  DIET NPO Sips of Water with Meds    Anthropometric Measures:  Height:  5' 8\" (172.7 cm)  Current Body Wt:  75.3 kg (166 lb 0.1 oz)   Admission Body Wt:  160 lb    Usual Body Wt:  78.9 kg (174 lb) (4/2021)     Ideal Body Wt:  140 lbs:  118.6 %   Adjusted Body Weight:  175.8; Weight Adjustment for: Amputation   Adjusted BMI:  26.7    BMI Category: Overweight (BMI 25.0-29. 9)       Nutrition Diagnosis:   Inadequate oral intake related to acute injury/trauma as evidenced by NPO or clear liquid status due to medical condition    Nutrition Interventions:   Food and/or Nutrient Delivery: Start oral diet  Nutrition Education and Counseling: No recommendations at this time  Coordination of Nutrition Care: Continue to monitor while inpatient    Goals:  Start PO diet when meet estimated nutritional needs within the next 2-3 days       Nutrition Monitoring and Evaluation:   Behavioral-Environmental Outcomes: None identified  Food/Nutrient Intake Outcomes: Diet Patient Education        Coccyx Pain: Care Instructions  Your Care Instructions     The coccyx is your tailbone. You can have pain in your tailbone from a fall or other injury. Pregnancy and childbirth also can cause tailbone pain. Sometimes, the cause of pain is not known. A tailbone injury causes pain when you sit, especially when you slump or sit on a hard seat. Straining to have a bowel movement also can be very painful. Tailbone injuries can take several months to heal, but in some cases the pain goes even longer. You can take steps at home to ease the pain. In some cases, a doctor injects a corticosteroid medicine into the coccyx to reduce swelling and pain. Follow-up care is a key part of your treatment and safety. Be sure to make and go to all appointments, and call your doctor if you are having problems. It's also a good idea to know your test results and keep a list of the medicines you take. How can you care for yourself at home? · Take pain medicines exactly as directed. ? If the doctor gave you a prescription medicine for pain, take it as prescribed. ? If you are not taking a prescription pain medicine, take an over-the-counter medicine to reduce pain. · Put ice or a cold pack on your tailbone for 10 to 20 minutes at a time. Try to do this every 1 to 2 hours for the next 3 days (when you are awake) or until the swelling goes down. Put a thin cloth between the ice and your skin. · About 2 or 3 days after your injury, you can alternate ice and heat. To soothe the tailbone area, take a warm bath for 20 minutes, 3 or 4 times a day. · Sit on soft, padded surfaces. A doughnut-shaped pillow can take pressure off the tailbone. · Avoid constipation, because straining to have a bowel movement will increase your tailbone pain. ? Include fruits, vegetables, beans, and whole grains in your diet each day. These foods are high in fiber.   ? Drink plenty of fluids, enough so that your urine is light yellow or advancement/tolerance  Physical Signs/Symptoms Outcomes: Biochemical data, Skin, Weight, GI status    Discharge Planning:     Too soon to determine     Electronically signed by Shaun Crowell RD on 8/17/2021 at 8:38 AM clear like water. If you have kidney, heart, or liver disease and have to limit fluids, talk with your doctor before you increase the amount of fluids you drink. ? Get some exercise every day. Build up slowly to 30 to 60 minutes a day on 5 or more days of the week. ? Take a fiber supplement, such as Citrucel or Metamucil, every day if needed. Read and follow all instructions on the label. ? Schedule time each day for a bowel movement. A daily routine may help. Take your time and do not strain when having a bowel movement. · Follow your doctor's directions for stretching and other exercises that might help with pain. When should you call for help? [id number] anytime you think you may need emergency care. For example, call if:  · You are unable to move a leg at all. Call your doctor now or seek immediate medical care if:  · You have new or worse symptoms in your legs or buttocks. Symptoms may include:  ? Numbness or tingling. ? Weakness. ? Pain. · You lose bladder or bowel control. Watch closely for changes in your health, and be sure to contact your doctor if:  · You are not getting better as expected. Where can you learn more? Go to https://[employer] org and sign in to your [employer] account. Enter G452 in the [employer] box to learn more about \"Coccyx Pain: Care Instructions. \"     If you do not have an account, please click on the \"Sign Up Now\" link. Current as of: June 26, 2019               Content Version: 12.5  © 2006-2020 Healthwise, Incorporated. Care instructions adapted under license by [facility] ([facility]). If you have questions about a medical condition or this instruction, always ask your healthcare professional. [place] [address] any warranty or liability for your use of this information. Patient Education        glipizide  Pronunciation:  GLIP mayelin hernandez  Brand:  Glucotrol  What is the most important information I should know about glipizide?   You should not use glipizide if you have diabetic ketoacidosis (call your doctor for treatment). What is glipizide? Glipizide is an oral diabetes medicine that helps control blood sugar levels by helping your pancreas produce insulin. Glipizide is used together with diet and exercise to improve blood sugar control in adults with type 2 diabetes mellitus. Glipizide is not for treating type 1 diabetes. Glipizide may also be used for purposes not listed in this medication guide. What should I discuss with my healthcare provider before taking glipizide? You should not use this medicine if you are allergic to glipizide, or if you have diabetic ketoacidosis (call your doctor for treatment). Tell your doctor if you have ever had:  · liver or kidney disease;  · chronic diarrhea, or a blockage in your intestines; or  · an enzyme deficiency called glucose-6-phosphate dehydrogenase deficiency (G6PD). Follow your doctor's instructions about using this medicine if you are pregnant. Blood sugar control is very important during pregnancy, and your dose needs may be different during each trimester. You should not take glipizide during the last 2 weeks of pregnancy. It may not be safe to breast-feed while using this medicine. Ask your doctor about any risk. How should I take glipizide? Follow all directions on your prescription label. Your doctor may occasionally change your dose. Do not take this medicine in larger or smaller amounts or for longer than recommended. Take the glipizide regular tablet 30 minutes before your first meal of the day. Take the glipizide extended-release tablet with your first meal of the day. Swallow the tablet whole and do not crush, chew, or break it. Your blood sugar will need to be checked often, and you may need other blood tests at your doctor's office. Low blood sugar (hypoglycemia) can happen to everyone who has diabetes.  Symptoms include headache, hunger, sweating, irritability, drugs, diagnose patients or recommend therapy. [facility]'s drug information is an informational resource designed to assist licensed healthcare practitioners in caring for their patients and/or to serve consumers viewing this service as a supplement to, and not a substitute for, the expertise, skill, knowledge and judgment of healthcare practitioners. The absence of a warning for a given drug or drug combination in no way should be construed to indicate that the drug or drug combination is safe, effective or appropriate for any given patient. [facility] does not assume any responsibility for any aspect of healthcare administered with the aid of information [facility] provides. The information contained herein is not intended to cover all possible uses, directions, precautions, warnings, drug interactions, allergic reactions, or adverse effects. If you have questions about the drugs you are taking, check with your doctor, nurse or pharmacist.  Copyright 0842-0250 [address]. Version: 11.01. Revision date: 8/16/2018. Care instructions adapted under license by [facility] ([facility]). If you have questions about a medical condition or this instruction, always ask your healthcare professional. [place] [address] any warranty or liability for your use of this information.

## 2021-08-18 ASSESSMENT — PATIENT HEALTH QUESTIONNAIRE - PHQ9
SUM OF ALL RESPONSES TO PHQ QUESTIONS 1-9: 1
1. LITTLE INTEREST OR PLEASURE IN DOING THINGS: 0
SUM OF ALL RESPONSES TO PHQ QUESTIONS 1-9: 1
2. FEELING DOWN, DEPRESSED OR HOPELESS: 1
SUM OF ALL RESPONSES TO PHQ9 QUESTIONS 1 & 2: 1
SUM OF ALL RESPONSES TO PHQ QUESTIONS 1-9: 1

## 2021-08-19 ENCOUNTER — TELEMEDICINE (OUTPATIENT)
Dept: FAMILY MEDICINE CLINIC | Age: 56
End: 2021-08-19
Payer: COMMERCIAL

## 2021-08-19 DIAGNOSIS — I10 ESSENTIAL HYPERTENSION: Primary | ICD-10-CM

## 2021-08-19 DIAGNOSIS — E11.42 TYPE 2 DIABETES MELLITUS WITH DIABETIC POLYNEUROPATHY, WITH LONG-TERM CURRENT USE OF INSULIN (HCC): ICD-10-CM

## 2021-08-19 DIAGNOSIS — G89.29 CHRONIC MIDLINE LOW BACK PAIN WITH LEFT-SIDED SCIATICA: ICD-10-CM

## 2021-08-19 DIAGNOSIS — E66.01 MORBID OBESITY WITH BMI OF 45.0-49.9, ADULT (HCC): ICD-10-CM

## 2021-08-19 DIAGNOSIS — M54.42 CHRONIC MIDLINE LOW BACK PAIN WITH LEFT-SIDED SCIATICA: ICD-10-CM

## 2021-08-19 DIAGNOSIS — E78.00 PURE HYPERCHOLESTEROLEMIA: ICD-10-CM

## 2021-08-19 DIAGNOSIS — M79.7 FIBROMYALGIA: ICD-10-CM

## 2021-08-19 DIAGNOSIS — Z79.4 TYPE 2 DIABETES MELLITUS WITH DIABETIC POLYNEUROPATHY, WITH LONG-TERM CURRENT USE OF INSULIN (HCC): ICD-10-CM

## 2021-08-19 PROCEDURE — 3046F HEMOGLOBIN A1C LEVEL >9.0%: CPT | Performed by: FAMILY MEDICINE

## 2021-08-19 PROCEDURE — 99214 OFFICE O/P EST MOD 30 MIN: CPT | Performed by: FAMILY MEDICINE

## 2021-08-19 PROCEDURE — 2022F DILAT RTA XM EVC RTNOPTHY: CPT | Performed by: FAMILY MEDICINE

## 2021-08-19 PROCEDURE — G8427 DOCREV CUR MEDS BY ELIG CLIN: HCPCS | Performed by: FAMILY MEDICINE

## 2021-08-19 PROCEDURE — 3017F COLORECTAL CA SCREEN DOC REV: CPT | Performed by: FAMILY MEDICINE

## 2021-08-19 RX ORDER — DULAGLUTIDE 0.75 MG/.5ML
0.75 INJECTION, SOLUTION SUBCUTANEOUS WEEKLY
Qty: 4 PEN | Refills: 2 | Status: SHIPPED | OUTPATIENT
Start: 2021-08-19 | End: 2022-02-11

## 2021-08-19 RX ORDER — INSULIN DEGLUDEC INJECTION 100 U/ML
INJECTION, SOLUTION SUBCUTANEOUS
Qty: 10 PEN | Refills: 1 | Status: SHIPPED | OUTPATIENT
Start: 2021-08-19 | End: 2021-10-18

## 2021-08-19 RX ORDER — PREGABALIN 150 MG/1
150 CAPSULE ORAL 2 TIMES DAILY
Qty: 90 CAPSULE | Refills: 0 | Status: SHIPPED | OUTPATIENT
Start: 2021-08-19 | End: 2021-09-18

## 2021-08-19 ASSESSMENT — ENCOUNTER SYMPTOMS
ABDOMINAL PAIN: 0
BACK PAIN: 1
SHORTNESS OF BREATH: 0

## 2021-08-19 NOTE — PATIENT INSTRUCTIONS
Patient Education        Learning About Carbohydrate (Carb) Counting and Eating Out When You Have Diabetes  Why plan your meals? Meal planning can be a key part of managing diabetes. Planning meals and snacks with the right balance of carbohydrate, protein, and fat can help you keep your blood sugar at the target level you set with your doctor. You don't have to eat special foods. You can eat what your family eats, including sweets once in a while. But you do have to pay attention to how often you eat and how much you eat of certain foods. You may want to work with a dietitian or a certified diabetes educator. He or she can give you tips and meal ideas and can answer your questions about meal planning. This health professional can also help you reach a healthy weight if that is one of your goals. What should you know about eating carbs? Managing the amount of carbohydrate (carbs) you eat is an important part of healthy meals when you have diabetes. Carbohydrate is found in many foods. · Learn which foods have carbs. And learn the amounts of carbs in different foods. ? Bread, cereal, pasta, and rice have about 15 grams of carbs in a serving. A serving is 1 slice of bread (1 ounce), ½ cup of cooked cereal, or 1/3 cup of cooked pasta or rice. ? Fruits have 15 grams of carbs in a serving. A serving is 1 small fresh fruit, such as an apple or orange; ½ of a banana; ½ cup of cooked or canned fruit; ½ cup of fruit juice; 1 cup of melon or raspberries; or 2 tablespoons of dried fruit. ? Milk and no-sugar-added yogurt have 15 grams of carbs in a serving. A serving is 1 cup of milk or 2/3 cup of no-sugar-added yogurt. ? Starchy vegetables have 15 grams of carbs in a serving. A serving is ½ cup of mashed potatoes or sweet potato; 1 cup winter squash; ½ of a small baked potato; ½ cup of cooked beans; or ½ cup cooked corn or green peas.   · Learn how much carbs to eat each day and at each meal. A dietitian or CDE can is a healthy choice because people who have diabetes are at higher risk of heart disease. So choose lean cuts of meat and nonfat or low-fat dairy products. Use olive or canola oil instead of butter or shortening when cooking. · Don't skip meals. Your blood sugar may drop too low if you skip meals and take insulin or certain medicines for diabetes. · Check with your doctor before you drink alcohol. Alcohol can cause your blood sugar to drop too low. Alcohol can also cause a bad reaction if you take certain diabetes medicines. Follow-up care is a key part of your treatment and safety. Be sure to make and go to all appointments, and call your doctor if you are having problems. It's also a good idea to know your test results and keep a list of the medicines you take. Where can you learn more? Go to https://yepme.comcorinneeb.Damage Hounds. org and sign in to your Slidely account. Enter M007 in the RFEyeD box to learn more about \"Learning About Carbohydrate (Carb) Counting and Eating Out When You Have Diabetes. \"     If you do not have an account, please click on the \"Sign Up Now\" link. Current as of: August 31, 2020               Content Version: 12.9  © 2006-2021 Healthwise, [employer]. Care instructions adapted under license by [facility] ([facility]). If you have questions about a medical condition or this instruction, always ask your healthcare professional. [place] [address] any warranty or liability for your use of this information. Patient Education        Low Sodium Diet (2,000 Milligram): Care Instructions  Overview     Limiting sodium can be an important part of managing some health problems. The most common source of sodium is salt. People get most of the salt in their diet from canned, prepared, and packaged foods. Fast food and restaurant meals also are very high in sodium. Your doctor will probably limit your sodium to less than 2,000 milligrams (mg) a day.  This limit counts all the sodium in prepared and packaged foods and any salt you add to your food. Follow-up care is a key part of your treatment and safety. Be sure to make and go to all appointments, and call your doctor if you are having problems. It's also a good idea to know your test results and keep a list of the medicines you take. How can you care for yourself at home? Read food labels  · Read labels on cans and food packages. The labels tell you how much sodium is in each serving. Make sure that you look at the serving size. If you eat more than the serving size, you have eaten more sodium. · Food labels also tell you the Percent Daily Value for sodium. Choose products with low Percent Daily Values for sodium. · Be aware that sodium can come in forms other than salt, including monosodium glutamate (MSG), sodium citrate, and sodium bicarbonate (baking soda). MSG is often added to Asian food. When you eat out, you can sometimes ask for food without MSG or added salt. Buy low-sodium foods  · Buy foods that are labeled \"unsalted\" (no salt added), \"sodium-free\" (less than 5 mg of sodium per serving), or \"low-sodium\" (140 mg or less of sodium per serving). Foods labeled \"reduced-sodium\" and \"light sodium\" may still have too much sodium. Be sure to read the label to see how much sodium you are getting. · Buy fresh vegetables, or frozen vegetables without added sauces. Buy low-sodium versions of canned vegetables, soups, and other canned goods. Prepare low-sodium meals  · Cut back on the amount of salt you use in cooking. This will help you adjust to the taste. Do not add salt after cooking. One teaspoon of salt has about 2,300 mg of sodium. · Take the salt shaker off the table. · Flavor your food with garlic, lemon juice, onion, vinegar, herbs, and spices. Do not use soy sauce, lite soy sauce, steak sauce, onion salt, garlic salt, celery salt, or ketchup on your food.   · Use low-sodium salad dressings, sauces, and ketchup. Or make your own salad dressings and sauces without adding salt. · Use less salt (or none) when recipes call for it. You can often use half the salt a recipe calls for without losing flavor. Other foods such as rice, pasta, and grains do not need added salt. · Rinse canned vegetables, and cook them in fresh water. This removes somebut not allof the salt. · Avoid water that is naturally high in sodium or that has been treated with water softeners, which add sodium. If you buy bottled water, read the label and choose a sodium-free brand. Avoid high-sodium foods  · Avoid eating:  ? Smoked, cured, salted, and canned meat, fish, and poultry. ? Ham, mcdermott, hot dogs, and luncheon meats. ? Regular, hard, and processed cheese and regular peanut butter. ? Crackers with salted tops, and other salted snack foods such as pretzels, chips, and salted popcorn. ? Frozen prepared meals, unless labeled low-sodium. ? Canned and dried soups, broths, and bouillon, unless labeled sodium-free or low-sodium. ? Canned vegetables, unless labeled sodium-free or low-sodium. ? [place] fries, pizza, tacos, and other fast foods. ? Pickles, olives, ketchup, and other condiments, especially soy sauce, unless labeled sodium-free or low-sodium. Where can you learn more? Go to https://DataCrowd.TaskRabbit. org and sign in to your CarJump account. Enter E404 in the [facility] box to learn more about \"Low Sodium Diet (2,000 Milligram): Care Instructions. \"     If you do not have an account, please click on the \"Sign Up Now\" link. Current as of: December 17, 2020               Content Version: 12.9  © 1810-0756 Healthwise, Incorporated. Care instructions adapted under license by [facility] ([facility]). If you have questions about a medical condition or this instruction, always ask your healthcare professional. [place] [address] any warranty or liability for your use of this information.

## 2021-09-14 DIAGNOSIS — Z79.4 TYPE 2 DIABETES MELLITUS WITH DIABETIC POLYNEUROPATHY, WITH LONG-TERM CURRENT USE OF INSULIN (HCC): ICD-10-CM

## 2021-09-14 DIAGNOSIS — E11.42 TYPE 2 DIABETES MELLITUS WITH DIABETIC POLYNEUROPATHY, WITH LONG-TERM CURRENT USE OF INSULIN (HCC): ICD-10-CM

## 2021-09-14 RX ORDER — ISOPROPYL ALCOHOL 0.75 G/1
SWAB TOPICAL
Qty: 100 EACH | Refills: 5 | Status: SHIPPED | OUTPATIENT
Start: 2021-09-14 | End: 2022-02-11

## 2021-09-18 DIAGNOSIS — M79.7 FIBROMYALGIA: ICD-10-CM

## 2021-09-18 DIAGNOSIS — M54.42 CHRONIC MIDLINE LOW BACK PAIN WITH LEFT-SIDED SCIATICA: ICD-10-CM

## 2021-09-18 DIAGNOSIS — G89.29 CHRONIC MIDLINE LOW BACK PAIN WITH LEFT-SIDED SCIATICA: ICD-10-CM

## 2021-09-18 DIAGNOSIS — Z79.4 TYPE 2 DIABETES MELLITUS WITH DIABETIC POLYNEUROPATHY, WITH LONG-TERM CURRENT USE OF INSULIN (HCC): ICD-10-CM

## 2021-09-18 DIAGNOSIS — E11.42 TYPE 2 DIABETES MELLITUS WITH DIABETIC POLYNEUROPATHY, WITH LONG-TERM CURRENT USE OF INSULIN (HCC): ICD-10-CM

## 2021-09-18 RX ORDER — PREGABALIN 150 MG/1
CAPSULE ORAL
Qty: 60 CAPSULE | Refills: 0 | Status: SHIPPED | OUTPATIENT
Start: 2021-09-18 | End: 2021-12-02 | Stop reason: SDUPTHER

## 2021-09-18 NOTE — TELEPHONE ENCOUNTER
Please Approve or Refuse.   Send to Pharmacy per Pt's Request:      Next Visit Date:  11/22/2021   Last Visit Date: 8/19/2021    Hemoglobin A1C (%)   Date Value   02/22/2021 10.3   10/05/2020 11.1 (H)   08/29/2020 11.2 (H)             ( goal A1C is < 7)   BP Readings from Last 3 Encounters:   04/13/21 132/81   03/16/21 122/84   03/12/21 131/73          (goal 120/80)  BUN   Date Value Ref Range Status   08/29/2020 8 6 - 20 mg/dL Final     CREATININE   Date Value Ref Range Status   08/29/2020 0.74 0.50 - 0.90 mg/dL Final     Potassium   Date Value Ref Range Status   08/29/2020 4.6 3.7 - 5.3 mmol/L Final

## 2021-10-18 DIAGNOSIS — M54.42 CHRONIC MIDLINE LOW BACK PAIN WITH LEFT-SIDED SCIATICA: ICD-10-CM

## 2021-10-18 DIAGNOSIS — G89.29 CHRONIC MIDLINE LOW BACK PAIN WITH LEFT-SIDED SCIATICA: ICD-10-CM

## 2021-10-18 DIAGNOSIS — Z79.4 TYPE 2 DIABETES MELLITUS WITH DIABETIC POLYNEUROPATHY, WITH LONG-TERM CURRENT USE OF INSULIN (HCC): ICD-10-CM

## 2021-10-18 DIAGNOSIS — E11.42 TYPE 2 DIABETES MELLITUS WITH DIABETIC POLYNEUROPATHY, WITH LONG-TERM CURRENT USE OF INSULIN (HCC): ICD-10-CM

## 2021-10-18 DIAGNOSIS — M54.16 ACUTE LUMBAR RADICULOPATHY: ICD-10-CM

## 2021-10-18 RX ORDER — TIZANIDINE 4 MG/1
TABLET ORAL
Qty: 90 TABLET | Refills: 0 | Status: SHIPPED | OUTPATIENT
Start: 2021-10-18 | End: 2021-12-02 | Stop reason: SDUPTHER

## 2021-10-18 RX ORDER — INSULIN DEGLUDEC INJECTION 100 U/ML
INJECTION, SOLUTION SUBCUTANEOUS
Qty: 10 PEN | Refills: 1 | Status: SHIPPED | OUTPATIENT
Start: 2021-10-18 | End: 2022-01-10

## 2021-10-29 NOTE — TELEPHONE ENCOUNTER
I called the patient and notified her the procedure scheduled for Monday has been denied by her insurance. Patient states she will still have the procedure done; will fight with her insurance to get it covered. Patient is aware she will need to sign a notice of non-coverage. Patient agrees to be seen. fair plus

## 2021-12-01 ASSESSMENT — PATIENT HEALTH QUESTIONNAIRE - PHQ9
2. FEELING DOWN, DEPRESSED OR HOPELESS: 2
SUM OF ALL RESPONSES TO PHQ9 QUESTIONS 1 & 2: 2
SUM OF ALL RESPONSES TO PHQ QUESTIONS 1-9: 2
SUM OF ALL RESPONSES TO PHQ QUESTIONS 1-9: 2
1. LITTLE INTEREST OR PLEASURE IN DOING THINGS: 0
SUM OF ALL RESPONSES TO PHQ QUESTIONS 1-9: 2

## 2021-12-01 ASSESSMENT — ENCOUNTER SYMPTOMS
SHORTNESS OF BREATH: 0
BACK PAIN: 1
ABDOMINAL PAIN: 0

## 2021-12-01 NOTE — PROGRESS NOTES
Visit Information    Have you changed or started any medications since your last visit including any over-the-counter medicines, vitamins, or herbal medicines? no   Have you stopped taking any of your medications? Is so, why? -  no  Are you having any side effects from any of your medications? - no    Have you seen any other physician or provider since your last visit?  no   Have you had any other diagnostic tests since your last visit?  no   Have you been seen in the emergency room and/or had an admission in a hospital since we last saw you?  no   Have you had your routine dental cleaning in the past 6 months?  no     Do you have an active MyChart account? If no, what is the barrier?   Yes    Patient Care Team:  KIMANI Galo CNP as PCP - General (Family Medicine)  KIMANI Galo CNP as PCP - [facility]  Joselito Mathews DO as Consulting Physician (General Surgery)  Ion Somers MD as Consulting Physician (Pain Management)  Olena Salazar MD as Consulting Physician (Endocrinology)    Medical History Review  Past Medical, Family, and Social History reviewed and does contribute to the patient presenting condition    Health Maintenance   Topic Date Due    Low dose CT lung screening  Never done    Diabetic foot exam  07/26/2020    Colon cancer screen colonoscopy  02/21/2021    A1C test (Diabetic or Prediabetic)  05/22/2021    Diabetic microalbuminuria test  08/29/2021    Potassium monitoring  08/29/2021    Creatinine monitoring  08/29/2021    Flu vaccine (1) 09/01/2021    Diabetic retinal exam  09/15/2021    Lipid screen  10/07/2021    COVID-19 Vaccine (3 - Booster) 10/14/2021    Breast cancer screen  10/08/2022    Cervical cancer screen  10/05/2025    DTaP/Tdap/Td vaccine (2 - Td or Tdap) 09/28/2030    Pneumococcal 0-64 years Vaccine (2 of 2 - PPSV23) 01/30/2032    Hepatitis B vaccine  Completed    Shingles Vaccine  Completed    Hepatitis C screen Completed    HIV screen  Completed    Hepatitis A vaccine  Aged Out    Hib vaccine  Aged Out    Meningococcal (ACWY) vaccine  Aged Out

## 2021-12-01 NOTE — PROGRESS NOTES
[facility] at [address] [facility] [address] [facility] [address]   O: [phone]  C:[phone]    Melody Jaquez is a 49 y.o. female evaluated via telephone on 12/2/2021. CONSENT:  She and/or health care decision maker is aware that that she may receive a bill for this telephone service, depending on her insurance coverage, and has provided verbal consent to proceed: Yes    DOCUMENTATION:  Patient scheduled this appointment today due to Hypertension and Diabetes    HYPERTENSION  Melody Jaquez has a well controlled hypertension. she is currently on Lisinopril, HCTZ. She has been on this therapy for a while patient's most recent BP in the office was stable. she reports stable BP readings at home. Patient denies any adverse reaction to this therapy. she denies any CP, SOB, HA, or palpitations. Patient admits to exercising and adheres to low salt diet. No history of organ damage due to condition noted. Lab Results   Component Value Date/Time    CREATININE 0.74 08/29/2020 07:30 AM     DIABETES MELLITUS  Patient reported her glucose reading has not been too bad lately. She had 1 hypoglycemic episode but is associated with not eating. Patient has a  stable Diabetes Mellitus. Current therapy includes Trulicity,,[id number]/60, Humalog.-will do sliding scale TID, Patient is responding well with this therapy. Patient reports home glucose monitoring as Stable readings. Patient denieshypoglycemia episodes such as{symptoms. Patient denies neither polyuria nor polydipsia. Dr. Alva Palmer. Patient was kept on the same tresiba and Humolog on sliding scale. 55- 60 pm Tresiba  Sugar kept going up, eat more, reading high at midnight. 10 minutes,  1 pm 2 glimeperide, 25 units tresiba, 30 units humolog. After an hour, it says high, humolog, 30 mins later- 552 mg/dl. Adjust humolog? She was doing the trulicity . 75 mg .  Got sick from 1.5 mg  Lab Results   Component Value Date    LABA1C 10.3 02/22/2021      MULTIPLE ARTHRITIS/LUBAR RADICULOPATHY/FIBROMYALGIA  PREVIOUS AND CURRENT NOTES   Patient has had some chronic low back pain for a while. More difficult to move around due to cold weather. Especially [place] in [place]  Patient states that in [facility] her Sister found place with a warm pool/area to go too. It is definitely a lot warmer in [facility] than [place]. She is established with pain management but have not followed up. She denies any weakness to her legs. She is not getting any relief with the medicine that she is on which includes tizanidine, Lyrica, meloxicam  she continues to have some breakthrough pains but is tolerable with the current therapy from her sister. Patient with known fibromyalgia. Occasional muscle aches and pains also better with Lyrica better in a warmer weather. Patient is recommended to see the neurosurgeon here in [place] rather than another place. However, she has not really reached out to any of them Since she will not be going back and forth for treatments. Previous notes   Patient has a known chronic low back pain for several years , Pain is worse after just 5 minutes of standing up when doing dishes. The pain is located midline and radiates up or down into the buttocks and thighs at times. Intensity of pain is 8/10, down to 5/10 when sitting. Patient also has known General muscle aches which is also chronic. Doing PT and TENS unit. Taking tizanidine at nighttime helps a little. She is also on Meloxicam,Tramadol  Patient was referred to ortho and pain management. She started seeing Dr. Gallo Wen. SHe had several epidural injection. Patient is concerned about steroid raising her glucose levels. Injections are helping but not taking away all the pain. Patient also had shoulder injections by Dr. Ismael Em. Steroid injection- 75 % relief- 25 relief. 4 injections so far. SLEEP APNEA  Patient not using. CPAP.   Patient has been having a hard time using this had another conversation about the importance of wearing Ms. and if she is planning on moving to [facility] she needs to find another pulmonologist to follow-up with and perhaps find a way to get a better equipment that will be more tolerable to use. Luca Lee is currently on atorvastatin (Lipitor). Patient denies adverse reaction to this medication. Compliance with treatment thus far has been good. The patient is not known to have coexisting coronary artery disease. The 10-year CVD risk score (CINDI'Agostino, et al., 2008) is: 11%    Values used to calculate the score:      Age: 49 years      Sex: Female      Diabetic: Yes      Tobacco smoker: No      Systolic Blood Pressure: 559 mmHg      Is BP treated: Yes      HDL Cholesterol: 58 mg/dL      Total Cholesterol: 159 mg/dL  Lab Results   Component Value Date/Time    CHOLFAST 166 08/29/2020 07:30 AM    CHOL 159 10/07/2020 09:05 AM    CHOL 181 03/19/2015 08:25 AM    HDL 58 10/07/2020 09:05 AM    LDLCHOLESTEROL 74 10/07/2020 09:05 AM    TRIGLYCFAST 167 (H) 08/29/2020 07:30 AM    CHOLHDLRATIO 2.7 10/07/2020 09:05 AM    TRIG 134 10/07/2020 09:05 AM    VLDL NOT REPORTED 10/07/2020 09:05 AM     WEIGHT/OBESITY  Patient's BMI is Body mass index is 45.84 kg/m². kg/m2. BMI is increasing. Patient understands that this condition increases the patient's risk for chronic conditions. Wt Readings from Last 3 Encounters:   12/02/21 284 lb (128.8 kg)   04/13/21 280 lb (127 kg)   03/16/21 283 lb (128.4 kg)     Encouraged to get primary care. [facility] is strict with insurance. Leave vehicle- can't drive-   Daughter still in rehab      One mountain dew a month  [employer] peach tea bid  Real food- [employer]. Low back pain improved. May move back to [facility]  4 hours drive. Uses the walker. Does housework   Uses walker at the store. Daughter will help out.    I communicated with the patient and/or health care decision maker about: PLAN     Review of Systems   Constitutional: Positive for activity change and fatigue. Negative for chills and fever. Respiratory: Negative for shortness of breath and wheezing. Cardiovascular: Negative for chest pain and palpitations. Gastrointestinal: Negative for abdominal pain. Genitourinary: Negative for dysuria. Musculoskeletal: Positive for arthralgias, back pain, gait problem and myalgias. General muscle aches, low back pain   Allergic/Immunologic: Positive for environmental allergies. Neurological: Negative for weakness, numbness and headaches. Psychiatric/Behavioral: Positive for dysphoric mood and sleep disturbance. Negative for suicidal ideas. The patient is nervous/anxious. Details of this discussion including any medical advice provided: Under assessment. PHYSICAL EXAM[INSTRUCTIONS:  \"[x]\" Indicates a positive item  \"[]\" Indicates a negative item  -- DELETE ALL ITEMS NOT EXAMINED]  Patient-Reported Vitals 12/1/2021   Patient-Reported Weight -   Patient-Reported Height 5 6282 LB   Patient-Reported Systolic -   Patient-Reported Diastolic -   Patient-Reported Pulse -   Patient-Reported Temperature -     Constitutional: [x] No apparent distress      [] Abnormal -   Mental status: [x] Alert and awake  [x] Oriented to person/place/time[] Abnormal -   Pulmonary/Chest: [x] Respiratory effort normal         [] Abnormal -          Psychiatric:       [x] Normal Affect [] Abnormal -        [x] No Hallucinations   Other pertinent observable physical exam findings:-mildly anxious  Controlled Substance Monitoring:  Acute and Chronic Pain Monitoring:   RX Monitoring 5/14/2021   Attestation -   Periodic Controlled Substance Monitoring No signs of potential drug abuse or diversion identified. ;Possible medication side effects, risk of tolerance/dependence & alternative treatments discussed. ;Assessed functional status. ;Obtaining appropriate analgesic effect of treatment.    Chronic Pain > 50 MEDD Obtained or confirmed \"Consent for Opioid Use\" on file. ASSESSMENT  1. Essential hypertension  Stable  Continue current therapy. DISCUSSED AND ADVISED TO:  Cut down on your salt intake. Cut down on caffeinated drinks, sports drinks. Instructed to check BP at home regularly. Report for any chest pains, shortness of breath, headaches, and lightheadedness. Call the office if your blood pressure continue to be higher than 140/90 or 90/50.      - Urinalysis Reflex to Culture; Future  - CBC Auto Differential; Future  - Comprehensive Metabolic Panel, Fasting; Future  - lisinopril (PRINIVIL;ZESTRIL) 20 MG tablet; TAKE ONE TABLET BY MOUTH DAILY  Dispense: 90 tablet; Refill: 2  - hydroCHLOROthiazide (HYDRODIURIL) 25 MG tablet; TAKE ONE TABLET BY MOUTH DAILY WITH POTASSIUM SUPPLEMENT  Dispense: 90 tablet; Refill: 2  - potassium chloride (KLOR-CON) 10 MEQ extended release tablet; TAKE ONE TABLET BY MOUTH DAILY WITH HYDROCHLOROTHIAZIDE  Dispense: 90 tablet; Refill: 2    2. Type 2 diabetes mellitus with diabetic polyneuropathy, with long-term current use of insulin (HCC)  Failure to Improve  Unable to check A1c  Continue therapy. We will continue to monitor Hemoglobin A1c   DISCUSSED and ADVISED TO:  Continue to check Glucose levels at home. Report increased and low levels as discussed. Decrease carbohydrates, sugary drinks, desserts in your diet. Exercise regularly, as tolerated. Try to lose weight.      - Hemoglobin A1C; Future  - Microalbumin, Ur; Future  - Creatinine, Random Urine; Future  - Urinalysis Reflex to Culture; Future  - CBC Auto Differential; Future  - Comprehensive Metabolic Panel, Fasting; Future  - insulin lispro, 1 Unit Dial, (HUMALOG KWIKPEN) 100 UNIT/ML SOPN; TID W meals   If <139             No Ins  140-199 2 U  200-249 4 U  250-299 6 U  300-349 8 Units  350-400 10 U  > 400               12 U  > 400: 14 u , rep in 30. If still high add 10 u X 2  Dispense: 5 pen; Refill: 1  - pregabalin (LYRICA) 150 MG capsule; TAKE ONE CAPSULE BY MOUTH TWICE A DAY  Dispense: 60 capsule; Refill: 0  - blood glucose monitor strips; Test 2-3 times a day & as needed for symptoms of irregular blood glucose. BRAND OF CHO[employer] INSURANCE ALLOWS. Dispense: 100 strip; Refill: 11    3. Pure hypercholesterolemia (ASCVD score 21.7% July 2020)  Failure to Improve  Continue therapy. Advised to decrease the consumption of red meats, fried foods, trans fats, sweets, sugary beverages. Advised to increase fish, vegetables, and fruits consumption. Advised to add fiber or OTC supplements in diet. Discussed weight loss which will result in improvement of lipids levels. Advised to increase daily physical activities and add regular exercises. - Lipid, Fasting; Future    4. Fibromyalgia  Failure to Improve  DISCUSSED AND ADVISED TO:  Exercise often. Get a good night's sleep. Make healthy changes to diet. Try to reduce stress  Carefully take medications as discussed  Report for worsening symptoms          - meloxicam (MOBIC) 15 MG tablet; TAKE ONE TABLET BY MOUTH DAILY  Dispense: 90 tablet; Refill: 2  - pregabalin (LYRICA) 150 MG capsule; TAKE ONE CAPSULE BY MOUTH TWICE A DAY  Dispense: 60 capsule; Refill: 0    5. Lumbar radiculopathy  Failure to Improve  Continue current therapy. DISCUSSED AND ADVISED TO:  Use heat packs 15 to 20 mins every 2-3 hours. Do some back stretches as tolerated. Refer to hand out for instructions. Call for worsening, numbness, weakness.      - meloxicam (MOBIC) 15 MG tablet; TAKE ONE TABLET BY MOUTH DAILY  Dispense: 90 tablet; Refill: 2    6. Diabetic polyneuropathy associated with type 2 diabetes mellitus ([facility] [address]  Failure to Improve  The current medical regimen is effective;  continue present plan and medications. 7. Severe sleep apnea  Failure to Improve  Stop using CPAP on her own    8.  Arthritis involving multiple sites  Failure to Improve  Continue current therapy. DISCUSSED and ADVISED TO:  Stay at a healthy weight. Continue exercises/PT  Stretch to help prevent stiffness and to prevent injury before exercise. Gentle forms of yoga help keep joints and muscles flexible. Walk instead of jog, ride a bike, swim, and water exercise. Lift weights as tolerated. strong muscles help reduce stress on joints. Take pain medicines exactly as directed and only as needed. - meloxicam (MOBIC) 15 MG tablet; TAKE ONE TABLET BY MOUTH DAILY  Dispense: 90 tablet; Refill: 2    9. Morbid obesity with BMI of 40.0-44.9, adult ([facility] [address]  BMI increasing  DISCUSSED AND ADVISED TO:  Eat a low-fat and low carbohydrates diet. Avoid fried foods especially fast food. Choose healthier options for snacks. Have 5-6 servings of fruits and vegetables per day. Cut down on eating processed food. Add 30 minutes to 1 hour aerobic exercise for 3-4 days a week. 10. Colon cancer screening  Recommended    - Cologuard (For External Results Only);  Future      Note: not billable if this call serves to triage the patient into an appointment for the relevant concern  Patient-Reported Vitals 12/1/2021   Patient-Reported Weight -   Patient-Reported Height 5 6282 LB   Patient-Reported Systolic -   Patient-Reported Diastolic -   Patient-Reported Pulse -   Patient-Reported Temperature -     Patient Active Problem List   Diagnosis    Allergic rhinitis    Fibromyalgia    Pure hypercholesterolemia    Chronic cholecystitis    Gastroesophageal reflux disease with esophagitis    Medication monitoring encounter    Type 2 diabetes mellitus with diabetic polyneuropathy, with long-term current use of insulin (HCC)    Chronic left shoulder pain    Essential hypertension    Vitamin D deficiency    Fatigue    Diabetic polyneuropathy associated with type 2 diabetes mellitus (HCC)    Large breasts    Elevated LFTs    Lumbar radiculopathy    Arthritis involving multiple sites    Morbid obesity with BMI of 40.0-44.9, adult (HCC)    Numbness and tingling in both hands    Chronic bilateral low back pain without sciatica    Acute pain of both shoulders    Severe sleep apnea    Diabetes mellitus due to underlying condition with hyperosmolarity without coma, with long-term current use of insulin (HCC)    Facet degeneration of lumbar region    Decreased functional residual capacity    Lumbosacral spondylosis without myelopathy    Macromastia    Skin ulcer, limited to breakdown of skin ([facility] [address]     Hermelindo Otto is a 49 y.o. female patient  being evaluated by a Virtual Visit (video visit) encounter to address concerns as mentioned above. A caregiver was present when appropriate. Due to this being a TeleHealth encounter (During [id number]-77 public health emergency), evaluation of the following organ systems was limited:Vitals/Constitutional/EENT/Resp/CV/GI//MS/Neuro/Skin/Heme-Lymph-Imm. Services were provided through a video synchronous discussion virtually to substitute for in-person clinic visit. This is a telehealth visit that was performed with the originating site at Patient Location: home and provider Location of [place], [place]. Pursuant to the emergency declaration under the [facility], [address] [place] [address] authority and the [employer] and [employer] Act, this Virtual Visit was conducted with patient's (and/or legal guardian's) consent, to reduce the patient's risk of exposure to COVID-19 and provide necessary medical care. The patient (and/or legal guardian) has also been advised to contact this office for worsening conditions or problems, and seek emergency medical treatment and/or call 911 if deemed necessary. This note was completed by using the assistance of a speech-recognition program. However, inadvertent computerized transcription errors may be present.  Although every effort was made to ensure accuracy, no guarantees can be provided that every mistake has been identified and corrected by editing.   Electronically signed by KIMANI Ozuna CNP on 8/17/21 at 9:32 AM LARRY

## 2021-12-02 ENCOUNTER — TELEMEDICINE (OUTPATIENT)
Dept: FAMILY MEDICINE CLINIC | Age: 56
End: 2021-12-02
Payer: COMMERCIAL

## 2021-12-02 VITALS — HEIGHT: 66 IN | WEIGHT: 284 LBS | BODY MASS INDEX: 45.64 KG/M2

## 2021-12-02 DIAGNOSIS — G47.30 SEVERE SLEEP APNEA: ICD-10-CM

## 2021-12-02 DIAGNOSIS — Z12.11 COLON CANCER SCREENING: ICD-10-CM

## 2021-12-02 DIAGNOSIS — E11.42 TYPE 2 DIABETES MELLITUS WITH DIABETIC POLYNEUROPATHY, WITH LONG-TERM CURRENT USE OF INSULIN (HCC): ICD-10-CM

## 2021-12-02 DIAGNOSIS — E78.00 PURE HYPERCHOLESTEROLEMIA: ICD-10-CM

## 2021-12-02 DIAGNOSIS — M79.7 FIBROMYALGIA: ICD-10-CM

## 2021-12-02 DIAGNOSIS — I10 ESSENTIAL HYPERTENSION: Primary | ICD-10-CM

## 2021-12-02 DIAGNOSIS — M54.16 LUMBAR RADICULOPATHY: ICD-10-CM

## 2021-12-02 DIAGNOSIS — M12.9 ARTHRITIS INVOLVING MULTIPLE SITES: ICD-10-CM

## 2021-12-02 DIAGNOSIS — E11.42 DIABETIC POLYNEUROPATHY ASSOCIATED WITH TYPE 2 DIABETES MELLITUS (HCC): ICD-10-CM

## 2021-12-02 DIAGNOSIS — E66.01 MORBID OBESITY WITH BMI OF 40.0-44.9, ADULT (HCC): ICD-10-CM

## 2021-12-02 DIAGNOSIS — Z79.4 TYPE 2 DIABETES MELLITUS WITH DIABETIC POLYNEUROPATHY, WITH LONG-TERM CURRENT USE OF INSULIN (HCC): ICD-10-CM

## 2021-12-02 PROCEDURE — 2022F DILAT RTA XM EVC RTNOPTHY: CPT | Performed by: FAMILY MEDICINE

## 2021-12-02 PROCEDURE — 99214 OFFICE O/P EST MOD 30 MIN: CPT | Performed by: FAMILY MEDICINE

## 2021-12-02 PROCEDURE — 3046F HEMOGLOBIN A1C LEVEL >9.0%: CPT | Performed by: FAMILY MEDICINE

## 2021-12-02 PROCEDURE — 3017F COLORECTAL CA SCREEN DOC REV: CPT | Performed by: FAMILY MEDICINE

## 2021-12-02 PROCEDURE — 81003 URINALYSIS AUTO W/O SCOPE: CPT | Performed by: FAMILY MEDICINE

## 2021-12-02 PROCEDURE — G8427 DOCREV CUR MEDS BY ELIG CLIN: HCPCS | Performed by: FAMILY MEDICINE

## 2021-12-02 RX ORDER — TIZANIDINE 4 MG/1
TABLET ORAL
Qty: 90 TABLET | Refills: 0 | Status: SHIPPED | OUTPATIENT
Start: 2021-12-02 | End: 2021-12-06

## 2021-12-02 RX ORDER — GLUCOSAMINE HCL/CHONDROITIN SU 500-400 MG
CAPSULE ORAL
Qty: 100 STRIP | Refills: 11 | Status: SHIPPED | OUTPATIENT
Start: 2021-12-02

## 2021-12-02 RX ORDER — PREGABALIN 150 MG/1
CAPSULE ORAL
Qty: 60 CAPSULE | Refills: 0 | Status: SHIPPED | OUTPATIENT
Start: 2021-12-02 | End: 2022-02-15 | Stop reason: SDUPTHER

## 2021-12-02 RX ORDER — HYDROCHLOROTHIAZIDE 25 MG/1
TABLET ORAL
Qty: 90 TABLET | Refills: 2 | Status: SHIPPED | OUTPATIENT
Start: 2021-12-02

## 2021-12-02 RX ORDER — POTASSIUM CHLORIDE 750 MG/1
TABLET, FILM COATED, EXTENDED RELEASE ORAL
Qty: 90 TABLET | Refills: 2 | Status: SHIPPED | OUTPATIENT
Start: 2021-12-02

## 2021-12-02 RX ORDER — INSULIN LISPRO 100 [IU]/ML
INJECTION, SOLUTION INTRAVENOUS; SUBCUTANEOUS
Qty: 5 PEN | Refills: 1 | Status: SHIPPED | OUTPATIENT
Start: 2021-12-02 | End: 2022-08-01 | Stop reason: SDUPTHER

## 2021-12-02 RX ORDER — MELOXICAM 15 MG/1
TABLET ORAL
Qty: 90 TABLET | Refills: 2 | Status: SHIPPED | OUTPATIENT
Start: 2021-12-02 | End: 2022-08-01

## 2021-12-02 RX ORDER — LISINOPRIL 20 MG/1
TABLET ORAL
Qty: 90 TABLET | Refills: 2 | Status: SHIPPED | OUTPATIENT
Start: 2021-12-02

## 2021-12-02 ASSESSMENT — ENCOUNTER SYMPTOMS: WHEEZING: 0

## 2021-12-02 NOTE — PATIENT INSTRUCTIONS
TID W meals   If <139             No Ins  140-199 2 U  200-249 4 U  250-299 6 U  300-349 8 Units  350-400 10 U  > 400               12 U  > 400: 14 u , rep in 30. If still high add 10 u X 2            Patient Education        Eating Healthy Foods: Care Instructions  Your Care Instructions     Eating healthy foods can help lower your risk for disease. Healthy food gives you energy and keeps your heart strong, your brain active, your muscles working, and your bones strong. A healthy diet includes a variety of foods from the basic food groups: grains, vegetables, fruits, milk and milk products, and meat and beans. Some people may eat more of their favorite foods from only one food group and, as a result, miss getting the nutrients they need. So, it is important to pay attention not only to what you eat but also to what you are missing from your diet. You can eat a healthy, balanced diet by making a few small changes. Follow-up care is a key part of your treatment and safety. Be sure to make and go to all appointments, and call your doctor if you are having problems. It's also a good idea to know your test results and keep a list of the medicines you take. How can you care for yourself at home? Look at what you eat  · Keep a food diary for a week or two and record everything you eat or drink. Track the number of servings you eat from each food group. · For a balanced diet every day, eat a variety of:  ? 6 or more ounce-equivalents of grains, such as cereals, breads, crackers, rice, or pasta, every day. An ounce-equivalent is 1 slice of bread, 1 cup of ready-to-eat cereal, or ½ cup of cooked rice, cooked pasta, or cooked cereal.  ? 2½ cups of vegetables, especially:  § Dark-green vegetables such as broccoli and spinach. § Orange vegetables such as carrots and sweet potatoes.   § Dry beans (such as david and kidney beans) and peas (such as lentils). ? 2 cups of fresh, frozen, or canned fruit. A small apple or 1 banana or orange equals 1 cup. ? 3 cups of nonfat or low-fat milk, yogurt, or other milk products. ? 5½ ounces of meat and beans, such as chicken, fish, lean meat, beans, nuts, and seeds. One egg, 1 tablespoon of peanut butter, ½ ounce nuts or seeds, or ¼ cup of cooked beans equals 1 ounce of meat. · Learn how to read food labels for serving sizes and ingredients. Fast-food and convenience-food meals often contain few or no fruits or vegetables. Make sure you eat some fruits and vegetables to make the meal more nutritious. · Look at your food diary. For each food group, add up what you have eaten and then divide the total by the number of days. This will give you an idea of how much you are eating from each food group. See if you can find some ways to change your diet to make it more healthy. Start small  · Do not try to make dramatic changes to your diet all at once. You might feel that you are missing out on your favorite foods and then be more likely to fail. · Start slowly, and gradually change your habits. Try some of the following:  ? Use whole wheat bread instead of white bread. ? Use nonfat or low-fat milk instead of whole milk. ? Eat brown rice instead of white rice, and eat whole wheat pasta instead of white-flour pasta. ? Try low-fat cheeses and low-fat yogurt. ? Add more fruits and vegetables to meals and have them for snacks. ? Add lettuce, tomato, cucumber, and onion to sandwiches. ? Add fruit to yogurt and cereal.  Enjoy food  · You can still eat your favorite foods. You just may need to eat less of them. If your favorite foods are high in fat, salt, and sugar, limit how often you eat them, but do not cut them out entirely. · Eat a wide variety of foods. Make healthy choices when eating out  · The type of restaurant you choose can help you make healthy choices.  Even fast-food chains are now offering more low-fat or healthier choices on the menu. · Choose smaller portions, or take half of your meal home. · When eating out, try:  ? A veggie pizza with a whole wheat crust or grilled chicken (instead of sausage or pepperoni). ? Pasta with roasted vegetables, grilled chicken, or marinara sauce instead of cream sauce. ? A vegetable wrap or grilled chicken wrap. ? Broiled or poached food instead of fried or breaded items. Make healthy choices easy  · Buy packaged, prewashed, ready-to-eat fresh vegetables and fruits, such as baby carrots, salad mixes, and chopped or shredded broccoli and cauliflower. · Buy packaged, presliced fruits, such as melon or pineapple. · Choose 100% fruit or vegetable juice instead of soda. Limit juice intake to 4 to 6 oz (½ to ¾ cup) a day. · Blend low-fat yogurt, fruit juice, and canned or frozen fruit to make a smoothie for breakfast or a snack. Where can you learn more? Go to https://[employer]. org and sign in to your [employer] account. Enter G111 in the [facility] box to learn more about \"Eating Healthy Foods: Care Instructions. \"     If you do not have an account, please click on the \"Sign Up Now\" link. Current as of: December 17, 2020               Content Version: 13.0  © 1171-2380 Healthwise, [facility]. Care instructions adapted under license by [facility] [place] ([facility]). If you have questions about a medical condition or this instruction, always ask your healthcare professional. [place] [address] any warranty or liability for your use of this information. Patient Education        Learning About Being Physically Active  What is physical activity? Being physically active means doing any kind of activity that gets your body moving. The types of physical activity that can help you get fit and stay healthy include:  · Aerobic or \"cardio\" activities.  These make your heart beat faster and make you breathe harder, such as brisk walking, riding a bike, or running. They strengthen your heart and lungs and build up your endurance. · Strength training activities. These make your muscles work against, or \"resist,\" something. Examples include lifting weights or doing push-ups. These activities help tone and strengthen your muscles and bones. · Stretches. These let you move your joints and muscles through their full range of motion. Stretching helps you be more flexible. What are the benefits of being active? Being active is one of the best things you can do for your health. It helps you to:  · Feel stronger and have more energy to do all the things you like to do. · Focus better at school or work. · Feel, think, and sleep better. · Reach and stay at a healthy weight. · Lose fat and build lean muscle. · Lower your risk for serious health problems, including diabetes, heart attack, high blood pressure, and some cancers. · Keep your heart, lungs, bones, muscles, and joints strong and healthy. How can you make being active part of your life? Start slowly. Make it your long-term goal to get at least 30 minutes of exercise on most days of the week. Walking is a good choice. You also may want to do other activities, such as running, swimming, cycling, or playing tennis or team sports. Pick activities that you like--ones that make your heart beat faster, your muscles stronger, and your muscles and joints more flexible. If you find more than one thing you like doing, do them all. You don't have to do the same thing every day. Get your heart pumping every day. Any activity that makes your heart beat faster and keeps it at that rate for a while counts. Here are some great ways to get your heart beating faster:  · Go for a brisk walk, run, or bike ride. · Go for a hike or swim. · Go in-line skating. · Play a game of touch football, basketball, or soccer. · Ride a bike. · Play tennis or racquetball. · Climb stairs.   Even some household chores can be aerobic--just do them at a faster pace. Vacuuming, raking or mowing the lawn, sweeping the garage, and washing and waxing the car all can help get your heart rate up. Strengthen your muscles during the week. You don't have to lift heavy weights or grow big, bulky muscles to get stronger. Doing a few simple activities that make your muscles work against, or \"resist,\" something can help you get stronger. For example, you can:  · Do push-ups or sit-ups, which use your own body weight as resistance. · Lift weights or dumbbells or use stretch bands at home or in a gym or community center. Stretch your muscles often. Stretching will help you as you become more active. It can help you stay flexible, loosen tight muscles, and avoid injury. It can also help improve your balance and posture and can be a great way to relax. Be sure to stretch the muscles you'll be using when you work out. It's best to warm your muscles slightly before you stretch them. Walk or do some other light aerobic activity for a few minutes, and then start stretching. When you stretch your muscles:  · Do it slowly. Stretching is not about going fast or making sudden movements. · Don't push or bounce during a stretch. · Hold each stretch for at least 15 to 30 seconds, if you can. You should feel a stretch in the muscle, but not pain. · Breathe out as you do the stretch. Then breathe in as you hold the stretch. Don't hold your breath. If you're worried about how more activity might affect your health, have a checkup before you start. Follow any special advice your doctor gives you for getting a smart start. Where can you learn more? Go to https://[employer].stylefruits. org and sign in to your [employer] account. Enter S503 in the [employer] box to learn more about \"Learning About Being Physically Active. \"     If you do not have an account, please click on the \"Sign Up Now\" link. Current as of:  May 12, 2021               Content Version: 13.0  © 0442-7220 Healthwise, Incorporated. Care instructions adapted under license by [facility] ([facility]). If you have questions about a medical condition or this instruction, always ask your healthcare professional. [address] any warranty or liability for your use of this information.

## 2021-12-06 RX ORDER — TIZANIDINE 4 MG/1
TABLET ORAL
Qty: 90 TABLET | Refills: 0 | Status: SHIPPED | OUTPATIENT
Start: 2021-12-06 | End: 2022-10-08 | Stop reason: SDUPTHER

## 2022-02-11 DIAGNOSIS — M79.7 FIBROMYALGIA: ICD-10-CM

## 2022-02-11 DIAGNOSIS — E11.42 TYPE 2 DIABETES MELLITUS WITH DIABETIC POLYNEUROPATHY, WITH LONG-TERM CURRENT USE OF INSULIN (HCC): ICD-10-CM

## 2022-02-11 DIAGNOSIS — Z79.4 TYPE 2 DIABETES MELLITUS WITH DIABETIC POLYNEUROPATHY, WITH LONG-TERM CURRENT USE OF INSULIN (HCC): ICD-10-CM

## 2022-02-11 RX ORDER — ISOPROPYL ALCOHOL 0.75 G/1
SWAB TOPICAL
Qty: 200 EACH | Refills: 3 | Status: SHIPPED | OUTPATIENT
Start: 2022-02-11

## 2022-02-11 RX ORDER — DULAGLUTIDE 0.75 MG/.5ML
INJECTION, SOLUTION SUBCUTANEOUS
Qty: 2 ML | Refills: 3 | Status: SHIPPED | OUTPATIENT
Start: 2022-02-11 | End: 2022-08-01 | Stop reason: SDUPTHER

## 2022-02-11 RX ORDER — PREGABALIN 150 MG/1
CAPSULE ORAL
Qty: 60 CAPSULE | OUTPATIENT
Start: 2022-02-11

## 2022-02-11 NOTE — TELEPHONE ENCOUNTER
Patient did not have Lyrica in almost 1 year, keep appointment with her PCP to see if it is appropriate to restart, I cannot refill her Lyrica      04/01/2021 03/29/2021   2  Pregabalin 100 Mg Capsule   60.00  30  Re Gau  [id number]  Shana ([id number])        Current Outpatient Medications on File Prior to Visit   Medication Sig Dispense Refill    KROGER PEN NEEDLES 31G X 5 MM MISC USE ONE NEW NEEDLE DAILY 100 each 3    Insulin Degludec (TRESIBA FLEXTOUCH) 100 UNIT/ML SOPN INJECT 55 UNITS UNDER THE SKIN IN THE MORNING AND THEN INJECT 60 UNITS UNDER THE SKIN IN THE EVENING 18 pen 1    tiZANidine (ZANAFLEX) 4 MG tablet TAKE ONE TABLET BY MOUTH EVERY 8 HOURS AS NEEDED FOR NECK/BACK PAIN 90 tablet 0    insulin lispro, 1 Unit Dial, (HUMALOG KWIKPEN) 100 UNIT/ML SOPN TID W meals   If <139             No Ins  140-199 2 U  200-249 4 U  250-299 6 U  300-349 8 Units  350-400 10 U  > 400               12 U  > 400: 14 u , rep in 30. If still high add 10 u X 2 5 pen 1    meloxicam (MOBIC) 15 MG tablet TAKE ONE TABLET BY MOUTH DAILY 90 tablet 2    pregabalin (LYRICA) 150 MG capsule TAKE ONE CAPSULE BY MOUTH TWICE A DAY 60 capsule 0    blood glucose monitor strips Test 2-3 times a day & as needed for symptoms of irregular blood glucose.   BRAND OF CHOICE INSURANCE ALLOWS. 100 strip 11    lisinopril (PRINIVIL;ZESTRIL) 20 MG tablet TAKE ONE TABLET BY MOUTH DAILY 90 tablet 2    hydroCHLOROthiazide (HYDRODIURIL) 25 MG tablet TAKE ONE TABLET BY MOUTH DAILY WITH POTASSIUM SUPPLEMENT 90 tablet 2    potassium chloride (KLOR-CON) 10 MEQ extended release tablet TAKE ONE TABLET BY MOUTH DAILY WITH HYDROCHLOROTHIAZIDE 90 tablet 2    Alcohol Swabs (B-D SINGLE USE SWABS REGULAR) PADS TEST TWO TO THREE TIMES DAILY 100 each 5    Dulaglutide (TRULICITY) 9.14 IJ/6.5GU SOPN Inject 0.75 mg into the skin once a week 4 pen 2    atorvastatin (LIPITOR) 10 MG tablet TAKE ONE TABLET BY MOUTH DAILY 90 tablet 1    vitamin D3 (CHOLECALCIFEROL) 25 MCG (1000 UT) TABS tablet TAKE ONE TABLET BY MOUTH DAILY 30 tablet 2    ondansetron (ZOFRAN-ODT) 4 MG disintegrating tablet Take 1 tablet by mouth 3 times daily as needed for Nausea or Vomiting 30 tablet 3    busPIRone (BUSPAR) 5 MG tablet TAKE ONE TABLET BY MOUTH THREE TIMES A DAY AS NEEDED FOR ANXIETY 90 tablet 3    Easy Touch Lancets 33G/Twist MISC USE TWO TO THREE TIMES DAILY 90 each 2    Handicap Placard MISC by Does not apply route Prescription good for 5 Years  Expires January 2026 1 each 0    Blood Glucose Monitoring Suppl (TRUE METRIX METER) MANOLO Testing BID 1 Device 0    Insulin Syringes, Disposable, U-100 1 ML MISC 1 each by Does not apply route daily 100 each 3    [DISCONTINUED] hydroCHLOROthiazide (HYDRODIURIL) 25 MG tablet Take 1 tablet by mouth daily Take with Potassium supplement 30 tablet 3    [DISCONTINUED] Lancets MISC Dx: DM-2. Use 2-3 times daily 100 each 3     No current facility-administered medications on file prior to visit.

## 2022-02-11 NOTE — TELEPHONE ENCOUNTER
Please Approve or Refuse.   Send to Pharmacy per Pt's Request:      Next Visit Date:  3/4/2022   Last Visit Date: 12/2/2021    Hemoglobin A1C (%)   Date Value   02/22/2021 10.3   10/05/2020 11.1 (H)   08/29/2020 11.2 (H)             ( goal A1C is < 7)   BP Readings from Last 3 Encounters:   04/13/21 132/81   03/16/21 122/84   03/12/21 131/73          (goal 120/80)  BUN   Date Value Ref Range Status   08/29/2020 8 6 - 20 mg/dL Final     CREATININE   Date Value Ref Range Status   08/29/2020 0.74 0.50 - 0.90 mg/dL Final     Potassium   Date Value Ref Range Status   08/29/2020 4.6 3.7 - 5.3 mmol/L Final

## 2022-02-15 DIAGNOSIS — M79.7 FIBROMYALGIA: ICD-10-CM

## 2022-02-15 DIAGNOSIS — E11.42 TYPE 2 DIABETES MELLITUS WITH DIABETIC POLYNEUROPATHY, WITH LONG-TERM CURRENT USE OF INSULIN (HCC): ICD-10-CM

## 2022-02-15 DIAGNOSIS — Z79.4 TYPE 2 DIABETES MELLITUS WITH DIABETIC POLYNEUROPATHY, WITH LONG-TERM CURRENT USE OF INSULIN (HCC): ICD-10-CM

## 2022-02-15 RX ORDER — INSULIN DEGLUDEC INJECTION 100 U/ML
INJECTION, SOLUTION SUBCUTANEOUS
Qty: 18 PEN | Refills: 1 | Status: SHIPPED | OUTPATIENT
Start: 2022-02-15 | End: 2022-08-01 | Stop reason: SDUPTHER

## 2022-02-15 RX ORDER — PREGABALIN 150 MG/1
CAPSULE ORAL
Qty: 60 CAPSULE | Refills: 0 | Status: SHIPPED | OUTPATIENT
Start: 2022-02-15 | End: 2022-08-09 | Stop reason: SDUPTHER

## 2022-02-18 DIAGNOSIS — E11.42 TYPE 2 DIABETES MELLITUS WITH DIABETIC POLYNEUROPATHY, WITH LONG-TERM CURRENT USE OF INSULIN (HCC): ICD-10-CM

## 2022-02-18 DIAGNOSIS — Z79.4 TYPE 2 DIABETES MELLITUS WITH DIABETIC POLYNEUROPATHY, WITH LONG-TERM CURRENT USE OF INSULIN (HCC): ICD-10-CM

## 2022-02-18 DIAGNOSIS — M79.7 FIBROMYALGIA: ICD-10-CM

## 2022-02-18 RX ORDER — PREGABALIN 150 MG/1
CAPSULE ORAL
Qty: 60 CAPSULE | Refills: 0 | OUTPATIENT
Start: 2022-02-18 | End: 2022-03-21

## 2022-02-18 NOTE — TELEPHONE ENCOUNTER
Pt pregablin was sent to [address] [facility]. Prescription has been voided at [facility] location. Informed ERIN Puentes this will be pt last refill due to incomplete labs. She stated she will put that on the bottle.          Yaa Goddard is calling to request a refill on the following medication(s):    Last Visit Date (If Applicable):  92/9/4439    Next Visit Date:    3/4/2022    Medication Request:  Requested Prescriptions     Pending Prescriptions Disp Refills    pregabalin (LYRICA) 150 MG capsule 60 capsule 0     Sig: TAKE ONE CAPSULE BY MOUTH TWICE A DAY

## 2022-04-06 DIAGNOSIS — Z79.4 TYPE 2 DIABETES MELLITUS WITH DIABETIC POLYNEUROPATHY, WITH LONG-TERM CURRENT USE OF INSULIN (HCC): ICD-10-CM

## 2022-04-06 DIAGNOSIS — M79.7 FIBROMYALGIA: ICD-10-CM

## 2022-04-06 DIAGNOSIS — E11.42 TYPE 2 DIABETES MELLITUS WITH DIABETIC POLYNEUROPATHY, WITH LONG-TERM CURRENT USE OF INSULIN (HCC): ICD-10-CM

## 2022-04-06 RX ORDER — PREGABALIN 150 MG/1
CAPSULE ORAL
Qty: 60 CAPSULE | OUTPATIENT
Start: 2022-04-06

## 2022-04-12 DIAGNOSIS — E11.42 TYPE 2 DIABETES MELLITUS WITH DIABETIC POLYNEUROPATHY, WITH LONG-TERM CURRENT USE OF INSULIN (HCC): ICD-10-CM

## 2022-04-12 DIAGNOSIS — M79.7 FIBROMYALGIA: ICD-10-CM

## 2022-04-12 DIAGNOSIS — Z79.4 TYPE 2 DIABETES MELLITUS WITH DIABETIC POLYNEUROPATHY, WITH LONG-TERM CURRENT USE OF INSULIN (HCC): ICD-10-CM

## 2022-04-12 RX ORDER — INSULIN LISPRO 100 [IU]/ML
INJECTION, SOLUTION INTRAVENOUS; SUBCUTANEOUS
Qty: 5 PEN | Refills: 1 | OUTPATIENT
Start: 2022-04-12

## 2022-04-12 RX ORDER — PREGABALIN 150 MG/1
CAPSULE ORAL
Qty: 60 CAPSULE | Refills: 0 | OUTPATIENT
Start: 2022-04-12 | End: 2022-05-13

## 2022-04-17 DIAGNOSIS — E11.42 TYPE 2 DIABETES MELLITUS WITH DIABETIC POLYNEUROPATHY, WITH LONG-TERM CURRENT USE OF INSULIN (HCC): ICD-10-CM

## 2022-04-17 DIAGNOSIS — I10 ESSENTIAL HYPERTENSION: ICD-10-CM

## 2022-04-17 DIAGNOSIS — Z79.4 TYPE 2 DIABETES MELLITUS WITH DIABETIC POLYNEUROPATHY, WITH LONG-TERM CURRENT USE OF INSULIN (HCC): ICD-10-CM

## 2022-04-17 DIAGNOSIS — M79.7 FIBROMYALGIA: ICD-10-CM

## 2022-04-18 RX ORDER — LISINOPRIL 20 MG/1
TABLET ORAL
Qty: 90 TABLET | Refills: 2 | OUTPATIENT
Start: 2022-04-18

## 2022-04-18 RX ORDER — PREGABALIN 150 MG/1
CAPSULE ORAL
Qty: 60 CAPSULE | Refills: 0 | OUTPATIENT
Start: 2022-04-18 | End: 2022-05-18

## 2022-04-18 NOTE — TELEPHONE ENCOUNTER
Please Approve or Refuse.   Send to Pharmacy per Pt's Request: [facility]     Next Visit Date:  Visit date not found   Last Visit Date: 12/2/2021    Hemoglobin A1C (%)   Date Value   02/22/2021 10.3   10/05/2020 11.1 (H)   08/29/2020 11.2 (H)             ( goal A1C is < 7)   BP Readings from Last 3 Encounters:   04/13/21 132/81   03/16/21 122/84   03/12/21 131/73          (goal 120/80)  BUN   Date Value Ref Range Status   08/29/2020 8 6 - 20 mg/dL Final     CREATININE   Date Value Ref Range Status   08/29/2020 0.74 0.50 - 0.90 mg/dL Final     Potassium   Date Value Ref Range Status   08/29/2020 4.6 3.7 - 5.3 mmol/L Final

## 2022-05-28 DIAGNOSIS — I10 ESSENTIAL HYPERTENSION: ICD-10-CM

## 2022-05-31 RX ORDER — LISINOPRIL 20 MG/1
TABLET ORAL
Qty: 87 TABLET | OUTPATIENT
Start: 2022-05-31

## 2022-07-22 DIAGNOSIS — R52 PAIN: Primary | ICD-10-CM

## 2022-07-27 ENCOUNTER — OFFICE VISIT (OUTPATIENT)
Dept: ORTHOPEDIC SURGERY | Age: 57
End: 2022-07-27
Payer: COMMERCIAL

## 2022-07-27 VITALS — WEIGHT: 284 LBS | BODY MASS INDEX: 45.64 KG/M2 | HEIGHT: 66 IN

## 2022-07-27 DIAGNOSIS — M18.12 ARTHRITIS OF CARPOMETACARPAL (CMC) JOINT OF LEFT THUMB: Primary | ICD-10-CM

## 2022-07-27 DIAGNOSIS — G56.02 LEFT CARPAL TUNNEL SYNDROME: ICD-10-CM

## 2022-07-27 PROCEDURE — 20600 DRAIN/INJ JOINT/BURSA W/O US: CPT

## 2022-07-27 PROCEDURE — 99214 OFFICE O/P EST MOD 30 MIN: CPT

## 2022-07-27 PROCEDURE — 3017F COLORECTAL CA SCREEN DOC REV: CPT

## 2022-07-27 PROCEDURE — G8427 DOCREV CUR MEDS BY ELIG CLIN: HCPCS

## 2022-07-27 PROCEDURE — G8417 CALC BMI ABV UP PARAM F/U: HCPCS

## 2022-07-27 PROCEDURE — 1036F TOBACCO NON-USER: CPT

## 2022-07-27 NOTE — PROGRESS NOTES
[address]  [address] [facility] [address]  Dept: [phone]  Dept Fax: [phone]        Orthopaedic Clinic Follow Up      Subjective:       Mitul Busch is a 56y.o. year old female who presents to the clinic today for routine follow up her left thumb pain that has been going on for over 3 months. Patient states that there was no specific incident that led to the onset of her left thumb pain, but it was more of a progression. Patient states that she not tried any anti-inflammatories or night splints for her right thumb pain. Patient also states that when she flexes and abductor finger causes statement of pain. She sometimes endorsed some crepitus and popping of her left thumb. Patient is right-hand dominant. Patient states that this thumb pain is causing her significant mount of distress and, those are #1 complaint at this time. Patient also complains of left carpal tunnel symptoms that she has previously been seen in 2020 but cannot clinic, but it was unable to be addressed due to the COVID crisis. She reports that her symptoms are manageable for her carpal tunnel syndrome. EMG was obtained that demonstrated mild to moderate carpal tunnel syndrome. Review of Systems  Gen: no fever, chills, malaise  CV: no chest pain or palpitations  Resp: no cough or shortness of breath  GI: no nausea, vomiting, diarrhea, or constipation  Neuro: no seizures, vertigo, or headache  Msk: As per HPI  10 remaining systems reviewed and negative    Objective : There were no vitals filed for this visit. Body mass index is 45.84 kg/m². General: No acute distress, resting comfortably in the clinic  Neuro: alert. oriented  Eyes: Extra-ocular muscles intact  Pulm: Respirations unlabored and regular. Skin: warm, well perfused  Psych:   Patient has good fund of knowledge and displays understanding of exam, diagnosis, and plan.   MSK: LUE: Skin intact. No ecchymoses, abrasion, deformity, or lacerations. Tenderness palpation about the basilar thumb, specifically at the [facility] joint. Positive CMC grind test.  Crepitus felt upon movement of the thumb. Mild dysesthesias felt that the median nerve distribution of the left hand. Positive Tinel sign at the carpal tunnel. Positive Alyson's compression test.  Compartments soft and easily compressible. Ulnar/median/AIN/PIN/radial motor intact. Axillary/MCN/median/ulnar/radial nerves SILT. Radial pulse 2+ with BCR. Radiology:  History: Left thumb pain    Views: AP/lateral/oblique    Findings: 3 views of the left hand (AP/lateral/oblique) showing significant first CMC joint arthritis, and depression of the first metacarpal in comparison to the trapezium getting stage III, progressing to stage IV CMC joint arthritis. There are changes noted within the DIP and PIP joints of the digits. No acute fractures, dislocations, or osseous abnormalities, or foreign bodies. Comparison: None    Impression: Left hand with first [facility] joint arthritis     Assessment:   56y.o. year old female with    Diagnosis Orders   1. Arthritis of carpometacarpal (CMC) joint of left thumb        2. Left carpal tunnel syndrome            Plan: Today we went over the x-rays with the patient, informed the patient that her radiographic findings are consistent with [facility] arthritis of the left thumb. We also went over the etiology, progression, and treatment options for first [facility] joint arthritis. At this time the patient would like to continue with nonoperative treatment for her first [facility] joint arthritis, in which we offered anti-inflammatories and injections. The patient would like to try an injection to her first [facility] joint at this time. Patient tolerated the procedure well. Due to the patient being a diabetic, we informed the patient that we will only be able to inject her [facility] joint, not the carpal tunnel at this time. Patient was agreement with this plan. Patient states that she would not like any anti-inflammatories at this time. As for the carpal tunnel syndrome of the left wrist, the patient will be given a night splint for her to wear while she is sleep, as she endorses most of her pain when she wakes up in the morning. Until her symptoms get progressively worse, like to continue which is monitoring her carpal tunnel syndrome. She will follow up in 4 months for repeat evaluation of her basilar thumb OA. No follow-ups on file. No orders of the defined types were placed in this encounter. No orders of the defined types were placed in this encounter. Electronically signed by Stu Chan DO Orthopedic Surgery Resident on 7/27/2022 at 4:36 PM    This note is created with the assistance of a speech recognition program.  While intending to generate a document that actually reflects the content of the visit, the document can still have some errors including those of syntax and sound a like substitutions which may escape proof reading. In such instances, actual meaning can be extrapolated by contextual diversion    Injection procedure note  The alternatives, benefits, and risks were discussed with the patient. After answering all questions to the patient's satisfaction, the patient agreed to proceed forward with injection and gave verbal consent for the procedure. With the patient's permission, appropriate anatomic landmarks were identified and the left 1st [facility] joint was prepped in a sterile fashion using alcohol and/or betadine. A 25 gauge needle was then used to inject 1cc 0.5% marcaine plain and 80mg depo medrol into the joint. The injection was advanced without resistance confirming appropriate position. The patient tolerated the procedure well and the site was dressed with a band-aid. Patient was advised to ice the area for 15-20 minutes to relieve any injection site related pain.  Patient was advised to contact nurse if area becomes swollen, hot, erythematous, or painful, or to go to the emergency room after business hours.

## 2022-07-31 NOTE — PROGRESS NOTES
[facility] PHYSICIANS  [facility] PHYSICIANS [facility]  [address]  [address] [place] [address]  Dept: 100 Bubba [place] (:  1965) is a 56 y.o. female. Patient is here for evaluation of the following chief complaint(s):  Chief Complaint   Patient presents with    Annual Exam    Choking     4 years ago pt got a polyp taken out of her throat and now she keeps choking on things, saliva and food. Insomnia     Patient has been experiencing this for about 6 months         SUBJECTIVE/OBJECTIVE:  HPI  Ondina Casillas is a 56 y.o. female patient. Patient is an established patient of mine. Patient has a known history of larynx lesion, Hypertension , Diabetes Mellitus, Hyperlipidemia , fibromyalgia, low back pain, neuropathy, sleep apnea, allergies, arthritis, vitamin d deficiency, dysphagia, morbid obesity. CHOKING POLYP / LESION/ LARYNX/ DYSPHAGIA  Patient had a lesion/ polyps removal on 2017. Patient is an established patient of  Dr Ailyn Wilson. Now c/Obesity some dysphagia. Wants to see the same specialist. Neo Brood to see on exam.     Jaylyn Gasca has a well controlled hypertension. she is currently on Lisinopril, HCTZ. Patient's most recent BP in the office was stable. she reports stable BP readings at home. Patient denies any adverse reaction to this therapy. she denies any CP, SOB, HA, or palpitations. Patient admits to exercising and adheres to low salt diet. No history of organ damage due to condition noted. Lab Results   Component Value Date/Time    CREATININE 0.74 2020 07:30 AM     DIABETES MELLITUS-   she thinks A1c- she forgets to take it. Some non adherence. Patient reported her glucose reading has not been too bad lately. She had 1 hypoglycemic episode but is associated with not eating. Lesley Manriquez- will start  Patient has a  stable Diabetes Mellitus.  Current therapy includes Trulicity 5.22,UXLHBUH 70, Humalog. 30-35-30 , glimeperide sliding scale, Patient is responding well with this therapy. Patient reports home glucose monitoring as Stable readings. Patient denieshypoglycemia episodes such as{symptoms. Patient denies neither polyuria nor polydipsia. Dr. JAHAIRA PARKINSON [facility]. She was doing the trulicity . 75 mg . Got sick from 1.5 mg  Lab Results   Component Value Date/Time    LABA1C 9.0 08/01/2022 03:00 PM    LABA1C 10.3 02/22/2021 12:00 AM       MULTIPLE ARTHRITIS/LUBAR RADICULOPATHY/FIBROMYALGIA  PREVIOUS AND CURRENT NOTES '  Came back to [place] but did not see me yet went to see orthopedic specialist was complaining of inability to raise both arms. Patient has had some chronic low back pain for a while. More difficult to move around due to cold weather. Especially here in a [place]  Patient states that in [facility] her Sister found place with a warm pool/area to go too. It is definitely a lot warmer in [facility] than [place]. She is established with pain management but have not followed up. She denies any weakness to her legs. She is not getting any relief with the medicine that she is on which includes tizanidine, Lyrica, meloxicam  she continues to have some breakthrough pains but is tolerable with the current therapy from her sister. Patient with known fibromyalgia. Occasional muscle aches and pains also better with Lyrica better in a warmer weather. Patient is recommended to see the neurosurgeon here in [place] rather than another place. However, she has not really reached out to any of them Since she will not be going back and forth for treatments. Previous notes   Patient has a known chronic low back pain for several years , Pain is worse after just 5 minutes of standing up when doing dishes. The pain is located midline and radiates up or down into the buttocks and thighs at times. Intensity of pain is 8/10, down to 5/10 when sitting. Patient also has known General muscle aches which is also chronic.   Doing PT and TENS unit. Taking tizanidine at nighttime helps a little. She is also on Meloxicam,Tramadol  Patient was referred to ortho and pain management. She started seeing Dr. Anita Blue. SHe had several epidural injection. Patient is concerned about steroid raising her glucose levels. Injections are helping but not taking away all the pain. Patient also had shoulder injections by Dr. Tish Caro. Steroid injection- 75 % relief- 25 relief. 4 injections so far. SLEEP APNEA/INSOMNIA  Patient not using CPAP much. Will be sent to pulmonologist and asked to manage THIAGO before insomnia since this is inappropriate. .  Patient not using. CPAP. Not able to sPatient has been having a hard time using this had another conversation about the importance of wearing Ms. and if she is planning on moving to [facility] she needs to find another pulmonologist to follow-up with and perhaps find a way to get a better equipment that will be more tolerable to use. Jean Turcios is currently on atorvastatin (Lipitor). Patient denies adverse reaction to this medication. Compliance with treatment thus far has been good. The patient is known to have coexisting coronary artery disease. The 10-year CVD risk score (D'Agostino, et al., 2008) is: 8.5%    Values used to calculate the score:      Age: 56 years      Sex: Female      Diabetic: Yes      Tobacco smoker: No      Systolic Blood Pressure: 773 mmHg      Is BP treated: Yes      HDL Cholesterol: 58 mg/dL      Total Cholesterol: 159 mg/dL  Lab Results   Component Value Date/Time    CHOLFAST 166 08/29/2020 07:30 AM    CHOL 159 10/07/2020 09:05 AM    CHOL 181 03/19/2015 08:25 AM    HDL 58 10/07/2020 09:05 AM    LDLCHOLESTEROL 74 10/07/2020 09:05 AM    TRIGLYCFAST 167 (H) 08/29/2020 07:30 AM    CHOLHDLRATIO 2.7 10/07/2020 09:05 AM    TRIG 134 10/07/2020 09:05 AM    VLDL NOT REPORTED 10/07/2020 09:05 AM      ALLERGIES  Allegra not working. Will change to cetirizine. VITAMIN D  Patient has a known Vitamin D Deficiency. she Usually covered with 12 weeks weekly dose then daily. she is due to get levels check. We continue to discuss ways to manage this condition. We also discussed ways to improve the levels. Such as learning food groups that are high in Vitamin D. We also discuss that sun exposure is beneficial however, too much sun and sun damage can potentially result in skin cancer. Lab Results   Component Value Date/Time    VITD25 37.4 08/29/2020 07:30 AM       WEIGHT  Patient's BMI is Body mass index is 46.9 kg/m². kg/m2. BMI is increasing. Patient understands that this condition increases the patient's risk for chronic conditions. Wt Readings from Last 3 Encounters:   08/01/22 290 lb 9.6 oz (131.8 kg)   07/27/22 284 lb (128.8 kg)   12/02/21 284 lb (128.8 kg)      DEPRESSION SCREENING: Negative  PHQ Scores 8/1/2022 12/1/2021 8/18/2021 6/9/2021 3/16/2021 11/10/2020 10/5/2020   PHQ2 Score 2 2 1 0 2 0 0   PHQ9 Score 2 2 1 0 2 0 0     VITAL SIGNS:  Vitals:    08/01/22 1449   BP: 118/82   Pulse: 98   Temp: 97.2 °F (36.2 °C)   TempSrc: Temporal   SpO2: 95%   Weight: 290 lb 9.6 oz (131.8 kg)   Height: 5' 6\" (1.676 m)   Estimated body mass index is 46.9 kg/m² as calculated from the following:    Height as of this encounter: 5' 6\" (1.676 m). Weight as of this encounter: 290 lb 9.6 oz (131.8 kg).     Review of Systems    Physical Exam    MEDICAL HISTORY      Diagnosis Date    Allergic rhinitis 10/21/2014    Anxiety     Arthritis     Bleeds easily ([address]     per pt, has never had a work up    Complete rotator cuff tear of left shoulder 2018    Diabetes mellitus ([address]     On Insulin, metformin, Glipizide    Diabetic polyneuropathy associated with type 2 diabetes mellitus ([address] 3/26/2018    Facet degeneration of lumbar region 2/18/2021    Fibromyalgia     GERD (gastroesophageal reflux disease) 10/21/2014    H/O transfusion     History of blood transfusion     Hypertension Obesity 10/21/2014    Pure hypercholesterolemia 2/23/2016    Tobacco abuse     Type II or unspecified type diabetes mellitus without mention of complication, not stated as uncontrolled     Unspecified sleep apnea     CPAP machine is broken    Wears glasses       MEDICATIONS  Prior to Visit Medications    Medication Sig Taking? Authorizing Provider   insulin lispro, 1 Unit Dial, (HUMALOG KWIKPEN) 100 UNIT/ML SOPN TID W meals   If <139             No Ins  140-199 2 U  200-249 4 U  250-299 6 U  300-349 8 Units  350-400 10 U  > 400               12 U  > 400: 14 u , rep in 30. If still high add 10 u X 2 Yes KIMANI Haas CNP   Insulin Degludec (TRESIBA FLEXTOUCH) 100 UNIT/ML SOPN INJECT 70 UNITS UNDER THE SKIN IN THE MORNING AND THEN INJECT 60 UNITS UNDER THE SKIN IN THE EVENINGINJECT 70 UNITS UNDER THE SKIN IN THE MORNING AND THEN INJECT 60 UNITS UNDER THE SKIN IN THE EVENING Yes KIMANI Haas CNP   Dulaglutide (TRULICITY) 7.21 WX/9.5CG SOPN INJECT 0.75 MG UNDER THE SKIN ONCE WEEKLY Yes KIMANI Haas CNP   vitamin D3 (CHOLECALCIFEROL) 25 MCG (1000 UT) TABS tablet TAKE ONE TABLET BY MOUTH DAILY Yes KIMANI Haas CNP   empagliflozin (JARDIANCE) 10 MG tablet Take 1 tablet by mouth in the morning. Yes KIMANI Haas CNP   cetirizine (ZYRTEC) 10 MG tablet Take 1 tablet by mouth in the morning.  Yes KIMANI Haas CNP   pregabalin (LYRICA) 150 MG capsule TAKE ONE CAPSULE BY MOUTH TWICE A DAY Yes KIMANI Haas CNP   Alcohol Swabs (B-D SINGLE USE SWABS REGULAR) PADS TEST TWO TO THREE TIMES DAILY Yes MD EVERETT BrodyOGEFRANKI PEN NEEDLES 31G X 5 MM MISC USE ONE NEW NEEDLE DAILY Yes KIMANI Haas CNP   tiZANidine (ZANAFLEX) 4 MG tablet TAKE ONE TABLET BY MOUTH EVERY 8 HOURS AS NEEDED FOR NECK/BACK PAIN Yes KIMANI Haas CNP blood glucose monitor strips Test 2-3 times a day & as needed for symptoms of irregular blood glucose. BRAND OF CHOICE INSURANCE ALLOWS. Yes KIMANI Haas CNP   lisinopril (PRINIVIL;ZESTRIL) 20 MG tablet TAKE ONE TABLET BY MOUTH DAILY Yes KIMANI Haas CNP   hydroCHLOROthiazide (HYDRODIURIL) 25 MG tablet TAKE ONE TABLET BY MOUTH DAILY WITH POTASSIUM SUPPLEMENT Yes KIMANI Haas CNP   potassium chloride (KLOR-CON) 10 MEQ extended release tablet TAKE ONE TABLET BY MOUTH DAILY WITH HYDROCHLOROTHIAZIDE Yes KIMANI Haas CNP   atorvastatin (LIPITOR) 10 MG tablet TAKE ONE TABLET BY MOUTH DAILY Yes KIMANI Haas CNP   Easy Touch Lancets 33G/Twist MISC USE TWO TO THREE TIMES DAILY Yes KIMANI Haas CNP   Handicap Placard MISC by Does not apply route Prescription good for 5 Years  Expires January 2026 Yes KIMANI Haas CNP   Blood Glucose Monitoring Suppl (TRUE METRIX METER) MANOLO Testing BID Yes KIMANI Haas CNP   Insulin Syringes, Disposable, U-100 1 ML MISC 1 each by Does not apply route daily Yes KIMANI Haas CNP   hydroCHLOROthiazide (HYDRODIURIL) 25 MG tablet Take 1 tablet by mouth daily Take with Potassium supplement  KIMANI Haas CNP   Lancets MISC Dx: DM-2. Use 2-3 times daily  KIMANI Haas CNP     Controlled Substance Monitoring:  Acute and Chronic Pain Monitoring:   RX Monitoring 5/14/2021   Attestation -   Periodic Controlled Substance Monitoring No signs of potential drug abuse or diversion identified. ;Possible medication side effects, risk of tolerance/dependence & alternative treatments discussed. ;Assessed functional status. ;Obtaining appropriate analgesic effect of treatment. Chronic Pain > 50 MEDD Obtained or confirmed \"Consent for Opioid Use\" on file. ASSESSMENT/PLAN:  1. Encounter for physical examination  Stable  Repeat yearly    2.  Lesion of larynx  Failure to Improve  Consult to ENT  Continue to monitor    - MESHA Rossi MD, Otolaryngology, [place]    3. Essential hypertension  Stable  Continue current therapy. DISCUSSED AND ADVISED TO:  Cut down on your salt intake. Cut down on caffeinated drinks, sports drinks. Instructed to check BP at home regularly. Report for any chest pains, shortness of breath, headaches, and lightheadedness. Call the office if your blood pressure continue to be higher than 140/90 or 90/50.      4. Type 2 diabetes mellitus with diabetic polyneuropathy, with long-term current use of insulin (HCC)  Failure to Improve  Increased tresiba  Start jardiance  We will continue to monitor Hemoglobin A1c   DISCUSSED and ADVISED TO:  Continue to check Glucose levels at home. Report increased and low levels as discussed. Decrease carbohydrates, sugary drinks, desserts in your diet. Exercise regularly, as tolerated. Try to lose weight.    - DRUG SCREEN, PAIN; Future  - POCT glycosylated hemoglobin (Hb A1C); Future  - insulin lispro, 1 Unit Dial, (HUMALOG KWIKPEN) 100 UNIT/ML SOPN; TID W meals   If <139             No Ins  140-199 2 U  200-249 4 U  250-299 6 U  300-349 8 Units  350-400 10 U  > 400               12 U  > 400: 14 u , rep in 30. If still high add 10 u X 2  Dispense: 5 pen; Refill: 1  - Insulin Degludec (TRESIBA FLEXTOUCH) 100 UNIT/ML SOPN; INJECT 70 UNITS UNDER THE SKIN IN THE MORNING AND THEN INJECT 60 UNITS UNDER THE SKIN IN THE EVENINGINJECT 70 UNITS UNDER THE SKIN IN THE MORNING AND THEN INJECT 60 UNITS UNDER THE SKIN IN THE EVENING  Dispense: 18 pen; Refill: 1  - Dulaglutide (TRULICITY) 9.25 VE/6.8GZ SOPN; INJECT 0.75 MG UNDER THE SKIN ONCE WEEKLY  Dispense: 2 mL; Refill: 3  - empagliflozin (JARDIANCE) 10 MG tablet; Take 1 tablet by mouth in the morning. Dispense: 30 tablet; Refill: 1  - POCT glycosylated hemoglobin (Hb A1C)    5. Pure hypercholesterolemia (ASCVD score 21.7% July 2020)  Stable  Continue therapy. Advised to decrease the consumption of red meats, fried foods, trans fats, sweets, sugary beverages. Advised to increase fish, vegetables, and fruits consumption. Advised to add fiber or OTC supplements in diet. Discussed weight loss which will result in improvement of lipids levels. Advised to increase daily physical activities and add regular exercises. 6. Fibromyalgia  Failure to Improve  DISCUSSED AND ADVISED TO:  Exercise often. Get a good night's sleep. Make healthy changes to diet. Quit smoking   Try to reduce stress  Carefully take medications as discussed  Report for worsening symptoms          - DRUG SCREEN, PAIN; Future    7. Lumbar radiculopathy  Failure to Improve  Continue current therapy. DISCUSSED AND ADVISED TO:  Use heat packs 15 to 20 mins every 2-3 hours. Do some back stretches as tolerated. Refer to hand out for instructions. Call for worsening, numbness, weakness.    - DRUG SCREEN, PAIN; Future    8. Diabetic polyneuropathy associated with type 2 diabetes mellitus (HCC)  Failure to Improve  Continue current therapy  Encouraged to maintain glucose levels  Encouraged to wear shoes all the time  Yearly foot exam      9. Severe sleep apnea  Failure to Improve  DISCUSSED AND ADVISED TO:  Weight loss with diet exercise,   decrease caffeine intake. Especially in the evening. Healthy sleep hygiene measures,  Effective positional therapy,  Avoid sleep deprivation  Continue CPAP use nightly as discussed. - Sheron Alonzo MD, Pulmonology, [facility]. Non-seasonal allergic rhinitis due to other allergic trigger  Failure to Improve  Continue with the same therapy   ADVISED TO:  Avoid known allergens/irritants. Stop smoking or avoid second hand smoke. Stay hydrated. Report for worsening symptoms    - cetirizine (ZYRTEC) 10 MG tablet; Take 1 tablet by mouth in the morning.   Dispense: 90 tablet; Refill: 2    11. Arthritis involving multiple sites  Failure to Improve  Continue current therapy. DISCUSSED and ADVISED TO:  Stay at a healthy weight. Continue exercises/PT  Stretch to help prevent stiffness and to prevent injury before exercise. Gentle forms of yoga help keep joints and muscles flexible. Walk instead of jog, ride a bike, swim, and water exercise. Lift weights as tolerated. strong muscles help reduce stress on joints. Take pain medicines exactly as directed and only as needed. 12. Vitamin D deficiency  Stable  Continue Vitamin D supplementation  DISCUSSED AND ADVISED TO:  Foods that contain a lot of vitamin D includes [place], tuna, and mackerel. Cheese, egg yolks, and beef liver have small amounts of vit D.  Milk, soy drinks, orange juice, yogurt, margarine, and some kinds of cereal have vitamin D added to them. Continue to use sunblock when out in the sun to prevent skin cancer.    - vitamin D3 (CHOLECALCIFEROL) 25 MCG (1000 UT) TABS tablet; TAKE ONE TABLET BY MOUTH DAILY  Dispense: 90 tablet; Refill: 2      13. Morbid obesity with BMI of 40.0-44.9, adult ([address]  Worsening  BMI increasing  DISCUSSED AND ADVISED TO:  Eat a low-fat and low carbohydrates diet. Avoid fried foods especially fast food. Choose healthier options for snacks. Have 5-6 servings of fruits and vegetables per day. Cut down on eating processed food. Add 30 minutes to 1 hour aerobic exercise for 3-4 days a week. 14. Patient non adherence  Worsening  Continue to monitor       On this date 8/1/2022 I have spent 45 minutes reviewing previous notes, test results and face to face with the patient discussing the diagnosis and importance of compliance with the treatment plan as well as documenting on the day of the visit. Return in about 4 weeks (around 8/29/2022) for Rev. results, Chronic conditions, 30mins.     This note was completed by using the assistance of a speech-recognition program. However, inadvertent computerized transcription errors may be present. Although every effort was made to ensure accuracy, no guarantees can be provided that every mistake has been identified and corrected by editing.   Electronically signed by KIMANI Lazcano CNP on 7/31/22 at 7:28 PM EDT     --KIMANI Lazcano - CNP

## 2022-08-01 ENCOUNTER — HOSPITAL ENCOUNTER (OUTPATIENT)
Age: 57
Setting detail: SPECIMEN
Discharge: HOME | End: 2022-08-01
Payer: COMMERCIAL

## 2022-08-01 ENCOUNTER — OFFICE VISIT (OUTPATIENT)
Dept: FAMILY MEDICINE CLINIC | Age: 57
End: 2022-08-01
Payer: COMMERCIAL

## 2022-08-01 VITALS
OXYGEN SATURATION: 95 % | BODY MASS INDEX: 46.7 KG/M2 | DIASTOLIC BLOOD PRESSURE: 82 MMHG | HEIGHT: 66 IN | SYSTOLIC BLOOD PRESSURE: 118 MMHG | TEMPERATURE: 97.2 F | WEIGHT: 290.6 LBS | HEART RATE: 98 BPM

## 2022-08-01 DIAGNOSIS — E78.00 PURE HYPERCHOLESTEROLEMIA: ICD-10-CM

## 2022-08-01 DIAGNOSIS — I10 ESSENTIAL HYPERTENSION: ICD-10-CM

## 2022-08-01 DIAGNOSIS — E11.42 DIABETIC POLYNEUROPATHY ASSOCIATED WITH TYPE 2 DIABETES MELLITUS (HCC): ICD-10-CM

## 2022-08-01 DIAGNOSIS — M79.7 FIBROMYALGIA: ICD-10-CM

## 2022-08-01 DIAGNOSIS — J38.7 LESION OF LARYNX: ICD-10-CM

## 2022-08-01 DIAGNOSIS — M54.16 LUMBAR RADICULOPATHY: ICD-10-CM

## 2022-08-01 DIAGNOSIS — E11.42 TYPE 2 DIABETES MELLITUS WITH DIABETIC POLYNEUROPATHY, WITH LONG-TERM CURRENT USE OF INSULIN (HCC): ICD-10-CM

## 2022-08-01 DIAGNOSIS — Z79.4 TYPE 2 DIABETES MELLITUS WITH DIABETIC POLYNEUROPATHY, WITH LONG-TERM CURRENT USE OF INSULIN (HCC): ICD-10-CM

## 2022-08-01 DIAGNOSIS — J30.89 NON-SEASONAL ALLERGIC RHINITIS DUE TO OTHER ALLERGIC TRIGGER: ICD-10-CM

## 2022-08-01 DIAGNOSIS — Z00.00 ENCOUNTER FOR PHYSICAL EXAMINATION: Primary | ICD-10-CM

## 2022-08-01 DIAGNOSIS — Z91.199 PATIENT NON ADHERENCE: ICD-10-CM

## 2022-08-01 DIAGNOSIS — E66.01 MORBID OBESITY WITH BMI OF 40.0-44.9, ADULT (HCC): ICD-10-CM

## 2022-08-01 DIAGNOSIS — E55.9 VITAMIN D DEFICIENCY: ICD-10-CM

## 2022-08-01 DIAGNOSIS — M12.9 ARTHRITIS INVOLVING MULTIPLE SITES: ICD-10-CM

## 2022-08-01 DIAGNOSIS — G47.30 SEVERE SLEEP APNEA: ICD-10-CM

## 2022-08-01 LAB — HBA1C MFR BLD: 9 %

## 2022-08-01 PROCEDURE — G0481 DRUG TEST DEF 8-14 CLASSES: HCPCS

## 2022-08-01 PROCEDURE — 99396 PREV VISIT EST AGE 40-64: CPT | Performed by: FAMILY MEDICINE

## 2022-08-01 PROCEDURE — 80307 DRUG TEST PRSMV CHEM ANLYZR: CPT

## 2022-08-01 PROCEDURE — 3017F COLORECTAL CA SCREEN DOC REV: CPT | Performed by: FAMILY MEDICINE

## 2022-08-01 PROCEDURE — 83036 HEMOGLOBIN GLYCOSYLATED A1C: CPT | Performed by: FAMILY MEDICINE

## 2022-08-01 PROCEDURE — G8427 DOCREV CUR MEDS BY ELIG CLIN: HCPCS | Performed by: FAMILY MEDICINE

## 2022-08-01 PROCEDURE — 2022F DILAT RTA XM EVC RTNOPTHY: CPT | Performed by: FAMILY MEDICINE

## 2022-08-01 PROCEDURE — G8417 CALC BMI ABV UP PARAM F/U: HCPCS | Performed by: FAMILY MEDICINE

## 2022-08-01 PROCEDURE — 1036F TOBACCO NON-USER: CPT | Performed by: FAMILY MEDICINE

## 2022-08-01 PROCEDURE — 99214 OFFICE O/P EST MOD 30 MIN: CPT | Performed by: FAMILY MEDICINE

## 2022-08-01 RX ORDER — FEXOFENADINE HCL 180 MG/1
180 TABLET ORAL DAILY
COMMUNITY
End: 2022-08-01

## 2022-08-01 RX ORDER — MELATONIN
Qty: 90 TABLET | Refills: 2 | Status: SHIPPED | OUTPATIENT
Start: 2022-08-01

## 2022-08-01 RX ORDER — DULAGLUTIDE 0.75 MG/.5ML
INJECTION, SOLUTION SUBCUTANEOUS
Qty: 2 ML | Refills: 3 | Status: SHIPPED | OUTPATIENT
Start: 2022-08-01

## 2022-08-01 RX ORDER — PREGABALIN 150 MG/1
CAPSULE ORAL
Qty: 60 CAPSULE | Refills: 0 | Status: CANCELLED | OUTPATIENT
Start: 2022-08-01 | End: 2023-01-15

## 2022-08-01 RX ORDER — INSULIN LISPRO 100 [IU]/ML
INJECTION, SOLUTION INTRAVENOUS; SUBCUTANEOUS
Qty: 5 PEN | Refills: 1 | Status: SHIPPED | OUTPATIENT
Start: 2022-08-01 | End: 2022-08-05 | Stop reason: SDUPTHER

## 2022-08-01 RX ORDER — CETIRIZINE HYDROCHLORIDE 10 MG/1
10 TABLET ORAL DAILY
Qty: 90 TABLET | Refills: 2 | Status: SHIPPED | OUTPATIENT
Start: 2022-08-01 | End: 2022-10-30

## 2022-08-01 RX ORDER — INSULIN DEGLUDEC INJECTION 100 U/ML
INJECTION, SOLUTION SUBCUTANEOUS
Qty: 18 PEN | Refills: 1 | Status: SHIPPED | OUTPATIENT
Start: 2022-08-01

## 2022-08-01 SDOH — ECONOMIC STABILITY: FOOD INSECURITY: WITHIN THE PAST 12 MONTHS, YOU WORRIED THAT YOUR FOOD WOULD RUN OUT BEFORE YOU GOT MONEY TO BUY MORE.: OFTEN TRUE

## 2022-08-01 SDOH — ECONOMIC STABILITY: FOOD INSECURITY: WITHIN THE PAST 12 MONTHS, THE FOOD YOU BOUGHT JUST DIDN'T LAST AND YOU DIDN'T HAVE MONEY TO GET MORE.: OFTEN TRUE

## 2022-08-01 ASSESSMENT — PATIENT HEALTH QUESTIONNAIRE - PHQ9
SUM OF ALL RESPONSES TO PHQ QUESTIONS 1-9: 2
1. LITTLE INTEREST OR PLEASURE IN DOING THINGS: 1
SUM OF ALL RESPONSES TO PHQ9 QUESTIONS 1 & 2: 2
SUM OF ALL RESPONSES TO PHQ QUESTIONS 1-9: 2
2. FEELING DOWN, DEPRESSED OR HOPELESS: 1

## 2022-08-01 ASSESSMENT — SOCIAL DETERMINANTS OF HEALTH (SDOH): HOW HARD IS IT FOR YOU TO PAY FOR THE VERY BASICS LIKE FOOD, HOUSING, MEDICAL CARE, AND HEATING?: HARD

## 2022-08-01 NOTE — PATIENT INSTRUCTIONS
New Updates for [facility]/ [employer] ([facility]) ERIKA    Thank you for choosing US to give you the best care! [employer] ([facility]) is always trying to think of new ways to help their patients. We are asking all patients to try out the new digital registration that is now available through your [facility] account or the new ERIKA, [employer] ([facility]). Via the erika you're now able to update your personal and registration information prior to your upcoming appointment. This will save you time once you arrive at the office to check-in, not to mention your information remains safe!! Many other perks come from signing up for an account, such as:  Requesting refills  Scheduling an appointment  Completing an E-Visit  Sending a message to the office/provider  Having access to your medication list  Paying your bill/copay prior to your appointment  Scheduling your yearly mammogram  Review your test results    If you are not familiar with [facility] or the [employer] ([facility]) ERIKA, please ask one of us and we will be happy to answer any questions or help you set-up your account.       Your [facility] office,  Danny

## 2022-08-01 NOTE — PROGRESS NOTES
Visit Information    Have you changed or started any medications since your last visit including any over-the-counter medicines, vitamins, or herbal medicines? no   Have you stopped taking any of your medications? Is so, why? -  yes - 3 that were removed from list  Are you having any side effects from any of your medications? - no    Have you seen any other physician or provider since your last visit? yes -    Have you had any other diagnostic tests since your last visit?  no   Have you been seen in the emergency room and/or had an admission in a hospital since we last saw you?  no   Have you had your routine dental cleaning in the past 6 months?  no     Do you have an active MyChart account? If no, what is the barrier?   Yes    Patient Care Team:  KIMANI Jordan CNP as PCP - General (Family Medicine)  KIMANI Jordan C[facility]  Jed Hein DO as Consulting Physician (General Surgery)  Claudette Rivas MD as Consulting Physician (Pain Management)  Marilyn Cage MD as Consulting Physician (Endocrinology)    Medical History Review  Past Medical, Family, and Social History reviewed and does contribute to the patient presenting condition    Health Maintenance   Topic Date Due    Pneumococcal 0-64 years Vaccine (2 - PCV) 10/06/2016    Low dose CT lung screening  Never done    Diabetic foot exam  07/26/2020    Colorectal Cancer Screen  02/21/2021    COVID-19 Vaccine (3 - Booster) 09/14/2021    Diabetic retinal exam  09/15/2021    Lipids  10/07/2021    Diabetic microalbuminuria test  10/20/2021    A1C test (Diabetic or Prediabetic)  02/22/2022    Flu vaccine (1) 09/01/2022    Breast cancer screen  10/08/2022    Depression Screen  12/01/2022    Cervical cancer screen  10/05/2025    DTaP/Tdap/Td vaccine (2 - Td or Tdap) 09/28/2030    Hepatitis B vaccine  Completed    Shingles vaccine  Completed    Hepatitis C screen  Completed    HIV screen  Completed Hepatitis A vaccine  Aged Out    Hib vaccine  Aged Out    Meningococcal (ACWY) vaccine  Aged Out

## 2022-08-02 ENCOUNTER — HOSPITAL ENCOUNTER (OUTPATIENT)
Age: 57
Setting detail: SPECIMEN
Discharge: HOME | End: 2022-08-02
Payer: COMMERCIAL

## 2022-08-02 ENCOUNTER — TELEPHONE (OUTPATIENT)
Dept: FAMILY MEDICINE CLINIC | Age: 57
End: 2022-08-02

## 2022-08-02 DIAGNOSIS — Z79.4 TYPE 2 DIABETES MELLITUS WITH DIABETIC POLYNEUROPATHY, WITH LONG-TERM CURRENT USE OF INSULIN (HCC): ICD-10-CM

## 2022-08-02 DIAGNOSIS — E78.00 PURE HYPERCHOLESTEROLEMIA: Primary | ICD-10-CM

## 2022-08-02 DIAGNOSIS — E11.42 TYPE 2 DIABETES MELLITUS WITH DIABETIC POLYNEUROPATHY, WITH LONG-TERM CURRENT USE OF INSULIN (HCC): ICD-10-CM

## 2022-08-02 DIAGNOSIS — I10 ESSENTIAL HYPERTENSION: ICD-10-CM

## 2022-08-02 DIAGNOSIS — E78.00 PURE HYPERCHOLESTEROLEMIA: ICD-10-CM

## 2022-08-02 LAB
ABSOLUTE EOS #: 0.2 K/UL (ref 0–0.4)
ABSOLUTE LYMPH #: 1.7 K/UL (ref 1–4.8)
ABSOLUTE MONO #: 0.5 K/UL (ref 0.1–1.3)
ALBUMIN SERPL-MCNC: 4.1 G/DL (ref 3.5–5.2)
ALP BLD-CCNC: 134 U/L (ref 35–104)
ALT SERPL-CCNC: 18 U/L (ref 5–33)
ANION GAP SERPL CALCULATED.3IONS-SCNC: 9 MMOL/L (ref 9–17)
AST SERPL-CCNC: 16 U/L
BACTERIA: ABNORMAL
BASOPHILS # BLD: 1 % (ref 0–2)
BASOPHILS ABSOLUTE: 0 K/UL (ref 0–0.2)
BILIRUB SERPL-MCNC: 0.28 MG/DL (ref 0.3–1.2)
BILIRUBIN URINE: NEGATIVE
BUN BLDV-MCNC: 10 MG/DL (ref 6–20)
CALCIUM SERPL-MCNC: 9.3 MG/DL (ref 8.6–10.4)
CASTS UA: ABNORMAL /LPF
CHLORIDE BLD-SCNC: 104 MMOL/L (ref 98–107)
CHOLESTEROL, FASTING: 221 MG/DL
CHOLESTEROL/HDL RATIO: 4
CO2: 28 MMOL/L (ref 20–31)
COLOR: YELLOW
CREAT SERPL-MCNC: 0.92 MG/DL (ref 0.5–0.9)
CREATININE URINE: 98.2 MG/DL (ref 28–217)
EOSINOPHILS RELATIVE PERCENT: 5 % (ref 0–4)
EPITHELIAL CELLS UA: ABNORMAL /HPF
ESTIMATED AVERAGE GLUCOSE: 209 MG/DL
GFR AFRICAN AMERICAN: >60 ML/MIN
GFR NON-AFRICAN AMERICAN: >60 ML/MIN
GFR SERPL CREATININE-BSD FRML MDRD: ABNORMAL ML/MIN/{1.73_M2}
GLUCOSE FASTING: 158 MG/DL (ref 70–99)
GLUCOSE URINE: NEGATIVE
HBA1C MFR BLD: 8.9 % (ref 4–6)
HCT VFR BLD CALC: 39.3 % (ref 36–46)
HDLC SERPL-MCNC: 55 MG/DL
HEMOGLOBIN: 12.9 G/DL (ref 12–16)
KETONES, URINE: NEGATIVE
LDL CHOLESTEROL: 137 MG/DL (ref 0–130)
LEUKOCYTE ESTERASE, URINE: ABNORMAL
LYMPHOCYTES # BLD: 36 % (ref 24–44)
MCH RBC QN AUTO: 27.1 PG (ref 26–34)
MCHC RBC AUTO-ENTMCNC: 32.8 G/DL (ref 31–37)
MCV RBC AUTO: 82.5 FL (ref 80–100)
MICROALBUMIN/CREAT 24H UR: <12 MG/L
MICROALBUMIN/CREAT UR-RTO: NORMAL MCG/MG CREAT
MONOCYTES # BLD: 10 % (ref 1–7)
NITRITE, URINE: NEGATIVE
PDW BLD-RTO: 14.8 % (ref 11.5–14.9)
PH UA: 6 (ref 5–8)
PLATELET # BLD: 241 K/UL (ref 150–450)
PMV BLD AUTO: 8.8 FL (ref 6–12)
POTASSIUM SERPL-SCNC: 4.3 MMOL/L (ref 3.7–5.3)
PROTEIN UA: NEGATIVE
RBC # BLD: 4.77 M/UL (ref 4–5.2)
RBC UA: ABNORMAL /HPF
SEG NEUTROPHILS: 48 % (ref 36–66)
SEGMENTED NEUTROPHILS ABSOLUTE COUNT: 2.3 K/UL (ref 1.3–9.1)
SODIUM BLD-SCNC: 141 MMOL/L (ref 135–144)
SPECIFIC GRAVITY UA: 1.01 (ref 1–1.03)
TOTAL PROTEIN: 6.9 G/DL (ref 6.4–8.3)
TRIGLYCERIDE, FASTING: 147 MG/DL
TURBIDITY: CLEAR
URINE HGB: NEGATIVE
UROBILINOGEN, URINE: NORMAL
WBC # BLD: 4.7 K/UL (ref 3.5–11)
WBC UA: ABNORMAL /HPF

## 2022-08-02 PROCEDURE — 80061 LIPID PANEL: CPT

## 2022-08-02 PROCEDURE — 82043 UR ALBUMIN QUANTITATIVE: CPT

## 2022-08-02 PROCEDURE — 85025 COMPLETE CBC W/AUTO DIFF WBC: CPT

## 2022-08-02 PROCEDURE — 36415 COLL VENOUS BLD VENIPUNCTURE: CPT

## 2022-08-02 PROCEDURE — 82570 ASSAY OF URINE CREATININE: CPT

## 2022-08-02 PROCEDURE — 81001 URINALYSIS AUTO W/SCOPE: CPT

## 2022-08-02 PROCEDURE — 80053 COMPREHEN METABOLIC PANEL: CPT

## 2022-08-02 PROCEDURE — 83036 HEMOGLOBIN GLYCOSYLATED A1C: CPT

## 2022-08-02 RX ORDER — PRAVASTATIN SODIUM 20 MG
20 TABLET ORAL DAILY
Qty: 90 TABLET | Refills: 2 | Status: SHIPPED | OUTPATIENT
Start: 2022-08-02 | End: 2022-08-05 | Stop reason: SDUPTHER

## 2022-08-02 ASSESSMENT — VISUAL ACUITY
OS_CC: 20/20
OD_CC: 20/20

## 2022-08-03 RX ORDER — LIDOCAINE HYDROCHLORIDE 10 MG/ML
0.5 INJECTION, SOLUTION INFILTRATION; PERINEURAL ONCE
Status: COMPLETED | OUTPATIENT
Start: 2022-08-03 | End: 2022-08-03

## 2022-08-03 RX ORDER — BUPIVACAINE HYDROCHLORIDE 2.5 MG/ML
0.5 INJECTION, SOLUTION INFILTRATION; PERINEURAL ONCE
Status: COMPLETED | OUTPATIENT
Start: 2022-08-03 | End: 2022-08-03

## 2022-08-03 RX ORDER — METHYLPREDNISOLONE ACETATE 80 MG/ML
80 INJECTION, SUSPENSION INTRA-ARTICULAR; INTRALESIONAL; INTRAMUSCULAR; SOFT TISSUE ONCE
Status: COMPLETED | OUTPATIENT
Start: 2022-08-03 | End: 2022-08-03

## 2022-08-03 RX ADMIN — LIDOCAINE HYDROCHLORIDE 0.5 ML: 10 INJECTION, SOLUTION INFILTRATION; PERINEURAL at 13:56

## 2022-08-03 RX ADMIN — BUPIVACAINE HYDROCHLORIDE 1.25 MG: 2.5 INJECTION, SOLUTION INFILTRATION; PERINEURAL at 13:56

## 2022-08-03 RX ADMIN — METHYLPREDNISOLONE ACETATE 80 MG: 80 INJECTION, SUSPENSION INTRA-ARTICULAR; INTRALESIONAL; INTRAMUSCULAR; SOFT TISSUE at 13:57

## 2022-08-03 NOTE — TELEPHONE ENCOUNTER
Please let the patient know of the recent results. These can also be discussed further on the future visits. Patient's blood count is WNL    Lab Results   Component Value Date    WBC 4.7 08/02/2022    HGB 12.9 08/02/2022    HCT 39.3 08/02/2022    MCV 82.5 08/02/2022     08/02/2022    LYMPHOPCT 36 08/02/2022    RBC 4.77 08/02/2022    MCH 27.1 08/02/2022    MCHC 32.8 08/02/2022    RDW 14.8 08/02/2022       Patient's thyroid function is WNL    Lab Results   Component Value Date    TSH 0.55 10/05/2020       Patient's kidney function is rising- . Adv to drink lots of fluids cut down salt intake and OTC NSAIDS      Patient's liver function is Some liver function is rising. If patient drinks alcohol please advise to cut down or stop. Also, advise not to take tylenol. We will continue to monitor. Lab Results   Component Value Date     08/02/2022    K 4.3 08/02/2022     08/02/2022    CO2 28 08/02/2022    BUN 10 08/02/2022    CREATININE 0.92 (H) 08/02/2022    GLUCOSE 164 (H) 12/02/2019    CALCIUM 9.3 08/02/2022    PROT 6.9 08/02/2022    LABALBU 4.1 08/02/2022    BILITOT 0.28 (L) 08/02/2022    ALKPHOS 134 (H) 08/02/2022    AST 16 08/02/2022    ALT 18 08/02/2022    LABGLOM >60 08/02/2022    GFRAA >60 08/02/2022    GLOB NOT REPORTED 02/23/2018       Patient's urinalysis results presence of white cells, will wait for culture  Lab Results   Component Value Date/Time    APPEARANCE URINE 08/04/2014 11:27 AM    COLORU Yellow 08/02/2022 11:00 AM    NITRU NEGATIVE 08/02/2022 11:00 AM    LEUKOCYTESUR TRACE 08/02/2022 11:00 AM    GLUCOSEU NEGATIVE 08/02/2022 11:00 AM    KETUA NEGATIVE 08/02/2022 11:00 AM    UROBILINOGEN Normal 08/02/2022 11:00 AM    BILIRUBINUR NEGATIVE 08/02/2022 11:00 AM    BILIRUBINUR neg 08/09/2019 04:55 PM       Vitamin D Screening  Please let the patient know that the patient's recent vitamin d level was insufficient.  Continue daily supplement  Lab Results   Component Value Date/Time VITD25 37.4 08/29/2020 07:30 AM         Patient's lipids test results Please let the patient know that her  cholesterol levels were elevated. I sent a prescription for pravastatin to help improve her  cholesterol level. I will change it from lipitor  Please advise the patient to:  Cut down on red meats and trans fats in their diet. Increase physical activity. Add some regular exercise at least 3 times a week on their routine  Try to lose weight to help improve their cholesterol levels.    Lab Results   Component Value Date/Time    CHOLFAST 221 (H) 08/02/2022 11:00 AM    CHOL 159 10/07/2020 09:05 AM    CHOL 181 03/19/2015 08:25 AM    HDL 55 08/02/2022 11:00 AM    LDLCHOLESTEROL 137 (H) 08/02/2022 11:00 AM    TRIG 134 10/07/2020 09:05 AM    CHOLHDLRATIO 4.0 08/02/2022 11:00 AM    VLDL NOT REPORTED 10/07/2020 09:05 AM

## 2022-08-03 NOTE — TELEPHONE ENCOUNTER
Pt called back and was given lab results. Verbalized understanding. Also will like script lyrica called into pharmacy     Please Approve or Refuse.   Send to Pharmacy per Pt's Request: Meg Pugh     Next Visit Date:  8/30/2022   Last Visit Date: 8/1/2022    Hemoglobin A1C (%)   Date Value   08/02/2022 8.9 (H)   08/01/2022 9.0   02/22/2021 10.3             ( goal A1C is < 7)   BP Readings from Last 3 Encounters:   08/01/22 118/82   04/13/21 132/81   03/16/21 122/84          (goal 120/80)  BUN   Date Value Ref Range Status   08/02/2022 10 6 - 20 mg/dL Final     Creatinine   Date Value Ref Range Status   08/02/2022 0.92 (H) 0.50 - 0.90 mg/dL Final     Potassium   Date Value Ref Range Status   08/02/2022 4.3 3.7 - 5.3 mmol/L Final

## 2022-08-04 LAB
6-ACETYLMORPHINE, UR: NOT DETECTED
7-AMINOCLONAZEPAM, URINE: NOT DETECTED
ALPHA-OH-ALPRAZ, URINE: NOT DETECTED
ALPHA-OH-MIDAZOLAM, URINE: NOT DETECTED
ALPRAZOLAM, URINE: NOT DETECTED
AMPHETAMINES, URINE: NOT DETECTED
BARBITURATES, URINE: NOT DETECTED
BENZOYLECGONINE, UR: NOT DETECTED
BUPRENORPHINE URINE: NOT DETECTED
CARISOPRODOL, UR: NOT DETECTED
CLONAZEPAM, URINE: NOT DETECTED
CODEINE, URINE: NOT DETECTED
CREATININE URINE: 157.1 MG/DL (ref 20–400)
DIAZEPAM, URINE: NOT DETECTED
DRUGS EXPECTED, UR: NORMAL
EER HI RES INTERP UR: NORMAL
ETHYL GLUCURONIDE UR: NOT DETECTED
FENTANYL URINE: NOT DETECTED
GABAPENTIN: NOT DETECTED
HYDROCODONE, URINE: NOT DETECTED
HYDROMORPHONE, URINE: NOT DETECTED
LORAZEPAM, URINE: NOT DETECTED
MARIJUANA METAB, UR: NOT DETECTED
MDA, UR: NOT DETECTED
MDEA, EVE, UR: NOT DETECTED
MDMA URINE: NOT DETECTED
MEPERIDINE METAB, UR: NOT DETECTED
METHADONE, URINE: NOT DETECTED
METHAMPHETAMINE, URINE: NOT DETECTED
METHYLPHENIDATE: NOT DETECTED
MIDAZOLAM, URINE: NOT DETECTED
MORPHINE URINE: NOT DETECTED
NALOXONE URINE: NOT DETECTED
NORBUPRENORPHINE, URINE: NOT DETECTED
NORDIAZEPAM, URINE: NOT DETECTED
NORFENTANYL, URINE: NOT DETECTED
NORHYDROCODONE, URINE: NOT DETECTED
NOROXYCODONE, URINE: NOT DETECTED
NOROXYMORPHONE, URINE: NOT DETECTED
OXAZEPAM, URINE: NOT DETECTED
OXYCODONE URINE: NOT DETECTED
OXYMORPHONE, URINE: NOT DETECTED
PAIN MANAGEMENT DRUG PANEL INTERP, URINE: NORMAL
PAIN MGT DRUG PANEL, HI RES, UR: NORMAL
PCP,URINE: NOT DETECTED
PHENTERMINE, UR: NOT DETECTED
PREGABALIN: NOT DETECTED
TAPENTADOL, URINE: NOT DETECTED
TAPENTADOL-O-SULFATE, URINE: NOT DETECTED
TEMAZEPAM, URINE: NOT DETECTED
TRAMADOL, URINE: NOT DETECTED
ZOLPIDEM METABOLITE (ZCA), URINE: NOT DETECTED
ZOLPIDEM, URINE: NOT DETECTED

## 2022-08-05 ENCOUNTER — TELEPHONE (OUTPATIENT)
Dept: FAMILY MEDICINE CLINIC | Age: 57
End: 2022-08-05

## 2022-08-05 DIAGNOSIS — E11.42 TYPE 2 DIABETES MELLITUS WITH DIABETIC POLYNEUROPATHY, WITH LONG-TERM CURRENT USE OF INSULIN (HCC): ICD-10-CM

## 2022-08-05 DIAGNOSIS — Z79.4 TYPE 2 DIABETES MELLITUS WITH DIABETIC POLYNEUROPATHY, WITH LONG-TERM CURRENT USE OF INSULIN (HCC): ICD-10-CM

## 2022-08-05 RX ORDER — PRAVASTATIN SODIUM 20 MG
20 TABLET ORAL DAILY
Qty: 90 TABLET | Refills: 2 | Status: SHIPPED | OUTPATIENT
Start: 2022-08-05

## 2022-08-05 RX ORDER — INSULIN LISPRO 100 [IU]/ML
INJECTION, SOLUTION INTRAVENOUS; SUBCUTANEOUS
Qty: 5 PEN | Refills: 1 | Status: SHIPPED | OUTPATIENT
Start: 2022-08-05

## 2022-08-05 NOTE — TELEPHONE ENCOUNTER
Please Approve or Refuse.   Send to Pharmacy per Pt's Request: Mariza Tenstrike     Next Visit Date:  8/30/2022   Last Visit Date: 8/1/2022    Hemoglobin A1C (%)   Date Value   08/02/2022 8.9 (H)   08/01/2022 9.0   02/22/2021 10.3             ( goal A1C is < 7)   BP Readings from Last 3 Encounters:   08/01/22 118/82   04/13/21 132/81   03/16/21 122/84          (goal 120/80)  BUN   Date Value Ref Range Status   08/02/2022 10 6 - 20 mg/dL Final     Creatinine   Date Value Ref Range Status   08/02/2022 0.92 (H) 0.50 - 0.90 mg/dL Final     Potassium   Date Value Ref Range Status   08/02/2022 4.3 3.7 - 5.3 mmol/L Final

## 2022-08-09 DIAGNOSIS — Z79.4 TYPE 2 DIABETES MELLITUS WITH DIABETIC POLYNEUROPATHY, WITH LONG-TERM CURRENT USE OF INSULIN (HCC): ICD-10-CM

## 2022-08-09 DIAGNOSIS — E11.42 TYPE 2 DIABETES MELLITUS WITH DIABETIC POLYNEUROPATHY, WITH LONG-TERM CURRENT USE OF INSULIN (HCC): ICD-10-CM

## 2022-08-09 DIAGNOSIS — M79.7 FIBROMYALGIA: ICD-10-CM

## 2022-08-09 RX ORDER — PREGABALIN 150 MG/1
CAPSULE ORAL
Qty: 60 CAPSULE | Refills: 0 | Status: SHIPPED | OUTPATIENT
Start: 2022-08-09 | End: 2022-09-21

## 2022-08-09 NOTE — TELEPHONE ENCOUNTER
Pt called in inquiring about her refill on the pregabalin (LYRICA) 150 MG capsule. Writer adv will send a message back to inquire for her. Pt also wanted to adv that the Agatha Canales is working very well for her. Please Approve or Refuse.   Send to Pharmacy per Pt's Request: [facility] ([id number])     Next Visit Date:  8/30/2022   Last Visit Date: 8/1/2022    Hemoglobin A1C (%)   Date Value   08/02/2022 8.9 (H)   08/01/2022 9.0   02/22/2021 10.3             ( goal A1C is < 7)   BP Readings from Last 3 Encounters:   08/01/22 118/82   04/13/21 132/81   03/16/21 122/84          (goal 120/80)  BUN   Date Value Ref Range Status   08/02/2022 10 6 - 20 mg/dL Final     Creatinine   Date Value Ref Range Status   08/02/2022 0.92 (H) 0.50 - 0.90 mg/dL Final     Potassium   Date Value Ref Range Status   08/02/2022 4.3 3.7 - 5.3 mmol/L Final

## 2022-08-10 DIAGNOSIS — M79.7 FIBROMYALGIA: ICD-10-CM

## 2022-08-10 DIAGNOSIS — Z79.4 TYPE 2 DIABETES MELLITUS WITH DIABETIC POLYNEUROPATHY, WITH LONG-TERM CURRENT USE OF INSULIN (HCC): ICD-10-CM

## 2022-08-10 DIAGNOSIS — E11.42 TYPE 2 DIABETES MELLITUS WITH DIABETIC POLYNEUROPATHY, WITH LONG-TERM CURRENT USE OF INSULIN (HCC): ICD-10-CM

## 2022-08-11 RX ORDER — PREGABALIN 150 MG/1
CAPSULE ORAL
Qty: 60 CAPSULE | Refills: 0 | OUTPATIENT
Start: 2022-08-11 | End: 2022-09-11

## 2022-08-11 NOTE — TELEPHONE ENCOUNTER
Please Approve or Refuse.   Send to Pharmacy per Pt's Request:      Next Visit Date:  8/30/2022   Last Visit Date: 8/1/2022    Hemoglobin A1C (%)   Date Value   08/02/2022 8.9 (H)   08/01/2022 9.0   02/22/2021 10.3             ( goal A1C is < 7)   BP Readings from Last 3 Encounters:   08/01/22 118/82   04/13/21 132/81   03/16/21 122/84          (goal 120/80)  BUN   Date Value Ref Range Status   08/02/2022 10 6 - 20 mg/dL Final     Creatinine   Date Value Ref Range Status   08/02/2022 0.92 (H) 0.50 - 0.90 mg/dL Final     Potassium   Date Value Ref Range Status   08/02/2022 4.3 3.7 - 5.3 mmol/L Final

## 2022-08-17 DIAGNOSIS — E11.42 TYPE 2 DIABETES MELLITUS WITH DIABETIC POLYNEUROPATHY, WITH LONG-TERM CURRENT USE OF INSULIN (HCC): ICD-10-CM

## 2022-08-17 DIAGNOSIS — Z79.4 TYPE 2 DIABETES MELLITUS WITH DIABETIC POLYNEUROPATHY, WITH LONG-TERM CURRENT USE OF INSULIN (HCC): ICD-10-CM

## 2022-09-02 DIAGNOSIS — Z79.4 TYPE 2 DIABETES MELLITUS WITH DIABETIC POLYNEUROPATHY, WITH LONG-TERM CURRENT USE OF INSULIN (HCC): ICD-10-CM

## 2022-09-02 DIAGNOSIS — E11.42 TYPE 2 DIABETES MELLITUS WITH DIABETIC POLYNEUROPATHY, WITH LONG-TERM CURRENT USE OF INSULIN (HCC): ICD-10-CM

## 2022-09-02 RX ORDER — PEN NEEDLE, DIABETIC 31 GX3/16"
1 NEEDLE, DISPOSABLE MISCELLANEOUS 3 TIMES DAILY
Qty: 100 EACH | Refills: 3 | Status: SHIPPED | OUTPATIENT
Start: 2022-09-02

## 2022-09-02 NOTE — TELEPHONE ENCOUNTER
Please Approve or Refuse.   Send to Pharmacy per Pt's Request[facility]     Next Visit Date:  9/29/2022   Last Visit Date: 8/1/2022    Hemoglobin A1C (%)   Date Value   08/02/2022 8.9 (H)   08/01/2022 9.0   02/22/2021 10.3             ( goal A1C is < 7)   BP Readings from Last 3 Encounters:   08/01/22 118/82   04/13/21 132/81   03/16/21 122/84          (goal 120/80)  BUN   Date Value Ref Range Status   08/02/2022 10 6 - 20 mg/dL Final     Creatinine   Date Value Ref Range Status   08/02/2022 0.92 (H) 0.50 - 0.90 mg/dL Final     Potassium   Date Value Ref Range Status   08/02/2022 4.3 3.7 - 5.3 mmol/L Final

## 2022-09-08 DIAGNOSIS — Z79.4 TYPE 2 DIABETES MELLITUS WITH DIABETIC POLYNEUROPATHY, WITH LONG-TERM CURRENT USE OF INSULIN (HCC): ICD-10-CM

## 2022-09-08 DIAGNOSIS — E11.42 TYPE 2 DIABETES MELLITUS WITH DIABETIC POLYNEUROPATHY, WITH LONG-TERM CURRENT USE OF INSULIN (HCC): ICD-10-CM

## 2022-09-09 RX ORDER — PEN NEEDLE, DIABETIC 31 GX3/16"
NEEDLE, DISPOSABLE MISCELLANEOUS
OUTPATIENT
Start: 2022-09-09

## 2022-09-21 DIAGNOSIS — M79.7 FIBROMYALGIA: ICD-10-CM

## 2022-09-21 DIAGNOSIS — Z79.4 TYPE 2 DIABETES MELLITUS WITH DIABETIC POLYNEUROPATHY, WITH LONG-TERM CURRENT USE OF INSULIN (HCC): ICD-10-CM

## 2022-09-21 DIAGNOSIS — E11.42 TYPE 2 DIABETES MELLITUS WITH DIABETIC POLYNEUROPATHY, WITH LONG-TERM CURRENT USE OF INSULIN (HCC): ICD-10-CM

## 2022-09-21 RX ORDER — PREGABALIN 150 MG/1
CAPSULE ORAL
Qty: 60 CAPSULE | Refills: 0 | Status: SHIPPED | OUTPATIENT
Start: 2022-09-21 | End: 2022-10-21

## 2022-09-25 NOTE — PROGRESS NOTES
[facility] [address] [place] [address]   O: Maciej Figueroa is a 56 y.o. female evaluated via telephone on 9/29/2022. CONSENT:  She and/or health care decision maker is aware that that she may receive a bill for this telephone service, depending on her insurance coverage, and has provided verbal consent to proceed: Yes    DOCUMENTATION:  Patient scheduled this appointment today due to 1 Month Follow-Up  . HYPERTENSION  Villa García has a well controlled hypertension. she is currently on Lisinopril, HCTZ. Patient's most recent BP in the office was stable. she reports stable BP readings at home. Patient denies any adverse reaction to this therapy. she denies any CP, SOB, HA, or palpitations. Patient admits to exercising and adheres to low salt diet. No history of organ damage due to condition noted. Lab Results   Component Value Date    CREATININE 0.92 (H) 08/02/2022      BP Readings from Last 3 Encounters:   08/01/22 118/82   04/13/21 132/81   03/16/21 122/84     DIABETES MELLITUS- / NEUROPATHY  Patient reported her glucose reading has been great lately. Likes Jardiance. cut down humalog  Patient has a  stable Diabetes Mellitus. Current therapy includes Trulicity 7.61, Tresiba 70, Humalog. 10-15- decreased, sliding scale, Jardiance Patient is responding well with this therapy. Patient reports home glucose monitoring as Stable readings. Patient denieshypoglycemia episodes such as{symptoms. Patient denies neither polyuria nor polydipsia. Dr. Inocencio Yang. She was doing the trulicity . 75 mg . Got sick from 1.5 mg    Glucose 87. Less than 110--  Lab Results   Component Value Date/Time    LABA1C 8.9 (H) 08/02/2022 11:00 AM    LABA1C 9.0 08/01/2022 03:00 PM      NEUROPATHY  Patient currently on  Lyrica. Reports improvement. Ned Still is currently on pravastatin (Pravachol).  Patient denies adverse reaction to this medication. Compliance with treatment thus far has been good. The patient is known to have coexisting coronary artery disease. The 10-year CVD risk score (D'Agostino, et al., 2008) is: 12.8%    Values used to calculate the score:      Age: 56 years      Sex: Female      Diabetic: Yes      Tobacco smoker: No      Systolic Blood Pressure: 107 mmHg      Is BP treated: Yes      HDL Cholesterol: 55 mg/dL      Total Cholesterol: 221 mg/dL  Lab Results   Component Value Date/Time    CHOLFAST 221 (H) 08/02/2022 11:00 AM    CHOL 159 10/07/2020 09:05 AM    CHOL 181 03/19/2015 08:25 AM    HDL 55 08/02/2022 11:00 AM    LDLCHOLESTEROL 137 (H) 08/02/2022 11:00 AM    TRIGLYCFAST 147 08/02/2022 11:00 AM    CHOLHDLRATIO 4.0 08/02/2022 11:00 AM    TRIG 134 10/07/2020 09:05 AM    VLDL NOT REPORTED 10/07/2020 09:05 AM      MULTIPLE ARTHRITIS/LUBAR RADICULOPATHY/FIBROMYALGIA  PREVIOUS AND CURRENT NOTES '  Asking about lyrica monthly-- aware it is controlled now. Danilo Cruz is a 56 y.o. female patient Came back to [place] but did not see me yet went to see orthopedic specialist was complaining of inability to raise both arms. Patient has had some chronic low back pain for a while. More difficult to move around due to cold weather. Especially here in a [place]  Patient states that in [facility] her Sister found place with a warm pool/area to go too. It is definitely a lot warmer in [facility] than [place]. She is established with pain management but have not followed up. She denies any weakness to her legs. She is not getting any relief with the medicine that she is on which includes tizanidine, Lyrica, meloxicam  she continues to have some breakthrough pains but is tolerable with the current therapy from her sister. Patient with known fibromyalgia. Occasional muscle aches and pains also better with Lyrica better in a warmer weather.   Patient is recommended to see the neurosurgeon here in [place] rather than another place. However, she has not really reached out to any of them Since she will not be going back and forth for treatments. Previous notes   Patient has a known chronic low back pain for several years , Pain is worse after just 5 minutes of standing up when doing dishes. The pain is located midline and radiates up or down into the buttocks and thighs at times. Intensity of pain is 8/10, down to 5/10 when sitting. Patient also has known General muscle aches which is also chronic. Doing PT and TENS unit. Taking tizanidine at nighttime helps a little. She is also on Meloxicam,Tramadol  Patient was referred to ortho and pain management. She started seeing Dr. Lora Philip. SHe had several epidural injection. Patient is concerned about steroid raising her glucose levels. Injections are helping but not taking away all the pain. Patient also had shoulder injections by Dr. Alexsandra Ritchie. Steroid injection- 75 % relief- 25 relief. 4 injections so far. CURRENT SMOKER  Long time history of smoking. reports that she quit smoking about 5 years ago. Her smoking use included cigarettes. She started smoking about 39 years ago. She has a 68.00 pack-year smoking history. She has never used smokeless tobacco.Tayler is trying to cut down on smoking. Several unsuccessful attempts to quit in past.Tayler  does not have any plans to quit. We will continue to discuss different ways to quit. Resources provided. Due for ct lung for cancer screening      DEPRESSION SCREENING: Negative  PHQ Scores 8/1/2022 12/1/2021 8/18/2021 6/9/2021 3/16/2021 11/10/2020 10/5/2020   PHQ2 Score 2 2 1 0 2 0 0   PHQ9 Score 2 2 1 0 2 0 0     I communicated with the patient and/or health care decision maker about: PLAN     Review of Systems   Constitutional:  Positive for activity change. Negative for chills, fatigue and fever. Respiratory:  Negative for shortness of breath and wheezing. Cardiovascular:  Negative for chest pain and palpitations. Gastrointestinal:  Negative for abdominal pain. Genitourinary:  Negative for dysuria. Musculoskeletal:  Positive for arthralgias, back pain, gait problem and myalgias. General muscle aches, low back pain   Allergic/Immunologic: Positive for environmental allergies. Neurological:  Positive for numbness. Negative for weakness and headaches. Psychiatric/Behavioral:  Positive for sleep disturbance. Negative for dysphoric mood and suicidal ideas. The patient is nervous/anxious. Details of this discussion including any medical advice provided: Under assessment. PHYSICAL EXAM[INSTRUCTIONS:  \"[x]\" Indicates a positive item  \"[]\" Indicates a negative item  -- DELETE ALL ITEMS NOT EXAMINED]  Patient-Reported Vitals 9/29/2022   Patient-Reported Weight 290   Patient-Reported Height 5 6   Patient-Reported Systolic 030   Patient-Reported Diastolic 82   Patient-Reported Pulse -   Patient-Reported Temperature -     Constitutional: [x] No apparent distress      [] Abnormal -   Mental status: [x] Alert and awake  [x] Oriented to person/place/time[] Abnormal -   Pulmonary/Chest: [x] Respiratory effort normal         [] Abnormal -          Psychiatric:       [x] Normal Affect [] Abnormal -        [x] No Hallucinations  Other pertinent observable physical exam findings:-moderately anxious. Pressures speech  Controlled Substance Monitoring:  Acute and Chronic Pain Monitoring:   RX Monitoring 5/14/2021   Attestation -   Periodic Controlled Substance Monitoring No signs of potential drug abuse or diversion identified. ;Possible medication side effects, risk of tolerance/dependence & alternative treatments discussed. ;Assessed functional status. ;Obtaining appropriate analgesic effect of treatment. Chronic Pain > 50 MEDD Obtained or confirmed \"Consent for Opioid Use\" on file. ASSESSMENT  1. Essential hypertension  Stable  Continue current therapy. DISCUSSED AND ADVISED TO:  Cut down on your salt intake.   Cut down on caffeinated drinks, sports drinks. Instructed to check BP at home regularly. Report for any chest pains, shortness of breath, headaches, and lightheadedness. Call the office if your blood pressure continue to be higher than 140/90 or 90/50.      2. Type 2 diabetes mellitus with diabetic polyneuropathy, with long-term current use of insulin (HCC)  Stable  Continue therapy. We will continue to monitor Hemoglobin A1c   DISCUSSED and ADVISED TO:  Continue to check Glucose levels at home. Report increased and low levels as discussed. Decrease carbohydrates, sugary drinks, desserts in your diet. Exercise regularly, as tolerated. Try to lose weight. 3. Pure hypercholesterolemia  Stable  Continue therapy. Advised to decrease the consumption of red meats, fried foods, trans fats, sweets, sugary beverages. Advised to increase fish, vegetables, and fruits consumption. Advised to add fiber or OTC supplements in diet. Discussed weight loss which will result in improvement of lipids levels. Advised to increase daily physical activities and add regular exercises. 4. Fibromyalgia  Stable  DISCUSSED AND ADVISED TO:  Exercise often. Get a good night's sleep. Make healthy changes to diet. Try to reduce stress  Carefully take medications as discussed  Report for worsening symptoms            5. Lumbar radiculopathy  Stable  Continue current therapy. DISCUSSED AND ADVISED TO:  Use heat packs 15 to 20 mins every 2-3 hours. Do some back stretches as tolerated. Refer to hand out for instructions. Call for worsening, numbness, weakness. 6. Diabetic polyneuropathy associated with type 2 diabetes mellitus ([facility] [address]  Stable  Continue current therapy  Encouraged to maintain glucose levels  Encouraged to wear shoes all the time  Yearly foot exam      7.  Class 3 severe obesity due to excess calories with serious comorbidity and body mass index (BMI) of 40.0 to 44.9 in adult [facility])  Failure to Improve  BMI increasing  DISCUSSED AND ADVISED TO:  Eat a low-fat and low carbohydrates diet. Avoid fried foods especially fast food. Choose healthier options for snacks. Have 5-6 servings of fruits and vegetables per day. Cut down on eating processed food. Add 30 minutes to 1 hour aerobic exercise for 3-4 days a week. 8. Personal history of smoking  Failure to Improve  Continue to evaluate  Recommend ct lung screening    - CT LUNG SCREENING; Future    Total Time: minutes: 21-30 minutes  Donn Patel was evaluated through a synchronous (real-time) audio encounter. Patient identification was verified at the start of the visit. She (or guardian if applicable) is aware that this is a billable service, which includes applicable co-pays. This visit was conducted with the patient's (and/or legal guardian's) verbal consent. She has not had a related appointment within my department in the past 7 days or scheduled within the next 24 hours. The patient was located at Home: [address] [place] [address]. The provider was located at [facility] (Appt Dept): [place] [address]. Note: not billable if this call serves to triage the patient into an appointment for the relevant concern  This note was completed by using the assistance of a speech-recognition program. However, inadvertent computerized transcription errors may be present. Although every effort was made to ensure accuracy, no guarantees can be provided that every mistake has been identified and corrected by editing.   Electronically signed by KMIANI Tomas CNP on 9/25/22 at 7:17 PM EDT

## 2022-09-29 ENCOUNTER — TELEMEDICINE (OUTPATIENT)
Dept: FAMILY MEDICINE CLINIC | Age: 57
End: 2022-09-29
Payer: COMMERCIAL

## 2022-09-29 DIAGNOSIS — E11.42 TYPE 2 DIABETES MELLITUS WITH DIABETIC POLYNEUROPATHY, WITH LONG-TERM CURRENT USE OF INSULIN (HCC): ICD-10-CM

## 2022-09-29 DIAGNOSIS — I10 ESSENTIAL HYPERTENSION: Primary | ICD-10-CM

## 2022-09-29 DIAGNOSIS — Z87.891 PERSONAL HISTORY OF SMOKING: ICD-10-CM

## 2022-09-29 DIAGNOSIS — E78.00 PURE HYPERCHOLESTEROLEMIA: ICD-10-CM

## 2022-09-29 DIAGNOSIS — E11.42 DIABETIC POLYNEUROPATHY ASSOCIATED WITH TYPE 2 DIABETES MELLITUS (HCC): ICD-10-CM

## 2022-09-29 DIAGNOSIS — M54.16 LUMBAR RADICULOPATHY: ICD-10-CM

## 2022-09-29 DIAGNOSIS — Z79.4 TYPE 2 DIABETES MELLITUS WITH DIABETIC POLYNEUROPATHY, WITH LONG-TERM CURRENT USE OF INSULIN (HCC): ICD-10-CM

## 2022-09-29 DIAGNOSIS — E66.01 CLASS 3 SEVERE OBESITY DUE TO EXCESS CALORIES WITH SERIOUS COMORBIDITY AND BODY MASS INDEX (BMI) OF 40.0 TO 44.9 IN ADULT (HCC): ICD-10-CM

## 2022-09-29 DIAGNOSIS — M79.7 FIBROMYALGIA: ICD-10-CM

## 2022-09-29 PROCEDURE — G8427 DOCREV CUR MEDS BY ELIG CLIN: HCPCS | Performed by: FAMILY MEDICINE

## 2022-09-29 PROCEDURE — 3052F HG A1C>EQUAL 8.0%<EQUAL 9.0%: CPT | Performed by: FAMILY MEDICINE

## 2022-09-29 PROCEDURE — 3017F COLORECTAL CA SCREEN DOC REV: CPT | Performed by: FAMILY MEDICINE

## 2022-09-29 PROCEDURE — 2022F DILAT RTA XM EVC RTNOPTHY: CPT | Performed by: FAMILY MEDICINE

## 2022-09-29 PROCEDURE — 99214 OFFICE O/P EST MOD 30 MIN: CPT | Performed by: FAMILY MEDICINE

## 2022-09-29 ASSESSMENT — ENCOUNTER SYMPTOMS
WHEEZING: 0
SHORTNESS OF BREATH: 0
ABDOMINAL PAIN: 0
BACK PAIN: 1

## 2022-10-08 DIAGNOSIS — Z79.4 TYPE 2 DIABETES MELLITUS WITH DIABETIC POLYNEUROPATHY, WITH LONG-TERM CURRENT USE OF INSULIN (HCC): ICD-10-CM

## 2022-10-08 DIAGNOSIS — E11.42 TYPE 2 DIABETES MELLITUS WITH DIABETIC POLYNEUROPATHY, WITH LONG-TERM CURRENT USE OF INSULIN (HCC): ICD-10-CM

## 2022-10-10 RX ORDER — TIZANIDINE 4 MG/1
TABLET ORAL
Qty: 90 TABLET | Refills: 2 | Status: SHIPPED | OUTPATIENT
Start: 2022-10-10

## 2022-10-10 NOTE — TELEPHONE ENCOUNTER
Last Visit:  9/29/2022     Next Visit Date:  No future appointments. Health Maintenance   Topic Date Due    Low dose CT lung screening  Never done    Diabetic foot exam  07/26/2020    Colorectal Cancer Screen  02/21/2021    COVID-19 Vaccine (3 - Booster) 09/14/2021    Flu vaccine (1) 08/01/2022    Breast cancer screen  10/08/2022    Diabetic retinal exam  07/28/2023    Depression Screen  08/01/2023    A1C test (Diabetic or Prediabetic)  08/02/2023    Diabetic microalbuminuria test  08/02/2023    Lipids  08/02/2023    Cervical cancer screen  10/05/2025    DTaP/Tdap/Td vaccine (2 - Td or Tdap) 09/28/2030    Hepatitis B vaccine  Completed    Shingles vaccine  Completed    Pneumococcal 0-64 years Vaccine  Completed    Hepatitis C screen  Completed    HIV screen  Completed    Hepatitis A vaccine  Aged Out    Hib vaccine  Aged Out    Meningococcal (ACWY) vaccine  Aged Out       Hemoglobin A1C (%)   Date Value   08/02/2022 8.9 (H)   08/01/2022 9.0   02/22/2021 10.3             ( goal A1C is < 7)   Microalb/Crt.  Ratio (mcg/mg creat)   Date Value   08/02/2022 Can not be calculated     LDL Cholesterol (mg/dL)   Date Value   08/02/2022 137 (H)   10/07/2020 74     LDL Calculated (mg/dL)   Date Value   03/19/2015 110       (goal LDL is <100)   AST (U/L)   Date Value   08/02/2022 16     ALT (U/L)   Date Value   08/02/2022 18     BUN (mg/dL)   Date Value   08/02/2022 10     BP Readings from Last 3 Encounters:   08/01/22 118/82   04/13/21 132/81   03/16/21 122/84          (goal 120/80)    All Future Testing planned in [facility]  Lab Frequency Next Occurrence   MRI LUMBAR SPINE W CONTRAST Once 10/13/2022   Cologuard (For External Results Only) Once 06/02/2023   CT LUNG SCREENING Once 10/05/2022               Patient Active Problem List:     Allergic rhinitis     Fibromyalgia     Pure hypercholesterolemia     Chronic cholecystitis     Gastroesophageal reflux disease with esophagitis     Medication monitoring encounter     Type 2 diabetes mellitus with diabetic polyneuropathy, with long-term current use of insulin (HCC)     Chronic left shoulder pain     Essential hypertension     Vitamin D deficiency     Fatigue     Diabetic polyneuropathy associated with type 2 diabetes mellitus (HCC)     Large breasts     Elevated LFTs     Lumbar radiculopathy     Arthritis involving multiple sites     Morbid obesity with BMI of 40.0-44.9, adult (HCC)     Numbness and tingling in both hands     Chronic bilateral low back pain without sciatica     Acute pain of both shoulders     Severe sleep apnea     Diabetes mellitus due to underlying condition with hyperosmolarity without coma, with long-term current use of insulin (HCC)     Facet degeneration of lumbar region     Decreased functional residual capacity     Lumbosacral spondylosis without myelopathy     Macromastia     Skin ulcer, limited to breakdown of skin ([address]     Lesion of larynx

## 2022-11-21 DIAGNOSIS — I10 ESSENTIAL HYPERTENSION: ICD-10-CM

## 2022-11-21 DIAGNOSIS — E11.42 TYPE 2 DIABETES MELLITUS WITH DIABETIC POLYNEUROPATHY, WITH LONG-TERM CURRENT USE OF INSULIN (HCC): ICD-10-CM

## 2022-11-21 DIAGNOSIS — Z79.4 TYPE 2 DIABETES MELLITUS WITH DIABETIC POLYNEUROPATHY, WITH LONG-TERM CURRENT USE OF INSULIN (HCC): ICD-10-CM

## 2022-11-21 DIAGNOSIS — E78.00 PURE HYPERCHOLESTEROLEMIA: ICD-10-CM

## 2022-11-21 RX ORDER — POTASSIUM CHLORIDE 750 MG/1
TABLET, FILM COATED, EXTENDED RELEASE ORAL
Qty: 90 TABLET | Refills: 0 | Status: SHIPPED | OUTPATIENT
Start: 2022-11-21

## 2022-11-21 RX ORDER — ATORVASTATIN CALCIUM 10 MG/1
TABLET, FILM COATED ORAL
Qty: 90 TABLET | Refills: 0 | Status: SHIPPED | OUTPATIENT
Start: 2022-11-21 | End: 2022-12-01 | Stop reason: SDUPTHER

## 2022-11-21 RX ORDER — EMPAGLIFLOZIN 10 MG/1
TABLET, FILM COATED ORAL
Qty: 90 TABLET | Refills: 0 | Status: SHIPPED | OUTPATIENT
Start: 2022-11-21

## 2022-11-23 DIAGNOSIS — E78.00 PURE HYPERCHOLESTEROLEMIA: ICD-10-CM

## 2022-11-23 NOTE — TELEPHONE ENCOUNTER
Call received from [address] [place] [address] requesting a refill of patients pravastatin (PRAVACHOL) 20 MG tablet , [address] [facility] [address] is not listed as a pharmacy for the pt. Adv pt will need to call to update pharmacy information.

## 2022-12-01 DIAGNOSIS — E11.42 TYPE 2 DIABETES MELLITUS WITH DIABETIC POLYNEUROPATHY, WITH LONG-TERM CURRENT USE OF INSULIN (HCC): ICD-10-CM

## 2022-12-01 DIAGNOSIS — Z79.4 TYPE 2 DIABETES MELLITUS WITH DIABETIC POLYNEUROPATHY, WITH LONG-TERM CURRENT USE OF INSULIN (HCC): ICD-10-CM

## 2022-12-01 DIAGNOSIS — I10 ESSENTIAL HYPERTENSION: ICD-10-CM

## 2022-12-01 DIAGNOSIS — E78.00 PURE HYPERCHOLESTEROLEMIA: ICD-10-CM

## 2022-12-01 RX ORDER — ATORVASTATIN CALCIUM 10 MG/1
TABLET, FILM COATED ORAL
Qty: 90 TABLET | Refills: 0 | Status: SHIPPED | OUTPATIENT
Start: 2022-12-01

## 2022-12-01 RX ORDER — DULAGLUTIDE 0.75 MG/.5ML
INJECTION, SOLUTION SUBCUTANEOUS
Qty: 2 ML | Refills: 1 | Status: SHIPPED | OUTPATIENT
Start: 2022-12-01

## 2022-12-01 RX ORDER — INSULIN DEGLUDEC INJECTION 100 U/ML
INJECTION, SOLUTION SUBCUTANEOUS
Qty: 18 ADJUSTABLE DOSE PRE-FILLED PEN SYRINGE | Refills: 1 | Status: SHIPPED | OUTPATIENT
Start: 2022-12-01

## 2022-12-01 RX ORDER — INSULIN LISPRO 100 [IU]/ML
INJECTION, SOLUTION INTRAVENOUS; SUBCUTANEOUS
Qty: 5 ADJUSTABLE DOSE PRE-FILLED PEN SYRINGE | Refills: 1 | Status: SHIPPED | OUTPATIENT
Start: 2022-12-01

## 2022-12-01 RX ORDER — LISINOPRIL 20 MG/1
TABLET ORAL
Qty: 90 TABLET | Refills: 0 | Status: SHIPPED | OUTPATIENT
Start: 2022-12-01

## 2022-12-01 NOTE — TELEPHONE ENCOUNTER
Please Approve or Refuse.   Send to Pharmacy per Pt's Request: Lucinda Blum     Next Visit Date:  Visit date not found   Last Visit Date: 9/29/2022    Hemoglobin A1C (%)   Date Value   08/02/2022 8.9 (H)   08/01/2022 9.0   02/22/2021 10.3             ( goal A1C is < 7)   BP Readings from Last 3 Encounters:   08/01/22 118/82   04/13/21 132/81   03/16/21 122/84          (goal 120/80)  BUN   Date Value Ref Range Status   08/02/2022 10 6 - 20 mg/dL Final     Creatinine   Date Value Ref Range Status   08/02/2022 0.92 (H) 0.50 - 0.90 mg/dL Final     Potassium   Date Value Ref Range Status   08/02/2022 4.3 3.7 - 5.3 mmol/L Final

## 2022-12-01 NOTE — TELEPHONE ENCOUNTER
[facility] called requesting 3 refills along with a 4th one being the pravastatin see TE from 11/23/22. Melinda Engle is calling to request a refill on the following medication(s):    Last Visit Date (If Applicable):  9/75/4525    Next Visit Date:    Visit date not found    Medication Request:  Requested Prescriptions     Pending Prescriptions Disp Refills    atorvastatin (LIPITOR) 10 MG tablet 90 tablet 0     Sig: TAKE ONE TABLET BY MOUTH DAILY    lisinopril (PRINIVIL;ZESTRIL) 20 MG tablet 90 tablet 2     Sig: TAKE ONE TABLET BY MOUTH DAILY    insulin lispro, 1 Unit Dial, (HUMALOG KWIKPEN) 100 UNIT/ML SOPN       Sig: TID W meals   If <139             No Ins  140-199 2 U  200-249 4 U  250-299 6 U  300-349 8 Units  350-400 10 U  > 400               12 U  > 400: 14 u , rep in 30.                                                                                                   If still high add 10 u X 2

## 2022-12-07 RX ORDER — PRAVASTATIN SODIUM 20 MG
20 TABLET ORAL DAILY
Qty: 90 TABLET | Refills: 0 | Status: SHIPPED | OUTPATIENT
Start: 2022-12-07

## 2022-12-08 ENCOUNTER — TELEPHONE (OUTPATIENT)
Dept: FAMILY MEDICINE CLINIC | Age: 57
End: 2022-12-08

## 2022-12-08 DIAGNOSIS — Z79.4 TYPE 2 DIABETES MELLITUS WITH DIABETIC POLYNEUROPATHY, WITH LONG-TERM CURRENT USE OF INSULIN (HCC): ICD-10-CM

## 2022-12-08 DIAGNOSIS — E11.42 TYPE 2 DIABETES MELLITUS WITH DIABETIC POLYNEUROPATHY, WITH LONG-TERM CURRENT USE OF INSULIN (HCC): ICD-10-CM

## 2022-12-08 RX ORDER — INSULIN DEGLUDEC INJECTION 100 U/ML
INJECTION, SOLUTION SUBCUTANEOUS
Qty: 18 ML | Refills: 0 | OUTPATIENT
Start: 2022-12-08

## 2022-12-08 NOTE — TELEPHONE ENCOUNTER
Pharmacy [facility] called and they will like a clarification in regard to medication listed below. They will like clarification on medication's DOSAGE,INSTRUCTIONS. I did verbalize to cancel script. Provider will clarify, correct and resend in script to pharmacy. Pharmacy verbalized will cancel pending medication, and await for new updated script. Medication listed below HUMALOG needs to be clarified. Thank you! insulin lispro, 1 Unit Dial, (HUMALOG KWIKPEN) 100 UNIT/ML SOPN [[id number]]     Order Details  Dose, Route, Frequency: As Directed   Dispense Quantity: 5 Adjustable Dose Pre-filled Pen Syringe Refills: 1          Sig:    TID W meals   If <139             No Ins   140-199 2 U   200-249 4 U   250-299 6 U   300-349 8 Units   350-400 10 U   > 400               12 U   > 400: 14 u , rep in 30.                                                                                                    If still high add 10 u X 2          Start Date: 12/01/22 End Date: --   Written Date: 12/01/22

## 2022-12-26 DIAGNOSIS — Z79.4 TYPE 2 DIABETES MELLITUS WITH DIABETIC POLYNEUROPATHY, WITH LONG-TERM CURRENT USE OF INSULIN (HCC): ICD-10-CM

## 2022-12-26 DIAGNOSIS — M79.7 FIBROMYALGIA: ICD-10-CM

## 2022-12-26 DIAGNOSIS — E11.42 TYPE 2 DIABETES MELLITUS WITH DIABETIC POLYNEUROPATHY, WITH LONG-TERM CURRENT USE OF INSULIN (HCC): ICD-10-CM

## 2022-12-26 DIAGNOSIS — E55.9 VITAMIN D DEFICIENCY: ICD-10-CM

## 2022-12-27 RX ORDER — ISOPROPYL ALCOHOL 0.75 G/1
SWAB TOPICAL
Qty: 200 EACH | Refills: 1 | Status: SHIPPED | OUTPATIENT
Start: 2022-12-27

## 2022-12-27 RX ORDER — GLUCOSAMINE HCL/CHONDROITIN SU 500-400 MG
CAPSULE ORAL
Qty: 100 STRIP | Refills: 1 | Status: SHIPPED | OUTPATIENT
Start: 2022-12-27

## 2022-12-27 RX ORDER — MELATONIN
Qty: 90 TABLET | Refills: 0 | Status: SHIPPED | OUTPATIENT
Start: 2022-12-27

## 2022-12-27 RX ORDER — PEN NEEDLE, DIABETIC 31 GX3/16"
1 NEEDLE, DISPOSABLE MISCELLANEOUS 3 TIMES DAILY
Qty: 100 EACH | Refills: 1 | Status: SHIPPED | OUTPATIENT
Start: 2022-12-27

## 2022-12-27 RX ORDER — PREGABALIN 150 MG/1
CAPSULE ORAL
Qty: 60 CAPSULE | Refills: 0 | Status: SHIPPED | OUTPATIENT
Start: 2022-12-27 | End: 2023-01-25

## 2022-12-28 DIAGNOSIS — E11.42 TYPE 2 DIABETES MELLITUS WITH DIABETIC POLYNEUROPATHY, WITH LONG-TERM CURRENT USE OF INSULIN (HCC): ICD-10-CM

## 2022-12-28 DIAGNOSIS — Z79.4 TYPE 2 DIABETES MELLITUS WITH DIABETIC POLYNEUROPATHY, WITH LONG-TERM CURRENT USE OF INSULIN (HCC): ICD-10-CM

## 2022-12-28 RX ORDER — INSULIN LISPRO 100 [IU]/ML
INJECTION, SOLUTION INTRAVENOUS; SUBCUTANEOUS
Qty: 5 ADJUSTABLE DOSE PRE-FILLED PEN SYRINGE | Refills: 0 | Status: SHIPPED | OUTPATIENT
Start: 2022-12-28

## 2022-12-28 NOTE — TELEPHONE ENCOUNTER
Please Approve or Refuse.   Send to Pharmacy per Pt's Request:      Next Visit Date:  Visit date not found   Last Visit Date: 9/29/2022    Hemoglobin A1C (%)   Date Value   08/02/2022 8.9 (H)   08/01/2022 9.0   02/22/2021 10.3             ( goal A1C is < 7)   BP Readings from Last 3 Encounters:   08/01/22 118/82   04/13/21 132/81   03/16/21 122/84          (goal 120/80)  BUN   Date Value Ref Range Status   08/02/2022 10 6 - 20 mg/dL Final     Creatinine   Date Value Ref Range Status   08/02/2022 0.92 (H) 0.50 - 0.90 mg/dL Final     Potassium   Date Value Ref Range Status   08/02/2022 4.3 3.7 - 5.3 mmol/L Final

## 2022-12-29 NOTE — CONSULTS
[address] [address]                                 CONSULTATION    PATIENT NAME: Hay Mckoy            :             1965  MED REC NO:   [id number]                            ROOM:           1115  ACCOUNT NO:   [de-identified]                          ADMISSION DATE:  2017  PROVIDER:     Aleah Hoover    CONSULT DATE:  2017    CONSULTING PHYSICIAN:  Dr. Anny Ash:  Vocal cord nodule. CHIEF COMPLAINT:  \"I'm hoarse. \"    HISTORY OF PRESENT ILLNESS:  The patient is a 61-year-old female  admitted a couple of days ago for right lower quadrant pain, found to  have chronic cholecystitis. She also underwent bronchoscopy the next  day after an EGD revealed a lesion on the right vocal cord. During  the bronchoscopy, she had vocal cord brushing that revealed some  atypical cells to be present, which may be reactive or possibly  neoplastic in nature, and a histologic evaluation was recommended with  definitive biopsy. She admits to being hoarse for a year. She had  pneumonia last October followed by bronchitis, and ever since then,  her voice has never been the same. She does admit to having chronic  reflux, being on Prilosec for years. She also is a heavy smoker,  smoking at least 1-1/2 to 2 packs a day for years now. She denies any  hemoptysis. She denies any respiratory issues, on shortness of  breath, no difficulty breathing. She does have vocal fatigue when  talking a lot. No sores in the mouth. No lumps or bumps in the neck. No history of vocal cord biopsies in the past.    REVIEW OF SYSTEMS:  As stated above; otherwise, she has some right  lower quadrant pain that has been chronic in nature, along with some  nausea. No fever, no chills. No sore throat. No chest pain, no  dyspnea on exertion, no wheezing. No dysuria. No headache.   All  other systems were checked and found to be negative. PAST MEDICAL HISTORY:  Diabetes, allergic rhinitis, diabetic  neuropathy, GERD, fibromyalgia, obesity, hypercholesterolemia, sleep  apnea. PAST SURGICAL HISTORY:  Gastric bypass surgery, ovarian cyst removal,  carpal tunnel release. MEDICATIONS:  Medication list was reviewed. Please refer to the chart  for the full list.    ALLERGIES:  DEMEROL, MORPHINE, AND PENICILLIN. FAMILY HISTORY:  Significant for diabetes and heart disease. SOCIAL HISTORY:  She has been smoking 1-1/2 to 2 packs of cigarettes a  day for years. She does drink alcohol as well on a regular basis. She denies any drug abuse. PHYSICAL EXAMINATION:  GENERAL:  She is alert, she is oriented, she is cooperative, she is  sitting awake in bed, she is in no acute distress. She is breathing  comfortably, with a hoarse voice, but no respiratory distress, no  labored breathing, no stridor. VITAL SIGNS:  Temperature 97.3, pulse 79, respirations 18, blood  pressure elevated at 148/88, and she is satting between 92 to 98% on  room air over the last couple of hours. HEENT:  She has a normocephalic, atraumatic head. She is morbidly  obese. Pupils are equally reactive to light and accommodation. Extraocular eye muscles are intact. Examination of the ears revealed  clear canals and pink tympanic membranes. Nasal exam revealed a right  septal deviation, the left naris is clear. Oral cavity exam revealed  tonsils to be 2+ in size, normal oropharynx, floor of the mouth was  soft, tongue base was soft, and no mucosal lesions were seen. NECK:  Palpation of the neck did not reveal any masses. Trachea  midline. No thyromegaly. CHEST:  Equal expansion, symmetric. HEART:  Regular rate and rhythm. LUNGS:  Clear to auscultation. ABDOMEN:  Soft, nondistended, with mild right lower quadrant  tenderness. EXTREMITIES:  She moves all four extremities. No clubbing, cyanosis,  or edema.   SKIN:  Normal turgor, no .

## 2023-01-04 DIAGNOSIS — E11.42 TYPE 2 DIABETES MELLITUS WITH DIABETIC POLYNEUROPATHY, WITH LONG-TERM CURRENT USE OF INSULIN (HCC): ICD-10-CM

## 2023-01-04 DIAGNOSIS — Z79.4 TYPE 2 DIABETES MELLITUS WITH DIABETIC POLYNEUROPATHY, WITH LONG-TERM CURRENT USE OF INSULIN (HCC): ICD-10-CM

## 2023-01-05 RX ORDER — CETIRIZINE HYDROCHLORIDE 10 MG/1
10 TABLET ORAL DAILY
Qty: 90 TABLET | Refills: 3 | Status: SHIPPED | OUTPATIENT
Start: 2023-01-05

## 2023-01-05 RX ORDER — DULAGLUTIDE 0.75 MG/.5ML
INJECTION, SOLUTION SUBCUTANEOUS
Qty: 2 ML | Refills: 3 | Status: SHIPPED | OUTPATIENT
Start: 2023-01-05

## 2023-01-05 NOTE — TELEPHONE ENCOUNTER
Please Approve or Refuse.   Send to Pharmacy per Pt's Request:      Next Visit Date:  07/21/23  Last Visit Date: 9/29/2022    Hemoglobin A1C (%)   Date Value   08/02/2022 8.9 (H)   08/01/2022 9.0   02/22/2021 10.3             ( goal A1C is < 7)   BP Readings from Last 3 Encounters:   08/01/22 118/82   04/13/21 132/81   03/16/21 122/84          (goal 120/80)  BUN   Date Value Ref Range Status   08/02/2022 10 6 - 20 mg/dL Final     Creatinine   Date Value Ref Range Status   08/02/2022 0.92 (H) 0.50 - 0.90 mg/dL Final     Potassium   Date Value Ref Range Status   08/02/2022 4.3 3.7 - 5.3 mmol/L Final

## 2023-01-05 NOTE — TELEPHONE ENCOUNTER
Patient needs an appointment for diabetes in 1 or 2 months      BP Readings from Last 3 Encounters:   08/01/22 118/82   04/13/21 132/81   03/16/21 122/84       Lab Results   Component Value Date    LABA1C 8.9 (H) 08/02/2022    LABA1C 9.0 08/01/2022    LABA1C 10.3 02/22/2021

## 2023-01-08 DIAGNOSIS — Z79.4 TYPE 2 DIABETES MELLITUS WITH DIABETIC POLYNEUROPATHY, WITH LONG-TERM CURRENT USE OF INSULIN (HCC): ICD-10-CM

## 2023-01-08 DIAGNOSIS — E11.42 TYPE 2 DIABETES MELLITUS WITH DIABETIC POLYNEUROPATHY, WITH LONG-TERM CURRENT USE OF INSULIN (HCC): ICD-10-CM

## 2023-01-09 RX ORDER — INSULIN DEGLUDEC INJECTION 100 U/ML
INJECTION, SOLUTION SUBCUTANEOUS
Qty: 18 ML | Refills: 0 | Status: SHIPPED | OUTPATIENT
Start: 2023-01-09

## 2023-01-09 NOTE — TELEPHONE ENCOUNTER
Please Approve or Refuse.   Send to Pharmacy per Pt's Request: [facility]      Next Visit Date:  Visit date not found   Last Visit Date: 9/29/2022    Hemoglobin A1C (%)   Date Value   08/02/2022 8.9 (H)   08/01/2022 9.0   02/22/2021 10.3             ( goal A1C is < 7)   BP Readings from Last 3 Encounters:   08/01/22 118/82   04/13/21 132/81   03/16/21 122/84          (goal 120/80)  BUN   Date Value Ref Range Status   08/02/2022 10 6 - 20 mg/dL Final     Creatinine   Date Value Ref Range Status   08/02/2022 0.92 (H) 0.50 - 0.90 mg/dL Final     Potassium   Date Value Ref Range Status   08/02/2022 4.3 3.7 - 5.3 mmol/L Final

## 2023-01-09 NOTE — TELEPHONE ENCOUNTER
Called patient to schedule routine follow up appointment with  new to provider . Patient did not answer so left a detail message. For patient to call office back to schedule follow up appointment as requested.      Future Appointments   Date Time Provider Shana Redding   7/21/2023  3:15 PM Dereck Aponte MD [facility]

## 2023-01-12 DIAGNOSIS — E11.42 TYPE 2 DIABETES MELLITUS WITH DIABETIC POLYNEUROPATHY, WITH LONG-TERM CURRENT USE OF INSULIN (HCC): ICD-10-CM

## 2023-01-12 DIAGNOSIS — Z79.4 TYPE 2 DIABETES MELLITUS WITH DIABETIC POLYNEUROPATHY, WITH LONG-TERM CURRENT USE OF INSULIN (HCC): ICD-10-CM

## 2023-01-12 RX ORDER — INSULIN LISPRO 100 [IU]/ML
INJECTION, SOLUTION INTRAVENOUS; SUBCUTANEOUS
Qty: 5 ADJUSTABLE DOSE PRE-FILLED PEN SYRINGE | Refills: 0 | Status: SHIPPED | OUTPATIENT
Start: 2023-01-12 | End: 2023-01-17 | Stop reason: SDUPTHER

## 2023-01-12 NOTE — TELEPHONE ENCOUNTER
Please Approve or Refuse.   Send to Pharmacy per Pt's Request:      Next Visit Date:  7/21/2023    Last Visit Date: 9/29/2022    Hemoglobin A1C (%)   Date Value   08/02/2022 8.9 (H)   08/01/2022 9.0   02/22/2021 10.3             ( goal A1C is < 7)   BP Readings from Last 3 Encounters:   08/01/22 118/82   04/13/21 132/81   03/16/21 122/84          (goal 120/80)  BUN   Date Value Ref Range Status   08/02/2022 10 6 - 20 mg/dL Final     Creatinine   Date Value Ref Range Status   08/02/2022 0.92 (H) 0.50 - 0.90 mg/dL Final     Potassium   Date Value Ref Range Status   08/02/2022 4.3 3.7 - 5.3 mmol/L Final

## 2023-01-12 NOTE — TELEPHONE ENCOUNTER
Needs diabetes appointment in 2-3 weeks  Lab Results   Component Value Date    LABA1C 8.9 (H) 08/02/2022    LABA1C 9.0 08/01/2022    LABA1C 10.3 02/22/2021

## 2023-01-17 ENCOUNTER — TELEPHONE (OUTPATIENT)
Dept: FAMILY MEDICINE CLINIC | Age: 58
End: 2023-01-17

## 2023-01-17 DIAGNOSIS — E11.42 TYPE 2 DIABETES MELLITUS WITH DIABETIC POLYNEUROPATHY, WITH LONG-TERM CURRENT USE OF INSULIN (HCC): ICD-10-CM

## 2023-01-17 DIAGNOSIS — Z79.4 TYPE 2 DIABETES MELLITUS WITH DIABETIC POLYNEUROPATHY, WITH LONG-TERM CURRENT USE OF INSULIN (HCC): ICD-10-CM

## 2023-01-17 RX ORDER — INSULIN LISPRO 100 [IU]/ML
INJECTION, SOLUTION INTRAVENOUS; SUBCUTANEOUS
Qty: 5 ADJUSTABLE DOSE PRE-FILLED PEN SYRINGE | Refills: 0 | Status: SHIPPED | OUTPATIENT
Start: 2023-01-17

## 2023-01-17 NOTE — TELEPHONE ENCOUNTER
SHANNON PHARMACIST IS ASKING WHAT IS THE MAX # UNITS PER DAY FOR RX:   insulin lispro, 1 Unit Dial, (HUMALOG KWIKPEN) 100 UNIT/ML SOPN

## 2023-01-18 NOTE — TELEPHONE ENCOUNTER
Please make patient a virtual appointment with any of the providers with openings, for diabetes and multiple health maintenance overdue    Lab Results   Component Value Date    LABA1C 8.9 (H) 08/02/2022    LABA1C 9.0 08/01/2022    LABA1C 10.3 02/22/2021         Future Appointments   Date Time Provider Shana Redding   7/21/2023  3:15 PM [facility]

## 2023-01-18 NOTE — TELEPHONE ENCOUNTER
30 units in 24 hours, I resent     Diagnosis Orders   1.  Type 2 diabetes mellitus with diabetic polyneuropathy, with long-term current use of insulin (HCC)  insulin lispro, 1 Unit Dial, (HUMALOG KWIKPEN) 100 UNIT/ML SOPN           Future Appointments   Date Time Provider Shana Redding   7/21/2023  3:15 PM Belvin Cabot, MD [facility]

## 2023-01-25 DIAGNOSIS — Z79.4 TYPE 2 DIABETES MELLITUS WITH DIABETIC POLYNEUROPATHY, WITH LONG-TERM CURRENT USE OF INSULIN (HCC): ICD-10-CM

## 2023-01-25 DIAGNOSIS — E11.42 TYPE 2 DIABETES MELLITUS WITH DIABETIC POLYNEUROPATHY, WITH LONG-TERM CURRENT USE OF INSULIN (HCC): ICD-10-CM

## 2023-01-25 RX ORDER — INSULIN DEGLUDEC INJECTION 100 U/ML
INJECTION, SOLUTION SUBCUTANEOUS
Qty: 18 ML | Refills: 0 | Status: SHIPPED | OUTPATIENT
Start: 2023-01-25

## 2023-01-25 NOTE — TELEPHONE ENCOUNTER
Please Approve or Refuse.  Send to Pharmacy per Pt's Request: [facility]      Next Visit Date:  7/21/2023   Last Visit Date: 9/29/2022    Hemoglobin A1C (%)   Date Value   08/02/2022 8.9 (H)   08/01/2022 9.0   02/22/2021 10.3             ( goal A1C is < 7)   BP Readings from Last 3 Encounters:   08/01/22 118/82   04/13/21 132/81   03/16/21 122/84          (goal 120/80)  BUN   Date Value Ref Range Status   08/02/2022 10 6 - 20 mg/dL Final     Creatinine   Date Value Ref Range Status   08/02/2022 0.92 (H) 0.50 - 0.90 mg/dL Final     Potassium   Date Value Ref Range Status   08/02/2022 4.3 3.7 - 5.3 mmol/L Final

## 2023-02-08 DIAGNOSIS — E11.42 TYPE 2 DIABETES MELLITUS WITH DIABETIC POLYNEUROPATHY, WITH LONG-TERM CURRENT USE OF INSULIN (HCC): ICD-10-CM

## 2023-02-08 DIAGNOSIS — Z79.4 TYPE 2 DIABETES MELLITUS WITH DIABETIC POLYNEUROPATHY, WITH LONG-TERM CURRENT USE OF INSULIN (HCC): ICD-10-CM

## 2023-02-08 RX ORDER — INSULIN DEGLUDEC INJECTION 100 U/ML
INJECTION, SOLUTION SUBCUTANEOUS
Qty: 18 ML | Refills: 0 | Status: SHIPPED | OUTPATIENT
Start: 2023-02-08

## 2023-02-08 NOTE — TELEPHONE ENCOUNTER
Please Approve or Refuse.   Send to Pharmacy per Pt's Request: Renae Cora      Next Visit Date:  7/21/2023   Last Visit Date: 9/29/2022    Hemoglobin A1C (%)   Date Value   08/02/2022 8.9 (H)   08/01/2022 9.0   02/22/2021 10.3             ( goal A1C is < 7)   BP Readings from Last 3 Encounters:   08/01/22 118/82   04/13/21 132/81   03/16/21 122/84          (goal 120/80)  BUN   Date Value Ref Range Status   08/02/2022 10 6 - 20 mg/dL Final     Creatinine   Date Value Ref Range Status   08/02/2022 0.92 (H) 0.50 - 0.90 mg/dL Final     Potassium   Date Value Ref Range Status   08/02/2022 4.3 3.7 - 5.3 mmol/L Final

## 2023-02-10 NOTE — TELEPHONE ENCOUNTER
Called patient to schedule routine follow up appointment with   . Patient did not answer so left a detail message. For patient to call office back to schedule follow up appointment as requested.      Future Appointments   Date Time Provider Shana Redding   7/21/2023  3:15 PM Javier Anglin MD [facility]

## 2023-02-13 RX ORDER — TIZANIDINE 4 MG/1
TABLET ORAL
Qty: 90 TABLET | Refills: 2 | Status: SHIPPED | OUTPATIENT
Start: 2023-02-13

## 2023-02-27 DIAGNOSIS — E11.42 TYPE 2 DIABETES MELLITUS WITH DIABETIC POLYNEUROPATHY, WITH LONG-TERM CURRENT USE OF INSULIN (HCC): ICD-10-CM

## 2023-02-27 DIAGNOSIS — Z79.4 TYPE 2 DIABETES MELLITUS WITH DIABETIC POLYNEUROPATHY, WITH LONG-TERM CURRENT USE OF INSULIN (HCC): ICD-10-CM

## 2023-02-27 DIAGNOSIS — M79.7 FIBROMYALGIA: ICD-10-CM

## 2023-02-27 RX ORDER — PREGABALIN 150 MG/1
CAPSULE ORAL
Qty: 60 CAPSULE | Refills: 0 | OUTPATIENT
Start: 2023-02-27 | End: 2023-03-28

## 2023-02-27 NOTE — TELEPHONE ENCOUNTER
I have never seen the patient  Prior PCP, Katina  Patient has been off Lyrica since August, this would not be appropriate for her anyway     08/22/2022 08/09/2022   2  Pregabalin 150 Mg Capsule 60.00  30  Lina Ramsey

## 2023-02-27 NOTE — TELEPHONE ENCOUNTER
Needs an appointment earlier to discuss other options instead of Lyrica she has been off Lyrica, please see my prior message in this encounter      Future Appointments   Date Time Provider Shana Redding   7/21/2023  3:15 PM [facility]

## 2023-03-01 DIAGNOSIS — Z79.4 TYPE 2 DIABETES MELLITUS WITH DIABETIC POLYNEUROPATHY, WITH LONG-TERM CURRENT USE OF INSULIN (HCC): ICD-10-CM

## 2023-03-01 DIAGNOSIS — E11.42 TYPE 2 DIABETES MELLITUS WITH DIABETIC POLYNEUROPATHY, WITH LONG-TERM CURRENT USE OF INSULIN (HCC): ICD-10-CM

## 2023-03-01 DIAGNOSIS — M79.7 FIBROMYALGIA: ICD-10-CM

## 2023-03-01 RX ORDER — PREGABALIN 150 MG/1
CAPSULE ORAL
Qty: 60 CAPSULE | Refills: 0 | OUTPATIENT
Start: 2023-03-01 | End: 2023-03-30

## 2023-03-01 NOTE — TELEPHONE ENCOUNTER
Same answer as per prior decline x 2        Me     FC    11:13 AM  Note   I have never seen the patient  Prior PCP, Katina  Patient has been off Lyrica since August, this would not be appropriate for her anyway      08/22/2022 08/09/2022    2  Pregabalin 150 Mg Capsule 60.00  30  Re Gau                Me     FC    11:13 AM  Note   Needs an appointment earlier to discuss other options instead of Lyrica she has been off Lyrica, please see my prior message in this encounter               Future Appointments   Date Time Provider Shana Redding   7/21/2023  3:15 PM Makeda Medrano MD [facility]

## 2023-03-03 NOTE — TELEPHONE ENCOUNTER
Patient will need to either keep appointment with us    She could also go to an urgent care in [facility]    Controlled substances prescribed in [place] are only for the use in [place]  Also she has been off Lyrica since August or beginning of September, so in other words she does not need it she is off of it already          Future Appointments   Date Time Provider Shana Redding   7/21/2023  3:15 PM [facility]

## 2023-03-03 NOTE — TELEPHONE ENCOUNTER
PATIENT STATES SHE IS IN [place] AND UNABLE TO MAKE IT BACK TO [place] UNABLE TO DO VIRTUAL SINCE SHE IS OUT OF STATE- NOT SURE WHAT CAN BE DONE

## 2023-04-20 DIAGNOSIS — E11.42 TYPE 2 DIABETES MELLITUS WITH DIABETIC POLYNEUROPATHY, WITH LONG-TERM CURRENT USE OF INSULIN (HCC): ICD-10-CM

## 2023-04-20 DIAGNOSIS — Z79.4 TYPE 2 DIABETES MELLITUS WITH DIABETIC POLYNEUROPATHY, WITH LONG-TERM CURRENT USE OF INSULIN (HCC): ICD-10-CM

## 2023-04-20 RX ORDER — ISOPROPYL ALCOHOL 0.75 G/1
SWAB TOPICAL
Qty: 200 EACH | Refills: 1 | Status: SHIPPED | OUTPATIENT
Start: 2023-04-20

## 2023-04-20 NOTE — TELEPHONE ENCOUNTER
Please Approve or Refuse.   Send to Pharmacy per Pt's Request: Frans[facility] Wilson      Next Visit Date:  7/21/2023   Last Visit Date: 9/29/2022    Hemoglobin A1C (%)   Date Value   08/02/2022 8.9 (H)   08/01/2022 9.0   02/22/2021 10.3             ( goal A1C is < 7)   BP Readings from Last 3 Encounters:   08/01/22 118/82   04/13/21 132/81   03/16/21 122/84          (goal 120/80)  BUN   Date Value Ref Range Status   08/02/2022 10 6 - 20 mg/dL Final     Creatinine   Date Value Ref Range Status   08/02/2022 0.92 (H) 0.50 - 0.90 mg/dL Final     Potassium   Date Value Ref Range Status   08/02/2022 4.3 3.7 - 5.3 mmol/L Final

## 2023-05-02 DIAGNOSIS — E11.42 TYPE 2 DIABETES MELLITUS WITH DIABETIC POLYNEUROPATHY, WITH LONG-TERM CURRENT USE OF INSULIN (HCC): ICD-10-CM

## 2023-05-02 DIAGNOSIS — Z79.4 TYPE 2 DIABETES MELLITUS WITH DIABETIC POLYNEUROPATHY, WITH LONG-TERM CURRENT USE OF INSULIN (HCC): ICD-10-CM

## 2023-05-02 RX ORDER — DULAGLUTIDE 0.75 MG/.5ML
INJECTION, SOLUTION SUBCUTANEOUS
Qty: 2 ML | Refills: 1 | Status: SHIPPED | OUTPATIENT
Start: 2023-05-02

## 2024-05-27 NOTE — PROGRESS NOTES
Physical Therapy  Initial Assessment  Date: 10/2/2020  Patient Name: Chucky Olivier  MRN: [id number]  : 1965     Treatment Diagnosis: Right Arm Aain     Subjective   General  Chart Reviewed: Yes  Patient assessed for rehabilitation services?: Yes  Additional Pertinent Hx: left rotator cuff repair   Response To Previous Treatment: Not applicable  Family / Caregiver Present: No  Referring Practitioner: Rosario Beck DO   Referral Date : 20  Diagnosis: Right shoulder pain (M25.511)  Follows Commands: Within Functional Limits  PT Visit Information  Onset Date: 20  PT Insurance Information: [employer] Ohm   Total # of Visits Approved: 12  Total # of Visits to Date: 1  Plan of Care/Certification Expiration Date: 20  No Show: 0  Progress Note Due Date: 10/16/20  Canceled Appointment: 0  Progress Note Counter:    Subjective  Subjective: The pain stated about 6 weeks ago. The pain started for no apparent reason.  (The patient noted her symptoms may be do to poor sleeping pat)  Pain Screening  Patient Currently in Pain: Yes  Pain Assessment  Pain Assessment: 0-10  Pain Level: 7  Patient's Stated Pain Goal: No pain  Pain Type: Chronic pain  Pain Location: Arm  Pain Orientation: Right  Pain Radiating Towards: right shoulder   Pain Descriptors: Numbness  Pain Frequency: Continuous  Pain Onset: Progressive  Clinical Progression: Gradually worsening  Functional Pain Assessment: Prevents or interferes some active activities and ADLs  Non-Pharmaceutical Pain Intervention(s): Ambulation/Increased Activity  Response to Pain Intervention: None  Vital Signs  Patient Currently in Pain: Yes  Patient Observation  Observations: limited right arm movements     Vision/Hearing  Vision  Vision: Impaired(glasses )  Hearing  Hearing: Within functional limits      Social/Functional History  Social/Functional History  Lives With: Alone  Type of Home: Trailer  Home Layout: One level  Home Access: Stairs to enter head and neck neutral spine  Exercise 3: HEP - Avoid over flexion and extension of head and neck   Ortho Screen  Posture: Sever forward head - loss of cervical curve      Assessment   Neurological  Neurological (WDL): Within Defined Limits  Conditions Requiring Skilled Therapeutic Intervention  Body structures, Functions, Activity limitations: Decreased functional mobility ; Decreased ADL status; Decreased ROM; Decreased strength;Decreased balance;Decreased high-level IADLs; Increased pain;Decreased posture  Assessment: The patient should do well with PT 2 x a week. Treating her cervical spine for right shoulder symptoms. Treatment Diagnosis: Right arm pain   Prognosis: Good  Decision Making: Low Complexity  Barriers to Learning: none  REQUIRES PT FOLLOW UP: Yes  Treatment Initiated : HEP see above   Discharge Recommendations: Continue to assess pending progress  Activity Tolerance  Activity Tolerance: Patient Tolerated treatment well         Plan      Plan of care started       OutComes Score     OPTIMAL score of 13/3 initial.     Goals  Short term goals  Time Frame for Short term goals: 6 Visits  Short term goal 1: OPTIMAL score of 13/3 initial , patient goal is 9/3. Short term goal 2: Independent with HEP. Long term goals  Time Frame for Long term goals : 12 visits  Long term goal 1: OPTIMAL score at discharge will be 6/3.    Patient Goals   Patient goals : Use right arm without pain        Therapy Time   Individual Concurrent Group Co-treatment   Time In  1100         Time Out  1200         Minutes  60 min          Timed Code Treatment Minutes: 60 Minutes      Treatment Charges: Minutes Units   []  Ultrasound     []  Electrical-Stim     []  Iontophoresis     []  Traction     []  Massage       [x]  Eval 30 1   []  Gait     [x]  Ther Exercise 15   1   []  Manual Therapy       []  Ther Activities       []  Aquatics     []  Vasopneumatic Device     [x]  Neuro Re-Ed 15   1   []  Other       Total Treatment Time: 60 3          Rehab Potential:  [x] Good  [] Fair  [] Poor   Suggested Professional Referral:  [x] No  [] Yes:  Barriers to Goal Achievement:  [x] No  [] Yes:  Domestic Concerns:  [x] No  [] Yes:    Treatment Plan:  [x] Therapeutic Exercise   00214  [] Iontophoresis: 4 mg/mL Dexamethasone Sodium Phosphate  mAmin  06676   [] Therapeutic Activity  81834 [] Vasopneumatic cold with compression  34917    [] Gait Training   18737 [] Ultrasound   81682   [x] Neuromuscular Re-education  80125 [x] Electrical Stimulation Unattended  32377   [] Manual Therapy  60227 [] Electrical Stimulation Attended  81455   [x] Instruction in HEP  [] Lumbar/Cervical Traction  96351   [] Aquatic Therapy   02823 [x] Cold/hot pack    [] Massage   96043      [] Dry Needling, 1 or 2 muscles  67250   [] Biofeedback, first 15 minutes   65434  [] Biofeedback, additional 15 minutes   82025 [] Dry Needling, 3 or more muscles  20562      Frequency:    2       X/wk x     6    wk's      [x] Plans/Goals, Risk/Benefits discussed with pt/family  Comprehension of Education [x] yes  [] Needs Review  Pt/Family Education: [x] Verbal  [x] Demo  [] Written    More objective information is available upon request.  Thank you for this referral.        Medicare/Regulatory Requirements:  I have reviewed this plan of care and certify a need for   Medically necessary rehabilitation services.   [] Physician Signature    Date:     Electronically signed by: Rosana Buerger, [address] [address] [address] [place]  [place], [address] [facility]  Phone [phone]  Fax [phone] Satisfactory

## (undated) DEVICE — GEL US 20GM NONIRRITATING OVERWRAPPED FILE PCH TRNSMIT

## (undated) DEVICE — GLOVE ORANGE PI 7   MSG9070

## (undated) DEVICE — GOWN,AURORA,NONRNF,XL,30/CS: Brand: MEDLINE

## (undated) DEVICE — PACK SURG ABD SVMMC

## (undated) DEVICE — GARMENT,MEDLINE,DVT,INT,CALF,MED, GEN2: Brand: MEDLINE

## (undated) DEVICE — SMARTSTITCH PERFECTPASSER                                    MAGNUMWIRE SUTURE CARTRIDGES, BLUE CO-BRAID: Brand: SMARTSTITCH PERFECTPASSER

## (undated) DEVICE — GLOVE ORANGE PI 7 1/2   MSG9075

## (undated) DEVICE — TOWEL SURG W16XL26IN WHT NONFENESTRATED ST 4 PER PK

## (undated) DEVICE — AIRLIFE™ NASAL OXYGEN CANNULA CURVED, FLARED TIP, WITH 7 FEET (2.1 M) CRUSH RESISTANT TUBING, OVER-THE-EAR STYLE: Brand: AIRLIFE™

## (undated) DEVICE — PROTECTOR ULN NRV PUR FOAM HK LOOP STRP ANATOMICALLY

## (undated) DEVICE — STERLING OVAL BUR, 4.0 MM: Brand: STERLING

## (undated) DEVICE — SNARE ENDOSCP L240CM LOOP W13MM DIA2.4MM SHT THROW SM OVL

## (undated) DEVICE — STANDARD HYPODERMIC NEEDLE,POLYPROPYLENE HUB: Brand: MONOJECT

## (undated) DEVICE — PAD,ABDOMINAL,5"X9",ST,LF,25/BX: Brand: MEDLINE INDUSTRIES, INC.

## (undated) DEVICE — SUTURE ETHLN SZ 3-0 L18IN NONABSORBABLE BLK L24MM PS-1 3/8 1663G

## (undated) DEVICE — Z DISCONTINUED USE 2423037 APPLICATOR MEDICATED 3 CC CHLORHEXIDINE GLUC 2% CHLORAPREP

## (undated) DEVICE — CANNULA NSL AD L2IN ETCO2 SAMP SFT CRUSH RESIST FEM AIRLFE

## (undated) DEVICE — IMMOBILIZER SHLDR L10.5-17IN D7IN SLNG W/ 15DEG ABD PLLW

## (undated) DEVICE — DUP USE 393023 JELLY LUBRICATING EZ 2OZ

## (undated) DEVICE — KENDALL SCD EXPRESS SLEEVES, KNEE LENGTH, MEDIUM: Brand: KENDALL SCD

## (undated) DEVICE — 3 ML SYRINGE LUER-LOCK TIP: Brand: MONOJECT

## (undated) DEVICE — MEDI-VAC NON-CONDUCTIVE SUCTION TUBING 7MM X 6.1M (20 FT.) L: Brand: CARDINAL HEALTH

## (undated) DEVICE — TRAY CATHETER 16FR F INCLUDE BARDX IC COMPLT CARE DRNGE BG

## (undated) DEVICE — SMARTSTITCH PERFECTPASSER CONNECTOR: Brand: PERFECTPASSER

## (undated) DEVICE — GOWN,AURORA,NONREINFORCED,LARGE: Brand: MEDLINE

## (undated) DEVICE — BRUSH CYTO GI W/ 3 RNG HNDL 1.8MMX120MM

## (undated) DEVICE — DRESSING TRNSPAR W5XL4.5IN FLM SHT SEMIPERMEABLE WIND

## (undated) DEVICE — Z CONVERTED USE 2271043 CONTAINER SPEC COLL 4OZ SCR ON LID PEEL PCH

## (undated) DEVICE — CANNULA IV 18GA L15IN BLNT FILL LUERLOCK HUB MJCT

## (undated) DEVICE — GLOVE ORANGE PI 8   MSG9080

## (undated) DEVICE — TRAP SPEC COLL 40CC MUCOUS CALIB UNIV CONN FOR OPN SUCT

## (undated) DEVICE — CHLORAPREP 26ML ORANGE

## (undated) DEVICE — SHOULDER SUSPENSION KIT 6 PER BOX

## (undated) DEVICE — APPLIER CLP L L13IN TI MULT RNG HNDL 20 CLP STR LIGACLP

## (undated) DEVICE — TOWEL,OR,DSP,ST,NATURAL,DLX,4/PK,20PK/CS: Brand: MEDLINE

## (undated) DEVICE — FORCEPS BX L240CM WRK CHN 2.8MM STD CAP W/ NDL MIC MESH

## (undated) DEVICE — GOWN,SURGICAL,AURORA,SLEEVE: Brand: MEDLINE

## (undated) DEVICE — CODMAN® SURGICAL PATTIES 1/2" X 1/2" (1.27CM X 1.27CM): Brand: CODMAN®

## (undated) DEVICE — COVER LT HNDL BLU PLAS

## (undated) DEVICE — GLOVE SURG SZ 85 L12IN FNGR THK13MIL WHT ISOLEX POLYISOPRENE

## (undated) DEVICE — GLOVE SURG SZ 65 THK91MIL LTX FREE SYN POLYISOPRENE

## (undated) DEVICE — STERLING XTRASHARP SHAVER GREAT WHITE SHAVER BLADE, 4.2 MM: Brand: STERLING XTRASHARP SHAVER GREAT WHITE

## (undated) DEVICE — SOLUTION: ACTIFOG W/FOAM PAD 48/CS: Brand: ACTIFOG™

## (undated) DEVICE — NEEDLE ECHOGENIC 20GA L4IN INSUL W/ 30DEG BVL EXTN SET

## (undated) DEVICE — DEFENDO AIR WATER SUCTION AND BIOPSY VALVE KIT FOR  OLYMPUS: Brand: DEFENDO AIR/WATER/SUCTION AND BIOPSY VALVE

## (undated) DEVICE — YANKAUER,FLEXIBLE HANDLE,REGLR CAPACITY: Brand: MEDLINE INDUSTRIES, INC.

## (undated) DEVICE — PACK PROCEDURE SURG SVMMC SHLDR ARTHRO PK

## (undated) DEVICE — PROBE ABLAT XL 90DEG ASPIR BPLR RF 1 PC ELECTRD ERGO HNDL

## (undated) DEVICE — SYRINGE, LUER LOCK, 10ML: Brand: MEDLINE

## (undated) DEVICE — SPONGE: SPECIALTY PEANUT XR 100/CS: Brand: MEDICAL ACTION INDUSTRIES

## (undated) DEVICE — SCALPEL 11 BLADE

## (undated) DEVICE — GAUZE,SPONGE,FLUFF,6"X6.75",STRL,5/TRAY: Brand: MEDLINE

## (undated) DEVICE — SMARTSTITCH PERFECTPASSER                                    MAGNUMWIRE SUTURE CARTRIDGES, WHITE: Brand: SMARTSTITCH PERFECTPASSER

## (undated) DEVICE — POLYP TRAP: Brand: TRAPEASE®